# Patient Record
Sex: FEMALE | Race: BLACK OR AFRICAN AMERICAN | NOT HISPANIC OR LATINO | Employment: FULL TIME | URBAN - METROPOLITAN AREA
[De-identification: names, ages, dates, MRNs, and addresses within clinical notes are randomized per-mention and may not be internally consistent; named-entity substitution may affect disease eponyms.]

---

## 2017-03-14 ENCOUNTER — TELEPHONE (OUTPATIENT)
Dept: GASTROENTEROLOGY | Facility: CLINIC | Age: 42
End: 2017-03-14

## 2017-03-14 DIAGNOSIS — K90.89 BILE SALT-INDUCED DIARRHEA: Chronic | ICD-10-CM

## 2017-03-14 RX ORDER — CHOLESTYRAMINE 4 G/9G
4 POWDER, FOR SUSPENSION ORAL DAILY
Qty: 30 PACKET | Refills: 1 | Status: SHIPPED | OUTPATIENT
Start: 2017-03-14 | End: 2017-05-03 | Stop reason: SDUPTHER

## 2017-03-14 NOTE — TELEPHONE ENCOUNTER
----- Message from Amanda Phipps MA sent at 3/14/2017  1:56 PM CDT -----  Contact: [phone] self      ----- Message -----     From: Keith Michaels MA     Sent: 3/14/2017   1:31 PM       To: Amanda Phipps MA        ----- Message -----     From: Beverly Reza MA     Sent: 3/14/2017   1:24 PM       To: [facility] Staff ([facility])    REFILLS:     Patient is requesting a medication refill.     RX name: cholestyramine (QUESTRAN) 4 gram packet    Pharmacy name/location: [address] [place] [address]    Pharmacy phone: [phone]

## 2017-04-24 ENCOUNTER — TELEPHONE (OUTPATIENT)
Dept: OBSTETRICS AND GYNECOLOGY | Facility: CLINIC | Age: 42
End: 2017-04-24

## 2017-04-24 NOTE — TELEPHONE ENCOUNTER
----- Message from Annabel Faye sent at 4/21/2017 12:33 PM CDT -----  Contact: self, [phone]  Patient requests to schedule her mammogram exam on the day of her annual appointment on 5/3. Please advise.

## 2017-05-03 ENCOUNTER — OFFICE VISIT (OUTPATIENT)
Dept: GASTROENTEROLOGY | Facility: CLINIC | Age: 42
End: 2017-05-03
Payer: COMMERCIAL

## 2017-05-03 VITALS
SYSTOLIC BLOOD PRESSURE: 111 MMHG | WEIGHT: 236.19 LBS | BODY MASS INDEX: 43.46 KG/M2 | DIASTOLIC BLOOD PRESSURE: 68 MMHG | HEIGHT: 62 IN | HEART RATE: 86 BPM

## 2017-05-03 DIAGNOSIS — R11.0 NAUSEA ALONE: ICD-10-CM

## 2017-05-03 DIAGNOSIS — K90.89 BILE SALT-INDUCED DIARRHEA: Chronic | ICD-10-CM

## 2017-05-03 DIAGNOSIS — Z51.81 ENCOUNTER FOR MONITORING CHRONIC NSAID THERAPY: Chronic | ICD-10-CM

## 2017-05-03 DIAGNOSIS — Z79.1 ENCOUNTER FOR MONITORING CHRONIC NSAID THERAPY: Chronic | ICD-10-CM

## 2017-05-03 DIAGNOSIS — M94.0 TIETZE'S SYNDROME: ICD-10-CM

## 2017-05-03 DIAGNOSIS — K21.9 GASTROESOPHAGEAL REFLUX DISEASE WITHOUT ESOPHAGITIS: Primary | Chronic | ICD-10-CM

## 2017-05-03 DIAGNOSIS — K90.89 BILE SALT-INDUCED DIARRHEA: ICD-10-CM

## 2017-05-03 DIAGNOSIS — R12 HEARTBURN: ICD-10-CM

## 2017-05-03 DIAGNOSIS — K21.9 GASTROESOPHAGEAL REFLUX DISEASE WITHOUT ESOPHAGITIS: Chronic | ICD-10-CM

## 2017-05-03 PROCEDURE — 1160F RVW MEDS BY RX/DR IN RCRD: CPT | Mod: S$GLB,,, | Performed by: INTERNAL MEDICINE

## 2017-05-03 PROCEDURE — 99999 PR PBB SHADOW E&M-EST. PATIENT-LVL III: CPT | Mod: PBBFAC,,, | Performed by: INTERNAL MEDICINE

## 2017-05-03 PROCEDURE — 99214 OFFICE O/P EST MOD 30 MIN: CPT | Mod: S$GLB,,, | Performed by: INTERNAL MEDICINE

## 2017-05-03 RX ORDER — KETOROLAC TROMETHAMINE 10 MG/1
10 TABLET, FILM COATED ORAL 2 TIMES DAILY
COMMUNITY
End: 2018-05-10

## 2017-05-03 RX ORDER — OMEPRAZOLE 20 MG/1
20 CAPSULE, DELAYED RELEASE ORAL DAILY
Qty: 30 CAPSULE | Refills: 10 | Status: SHIPPED | OUTPATIENT
Start: 2017-05-03 | End: 2018-05-10

## 2017-05-03 RX ORDER — TRAMADOL HYDROCHLORIDE 50 MG/1
50 TABLET ORAL EVERY 12 HOURS PRN
COMMUNITY
End: 2018-05-10

## 2017-05-03 RX ORDER — CHOLESTYRAMINE 4 G/9G
4 POWDER, FOR SUSPENSION ORAL DAILY
Qty: 30 PACKET | Refills: 10 | Status: SHIPPED | OUTPATIENT
Start: 2017-05-03 | End: 2018-10-26

## 2017-05-03 NOTE — PROGRESS NOTES
[facility]     Chief complaint: Follow-up and Gastroesophageal Reflux       PCP: Liana Grider MD    HPI: 42 y.o. 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  On presentation today, the patient looks well, but reports that she has had significant increase in heartburn symptoms, and persistent nausea for the past 2-3 months.  Upon reviewing her medications, it appears that she has been on daily ketorolac/Toradol for the past year as a method of preventing or treating her headaches.  There is been no weight loss, she denies any dysphagia or odynophagia.  Normal bowel pattern with Questran use is 1-2 bowel movements daily.  She denies any bright red blood per rectum or melena.    I discussed some length the problem with chronic NSAID use, especially for Toradol.  She has most of her heartburn symptoms occur at night, we discussed shifting the dosing of the Prilosec to before the evening meal.  Also trial off Toradol was recommended and the patient agrees to this.    Patient is accompanied by no one.    Past Medical History:   Diagnosis Date    Fibroid     GERD (gastroesophageal reflux disease)     Migraine headache        Review of Systems  Review of Systems   Constitutional: Negative for appetite change, chills, diaphoresis, fatigue, fever and unexpected weight change.   HENT: Negative for postnasal drip, trouble swallowing and voice change.    Eyes: Negative for visual disturbance.   Respiratory: Negative for cough, chest tightness, shortness of breath and wheezing.    Cardiovascular: Positive for chest pain (retrosternal burn). Negative for palpitations and leg swelling.   Gastrointestinal: Positive for nausea. Negative for abdominal distention, abdominal pain, anal bleeding, blood in stool, constipation, diarrhea, rectal pain and vomiting.   Endocrine: Negative for cold  "intolerance and polyuria.   Genitourinary: Negative for difficulty urinating, frequency, urgency and vaginal bleeding.   Musculoskeletal: Negative for arthralgias, back pain and gait problem.   Skin: Negative.    Allergic/Immunologic: Negative for environmental allergies and food allergies.   Neurological: Negative for seizures, speech difficulty and headaches.   Hematological: Does not bruise/bleed easily.   Psychiatric/Behavioral: Negative.        Physical Examination  /68 (BP Location: Right arm, Patient Position: Sitting)  Pulse 86  Ht 5' 2" (1.575 m)  Wt 107.1 kg (236 lb 3.2 oz)  BMI 43.2 kg/m2  Wt Readings from Last 1 Encounters:   05/03/17 1302 107.1 kg (236 lb 3.2 oz)       Physical Exam   Constitutional: She is oriented to person, place, and time. Vital signs are normal. She appears well-developed and well-nourished.   HENT:   Head: Normocephalic and atraumatic.   Right Ear: External ear normal.   Left Ear: External ear normal.   Nose: Nose normal.   Mouth/Throat: Uvula is midline and oropharynx is clear and moist. No oropharyngeal exudate.   Eyes: Conjunctivae, EOM and lids are normal. Pupils are equal, round, and reactive to light. No scleral icterus.   Neck: Trachea normal. Neck supple. No JVD present. No tracheal tenderness and no muscular tenderness present. No tracheal deviation and no edema present. No thyromegaly present.   Cardiovascular: Normal rate, regular rhythm, normal heart sounds and normal pulses.  Exam reveals no S3 and no S4.    No murmur heard.  Pulmonary/Chest: Effort normal and breath sounds normal. No stridor. She exhibits tenderness (focally tender in the mid axillary line along both costal margins).   Abdominal: Soft. Normal appearance and bowel sounds are normal. She exhibits no distension, no pulsatile liver, no fluid wave, no abdominal bruit, no pulsatile midline mass and no mass. There is no hepatosplenomegaly. There is no tenderness. There is no rebound and no " guarding. No hernia.   Morbidly obese, nontender throughout examined area   Musculoskeletal: Normal range of motion. She exhibits no edema.   Neurological: She is alert and oriented to person, place, and time. She exhibits normal muscle tone. Coordination normal.   Skin: Skin is warm and dry. No petechiae and no rash noted. No cyanosis. Nails show no clubbing.   Psychiatric: She has a normal mood and affect. Her speech is normal and behavior is normal. Judgment normal. Cognition and memory are normal.   Nursing note and vitals reviewed.    Labs:   Complete Blood Count  No results found for: CBC    Comprehensive Metabolic Panel  No results found for: CMP    TSH  No results found for: TSH      Assessment:   Gastroesophageal reflux disease without esophagitis  Comments:  Symptoms have recurred in the past 2-3 months despite daily Prilosec treatment;  Mainly nocturnal  Orders:  -     omeprazole (PRILOSEC) 20 MG capsule; Take 1 capsule (20 mg total) by mouth once daily.  Dispense: 30 capsule; Refill: 10    Bile salt-induced diarrhea  -     cholestyramine (QUESTRAN) 4 gram packet; Take 1 packet (4 g total) by mouth once daily.  Dispense: 30 packet; Refill: 10    Nausea alone  Comments:  Present for the past 2-3 months, persistent,    Heartburn    Encounter for monitoring chronic NSAID therapy  Comments:  Daily Toradol for 1 year for headache treatment    Tietze's syndrome  Comments:  Bilateral costal margins mainly in the chart      Bile salt-induced diarrhea  Comments:  doing well w cholestyramine  Orders:  -     cholestyramine (QUESTRAN) 4 gram packet; Take 1 packet (4 g total) by mouth once daily.  Dispense: 30 packet; Refill: 10    Gastroesophageal reflux disease without esophagitis  -     omeprazole (PRILOSEC) 20 MG capsule; Take 1 capsule (20 mg total) by mouth once daily.  Dispense: 30 capsule; Refill: 10      Plan:    Return in about 3 months (around 8/3/2017), or if symptoms worsen or fail to improve.    No  orders of the defined types were placed in this encounter.    - Prilosec dose should be moved to have a dose given a half-hour before the evening meal  -Recommend holding Toradol use for at least 1 week, and, assuming that symptoms respond, should discontinue and seek another treatment plan for headaches  -Will attempt to refer patient for headache specialist at [facility]  -Continue Prilosec use as well as Questran use as previously recommended  -Follow-up office visit in 3 months, or as needed      Rafael Huffman MD, FACP, FACG, AGAF [facility]  [address], [address], [place] [address]  Phone: [phone] Fax: [phone]    [address], [address], [place] [address]  Phone: [phone] Fax: [phone]    - Portions of this note were dictated using voice recognition software and may contain dictation related errors in spelling / grammar / syntax not discovered on text review.

## 2017-05-03 NOTE — PATIENT INSTRUCTIONS
- Prilosec dose should be moved to have a dose given a half-hour before the evening meal  -Recommend holding Toradol use for at least 1 week, and, assuming that symptoms respond, should discontinue and seek another treatment plan for headaches  -Will attempt to refer patient for headache specialist at [facility]  -Continue Prilosec use as well as Questran use as previously recommended  -Follow-up office visit in 3 months, or as needed

## 2017-05-03 NOTE — MR AVS SNAPSHOT
[facility]  [address] [place] [address]  Phone: [phone]  Fax: [phone]                  Luzmaria Real   5/3/2017 1:20 PM   Office Visit    Description:  Female : 1975   Provider:  Rafael Huffman MD   Department:  [facility]           Reason for Visit     Follow-up     Gastroesophageal Reflux           Diagnoses this Visit        Comments    Gastroesophageal reflux disease without esophagitis    -  Primary Symptoms have recurred in the past 2-3 months despite daily Prilosec treatment;  Mainly nocturnal    Bile salt-induced diarrhea         Nausea alone     Present for the past 2-3 months, persistent,    Heartburn         Encounter for monitoring chronic NSAID therapy     Daily Toradol for 1 year for headache treatment    Tietze's syndrome     Bilateral costal margins mainly in the chart      Bile salt-induced diarrhea     doing well w cholestyramine    Gastroesophageal reflux disease without esophagitis                To Do List           Future Appointments        Provider Department Dept Phone    5/15/2017 9:00 AM MD [facility] - [phone]    2017 9:20 AM Jarett Guajardo MD [place] - Neurology [phone]      Goals (5 Years of Data)     None      Follow-Up and Disposition     Return in about 3 months (around 8/3/2017), or if symptoms worsen or fail to improve.       These Medications        Disp Refills Start End    cholestyramine (QUESTRAN) 4 gram packet 30 packet 10 5/3/2017 2017    Take 1 packet (4 g total) by mouth once daily. - Oral    Pharmacy: [facility]/pharmacy #[id number] - [address] AT [facility] Ph #: [phone]       omeprazole (PRILOSEC) 20 MG capsule 30 capsule 10 5/3/2017     Take 1 capsule (20 mg total) by mouth once daily. - Oral    Pharmacy: [facility]/pharmacy #[id number] - [facility] [address] [facility] Ph  #: [phone]         [facility]     [facility] Nurse Care [facility] - 24/7 Assistance  Unless otherwise directed by your provider, please contact [facility], our nurse care line that is available for 24/7 assistance.     Registered nurses in the [facility] provide: appointment scheduling, clinical advisement, health education, and other advisory services.  Call: [phone] (toll free)               Medications           Message regarding Medications     Verify the changes and/or additions to your medication regime listed below are the same as discussed with your clinician today.  If any of these changes or additions are incorrect, please notify your healthcare provider.        STOP taking these medications     ALBUTEROL SULFATE (PROAIR HFA INHL) Inhale into the lungs.    amoxicillin (AMOXIL) 500 MG capsule Take 500 mg by mouth 3 (three) times daily.    oxycodone-acetaminophen 5-325 mg (PERCOCET) 5-325 mg per tablet Take by mouth as needed.    promethazine-codeine 6.25-10 mg/5 ml (PHENERGAN WITH CODEINE) 6.25-10 mg/5 mL syrup Take 5 mLs by mouth every 4 (four) hours as needed for Cough.           Verify that the below list of medications is an accurate representation of the medications you are currently taking.  If none reported, the list may be blank. If incorrect, please contact your healthcare provider. Carry this list with you in case of emergency.           Current Medications     ketorolac (TORADOL) 10 mg tablet Take 10 mg by mouth 2 (two) times daily.    omeprazole (PRILOSEC) 20 MG capsule Take 1 capsule (20 mg total) by mouth once daily.    promethazine (PHENERGAN) 12.5 MG Tab Take 12.5 mg by mouth every 6 (six) hours as needed.    tramadol (ULTRAM) 50 mg tablet Take 50 mg by mouth every 12 (twelve) hours as needed for Pain.    cholestyramine (QUESTRAN) 4 gram packet Take 1 packet (4 g total) by mouth once daily.           Clinical Reference Information           Your Vitals Were   "   BP Pulse Height Weight BMI    111/68 (BP Location: Right arm, Patient Position: Sitting) 86 5' 2" (1.575 m) 107.1 kg (236 lb 3.2 oz) 43.2 kg/m2      Blood Pressure          Most Recent Value    BP  111/68      Allergies as of 5/3/2017     Caffeine      Immunizations Administered on Date of Encounter - 5/3/2017     None      [facility] Sign-Up     Activating your [facility] account is as easy as 1-2-3!     1) Visit [email or web address], select Sign Up Now, enter this activation code and your date of birth, then select Next.  [email or web address]  Expires: 6/17/2017  2:05 PM      2) Create a username and password to use when you visit [facility] in the future and select a security question in case you lose your password and select Next.    3) Enter your e-mail address and click Sign Up!    Additional Information  If you have questions, please e-mail [email or web address] or call [phone] to talk to our [facility] staff. Remember, [facility] is NOT to be used for urgent needs. For medical emergencies, dial 911.         Instructions    - Prilosec dose should be moved to have a dose given a half-hour before the evening meal  -Recommend holding Toradol use for at least 1 week, and, assuming that symptoms respond, should discontinue and seek another treatment plan for headaches  -[facility]  -Continue Prilosec use as well as Questran use as previously recommended  -Follow-up office visit in 3 months, or as needed       Language Assistance Services     ATTENTION: Language assistance services are available, free of charge. Please call [phone].      ATENCIÓN: Si habla jason, tiene a emmanuel disposición servicios gratuitos de asistencia lingüística. Llame al [phone].     SHANA Ý: N?u b?n nói Ti?ng Vi?t, có các d?ch v? h? tr? ngôn ng? mi?n phí dành cho b?n. G?i s? [phone] complies with applicable Federal civil rights laws and " does not discriminate on the basis of race, color, national origin, age, disability, or sex.

## 2017-05-09 DIAGNOSIS — K21.9 GASTROESOPHAGEAL REFLUX DISEASE WITHOUT ESOPHAGITIS: Chronic | ICD-10-CM

## 2017-05-09 RX ORDER — OMEPRAZOLE 20 MG/1
20 CAPSULE, DELAYED RELEASE ORAL DAILY
Qty: 30 CAPSULE | Refills: 4 | OUTPATIENT
Start: 2017-05-09

## 2017-05-15 ENCOUNTER — OFFICE VISIT (OUTPATIENT)
Dept: OBSTETRICS AND GYNECOLOGY | Facility: CLINIC | Age: 42
End: 2017-05-15
Payer: COMMERCIAL

## 2017-05-15 ENCOUNTER — HOSPITAL ENCOUNTER (OUTPATIENT)
Dept: RADIOLOGY | Facility: HOSPITAL | Age: 42
Discharge: HOME | End: 2017-05-15
Attending: OBSTETRICS & GYNECOLOGY
Payer: COMMERCIAL

## 2017-05-15 VITALS
SYSTOLIC BLOOD PRESSURE: 110 MMHG | DIASTOLIC BLOOD PRESSURE: 80 MMHG | WEIGHT: 235.88 LBS | BODY MASS INDEX: 43.41 KG/M2 | HEIGHT: 62 IN

## 2017-05-15 DIAGNOSIS — Z01.419 WELL WOMAN EXAM WITH ROUTINE GYNECOLOGICAL EXAM: ICD-10-CM

## 2017-05-15 DIAGNOSIS — Z01.419 WELL WOMAN EXAM WITH ROUTINE GYNECOLOGICAL EXAM: Primary | ICD-10-CM

## 2017-05-15 PROCEDURE — 77067 SCR MAMMO BI INCL CAD: CPT | Mod: TC

## 2017-05-15 PROCEDURE — 88175 CYTOPATH C/V AUTO FLUID REDO: CPT

## 2017-05-15 PROCEDURE — 77067 SCR MAMMO BI INCL CAD: CPT | Mod: 26,,, | Performed by: RADIOLOGY

## 2017-05-15 PROCEDURE — 99396 PREV VISIT EST AGE 40-64: CPT | Mod: S$GLB,,, | Performed by: OBSTETRICS & GYNECOLOGY

## 2017-05-15 PROCEDURE — 99999 PR PBB SHADOW E&M-EST. PATIENT-LVL III: CPT | Mod: PBBFAC,,, | Performed by: OBSTETRICS & GYNECOLOGY

## 2017-05-15 NOTE — MR AVS SNAPSHOT
[place] [facility]  [address], [address]  [address] [place] [address]  Phone: [phone]                  Luzmaria Real   5/15/2017 9:00 AM   Office Visit    Description:  Female : 1975   Provider:  Boy Davila MD   Department:  [facility]           Reason for Visit     Well Woman                To Do List           Future Appointments        Provider Department Dept Phone    2017 9:20 AM Jarett Guajardo MD [facility] Neurology [phone]      Goals (5 Years of Data)     None      [facility]     [facility] Nurse Care Line -  Assistance  Unless otherwise directed by your provider, please contact [facility], our nurse care line that is available for  assistance.     Registered nurses in the [facility] provide: appointment scheduling, clinical advisement, health education, and other advisory services.  Call: [phone] (toll free)               Medications           Message regarding Medications     Verify the changes and/or additions to your medication regime listed below are the same as discussed with your clinician today.  If any of these changes or additions are incorrect, please notify your healthcare provider.             Verify that the below list of medications is an accurate representation of the medications you are currently taking.  If none reported, the list may be blank. If incorrect, please contact your healthcare provider. Carry this list with you in case of emergency.           Current Medications     cholestyramine (QUESTRAN) 4 gram packet Take 1 packet (4 g total) by mouth once daily.    ketorolac (TORADOL) 10 mg tablet Take 10 mg by mouth 2 (two) times daily.    omeprazole (PRILOSEC) 20 MG capsule Take 1 capsule (20 mg total) by mouth once daily.    promethazine (PHENERGAN) 12.5 MG Tab Take 12.5 mg by mouth every 6 (six) hours as needed.    tramadol (ULTRAM) 50 mg tablet Take 50 mg by mouth every 12  "(twelve) hours as needed for Pain.           Clinical Reference Information           Your Vitals Were     BP Height Weight BMI       110/80 5' 2" (1.575 m) 107 kg (235 lb 14.3 oz) 43.15 kg/m2       Blood Pressure          Most Recent Value    BP  110/80      Allergies as of 5/15/2017     Caffeine      Immunizations Administered on Date of Encounter - 5/15/2017     None      [facility] Sign-Up     Activating your [facility] account is as easy as 1-2-3!     1) Visit [email or web address], select Sign Up Now, enter this activation code and your date of birth, then select Next.  [email or web address]  Expires: 6/17/2017  2:05 PM      2) Create a username and password to use when you visit [facility] in the future and select a security question in case you lose your password and select Next.    3) Enter your e-mail address and click Sign Up!    Additional Information  If you have questions, please e-mail [email or web address] or call [phone] to talk to our [facility] staff. Remember, [facility] is NOT to be used for urgent needs. For medical emergencies, dial 911.         Language Assistance Services     ATTENTION: Language assistance services are available, free of charge. Please call [phone].      ATENCIÓN: Si habla jason, tiene a emmanuel disposición servicios gratuitos de asistencia lingüística. Llame al [phone].     CHÚ Ý: N?u b?n nói Ti?ng Vi?t, có các d?ch v? h? tr? ngôn ng? mi?n phí dành cho b?n. G?i s? [phone].         [facility] complies with applicable Federal civil rights laws and does not discriminate on the basis of race, color, national origin, age, disability, or sex.        "

## 2017-05-15 NOTE — PROGRESS NOTES
Subjective:       Patient ID: Luzmaria Real is a 42 y.o. female.    Chief Complaint: Well Woman (no complaints)    No problems; some breast tenderness; rare VMS (?)    HPI  Review of Systems   Gastrointestinal: Negative for abdominal distention, abdominal pain, constipation and nausea.   Genitourinary: Negative for dyspareunia, dysuria, genital sores, pelvic pain, vaginal bleeding and vaginal discharge.       Objective:      Physical Exam  PE:   APPEARANCE: Well nourished, well developed, in no acute distress.  SKIN: Normal skin turgor, no lesions.  NECK: Neck symmetric without thyromegaly.  NODES: No inguinal or cervical lymph node enlargement.  CHEST: Lungs clear to auscultation.  HEART: Regular rate and rhythm, no murmurs, rubs or gallops.  ABDOMEN: Soft. No tenderness or masses. No hernias.  BREASTS: Symmetrical, no skin changes or visible lesions. No palpable masses, nipple discharge or adenopathy bilaterally.  PELVIC: External female genitalia without lesions. Normal hair distribution. Adequate perineal body, normal urethral meatus. Vagina atrophic and not well rugated without lesions or discharge. Cervix and Uterus surgically absent. No significant cystocele or rectocele. Adnexa without masses or tenderness. Urethra and bladder normal.  EXTREMITIES: No edema.        Assessment:       1. Well woman exam with routine gynecological exam        Plan:     annual gyn visit; pap and mammogram; no labs or ERT unless gets worse symptoms or age 50

## 2017-05-22 ENCOUNTER — TELEPHONE (OUTPATIENT)
Dept: OBSTETRICS AND GYNECOLOGY | Facility: CLINIC | Age: 42
End: 2017-05-22

## 2017-06-02 ENCOUNTER — TELEPHONE (OUTPATIENT)
Dept: OBSTETRICS AND GYNECOLOGY | Facility: CLINIC | Age: 42
End: 2017-06-02

## 2017-06-02 NOTE — TELEPHONE ENCOUNTER
----- Message from Annabel Faye sent at 6/1/2017  4:29 PM CDT -----  Contact: self, [phone]  Patient called in requesting to speak with you regarding her 5/15 mammogram results.  Please advise.

## 2017-12-28 ENCOUNTER — HOSPITAL ENCOUNTER (EMERGENCY)
Facility: HOSPITAL | Age: 42
Discharge: HOME | End: 2017-12-29
Payer: COMMERCIAL

## 2017-12-28 DIAGNOSIS — K52.9 ENTERITIS: Primary | ICD-10-CM

## 2017-12-28 LAB
ALBUMIN SERPL BCP-MCNC: 4.4 G/DL
ALP SERPL-CCNC: 70 U/L
ALT SERPL W/O P-5'-P-CCNC: 40 U/L
ANION GAP SERPL CALC-SCNC: 12 MMOL/L
AST SERPL-CCNC: 42 U/L
BACTERIA #/AREA URNS AUTO: ABNORMAL /HPF
BASOPHILS # BLD AUTO: 0.01 K/UL
BASOPHILS NFR BLD: 0.1 %
BILIRUB SERPL-MCNC: 0.5 MG/DL
BILIRUB UR QL STRIP: NEGATIVE
BUN SERPL-MCNC: 13 MG/DL
CALCIUM SERPL-MCNC: 8.9 MG/DL
CHLORIDE SERPL-SCNC: 101 MMOL/L
CLARITY UR REFRACT.AUTO: CLEAR
CO2 SERPL-SCNC: 26 MMOL/L
COLOR UR AUTO: ABNORMAL
CREAT SERPL-MCNC: 0.74 MG/DL
DIFFERENTIAL METHOD: ABNORMAL
EOSINOPHIL # BLD AUTO: 0 K/UL
EOSINOPHIL NFR BLD: 0.4 %
ERYTHROCYTE [DISTWIDTH] IN BLOOD BY AUTOMATED COUNT: 13.3 %
EST. GFR  (AFRICAN AMERICAN): >60 ML/MIN/1.73 M^2
EST. GFR  (NON AFRICAN AMERICAN): >60 ML/MIN/1.73 M^2
GLUCOSE SERPL-MCNC: 115 MG/DL
GLUCOSE UR QL STRIP: NEGATIVE
HCT VFR BLD AUTO: 41.5 %
HGB BLD-MCNC: 13.8 G/DL
HGB UR QL STRIP: ABNORMAL
KETONES UR QL STRIP: NEGATIVE
LEUKOCYTE ESTERASE UR QL STRIP: NEGATIVE
LIPASE SERPL-CCNC: 33 U/L
LYMPHOCYTES # BLD AUTO: 0.9 K/UL
LYMPHOCYTES NFR BLD: 11.5 %
MCH RBC QN AUTO: 30 PG
MCHC RBC AUTO-ENTMCNC: 33.3 G/DL
MCV RBC AUTO: 90 FL
MICROSCOPIC COMMENT: ABNORMAL
MONOCYTES # BLD AUTO: 0.4 K/UL
MONOCYTES NFR BLD: 4.9 %
NEUTROPHILS # BLD AUTO: 6.2 K/UL
NEUTROPHILS NFR BLD: 83 %
NITRITE UR QL STRIP: NEGATIVE
PH UR STRIP: 5 [PH] (ref 5–8)
PLATELET # BLD AUTO: 263 K/UL
PMV BLD AUTO: 9.5 FL
POTASSIUM SERPL-SCNC: 4 MMOL/L
PROT SERPL-MCNC: 8 G/DL
PROT UR QL STRIP: NEGATIVE
RBC # BLD AUTO: 4.6 M/UL
RBC #/AREA URNS AUTO: 25 /HPF (ref 0–4)
SODIUM SERPL-SCNC: 139 MMOL/L
SP GR UR STRIP: 1.01 (ref 1–1.03)
TROPONIN I SERPL DL<=0.01 NG/ML-MCNC: <0.012 NG/ML
URN SPEC COLLECT METH UR: ABNORMAL
UROBILINOGEN UR STRIP-ACNC: NEGATIVE EU/DL
WBC # BLD AUTO: 7.5 K/UL
WBC #/AREA URNS AUTO: 1 /HPF (ref 0–5)

## 2017-12-28 PROCEDURE — 85025 COMPLETE CBC W/AUTO DIFF WBC: CPT

## 2017-12-28 PROCEDURE — 80053 COMPREHEN METABOLIC PANEL: CPT

## 2017-12-28 PROCEDURE — 93010 ELECTROCARDIOGRAM REPORT: CPT | Mod: S$GLB,,, | Performed by: INTERNAL MEDICINE

## 2017-12-28 PROCEDURE — 81000 URINALYSIS NONAUTO W/SCOPE: CPT

## 2017-12-28 PROCEDURE — 25000003 PHARM REV CODE 250

## 2017-12-28 PROCEDURE — 96361 HYDRATE IV INFUSION ADD-ON: CPT

## 2017-12-28 PROCEDURE — 99284 EMERGENCY DEPT VISIT MOD MDM: CPT | Mod: 25

## 2017-12-28 PROCEDURE — 96374 THER/PROPH/DIAG INJ IV PUSH: CPT

## 2017-12-28 PROCEDURE — 84484 ASSAY OF TROPONIN QUANT: CPT

## 2017-12-28 PROCEDURE — 83690 ASSAY OF LIPASE: CPT

## 2017-12-28 PROCEDURE — 93005 ELECTROCARDIOGRAM TRACING: CPT

## 2017-12-28 PROCEDURE — 63600175 PHARM REV CODE 636 W HCPCS

## 2017-12-28 PROCEDURE — 96375 TX/PRO/DX INJ NEW DRUG ADDON: CPT

## 2017-12-28 PROCEDURE — 96372 THER/PROPH/DIAG INJ SC/IM: CPT

## 2017-12-28 RX ORDER — DICYCLOMINE HYDROCHLORIDE 10 MG/ML
20 INJECTION INTRAMUSCULAR
Status: COMPLETED | OUTPATIENT
Start: 2017-12-28 | End: 2017-12-28

## 2017-12-28 RX ORDER — KETOROLAC TROMETHAMINE 30 MG/ML
30 INJECTION, SOLUTION INTRAMUSCULAR; INTRAVENOUS
Status: COMPLETED | OUTPATIENT
Start: 2017-12-28 | End: 2017-12-28

## 2017-12-28 RX ORDER — ONDANSETRON 2 MG/ML
4 INJECTION INTRAMUSCULAR; INTRAVENOUS
Status: COMPLETED | OUTPATIENT
Start: 2017-12-28 | End: 2017-12-28

## 2017-12-28 RX ADMIN — ONDANSETRON 4 MG: 2 INJECTION INTRAMUSCULAR; INTRAVENOUS at 09:12

## 2017-12-28 RX ADMIN — DICYCLOMINE HYDROCHLORIDE 20 MG: 20 INJECTION, SOLUTION INTRAMUSCULAR at 09:12

## 2017-12-28 RX ADMIN — SODIUM CHLORIDE 1000 ML: 0.9 INJECTION, SOLUTION INTRAVENOUS at 09:12

## 2017-12-28 RX ADMIN — KETOROLAC TROMETHAMINE 30 MG: 30 INJECTION, SOLUTION INTRAMUSCULAR at 11:12

## 2017-12-28 RX ADMIN — SODIUM CHLORIDE 1000 ML: 0.9 INJECTION, SOLUTION INTRAVENOUS at 10:12

## 2017-12-29 VITALS
DIASTOLIC BLOOD PRESSURE: 76 MMHG | HEART RATE: 92 BPM | OXYGEN SATURATION: 99 % | BODY MASS INDEX: 45.73 KG/M2 | TEMPERATURE: 98 F | SYSTOLIC BLOOD PRESSURE: 130 MMHG | RESPIRATION RATE: 18 BRPM | WEIGHT: 250 LBS

## 2017-12-29 RX ORDER — ONDANSETRON 4 MG/1
4 TABLET, FILM COATED ORAL EVERY 8 HOURS PRN
Qty: 12 TABLET | Refills: 0 | Status: SHIPPED | OUTPATIENT
Start: 2017-12-29 | End: 2018-05-10

## 2017-12-29 RX ORDER — DICYCLOMINE HYDROCHLORIDE 20 MG/1
20 TABLET ORAL 2 TIMES DAILY
Qty: 20 TABLET | Refills: 0 | Status: SHIPPED | OUTPATIENT
Start: 2017-12-29 | End: 2018-01-28

## 2017-12-29 NOTE — ED NOTES
Resting quietly in bed with respirations even and unlabored.  No distress noted.  Will continue to monitor.

## 2017-12-29 NOTE — ED TRIAGE NOTES
Pt presents to ed with upper abdominal pain with nausea and approx 9 episodes of diarrhea. Pt took phenergen with no relief. Mild headache. Pt c/o dry mouth-minimal water.

## 2017-12-29 NOTE — ED PROVIDER NOTES
Encounter Date: 12/28/2017       History     Chief Complaint   Patient presents with    Abdominal Pain     abd pain and nausea with diarrhea that started today    Nausea    Diarrhea     42-year-old female presents to clinic with complaints of diffuse abdominal cramping, 9 episodes of nonbloody diarrhea, nausea without vomiting and left sided chest pain.  Symptoms started this morning.  Patient suspects possible food intake as cause of symptoms.  Patient denies urinary symptoms.  Patient denies fever or chills. patient has past medical history of tubal ligation, hysterectomy and cholecystectomy.  Patient has tried leftover Phenergan prescription without relief. Pt reports PMH of hematuria and her PCP stating she most likely has kidney stones. When asked, pt states she did have CT scan 6 years ago that confirmed stones but to her knowledge she has never passed one.           Review of patient's allergies indicates:   Allergen Reactions    Caffeine      Past Medical History:   Diagnosis Date    Fibroid     GERD (gastroesophageal reflux disease)     Migraine headache      Past Surgical History:   Procedure Laterality Date    CHOLECYSTECTOMY      HYSTERECTOMY      MOUTH SURGERY      TONSILLECTOMY      TUBAL LIGATION       Family History   Problem Relation Age of Onset    Prostate cancer Paternal Grandfather     Cancer Paternal Grandmother     Throat cancer Maternal Grandmother     Aneurysm Father      stomach aneurysm    Cancer Maternal Aunt      gastric cancer    Prostate cancer Maternal Uncle      Social History   Substance Use Topics    Smoking status: Never Smoker    Smokeless tobacco: Never Used    Alcohol use Yes      Comment: socailly     Review of Systems   Constitutional: Negative for fever.   HENT: Negative for sore throat.    Respiratory: Negative for shortness of breath.    Cardiovascular: Negative for chest pain.   Gastrointestinal: Negative for nausea.   Genitourinary: Negative for  dysuria.   Musculoskeletal: Negative for back pain.   Skin: Negative for rash.   Neurological: Negative for weakness.   Hematological: Does not bruise/bleed easily.   All other systems reviewed and are negative.      Physical Exam     Initial Vitals [12/28/17 2126]   BP Pulse Resp Temp SpO2   (!) 136/90 (!) 115 20 98.9 °F (37.2 °C) 100 %      MAP       105.33         Physical Exam    Nursing note and vitals reviewed.  Constitutional: Vital signs are normal. She appears well-developed and well-nourished. She is active. No distress.   HENT:   Head: Normocephalic and atraumatic.   Eyes: Conjunctivae, EOM and lids are normal.   Neck: Normal range of motion. Neck supple. No thyroid mass and no thyromegaly present.   Cardiovascular: Normal rate, regular rhythm, normal heart sounds and normal pulses.   No murmur heard.  Pulmonary/Chest: Effort normal and breath sounds normal. No respiratory distress. She has no decreased breath sounds. She has no wheezes. She has no rales.   Musculoskeletal: Normal range of motion.   Neurological: She is alert.   Skin: Skin is warm, dry and intact. Capillary refill takes less than 2 seconds.         ED Course   Procedures  Labs Reviewed   URINALYSIS   CBC W/ AUTO DIFFERENTIAL   COMPREHENSIVE METABOLIC PANEL   LIPASE   TROPONIN I     EKG Readings: (Independently Interpreted)   Initial Reading: No STEMI. Rhythm: Normal Sinus Rhythm. Heart Rate: 97. Ectopy: No Ectopy. Conduction: Normal. ST Segments: Normal ST Segments. T Waves: Normal. Clinical Impression: Normal Sinus Rhythm       X-Rays:   Independently Interpreted Readings:   Abdomen:   Renal Stone Study - Consistent with nonspecific enteritis       Medical Decision Making:   Initial Assessment:   42-year-old female presents to clinic with complaints of diffuse abdominal cramping, 9 episodes of nonbloody diarrhea, nausea without vomiting and left sided chest pain.  Symptoms started this morning.  Patient suspects possible food intake as  cause of symptoms.  Patient denies urinary symptoms.  Patient denies fever or chills. patient has past medical history of tubal ligation, hysterectomy and cholecystectomy.  Patient has tried leftover Phenergan prescription without relief. Pt reports PMH of hematuria and her PCP stating she most likely has kidney stones. When asked, pt states she did have CT scan 6 years ago that confirmed stones but to her knowledge she has never passed one.   Physical exam reveals diffuse, mild,, mild, nonlocalizing abdominal tenderness without, guarding or rigidity.  No CVA tenderness.  Lung sounds are clear to auscultation bilaterally.  Oral mucosa is moist.    Differential Diagnosis:   Gastroenteritis, urinary tract infection, MI.  Clinical Tests:   Lab Tests: Ordered and Reviewed  Radiological Study: Ordered and Reviewed                   ED Course      Clinical Impression:   The encounter diagnosis was Enteritis.    Disposition:   Disposition: Discharged  Condition: Stable                        Mariana Gill MD  12/29/17 0144

## 2018-03-02 ENCOUNTER — TELEPHONE (OUTPATIENT)
Dept: OBSTETRICS AND GYNECOLOGY | Facility: CLINIC | Age: 43
End: 2018-03-02

## 2018-05-10 ENCOUNTER — HOSPITAL ENCOUNTER (EMERGENCY)
Facility: HOSPITAL | Age: 43
Discharge: HOME | End: 2018-05-10
Attending: EMERGENCY MEDICINE
Payer: COMMERCIAL

## 2018-05-10 VITALS
HEIGHT: 62 IN | RESPIRATION RATE: 18 BRPM | HEART RATE: 87 BPM | TEMPERATURE: 98 F | OXYGEN SATURATION: 99 % | SYSTOLIC BLOOD PRESSURE: 133 MMHG | BODY MASS INDEX: 45.08 KG/M2 | WEIGHT: 245 LBS | DIASTOLIC BLOOD PRESSURE: 81 MMHG

## 2018-05-10 DIAGNOSIS — R52 PAIN: ICD-10-CM

## 2018-05-10 DIAGNOSIS — K52.9 GASTROENTERITIS: Primary | ICD-10-CM

## 2018-05-10 LAB
BILIRUB UR QL STRIP: NEGATIVE
CLARITY UR REFRACT.AUTO: ABNORMAL
COLOR UR AUTO: YELLOW
GLUCOSE UR QL STRIP: NEGATIVE
HGB UR QL STRIP: ABNORMAL
KETONES UR QL STRIP: ABNORMAL
LEUKOCYTE ESTERASE UR QL STRIP: NEGATIVE
MICROSCOPIC COMMENT: ABNORMAL
NITRITE UR QL STRIP: NEGATIVE
PH UR STRIP: 5 [PH] (ref 5–8)
PROT UR QL STRIP: NEGATIVE
RBC #/AREA URNS AUTO: 25 /HPF (ref 0–4)
SP GR UR STRIP: 1.02 (ref 1–1.03)
URN SPEC COLLECT METH UR: ABNORMAL
UROBILINOGEN UR STRIP-ACNC: NEGATIVE EU/DL
WBC #/AREA URNS AUTO: 1 /HPF (ref 0–5)

## 2018-05-10 PROCEDURE — 96372 THER/PROPH/DIAG INJ SC/IM: CPT

## 2018-05-10 PROCEDURE — 25000003 PHARM REV CODE 250: Performed by: EMERGENCY MEDICINE

## 2018-05-10 PROCEDURE — 63600175 PHARM REV CODE 636 W HCPCS: Performed by: EMERGENCY MEDICINE

## 2018-05-10 PROCEDURE — 81000 URINALYSIS NONAUTO W/SCOPE: CPT

## 2018-05-10 PROCEDURE — 99284 EMERGENCY DEPT VISIT MOD MDM: CPT | Mod: 25

## 2018-05-10 RX ORDER — KETOROLAC TROMETHAMINE 30 MG/ML
60 INJECTION, SOLUTION INTRAMUSCULAR; INTRAVENOUS
Status: COMPLETED | OUTPATIENT
Start: 2018-05-10 | End: 2018-05-10

## 2018-05-10 RX ORDER — ONDANSETRON 4 MG/1
4 TABLET, ORALLY DISINTEGRATING ORAL
Status: COMPLETED | OUTPATIENT
Start: 2018-05-10 | End: 2018-05-10

## 2018-05-10 RX ORDER — ONDANSETRON 4 MG/1
4 TABLET, ORALLY DISINTEGRATING ORAL EVERY 6 HOURS PRN
Qty: 12 TABLET | Refills: 0 | Status: SHIPPED | OUTPATIENT
Start: 2018-05-10 | End: 2018-10-26

## 2018-05-10 RX ADMIN — KETOROLAC TROMETHAMINE 60 MG: 30 INJECTION, SOLUTION INTRAMUSCULAR at 08:05

## 2018-05-10 RX ADMIN — ONDANSETRON 4 MG: 4 TABLET, ORALLY DISINTEGRATING ORAL at 07:05

## 2018-05-10 NOTE — ED PROVIDER NOTES
Encounter Date: 5/10/2018       History     Chief Complaint   Patient presents with    Abdominal Pain     abd pain , nausea, no vomiting, no diarrhea. took zofran at home. denies urinary symptoms      The history is provided by the patient.   Abdominal Pain   The current episode started several hours ago. The onset of the illness was gradual. The problem has not changed since onset.The abdominal pain is generalized. The abdominal pain does not radiate. The severity of the abdominal pain is 5/10. The abdominal pain is relieved by nothing. The other symptoms of the illness include nausea. The other symptoms of the illness do not include fever, jaundice, melena, vomiting or diarrhea.     Review of patient's allergies indicates:   Allergen Reactions    Caffeine      Past Medical History:   Diagnosis Date    Fibroid     GERD (gastroesophageal reflux disease)     Migraine headache      Past Surgical History:   Procedure Laterality Date    CHOLECYSTECTOMY      HYSTERECTOMY      MOUTH SURGERY      TONSILLECTOMY      TUBAL LIGATION       Family History   Problem Relation Age of Onset    Prostate cancer Paternal Grandfather     Cancer Paternal Grandmother     Throat cancer Maternal Grandmother     Aneurysm Father         stomach aneurysm    Cancer Maternal Aunt         gastric cancer    Prostate cancer Maternal Uncle      Social History   Substance Use Topics    Smoking status: Never Smoker    Smokeless tobacco: Never Used    Alcohol use Yes      Comment: socailly     Review of Systems   Constitutional: Negative for fever.   Gastrointestinal: Positive for abdominal pain and nausea. Negative for diarrhea, jaundice, melena and vomiting.   All other systems reviewed and are negative.      Physical Exam     Initial Vitals [05/10/18 0713]   BP Pulse Resp Temp SpO2   137/89 94 18 98.2 °F (36.8 °C) 99 %      MAP       105         Physical Exam    Nursing note and vitals reviewed.  Constitutional: She appears  well-developed and well-nourished.   HENT:   Head: Normocephalic and atraumatic.   Eyes: Conjunctivae and EOM are normal.   Neck: Normal range of motion. Neck supple.   Cardiovascular: Normal rate, regular rhythm, normal heart sounds and intact distal pulses.   Pulmonary/Chest: Breath sounds normal. She has no wheezes. She has no rhonchi. She has no rales.   Abdominal: Soft. She exhibits no distension. There is no tenderness. There is no rigidity, no rebound, no guarding, no CVA tenderness, no tenderness at McBurney's point and negative Centeno's sign.   Musculoskeletal: Normal range of motion.   Neurological: She is alert and oriented to person, place, and time.   Skin: Skin is warm and dry. Capillary refill takes less than 2 seconds.   Psychiatric: She has a normal mood and affect. Her behavior is normal. Judgment and thought content normal.         ED Course   Procedures  Labs Reviewed   URINALYSIS          X-Rays:   Independently Interpreted Readings:   Abdomen:   Erect only of Abdomen - Unremarkable     Medical Decision Making:   Clinical Tests:   Lab Tests: Ordered and Reviewed  Radiological Study: Ordered and Reviewed  ED Management:  Patient has not had any vomiting while in the emergency department.  Her pain is at a minimum as well.                      Clinical Impression:   The primary encounter diagnosis was Gastroenteritis. A diagnosis of Pain was also pertinent to this visit.    Disposition:   Disposition: Discharged  Condition: Stable                        Mariana Gill MD  05/10/18 9083

## 2018-10-26 ENCOUNTER — OFFICE VISIT (OUTPATIENT)
Dept: INTERNAL MEDICINE | Facility: CLINIC | Age: 43
End: 2018-10-26
Payer: COMMERCIAL

## 2018-10-26 ENCOUNTER — LAB VISIT (OUTPATIENT)
Dept: LAB | Facility: HOSPITAL | Age: 43
End: 2018-10-26
Attending: INTERNAL MEDICINE
Payer: COMMERCIAL

## 2018-10-26 VITALS
DIASTOLIC BLOOD PRESSURE: 77 MMHG | TEMPERATURE: 98 F | BODY MASS INDEX: 44.3 KG/M2 | RESPIRATION RATE: 16 BRPM | HEART RATE: 63 BPM | HEIGHT: 62 IN | WEIGHT: 240.75 LBS | SYSTOLIC BLOOD PRESSURE: 124 MMHG

## 2018-10-26 DIAGNOSIS — G43.909 MIGRAINE WITHOUT STATUS MIGRAINOSUS, NOT INTRACTABLE, UNSPECIFIED MIGRAINE TYPE: ICD-10-CM

## 2018-10-26 DIAGNOSIS — Z12.31 VISIT FOR SCREENING MAMMOGRAM: ICD-10-CM

## 2018-10-26 DIAGNOSIS — Z00.00 ANNUAL PHYSICAL EXAM: ICD-10-CM

## 2018-10-26 DIAGNOSIS — K21.9 GASTROESOPHAGEAL REFLUX DISEASE WITHOUT ESOPHAGITIS: Chronic | ICD-10-CM

## 2018-10-26 DIAGNOSIS — Z00.00 ANNUAL PHYSICAL EXAM: Primary | ICD-10-CM

## 2018-10-26 LAB
ALBUMIN SERPL BCP-MCNC: 3.6 G/DL
ALP SERPL-CCNC: 53 U/L
ALT SERPL W/O P-5'-P-CCNC: 17 U/L
ANION GAP SERPL CALC-SCNC: 9 MMOL/L
AST SERPL-CCNC: 17 U/L
BASOPHILS # BLD AUTO: 0.01 K/UL
BASOPHILS NFR BLD: 0.1 %
BILIRUB SERPL-MCNC: 0.7 MG/DL
BUN SERPL-MCNC: 9 MG/DL
CALCIUM SERPL-MCNC: 10.1 MG/DL
CHLORIDE SERPL-SCNC: 102 MMOL/L
CHOLEST SERPL-MCNC: 218 MG/DL
CHOLEST/HDLC SERPL: 5.5 {RATIO}
CO2 SERPL-SCNC: 26 MMOL/L
CREAT SERPL-MCNC: 0.7 MG/DL
DIFFERENTIAL METHOD: ABNORMAL
EOSINOPHIL # BLD AUTO: 0.1 K/UL
EOSINOPHIL NFR BLD: 0.7 %
ERYTHROCYTE [DISTWIDTH] IN BLOOD BY AUTOMATED COUNT: 13 %
EST. GFR  (AFRICAN AMERICAN): >60 ML/MIN/1.73 M^2
EST. GFR  (NON AFRICAN AMERICAN): >60 ML/MIN/1.73 M^2
ESTIMATED AVG GLUCOSE: 100 MG/DL
GLUCOSE SERPL-MCNC: 80 MG/DL
HBA1C MFR BLD HPLC: 5.1 %
HCT VFR BLD AUTO: 39.8 %
HDLC SERPL-MCNC: 40 MG/DL
HDLC SERPL: 18.3 %
HGB BLD-MCNC: 13 G/DL
IMM GRANULOCYTES # BLD AUTO: 0.01 K/UL
IMM GRANULOCYTES NFR BLD AUTO: 0.1 %
LDLC SERPL CALC-MCNC: 145.6 MG/DL
LYMPHOCYTES # BLD AUTO: 3.8 K/UL
LYMPHOCYTES NFR BLD: 52.8 %
MCH RBC QN AUTO: 29.7 PG
MCHC RBC AUTO-ENTMCNC: 32.7 G/DL
MCV RBC AUTO: 91 FL
MONOCYTES # BLD AUTO: 0.4 K/UL
MONOCYTES NFR BLD: 5.3 %
NEUTROPHILS # BLD AUTO: 2.9 K/UL
NEUTROPHILS NFR BLD: 41 %
NONHDLC SERPL-MCNC: 178 MG/DL
NRBC BLD-RTO: 0 /100 WBC
PLATELET # BLD AUTO: 301 K/UL
PMV BLD AUTO: 10.1 FL
POTASSIUM SERPL-SCNC: 3.8 MMOL/L
PROT SERPL-MCNC: 7.3 G/DL
RBC # BLD AUTO: 4.37 M/UL
SODIUM SERPL-SCNC: 137 MMOL/L
TRIGL SERPL-MCNC: 162 MG/DL
TSH SERPL DL<=0.005 MIU/L-ACNC: 2.63 UIU/ML
WBC # BLD AUTO: 7.16 K/UL

## 2018-10-26 PROCEDURE — 85025 COMPLETE CBC W/AUTO DIFF WBC: CPT

## 2018-10-26 PROCEDURE — 80061 LIPID PANEL: CPT

## 2018-10-26 PROCEDURE — 99396 PREV VISIT EST AGE 40-64: CPT | Mod: 25,S$GLB,, | Performed by: INTERNAL MEDICINE

## 2018-10-26 PROCEDURE — 84443 ASSAY THYROID STIM HORMONE: CPT

## 2018-10-26 PROCEDURE — 90714 TD VACC NO PRESV 7 YRS+ IM: CPT | Mod: S$GLB,,, | Performed by: INTERNAL MEDICINE

## 2018-10-26 PROCEDURE — 99999 PR PBB SHADOW E&M-EST. PATIENT-LVL IV: CPT | Mod: PBBFAC,,, | Performed by: INTERNAL MEDICINE

## 2018-10-26 PROCEDURE — 83036 HEMOGLOBIN GLYCOSYLATED A1C: CPT

## 2018-10-26 PROCEDURE — 90471 IMMUNIZATION ADMIN: CPT | Mod: S$GLB,,, | Performed by: INTERNAL MEDICINE

## 2018-10-26 PROCEDURE — 80053 COMPREHEN METABOLIC PANEL: CPT

## 2018-10-26 PROCEDURE — 36415 COLL VENOUS BLD VENIPUNCTURE: CPT | Mod: PO

## 2018-10-26 RX ORDER — CHOLESTYRAMINE 4 G/9G
4 POWDER, FOR SUSPENSION ORAL
COMMUNITY
End: 2022-02-11 | Stop reason: SDUPTHER

## 2018-10-26 RX ORDER — PHENTERMINE HYDROCHLORIDE 37.5 MG/1
37.5 CAPSULE ORAL EVERY MORNING
COMMUNITY
End: 2018-10-26

## 2018-10-26 NOTE — PROGRESS NOTES
Subjective:       Patient ID: Luzmaria Real is a 43 y.o. female.    Chief Complaint: Annual Exam    HPI     43 y.o. female here for annual exam.     Cholesterol: needs  Vaccines: Influenza - needs; Tetanus - needs  Sexual Screening:   STD screening: no concern  Eye exam: done last about 1 yr ago  Mammogram: due  Gyn exam: done a year ago.  Dr. Davila.  Colonoscopy: no family history of cancer (she has polyps and has seen a GI doctor for years).  She sees Dr. Cleary.    Exercise:  No regular exercise. She started school again online and works Monday through Friday 8am - 5 pm.   Diet:  Fast food, home cooked, eating out.  Brings food from home.  When she gets home, she takes her bath and sits in bed to do her work.  She eats in her bed.  She does not eat late to avoid GERD.    She has topamax for migraine prophylaxis.  She has tightness in her head like before she is going to get a headache.  She sees neurology who has tried multiple medications to help her headaches.    Past Medical History:   Diagnosis Date    Fibroid     GERD (gastroesophageal reflux disease)     Migraine headache      Past Surgical History:   Procedure Laterality Date    CHOLECYSTECTOMY      HYSTERECTOMY      MOUTH SURGERY      TONSILLECTOMY      TUBAL LIGATION       Social History     Socioeconomic History    Marital status:      Spouse name: Not on file    Number of children: Not on file    Years of education: Not on file    Highest education level: Not on file   Social Needs    Financial resource strain: Not on file    Food insecurity - worry: Not on file    Food insecurity - inability: Not on file    Transportation needs - medical: Not on file    Transportation needs - non-medical: Not on file   Occupational History    Not on file   Tobacco Use    Smoking status: Never Smoker    Smokeless tobacco: Never Used   Substance and Sexual Activity    Alcohol use: Yes     Comment: once a week maybe     Drug use: No    Sexual activity: Yes     Partners: Male     Comment: hysterectomy   Other Topics Concern    Not on file   Social History Narrative    Not on file     Review of patient's allergies indicates:   Allergen Reactions    Caffeine      Luzmaria Real had no medications administered during this visit.    Review of Systems   Constitutional: Positive for unexpected weight change. Negative for activity change.   HENT: Negative for hearing loss, rhinorrhea and trouble swallowing.    Eyes: Negative for discharge and visual disturbance.   Respiratory: Negative for chest tightness and wheezing.    Cardiovascular: Positive for palpitations. Negative for chest pain.   Gastrointestinal: Negative for blood in stool, constipation, diarrhea and vomiting.   Endocrine: Negative for polydipsia and polyuria.   Genitourinary: Negative for difficulty urinating, dysuria, hematuria and menstrual problem.   Musculoskeletal: Negative for arthralgias, joint swelling and neck pain.   Neurological: Positive for headaches. Negative for weakness.   Psychiatric/Behavioral: Negative for confusion and dysphoric mood.       Objective:      Physical Exam   Constitutional: She is oriented to person, place, and time. She appears well-developed and well-nourished.   HENT:   Head: Normocephalic and atraumatic.   Mouth/Throat: No oropharyngeal exudate.   Eyes: EOM are normal. Pupils are equal, round, and reactive to light. Right eye exhibits no discharge. Left eye exhibits no discharge. No scleral icterus.   Neck: Normal range of motion. Neck supple. No tracheal deviation present. No thyromegaly present.   Cardiovascular: Normal rate, regular rhythm and normal heart sounds. Exam reveals no gallop and no friction rub.   No murmur heard.  Pulmonary/Chest: Effort normal and breath sounds normal. No respiratory distress. She has no wheezes. She has no rales. She exhibits no tenderness.   Abdominal: Soft. Bowel sounds are normal. She  exhibits no distension and no mass. There is no tenderness. There is no rebound and no guarding.   Musculoskeletal: Normal range of motion. She exhibits no edema or tenderness.   Neurological: She is alert and oriented to person, place, and time.   Skin: Skin is warm and dry. No rash noted. No erythema. No pallor.   Psychiatric: She has a normal mood and affect. Her behavior is normal.   Vitals reviewed.      Assessment:       1. Annual physical exam    2. Gastroesophageal reflux disease without esophagitis    3. Adult BMI 40.0-44.9 kg/sq m    4. Migraine without status migrainosus, not intractable, unspecified migraine type    5. Visit for screening mammogram        Plan:       1.  Check CBC, CMP, lipids, TSH, A1c.  Discussed exercise with patient.  Encouraged 5 days a week, 30 min a day.  Mammogram ordered.  No need for colonoscopy yet.  Flu and tetanus vaccines given today.  2.  Acid reflux medication as needed.  3.  Discussed exercise with patient.  Encouraged patient to start Topamax.  This will help with weight loss.  4.  Start Topamax as prescribed by Neurology.  5.  Mammogram.

## 2018-12-10 ENCOUNTER — TELEPHONE (OUTPATIENT)
Dept: INTERNAL MEDICINE | Facility: CLINIC | Age: 43
End: 2018-12-10

## 2018-12-10 NOTE — TELEPHONE ENCOUNTER
----- Message from Chel Butts sent at 12/10/2018  2:54 PM CST -----  Contact: Call [phone]  Patient was in the Urgent Care (Non [facility] in [place])  because it seem lump in throat and started to get painful. They  recommended her to have her  Tested. She will be seeing you on Friday 12/14. Please call to advise.

## 2018-12-13 ENCOUNTER — TELEPHONE (OUTPATIENT)
Dept: NEUROLOGY | Facility: CLINIC | Age: 43
End: 2018-12-13

## 2018-12-13 NOTE — TELEPHONE ENCOUNTER
I called patient from staff pool messages. The patient says she no longer needs a neurologist. She has decided to deal with the migraines and not see a neurologist. I just encouraged her to give us a call if she ever is in need again.

## 2018-12-14 ENCOUNTER — OFFICE VISIT (OUTPATIENT)
Dept: INTERNAL MEDICINE | Facility: CLINIC | Age: 43
End: 2018-12-14
Payer: COMMERCIAL

## 2018-12-14 ENCOUNTER — HOSPITAL ENCOUNTER (OUTPATIENT)
Dept: RADIOLOGY | Facility: HOSPITAL | Age: 43
Discharge: HOME | End: 2018-12-14
Attending: INTERNAL MEDICINE
Payer: COMMERCIAL

## 2018-12-14 ENCOUNTER — TELEPHONE (OUTPATIENT)
Dept: INTERNAL MEDICINE | Facility: CLINIC | Age: 43
End: 2018-12-14

## 2018-12-14 VITALS
BODY MASS INDEX: 44.63 KG/M2 | WEIGHT: 242.5 LBS | DIASTOLIC BLOOD PRESSURE: 70 MMHG | RESPIRATION RATE: 10 BRPM | HEIGHT: 62 IN | HEART RATE: 70 BPM | SYSTOLIC BLOOD PRESSURE: 110 MMHG

## 2018-12-14 DIAGNOSIS — E04.9 THYROID ENLARGED: ICD-10-CM

## 2018-12-14 DIAGNOSIS — E04.1 THYROID NODULE: Primary | ICD-10-CM

## 2018-12-14 DIAGNOSIS — E04.9 THYROID ENLARGED: Primary | ICD-10-CM

## 2018-12-14 DIAGNOSIS — R53.83 FATIGUE, UNSPECIFIED TYPE: ICD-10-CM

## 2018-12-14 PROCEDURE — 3008F BODY MASS INDEX DOCD: CPT | Mod: CPTII,S$GLB,, | Performed by: INTERNAL MEDICINE

## 2018-12-14 PROCEDURE — 99214 OFFICE O/P EST MOD 30 MIN: CPT | Mod: S$GLB,,, | Performed by: INTERNAL MEDICINE

## 2018-12-14 PROCEDURE — 76536 US EXAM OF HEAD AND NECK: CPT | Mod: TC,PO

## 2018-12-14 PROCEDURE — 99999 PR PBB SHADOW E&M-EST. PATIENT-LVL III: CPT | Mod: PBBFAC,,, | Performed by: INTERNAL MEDICINE

## 2018-12-14 NOTE — PROGRESS NOTES
Subjective:       Patient ID: Luzmaria Real is a 43 y.o. female.    Chief Complaint: Follow-up    HPI     43-year-old female here for follow-up after urgent care visit.  She was told she needs to have her thyroid functions checked.  She went to , because she felt like she has a lump in her throat.  She had acid reflux and made lifestyle changes.  It felt a lot bigger before.  She feels like something is sitting in her throat and is stuck.  Usually, the sensation resolves with acid reflux medications.  It did not resolve with her acid suppression medications.     She is sleepy and has been doing online classes.  This has been going on since April.  Her  says she snores.  She wakes up feeling tired.  She does not wake up feeling like she cannot breath.  Her  has not noticed that she stops breathing at night.    Review of Systems   Constitutional: Positive for unexpected weight change. Negative for activity change.   HENT: Positive for trouble swallowing. Negative for hearing loss and rhinorrhea.    Eyes: Positive for visual disturbance. Negative for discharge.   Respiratory: Negative for chest tightness and wheezing.    Cardiovascular: Negative for chest pain and palpitations.   Gastrointestinal: Negative for blood in stool, constipation, diarrhea and vomiting.   Endocrine: Negative for polydipsia and polyuria.   Genitourinary: Negative for difficulty urinating, dysuria, hematuria and menstrual problem.   Musculoskeletal: Negative for arthralgias, joint swelling and neck pain.   Neurological: Negative for weakness and headaches.   Psychiatric/Behavioral: Negative for confusion and dysphoric mood.       Objective:      Physical Exam   Constitutional: She is oriented to person, place, and time. She appears well-developed and well-nourished.   HENT:   Head: Normocephalic and atraumatic.   Mouth/Throat: No oropharyngeal exudate.   Eyes: EOM are normal. Pupils are equal, round, and reactive to  light. Right eye exhibits no discharge. Left eye exhibits no discharge. No scleral icterus.   Neck: Normal range of motion. Neck supple. No tracheal deviation present. No thyromegaly present.   Cardiovascular: Normal rate, regular rhythm and normal heart sounds. Exam reveals no gallop and no friction rub.   No murmur heard.  Pulmonary/Chest: Effort normal and breath sounds normal. No respiratory distress. She has no wheezes. She has no rales. She exhibits no tenderness.   Abdominal: Soft. Bowel sounds are normal. She exhibits no distension and no mass. There is no tenderness. There is no rebound and no guarding.   Musculoskeletal: Normal range of motion. She exhibits no edema or tenderness.   Neurological: She is alert and oriented to person, place, and time.   Skin: Skin is warm and dry. No rash noted. No erythema. No pallor.   Psychiatric: She has a normal mood and affect. Her behavior is normal.   Vitals reviewed.      Assessment:       1. Thyroid enlarged    2. Fatigue, unspecified type        Plan:       1.  Check ultrasound of thyroid.  If no enlargement of thyroid noted, would plan on EGD.  2.  Refer for Sleep Medicine.  Think patient has sleep apnea.

## 2018-12-14 NOTE — TELEPHONE ENCOUNTER
You do have enlargement of the thyroid.  This is responsible for this sensation you have when swallowing.  We need to get a nuclear medicine thyroid scan to further evaluate these nodules.  I am also referring you to endocrine.  Released to patient portal.

## 2018-12-18 ENCOUNTER — HOSPITAL ENCOUNTER (OUTPATIENT)
Dept: RADIOLOGY | Facility: HOSPITAL | Age: 43
Discharge: HOME | End: 2018-12-18
Attending: INTERNAL MEDICINE
Payer: COMMERCIAL

## 2018-12-18 ENCOUNTER — TELEPHONE (OUTPATIENT)
Dept: INTERNAL MEDICINE | Facility: CLINIC | Age: 43
End: 2018-12-18

## 2018-12-18 DIAGNOSIS — Z12.31 VISIT FOR SCREENING MAMMOGRAM: ICD-10-CM

## 2018-12-18 DIAGNOSIS — E04.1 THYROID NODULE: Primary | ICD-10-CM

## 2018-12-18 PROCEDURE — 77063 BREAST TOMOSYNTHESIS BI: CPT | Mod: TC,PO

## 2018-12-18 PROCEDURE — 77067 SCR MAMMO BI INCL CAD: CPT | Mod: TC,PO

## 2018-12-18 NOTE — TELEPHONE ENCOUNTER
----- Message from Ne Monroy LPN sent at 12/18/2018  7:36 AM CST -----  Regarding: FW: Referral correction  Do you want Nuclear Med uptake only or also the scan. Please advise and add scan if needed.    ThanksNe    ----- Message -----  From: Katelin Otero  Sent: 12/17/2018   8:50 AM  To: Ne Monroy LPN  Subject: Referral correction                              Dr Reza entered a referral for patient to have a NM Uptake only.  Chart notes show he wants a scan as well.  Please check with Dr Reza to verify what he needs for this patient and message me back to schedule.  Patient is wanting to do this before end of year.    Thyroid nodule [E04.1]    ThanksJolly

## 2018-12-21 ENCOUNTER — OFFICE VISIT (OUTPATIENT)
Dept: SLEEP MEDICINE | Facility: CLINIC | Age: 43
End: 2018-12-21
Payer: COMMERCIAL

## 2018-12-21 VITALS
SYSTOLIC BLOOD PRESSURE: 110 MMHG | HEIGHT: 62 IN | BODY MASS INDEX: 45.29 KG/M2 | DIASTOLIC BLOOD PRESSURE: 74 MMHG | WEIGHT: 246.13 LBS

## 2018-12-21 DIAGNOSIS — G47.30 SLEEP APNEA, UNSPECIFIED TYPE: Primary | ICD-10-CM

## 2018-12-21 PROCEDURE — 3008F BODY MASS INDEX DOCD: CPT | Mod: CPTII,S$GLB,, | Performed by: NURSE PRACTITIONER

## 2018-12-21 PROCEDURE — 99204 OFFICE O/P NEW MOD 45 MIN: CPT | Mod: S$GLB,,, | Performed by: NURSE PRACTITIONER

## 2018-12-21 PROCEDURE — 99999 PR PBB SHADOW E&M-EST. PATIENT-LVL III: CPT | Mod: PBBFAC,,, | Performed by: NURSE PRACTITIONER

## 2018-12-21 NOTE — PATIENT INSTRUCTIONS
Obstructive Sleep Apnea  Obstructive sleep apnea is a condition that causes your air passages to become narrowed or blocked during sleep. As a result, breathing stops for short periods. Your body wakes up enough for breathing to begin again, though you don't remember it. The cycle of stopped breathing and brief awakenings can repeat dozens of times a night. This prevents the body from getting to the deeper stages of sleep that are needed for good rest and may cause your body's oxygen level to fall.  Signs of sleep apnea include loud snoring, noisy breathing, and gasping sounds during sleep. Daytime symptoms include waking up tired after a full night's sleep, waking up with headaches, feeling very sleepy or falling asleep during the day, and having problems with memory or concentration.  Risk factors for sleep apnea include:  · Being overweight  · Being a man, or a woman in menopause  · Smoking  · Using alcohol or sedating medicines  · Having enlarged structures in the nose or throat  Home care  Lifestyle changes that can help treat snoring and sleep apnea include the following:  · If you are overweight, lose weight. Talk to your healthcare provider about a weight-loss plan for you.  · Avoid alcohol for 3 to 4 hours before bedtime. Avoid sedating medications. Ask your healthcare provider about the medicines you take.  · If you smoke, talk to your healthcare provider about ways to quit.  · Sleep on your side. This can help prevent gravity from pulling relaxed throat tissues into your breathing passages.  · If you have allergies or sinus problems that block your nose, ask your healthcare provider for help.  Follow-up care  Follow up with your healthcare provider, or as advised. A diagnosis of sleep apnea is made with a sleep study. Your healthcare provider can tell you more about this test.  When to seek medical advice  Sleep apnea can make you more likely to have certain health problems. These include high blood  pressure, heart attack, stroke, and sexual dysfunction. If you have sleep apnea, talk to your healthcare provider about the best treatments for you.  Date Last Reviewed: 4/1/2017  © 7136-4826 [employer] [address] [place] [address], [place] [address]. All rights reserved. This information is not intended as a substitute for professional medical care. Always follow your healthcare professional's instructions.      Mundo from sleep lab- [phone]

## 2018-12-21 NOTE — LETTER
December 21, 2018      Bharat Reza MD  [address] [place] [address]           [facility]  [address] [address]  Phone: [phone]  Fax: [phone]          Patient: Luzmaria Real   MR Number: [id number]   YOB: 1975   Date of Visit: 12/21/2018       Dear Dr. Bharat Reza:    Thank you for referring Luzmaria Real to me for evaluation. Attached you will find relevant portions of my assessment and plan of care.    If you have questions, please do not hesitate to call me. I look forward to following Luzmaria Real along with you.    Sincerely,    Saumya Lu, NP    Enclosure  CC:  No Recipients    If you would like to receive this communication electronically, please contact [email or web address] or [phone] to request more information on [employer] access.    For providers and/or their staff who would like to refer a patient to [facility], please contact us through our one-stop-shop provider referral line, [facility], at [phone].    If you feel you have received this communication in error or would no longer like to receive these types of communications, please e-mail [email or web address]

## 2018-12-21 NOTE — PROGRESS NOTES
Luzmaria Real  was seen as a new patient, referred by Dr. Bharat Reza , for the evaluation of obstructive sleep apnea.     CHIEF COMPLAINT: Snoring, excessive daytime sleepiness    HISTORY OF PRESENT ILLNESS: Luzmaria Real a 43 y.o.  female presents for the evaluation of obstructive sleep apnea.She has never had a sleep study. Hard to lose weight even with taking Adipex 3mos. Had no energy. Avoids caffeine due to allergy. Denies witnessed apneic pauses. Snoring. Denies witnessed apneic pauses. Disrupted sleep 3x and then can take 30min to return to sleep. TV on all night! Daily am headaches, but resolves once out of bed. SIDE sleeper.     During daytime may take deep breath and often coughing     Denies symptoms of restless legs or kicking during sleep.   Legs ache and bottom of feet burn like neuropathic pain    10/2018 HgBA1c 5.1, TSH normal but has nodules/having workup next week    EPWORTH SLEEPINESS SCALE 12/21/2018   Sitting and reading 1   Watching TV 1   Sitting, inactive in a public place (e.g. a theatre or a meeting) 0   As a passenger in a car for an hour without a break 1   Lying down to rest in the afternoon when circumstances permit 1   Sitting and talking to someone 0   Sitting quietly after a lunch without alcohol 1   In a car, while stopped for a few minutes in traffic 0   Total score 5     Sleep Clinic New Patient 12/21/2018   What time do you go to bed on a week day? (Give a range) 11   What time do you go to bed on a day off? (Give a range) 1   How long does it take you to fall asleep? (Give a range) 30 minutes   On average, how many times per night do you wake up? 3   How long does it take you to fall back into sleep? (Give a range) 30   What time do you wake up to start your day on a week day? (Give a range) 6:40   What time do you wake up to start your day on a day off? (Give a range) 7   What time do you get out of bed? (Give a range) 6:55   On average, how many hours do you  "sleep? 6   On average, how many naps do you take per day? None   Rate your sleep quality from 0 to 5 (0-poor, 5-great). 3   Have you experienced:  N/a   How much weight have you lost or gained (in lbs.) in the last year? Lost 20... gained 10   On average, how many times per night do you go to the bathroom?  1   Have you ever had a sleep study/CPAP machine/surgery for sleep apnea? No   Have you ever had a CPAP machine for sleep apnea? No   Have you ever had surgery for sleep apnea? No     FAMILY HISTORY: No known sleep disorders     SOCIAL HISTORY: . Social ETOH, no tobacco, airport /desk work    REVIEW OF SYSTEMS:  Sleep related symptoms as per [facility]  Sleep Clinic ROS  12/21/2018   Difficulty breathing through the nose?  No   Sore throat or dry mouth in the morning? Sometimes   Irregular or very fast heart beat?  Sometimes   Shortness of breath?  Sometimes   Acid reflux? Sometimes   Body aches and pains?  Sometimes   Morning headaches? Yes   Dizziness? Sometimes   Mood changes?  Yes   Do you exercise?  No   Do you feel like moving your legs a lot?  Yes     PHYSICAL EXAM:   /74   Ht 5' 2" (1.575 m)   Wt 111.6 kg (246 lb 1.6 oz)   BMI 45.01 kg/m²   GENERAL: morbid obese body habitus, well groomed   HEENT: Conjunctivae are non-erythematous; Pupils equal, round, and reactive to light; Nose is symmetrical; Nasal mucosa is normal; Septum is midline; Inferior turbinates are normal; Nasal airflow is normal; Posterior pharynx is pink; Modified Mallampati: II; Posterior palate is low; Tonsils absent  Uvula is normal. Tongue is high, small oral cavity; Dentition is fair; No TMJ tenderness; Jaw opening and protrusion without click but +bilateral discomfort.   NECK: Supple. Neck circumference is 15.25 inches. No thyromegaly. No palpable nodes.   SKIN: On face and neck: No abrasions, no rashes, no lesions. No subcutaneous nodules are palpable.   RESPIRATORY: Chest is clear to auscultation. Normal " chest expansion and non-labored breathing at rest.   CARDIOVASCULAR: Normal S1, S2. No murmurs, gallops or rubs. No carotid bruits bilaterally.   EXTREMITIES: No edema. No clubbing. No cyanosis. Station normal. Gait normal.   NEURO/PSYCH: Oriented to time, place and person. Normal attention span and concentration. Affect is full. Mood is normal.       ASSESSMENT:     Unspecified Sleep Apnea, with symptoms of snoring, un-refreshing disrupted sleep and excessive daytime sleepiness, with exam findings of a crowded oral airway, with medical comorbidities of morbid obesity. Warrants further investigation for untreated sleep apnea.     PLAN:   1.Home Sleep Study, discussed plan of care ([facility])   2. Discussed etiology of FLORIN and potential ramifications of untreated FLORIN, including stroke, heart disease, HTN.  We discussed potential treatment options, which could include weight loss (10-15%), body positioning, continuous positive airway pressure (CPAP-definitive), mandibular advancement splint by dentist, or referral for surgical consideration.   3. See nutritionist again. Encouraged weight loss efforts for potential improvement of FLORIN and overall health benefits        Thank you for allowing me the opportunity to participate in the care of your patient

## 2018-12-26 ENCOUNTER — HOSPITAL ENCOUNTER (EMERGENCY)
Facility: HOSPITAL | Age: 43
Discharge: HOME | End: 2018-12-26
Attending: EMERGENCY MEDICINE
Payer: COMMERCIAL

## 2018-12-26 VITALS
SYSTOLIC BLOOD PRESSURE: 128 MMHG | TEMPERATURE: 99 F | WEIGHT: 240 LBS | HEART RATE: 88 BPM | DIASTOLIC BLOOD PRESSURE: 74 MMHG | RESPIRATION RATE: 16 BRPM | HEIGHT: 62 IN | OXYGEN SATURATION: 100 % | BODY MASS INDEX: 44.16 KG/M2

## 2018-12-26 DIAGNOSIS — R05.9 COUGH: ICD-10-CM

## 2018-12-26 DIAGNOSIS — J06.9 VIRAL URI WITH COUGH: Primary | ICD-10-CM

## 2018-12-26 PROCEDURE — 99284 EMERGENCY DEPT VISIT MOD MDM: CPT

## 2018-12-26 PROCEDURE — 25000003 PHARM REV CODE 250: Performed by: PHYSICIAN ASSISTANT

## 2018-12-26 RX ORDER — BENZONATATE 100 MG/1
100 CAPSULE ORAL ONCE
Status: COMPLETED | OUTPATIENT
Start: 2018-12-26 | End: 2018-12-26

## 2018-12-26 RX ORDER — KETOROLAC TROMETHAMINE 10 MG/1
10 TABLET, FILM COATED ORAL
Status: COMPLETED | OUTPATIENT
Start: 2018-12-26 | End: 2018-12-26

## 2018-12-26 RX ORDER — BENZONATATE 100 MG/1
100 CAPSULE ORAL 3 TIMES DAILY PRN
Qty: 20 CAPSULE | Refills: 0 | Status: SHIPPED | OUTPATIENT
Start: 2018-12-26 | End: 2019-01-05

## 2018-12-26 RX ORDER — FLUTICASONE PROPIONATE 50 MCG
1 SPRAY, SUSPENSION (ML) NASAL 2 TIMES DAILY PRN
Qty: 15 G | Refills: 0 | Status: SHIPPED | OUTPATIENT
Start: 2018-12-26 | End: 2019-04-30

## 2018-12-26 RX ORDER — METHYLPREDNISOLONE 4 MG/1
TABLET ORAL
Qty: 1 PACKAGE | Refills: 0 | Status: SHIPPED | OUTPATIENT
Start: 2018-12-26 | End: 2019-02-18 | Stop reason: ALTCHOICE

## 2018-12-26 RX ORDER — KETOROLAC TROMETHAMINE 10 MG/1
10 TABLET, FILM COATED ORAL EVERY 6 HOURS
Qty: 12 TABLET | Refills: 0 | Status: SHIPPED | OUTPATIENT
Start: 2018-12-26 | End: 2018-12-26 | Stop reason: ALTCHOICE

## 2018-12-26 RX ADMIN — KETOROLAC TROMETHAMINE 10 MG: 10 TABLET, FILM COATED ORAL at 12:12

## 2018-12-26 RX ADMIN — BENZONATATE 100 MG: 100 CAPSULE ORAL at 12:12

## 2018-12-26 NOTE — ED NOTES
Pt states she started coughing last week. Pt is also complaining of SOB and hoarse voice. Pt started taking allergy medicine 3 days ago and felt better but the cough did not resolve

## 2018-12-28 ENCOUNTER — TELEPHONE (OUTPATIENT)
Dept: INTERNAL MEDICINE | Facility: CLINIC | Age: 43
End: 2018-12-28

## 2018-12-28 NOTE — TELEPHONE ENCOUNTER
Spoke with patient, she will try mucinex over weekend and if not improved or worsens over weekend will go to urgent care or make appt to be seen in clinic

## 2018-12-28 NOTE — TELEPHONE ENCOUNTER
"----- Message from Heidy Angeles sent at 12/28/2018 10:21 AM CST -----  Contact: self/ [phone]  Patient would like to get medical advice.    Symptoms (please be specific):  Coughing all night, was seen in ER 12/26/18 and was given cough medicine and it is not working.     How long has patient had these symptoms:  5 days    Pharmacy name and phone # (DON'T enter "on file" or "in chart"):  [facility]/pharmacy #[id number] - [address] AT [facility] [phone] (Phone)  [phone] (Fax)        Any drug allergies:      Would you prefer a response via [employer]?:      Comments:  Please call and advise.Patient have been taking benzonatate (TESSALON) 100 MG capsule and it is not working and also promethazine 6.25 mg/5ml syrup.  "

## 2018-12-30 NOTE — ED PROVIDER NOTES
Encounter Date: 12/26/2018       History     Chief Complaint   Patient presents with    Cough     patient states she has  been battling with a cough x3 days; has been taking over the counter medication with no relief.      Gabriellamartir Real 43 y.o. afebrile female with PMH of fibroids, GERD and migraine headaches presented to the ED with c/o URI symptoms for the past 3 days.  She reports that she initially started with some dry cough and increased nasal congestion last week that has continued.  She does report that cough is predominantly dry.  She states that during coughing episode she has some associated shortness of breath however denies any at rest.  She also reports some hoarse voice, generalized body aches and sore throat. Patient has been taking over-the-counter allergy relief medicines with only modest improvement.      The history is provided by the patient.     Review of patient's allergies indicates:   Allergen Reactions    Caffeine      Past Medical History:   Diagnosis Date    Fibroid     GERD (gastroesophageal reflux disease)     Migraine headache      Past Surgical History:   Procedure Laterality Date    CHOLECYSTECTOMY      HYSTERECTOMY      MOUTH SURGERY      TONSILLECTOMY      TUBAL LIGATION       Family History   Problem Relation Age of Onset    Prostate cancer Paternal Grandfather     Cancer Paternal Grandmother     Throat cancer Maternal Grandmother     Cancer Maternal Grandmother         throat cancer - smoker    Diabetes Maternal Grandmother     Stroke Maternal Grandmother     Hypertension Maternal Grandmother     Aneurysm Father         stomach aneurysm    Cancer Maternal Aunt         gastric cancer    Prostate cancer Maternal Uncle     Hyperlipidemia Mother     Prostate cancer Brother     Heart disease Neg Hx      Social History     Tobacco Use    Smoking status: Never Smoker    Smokeless tobacco: Never Used   Substance Use Topics    Alcohol use: Yes      Comment: once a week maybe    Drug use: No     Review of Systems   Constitutional: Negative for chills and fever.   HENT: Positive for congestion, postnasal drip, sinus pressure, sore throat and voice change.    Eyes: Negative for visual disturbance.   Respiratory: Positive for cough and shortness of breath (Associated exclusively with coughing episodes).    Cardiovascular: Negative for chest pain.   Gastrointestinal: Negative for nausea and vomiting.   Genitourinary: Negative for dysuria.   Musculoskeletal: Positive for arthralgias. Negative for back pain, neck pain and neck stiffness.   Skin: Negative for rash.   Allergic/Immunologic: Negative for immunocompromised state.   Neurological: Negative for dizziness, weakness, light-headedness and headaches.   Hematological: Does not bruise/bleed easily.       Physical Exam     Initial Vitals [12/26/18 1233]   BP Pulse Resp Temp SpO2   128/74 100 16 98.8 °F (37.1 °C) 100 %      MAP       --         Vitals:    12/26/18 1419   BP:    Pulse:    Resp:    Temp: 98.6 °F (37 °C)       Physical Exam    Nursing note and vitals reviewed.  Constitutional: Vital signs are normal. She appears well-developed and well-nourished. She is cooperative.  Non-toxic appearance. She appears ill. No distress.   HENT:   Head: Normocephalic and atraumatic.   Right Ear: A middle ear effusion is present.   Left Ear: A middle ear effusion is present.   Nose: Mucosal edema present.   Mouth/Throat: Mucous membranes are not dry. No posterior oropharyngeal edema or posterior oropharyngeal erythema.   Eyes: Conjunctivae and lids are normal.   Neck: Neck supple. No neck rigidity.   Cardiovascular: Normal rate and regular rhythm.   Pulmonary/Chest: Breath sounds normal. No respiratory distress. She has no wheezes. She has no rhonchi.   Abdominal: Soft. Normal appearance. There is no tenderness. There is no rigidity.   Musculoskeletal: Normal range of motion.   Neurological: She is alert and oriented to  person, place, and time. GCS eye subscore is 4. GCS verbal subscore is 5. GCS motor subscore is 6.   Skin: Skin is warm, dry and intact. No rash noted.   Psychiatric: She has a normal mood and affect. Her speech is normal and behavior is normal. Thought content normal.         ED Course   Procedures  Labs Reviewed - No data to display       Imaging Results          X-Ray Chest PA And Lateral (Final result)  Result time 12/26/18 13:19:04    Final result by Soni Yuen MD (12/26/18 13:19:04)                 Impression:      No acute abnormality.      Electronically signed by: Soni Yuen MD  Date:    12/26/2018  Time:    13:19             Narrative:    EXAMINATION:  XR CHEST PA AND LATERAL    CLINICAL HISTORY:  Cough    TECHNIQUE:  PA and lateral views of the chest were performed.    COMPARISON:  10/08/2016    FINDINGS:  The lungs are clear, with normal appearance of pulmonary vasculature and no pleural effusion or pneumothorax.    The cardiac silhouette is normal in size. The hilar and mediastinal contours are unremarkable.    Bones are intact. Surgical clips right upper abdominal quadrant.                            Luzmaria Real 43 y.o. afebrile female with PMH of fibroids, GERD and migraine headaches presented to the ED with c/o URI symptoms for the past 3 days.  She reports that she initially started with some dry cough and increased nasal congestion last week that has continued.  She does report that cough is predominantly dry.  She states that during coughing episode she has some associated shortness of breath however denies any at rest.  She also reports some hoarse voice, generalized body aches and sore throat. Patient has been taking over-the-counter allergy relief medicines with only modest improvement. ROS positive for URI symptoms.  Physical exam reveals patient that appears ill but nontoxic. TM's with bilateral serous otitis media; nose with congestion and rhinorrhea.  Oropharynx with mild erythema; no edema or exudate. Lungs clear and free of wheeze. Heart regular rate and rhythm. Abdomen is soft and nontender with normal bowel sounds. FROM of neck, no lymphadenopathy and FROM of all extremities with strength 5/5 bilaterally. Skin free of rash, pallor and diaphoresis.    DDX: viral URI with cough, bronchitis, pneumonia    ED management:  No flu swab was patient is out of Tamiflu treatment window.  Chest x-ray is unremarkable. No further labs or imaging warranted at this time as patient remains well appearing, afebrile and in no distress at this time.  We will send home with supportive medications for probable viral URI in this otherwise well patient and informed patient that this process is typically self limiting.. Instructed patient on fever and symptom control.      Impression/Plan: The primary encounter diagnosis was Viral URI with cough. A diagnosis of Cough was also pertinent to this visit. Discharged with Tessalon Perles, Flonase, Toradol and Medrol Dosepak. Patient will follow up with Primary.  Patient cautioned on when to return to ED.  Pt. Understands and agrees with current treatment plan                                                 Clinical Impression:   The primary encounter diagnosis was Viral URI with cough. A diagnosis of Cough was also pertinent to this visit.                             ASHKAN Mora  12/29/18 7807

## 2019-01-14 ENCOUNTER — TELEPHONE (OUTPATIENT)
Dept: SLEEP MEDICINE | Facility: OTHER | Age: 44
End: 2019-01-14

## 2019-01-21 ENCOUNTER — HOSPITAL ENCOUNTER (OUTPATIENT)
Dept: RADIOLOGY | Facility: HOSPITAL | Age: 44
Discharge: HOME | End: 2019-01-21
Attending: INTERNAL MEDICINE
Payer: COMMERCIAL

## 2019-01-21 DIAGNOSIS — E04.1 THYROID NODULE: ICD-10-CM

## 2019-01-21 PROCEDURE — 78014 THYROID IMAGING W/BLOOD FLOW: CPT | Mod: 26,,, | Performed by: RADIOLOGY

## 2019-01-21 PROCEDURE — A9516 IODINE I-123 SOD IODIDE MIC: HCPCS

## 2019-01-21 PROCEDURE — 78014 NM THYROID UPTAKE AND SCAN: ICD-10-PCS | Mod: 26,,, | Performed by: RADIOLOGY

## 2019-01-22 ENCOUNTER — HOSPITAL ENCOUNTER (OUTPATIENT)
Dept: RADIOLOGY | Facility: HOSPITAL | Age: 44
Discharge: HOME | End: 2019-01-22
Attending: INTERNAL MEDICINE
Payer: COMMERCIAL

## 2019-01-22 ENCOUNTER — TELEPHONE (OUTPATIENT)
Dept: INTERNAL MEDICINE | Facility: CLINIC | Age: 44
End: 2019-01-22

## 2019-01-22 DIAGNOSIS — E04.9 THYROID GOITER: Primary | ICD-10-CM

## 2019-01-22 NOTE — TELEPHONE ENCOUNTER
You have a multinodular thyroid goiter.  I am going to refer you to endocrine further evaluation.  Released to patient portal.

## 2019-01-30 ENCOUNTER — TELEPHONE (OUTPATIENT)
Dept: SLEEP MEDICINE | Facility: OTHER | Age: 44
End: 2019-01-30

## 2019-02-01 ENCOUNTER — TELEPHONE (OUTPATIENT)
Dept: SLEEP MEDICINE | Facility: OTHER | Age: 44
End: 2019-02-01

## 2019-02-01 ENCOUNTER — PATIENT MESSAGE (OUTPATIENT)
Dept: SLEEP MEDICINE | Facility: CLINIC | Age: 44
End: 2019-02-01

## 2019-02-01 DIAGNOSIS — G47.30 SLEEP APNEA, UNSPECIFIED TYPE: Primary | ICD-10-CM

## 2019-02-17 NOTE — PROGRESS NOTES
ENDOCRINOLOGY INITIAL VISIT    Gabriellamartir Real is a 44 y.o. female referred by Dr. Bharat Reza for evaluation of thyroid nodule    Nodule found 12/2018 when she reported globus sensation which had previously been helped by reflux medications but was not helped so seen at  and by PCP who ordered thyroid US which showed two right sided-nodules  She now notes enlargement in right side of neck  Continues to have sticking sensation and also notes change in her voice which affects her singing  +hoarseness and dry cough  Denies rapid neck enlargement, SOB    Risk Factors for Thyroid Cancer:  Hx of External Beam Radiation:denies  Family Hx of Thyroid Cancer:denies    There is no known disorder of thyroid function.  Recent TSH:   Lab Results   Component Value Date    TSH 2.631 10/26/2018     +cold intolerance  +palpitations  No tremor  +weight gain. Has been off and on adipex with varied results  Also on topamax for migraines    REVIEW OF SYSTEMS  Constitutional: Weight gain. + fatigue  Temperature: +cold intolerance  Eyes: No blurry vision, loss of vision, diplopia.  ENT: per HPI  Thyroid: per HPI  CV: No chest pain, palpitations, normal blood pressure, no swelling of the lower extremities.  Pulm: No cough, no shortness of breath, no wheezing.  GI: irregular bowel movements, on cholestyramine  MSK: No arthralgias, myalgias.  Neuro: Denies tremors.  Skin: No skin lesions.  Endo: No polyuria/polydipsia.  Menses: hysterectomy years ago; +hot flashes    MEDICATION    Current Outpatient Medications:     acetaminophen-codeine 300-30mg (TYLENOL #3) 300-30 mg Tab, TAKE 1 TABLET BY MOUTH EVERY 4 TO 6 HOURS AS NEEDED FOR HEADACHE OR PAIN, Disp: , Rfl: 1    cholestyramine (QUESTRAN) 4 gram packet, Take 4 g by mouth 3 (three) times daily with meals., Disp: , Rfl:     phentermine (ADIPEX-P) 37.5 mg tablet, TAKE 1 TABLET BY MOUTH EVERY DAY IN THE MORNING TO CURB APPETITE, Disp: , Rfl: 2    promethazine (PHENERGAN) 12.5 MG Tab,  "TAKE 1 TO 2 TABLETS BY MOUTH EVERY 6 HOURS AS NEEDED FOR NAUSEA, Disp: , Rfl: 1    fluticasone (FLONASE) 50 mcg/actuation nasal spray, 1 spray (50 mcg total) by Each Nare route 2 (two) times daily as needed for Rhinitis., Disp: 15 g, Rfl: 0    ALLERGIES  Review of patient's allergies indicates:   Allergen Reactions    Caffeine          PHYSICAL EXAM  /88   Pulse 76   Resp 18   Ht 5' 2" (1.575 m)   Wt 112.6 kg (248 lb 3.8 oz)   BMI 45.40 kg/m²   Body mass index is 45.4 kg/m².  General Appearance:  well appearing, pleasant, NAD  Skin:  no rashes or lesions  HEENT:  EOMI, MMM  Neck:  Thyromegaly R>L, palpable right-sided thyroid nodule  Extremities:  no lower extremity edema  Neurologic:  A&O x3  Psychiatric:  normal mood and affect    IMAGING STUDIES  Thyroid US 12/2018:  1. There is a 23 mm subtle nodule in the superior pole of the right lobe of the thyroid.  It has a mild amount of vascularity associated with it. There is a 34 mm complex nodule in the inferior pole of the right lobe of the thyroid.  It has a mild amount of vascularity associated with it. The cystic portion of this nodule measures 25 mm in size.    Uptake and scan 12/2018: The 24 hour uptake is elevated at 33% (normal 10-30%)..    ASSESSMENT/PLAN  1. Thyroid nodule    2. Globus sensation    3. Adult BMI 40.0-44.9 kg/sq m    4. Hyperthyroidism      Thyroid nodule  Globus sensation  Plan for FNA of right inferior and superior pole thyroid nodules  Inferior pole nodule with large cystic component and will also request drainage of this as she is bothered by size although unclear if nodules are cause of her current symptoms  Discussed possible outcomes of FNA including malignancy, benign, unsatisfactory, indeterminate  Discussed indications for surgery  Evaluation of thyroid function as below to ensure euthyroid prior to FNA    Hyperthyroidism  Recent TSH normal although uptake and scan with mildly elevated uptake which could be due to " underlying AI disease  Check TSH, TPO, TSI    Obesity  Referral to bariatric medicine to discuss weight loss options  On topamax and has had varied results with Adipex    RTC 1 year    Kathy Florez MD

## 2019-02-18 ENCOUNTER — OFFICE VISIT (OUTPATIENT)
Dept: ENDOCRINOLOGY | Facility: CLINIC | Age: 44
End: 2019-02-18
Payer: COMMERCIAL

## 2019-02-18 VITALS
HEART RATE: 76 BPM | DIASTOLIC BLOOD PRESSURE: 88 MMHG | SYSTOLIC BLOOD PRESSURE: 112 MMHG | RESPIRATION RATE: 18 BRPM | HEIGHT: 62 IN | WEIGHT: 248.25 LBS | BODY MASS INDEX: 45.68 KG/M2

## 2019-02-18 DIAGNOSIS — E05.90 HYPERTHYROIDISM: ICD-10-CM

## 2019-02-18 DIAGNOSIS — R09.A2 GLOBUS SENSATION: ICD-10-CM

## 2019-02-18 DIAGNOSIS — E04.1 THYROID NODULE: Primary | ICD-10-CM

## 2019-02-18 PROCEDURE — 99244 OFF/OP CNSLTJ NEW/EST MOD 40: CPT | Mod: S$GLB,,, | Performed by: INTERNAL MEDICINE

## 2019-02-18 PROCEDURE — 99999 PR PBB SHADOW E&M-EST. PATIENT-LVL V: ICD-10-PCS | Mod: PBBFAC,,, | Performed by: INTERNAL MEDICINE

## 2019-02-18 PROCEDURE — 99244 PR OFFICE CONSULTATION,LEVEL IV: ICD-10-PCS | Mod: S$GLB,,, | Performed by: INTERNAL MEDICINE

## 2019-02-18 PROCEDURE — 99999 PR PBB SHADOW E&M-EST. PATIENT-LVL V: CPT | Mod: PBBFAC,,, | Performed by: INTERNAL MEDICINE

## 2019-02-18 RX ORDER — PHENTERMINE HYDROCHLORIDE 37.5 MG/1
TABLET ORAL
Refills: 2 | COMMUNITY
Start: 2018-12-04 | End: 2019-05-07

## 2019-02-18 RX ORDER — ACETAMINOPHEN AND CODEINE PHOSPHATE 300; 30 MG/1; MG/1
TABLET ORAL
Refills: 1 | COMMUNITY
Start: 2019-01-06 | End: 2019-09-20

## 2019-02-18 RX ORDER — PROMETHAZINE HYDROCHLORIDE 12.5 MG/1
TABLET ORAL
Refills: 1 | COMMUNITY
Start: 2019-01-06 | End: 2019-05-07

## 2019-02-18 NOTE — LETTER
February 18, 2019      Bharat Reza MD  [address]  [place] [address]           [facility] - Endocrinology  [address]  [place] [address]  Phone: [phone]          Patient: Luzmaria Real   MR Number: [id number]   YOB: 1975   Date of Visit: 2/18/2019       Dear Dr. Bharat Reza:    Thank you for referring Luzmaria Real to me for evaluation. Attached you will find relevant portions of my assessment and plan of care.    If you have questions, please do not hesitate to call me. I look forward to following Luzmaria Real along with you.    Sincerely,    Kathy Florez MD    Enclosure  CC:  No Recipients    If you would like to receive this communication electronically, please contact [email or web address] or [phone] to request more information on [employer] access.    For providers and/or their staff who would like to refer a patient to [facility], please contact us through our one-stop-shop provider referral line, [facility] , at [phone].    If you feel you have received this communication in error or would no longer like to receive these types of communications, please e-mail [email or web address]

## 2019-02-19 ENCOUNTER — TELEPHONE (OUTPATIENT)
Dept: SLEEP MEDICINE | Facility: OTHER | Age: 44
End: 2019-02-19

## 2019-02-20 ENCOUNTER — HOSPITAL ENCOUNTER (OUTPATIENT)
Dept: SLEEP MEDICINE | Facility: OTHER | Age: 44
Discharge: HOME | End: 2019-02-20
Attending: NURSE PRACTITIONER
Payer: COMMERCIAL

## 2019-02-20 DIAGNOSIS — G47.20 SLEEP STAGE DYSFUNCTION: ICD-10-CM

## 2019-02-20 PROCEDURE — 95806 SLEEP STUDY UNATT&RESP EFFT: CPT | Mod: 26,,, | Performed by: PSYCHIATRY & NEUROLOGY

## 2019-02-20 PROCEDURE — 95806 PR SLEEP STUDY, UNATTENDED, SIMUL RECORD HR/O2 SAT/RESP FLOW/RESP EFFT: ICD-10-PCS | Mod: 26,,, | Performed by: PSYCHIATRY & NEUROLOGY

## 2019-02-20 PROCEDURE — 95800 SLP STDY UNATTENDED: CPT

## 2019-02-22 ENCOUNTER — HOSPITAL ENCOUNTER (OUTPATIENT)
Dept: ENDOCRINOLOGY | Facility: CLINIC | Age: 44
Discharge: HOME | End: 2019-02-22
Attending: INTERNAL MEDICINE
Payer: COMMERCIAL

## 2019-02-22 DIAGNOSIS — E04.1 THYROID NODULE: ICD-10-CM

## 2019-02-22 PROCEDURE — 10006 FNA BX W/US GDN EA ADDL: CPT | Mod: RT,S$GLB,, | Performed by: INTERNAL MEDICINE

## 2019-02-22 PROCEDURE — 88173 CYTOLOGY SPECIMEN-FNA NON-RADIOLOGY CLINICIAN PERFORMED W/O ON SITE: ICD-10-PCS | Mod: 26,,, | Performed by: PATHOLOGY

## 2019-02-22 PROCEDURE — 10005 FNA BX W/US GDN 1ST LES: CPT | Mod: RT,S$GLB,, | Performed by: INTERNAL MEDICINE

## 2019-02-22 PROCEDURE — 10005 PR FINE NEEDLE ASP BIOPSY, W/US GUIDANCE, 1ST LESION: ICD-10-PCS | Mod: RT,S$GLB,, | Performed by: INTERNAL MEDICINE

## 2019-02-22 PROCEDURE — 88173 CYTOPATH EVAL FNA REPORT: CPT | Performed by: PATHOLOGY

## 2019-02-22 PROCEDURE — 88173 CYTOPATH EVAL FNA REPORT: CPT | Mod: 26,,, | Performed by: PATHOLOGY

## 2019-02-22 PROCEDURE — 10006 US FINE NEEDLE ASPIRATION BIOPSY , ADDL LESION: ICD-10-PCS | Mod: RT,S$GLB,, | Performed by: INTERNAL MEDICINE

## 2019-02-26 ENCOUNTER — PATIENT MESSAGE (OUTPATIENT)
Dept: ENDOCRINOLOGY | Facility: CLINIC | Age: 44
End: 2019-02-26

## 2019-02-26 DIAGNOSIS — E04.1 THYROID NODULE: Primary | ICD-10-CM

## 2019-03-12 ENCOUNTER — PATIENT MESSAGE (OUTPATIENT)
Dept: SLEEP MEDICINE | Facility: CLINIC | Age: 44
End: 2019-03-12

## 2019-03-15 ENCOUNTER — PATIENT MESSAGE (OUTPATIENT)
Dept: SLEEP MEDICINE | Facility: CLINIC | Age: 44
End: 2019-03-15

## 2019-03-15 DIAGNOSIS — G47.30 SLEEP APNEA, UNSPECIFIED TYPE: Primary | ICD-10-CM

## 2019-04-02 ENCOUNTER — TELEPHONE (OUTPATIENT)
Dept: SLEEP MEDICINE | Facility: OTHER | Age: 44
End: 2019-04-02

## 2019-04-16 ENCOUNTER — HOSPITAL ENCOUNTER (OUTPATIENT)
Dept: ENDOCRINOLOGY | Facility: CLINIC | Age: 44
Discharge: HOME | End: 2019-04-16
Attending: INTERNAL MEDICINE
Payer: COMMERCIAL

## 2019-04-16 DIAGNOSIS — E04.1 THYROID NODULE: ICD-10-CM

## 2019-04-16 PROCEDURE — 88173 CYTOLOGY SPECIMEN-FNA NON-RADIOLOGY CLINICIAN PERFORMED W/O ON SITE: ICD-10-PCS | Mod: 26,,, | Performed by: PATHOLOGY

## 2019-04-16 PROCEDURE — 88173 CYTOPATH EVAL FNA REPORT: CPT | Performed by: PATHOLOGY

## 2019-04-16 PROCEDURE — 10005 FNA BX W/US GDN 1ST LES: CPT | Mod: S$GLB,,, | Performed by: INTERNAL MEDICINE

## 2019-04-16 PROCEDURE — 88173 CYTOPATH EVAL FNA REPORT: CPT | Mod: 26,,, | Performed by: PATHOLOGY

## 2019-04-16 PROCEDURE — 10005 FNA BX W/US GDN 1ST LES: CPT

## 2019-04-16 PROCEDURE — 10005 US FINE NEEDLE ASPIRATION BIOPSY, FIRST LESION: ICD-10-PCS | Mod: S$GLB,,, | Performed by: INTERNAL MEDICINE

## 2019-04-17 ENCOUNTER — TELEPHONE (OUTPATIENT)
Dept: ENDOCRINOLOGY | Facility: CLINIC | Age: 44
End: 2019-04-17

## 2019-04-17 NOTE — TELEPHONE ENCOUNTER
Spoke with patient informed her of providers recommendations patient stated she will come by to have provider take a look to be sure everything is ok.

## 2019-04-17 NOTE — TELEPHONE ENCOUNTER
----- Message from Mariella Hanley sent at 4/17/2019 10:28 AM CDT -----  Contact: Self [phone]  Site is swollen where PT had FNA yesterday.

## 2019-04-17 NOTE — TELEPHONE ENCOUNTER
Spoke with patient she stated the site of FNA is swollen to the point it is protruding from her neck. Patient is experiencing mild pain says pain was worse last night she has taken aleve and applied ice pack to location patient is concerned please address and advise.

## 2019-04-22 ENCOUNTER — TELEPHONE (OUTPATIENT)
Dept: ENDOCRINOLOGY | Facility: CLINIC | Age: 44
End: 2019-04-22

## 2019-04-22 NOTE — TELEPHONE ENCOUNTER
----- Message from Mariella Hanley sent at 4/22/2019 11:40 AM CDT -----  Contact: Self [phone]  PT returned call. She wants to know when she can come in.

## 2019-04-23 ENCOUNTER — PATIENT MESSAGE (OUTPATIENT)
Dept: ENDOCRINOLOGY | Facility: CLINIC | Age: 44
End: 2019-04-23

## 2019-04-23 NOTE — TELEPHONE ENCOUNTER
Left a message stated that at this time do not have appointment but May . Sept will open she can call  to scheduled at that time

## 2019-04-23 NOTE — TELEPHONE ENCOUNTER
----- Message from Gricel East sent at 4/18/2019  3:22 PM CDT -----  Contact:   Pt  [phone]  Needs Advice    Reason for call:    Pt returning the nurse phone call in regards to an appt         Communication Preference:   Phone     Additional Information:

## 2019-04-30 ENCOUNTER — CLINICAL SUPPORT (OUTPATIENT)
Dept: ENDOCRINOLOGY | Facility: CLINIC | Age: 44
End: 2019-04-30
Payer: COMMERCIAL

## 2019-04-30 ENCOUNTER — OFFICE VISIT (OUTPATIENT)
Dept: INTERNAL MEDICINE | Facility: CLINIC | Age: 44
End: 2019-04-30
Payer: COMMERCIAL

## 2019-04-30 ENCOUNTER — TELEPHONE (OUTPATIENT)
Dept: SLEEP MEDICINE | Facility: OTHER | Age: 44
End: 2019-04-30

## 2019-04-30 VITALS
BODY MASS INDEX: 46.38 KG/M2 | DIASTOLIC BLOOD PRESSURE: 80 MMHG | WEIGHT: 252 LBS | HEIGHT: 62 IN | SYSTOLIC BLOOD PRESSURE: 114 MMHG | TEMPERATURE: 98 F | HEART RATE: 62 BPM | RESPIRATION RATE: 19 BRPM

## 2019-04-30 DIAGNOSIS — J30.2 SEASONAL ALLERGIES: Primary | ICD-10-CM

## 2019-04-30 DIAGNOSIS — E04.2 NONTOXIC MULTINODULAR GOITER: Primary | ICD-10-CM

## 2019-04-30 PROCEDURE — 99214 OFFICE O/P EST MOD 30 MIN: CPT | Mod: S$GLB,,, | Performed by: INTERNAL MEDICINE

## 2019-04-30 PROCEDURE — 99214 PR OFFICE/OUTPT VISIT, EST, LEVL IV, 30-39 MIN: ICD-10-PCS | Mod: S$GLB,,, | Performed by: INTERNAL MEDICINE

## 2019-04-30 PROCEDURE — 3008F PR BODY MASS INDEX (BMI) DOCUMENTED: ICD-10-PCS | Mod: CPTII,S$GLB,, | Performed by: INTERNAL MEDICINE

## 2019-04-30 PROCEDURE — 99999 PR PBB SHADOW E&M-EST. PATIENT-LVL III: ICD-10-PCS | Mod: PBBFAC,,, | Performed by: INTERNAL MEDICINE

## 2019-04-30 PROCEDURE — 99999 PR PBB SHADOW E&M-EST. PATIENT-LVL III: CPT | Mod: PBBFAC,,, | Performed by: INTERNAL MEDICINE

## 2019-04-30 PROCEDURE — 3008F BODY MASS INDEX DOCD: CPT | Mod: CPTII,S$GLB,, | Performed by: INTERNAL MEDICINE

## 2019-04-30 RX ORDER — PREDNISONE 20 MG/1
20 TABLET ORAL DAILY
Qty: 2 TABLET | Refills: 0 | Status: SHIPPED | OUTPATIENT
Start: 2019-04-30 | End: 2019-05-02

## 2019-04-30 RX ORDER — TRIAMCINOLONE ACETONIDE 40 MG/ML
40 INJECTION, SUSPENSION INTRA-ARTICULAR; INTRAMUSCULAR ONCE
Status: DISCONTINUED | OUTPATIENT
Start: 2019-04-30 | End: 2019-04-30

## 2019-04-30 RX ORDER — LEVOCETIRIZINE DIHYDROCHLORIDE 5 MG/1
5 TABLET, FILM COATED ORAL NIGHTLY
Qty: 30 TABLET | Refills: 11 | Status: SHIPPED | OUTPATIENT
Start: 2019-04-30 | End: 2019-06-25 | Stop reason: CLARIF

## 2019-04-30 NOTE — TELEPHONE ENCOUNTER
Patient wants to reschedule in June on Sat in [place].  We will call her back to schedule,once the schedule opens up.

## 2019-04-30 NOTE — PROGRESS NOTES
Subjective:       Patient ID: Luzmaria Real is a 44 y.o. female.    Chief Complaint: Ear Fullness (Left ear); Otalgia (Left ear); and Dizziness (Pt state feeling imbalanced for couple weeks)    HPI     44-year-old female here for evaluation of otalgia, ear fullness, and dizziness.  This has been going on the last 2.5-3 weeks.  She reports that her left ear has been clogged a lot.  She feels like she cannot pop her ear.  It feels like it is not popping.  She has been dizzy - room spinning around her and imbalanced.  No sinus pressure of congestion.  No fevers.  She has had chills.    She has been thinking about seeing a bariatric surgeon.    Review of Systems   HENT: Positive for ear discharge and ear pain. Negative for hearing loss, rhinorrhea and sore throat.    Respiratory: Negative for cough.    Gastrointestinal: Negative for abdominal pain, diarrhea and vomiting.   Musculoskeletal: Negative for neck pain.   Skin: Negative for rash.   Neurological: Positive for headaches.       Objective:      Physical Exam   Constitutional: She is oriented to person, place, and time. She appears well-developed and well-nourished.   HENT:   Head: Normocephalic and atraumatic.   Right Ear: Tympanic membrane and ear canal normal.   Left Ear: Ear canal normal. A middle ear effusion is present.   Mouth/Throat: No oropharyngeal exudate.   Eyes: Pupils are equal, round, and reactive to light. EOM are normal. Right eye exhibits no discharge. Left eye exhibits no discharge. No scleral icterus.   Neck: Normal range of motion. Neck supple. No tracheal deviation present. No thyromegaly present.   Cardiovascular: Normal rate, regular rhythm and normal heart sounds. Exam reveals no gallop and no friction rub.   No murmur heard.  Pulmonary/Chest: Effort normal and breath sounds normal. No respiratory distress. She has no wheezes. She has no rales. She exhibits no tenderness.   Abdominal: Soft. Bowel sounds are normal. She exhibits no  distension and no mass. There is no tenderness. There is no rebound and no guarding.   Musculoskeletal: Normal range of motion. She exhibits no edema or tenderness.   Neurological: She is alert and oriented to person, place, and time.   Skin: Skin is warm and dry. No rash noted. No erythema. No pallor.   Psychiatric: She has a normal mood and affect. Her behavior is normal.   Vitals reviewed.      Assessment:       1. Seasonal allergies    2. BMI 45.0-49.9, adult        Plan:       1.  Xyzal and prednisone prescribed.  Prednisone 20 mg daily x2 days.    2.  Referred to Bariatric surgery

## 2019-05-06 NOTE — PROGRESS NOTES
Subjective:       Patient ID: Luzmaria Real is a 44 y.o. female.    Chief Complaint: Consult    CC: weight    Current attempts at weight loss: New pt to me, referred by Kathy Florez MD  [address] [place] [address] , with Patient Active Problem List:     GERD (gastroesophageal reflux disease)     Bile salt-induced diarrhea     BMI 45.0-49.9, adult     Migraine without status migrainosus, not intractable     Nausea alone     Heartburn     Encounter for monitoring chronic NSAID therapy     Tietze's syndrome     Sleep stage dysfunction     No effort currently      Previous diet attempts: Less fast food.     History of medication for loss: On topiramate for migraines. Took phentermine throughout 2018 and did not lose weight.   checked today     Heaviest weight: 255 lbs    Lightest weight: 135#    Goal weight: 200# or less.       Last eye exam:      1 year ago. No glaucoma per pt    Provider:    Typical eating patterns: Works at [email or web address]. Lives with  and daughter. SHe and  share in cooking.   Breakfast: eggs with spinach. PB and J. Weekends: eggs, sausage, grits (from [employer])    Lunch: leftovers.     dinner:; Spaghetti. Chicken with with mac and cheese or red beans and rice and spinach. If out- fried seafood.     Snacks:  Almonds, pork skins, pecans.     Beverages: water. Coffee sometimes. ETOH- glass of wine on fridays and 2 vodka cocktails on some satudays    Willingness to change: 5/10    EKG:Normal sinus rhythm  Slightly low T waves  Within normal limits  When compared with ECG of 01-JUN-2013 08:55,  Vent. rate has increased BY  37 BPM    BMR: 1731    Review of Systems   Constitutional: Negative for chills and fever.   Respiratory: Negative for shortness of breath.         + Snores   Cardiovascular: Negative for chest pain and leg swelling.   Gastrointestinal: Negative for constipation and diarrhea.        + GERD   Genitourinary: Negative for difficulty urinating and  dysuria.   Musculoskeletal: Positive for arthralgias and myalgias. Negative for back pain.   Neurological: Positive for dizziness. Negative for light-headedness.   Psychiatric/Behavioral: Negative for dysphoric mood. The patient is not nervous/anxious.        Objective:     /62   Pulse 69   Ht 5' (1.524 m) Comment: measured  Wt 115.9 kg (255 lb 8.2 oz)   BMI 49.90 kg/m²     Physical Exam   Constitutional: She is oriented to person, place, and time. She appears well-developed. No distress.   Morbidly obese     HENT:   Head: Normocephalic and atraumatic.   Mouth/Throat: No oropharyngeal exudate.   Eyes: Pupils are equal, round, and reactive to light. EOM are normal. No scleral icterus.   Neck: Normal range of motion. Neck supple. Thyromegaly present.   Cardiovascular: Normal rate and normal heart sounds. Exam reveals no gallop and no friction rub.   No murmur heard.  Pulmonary/Chest: Effort normal and breath sounds normal. No respiratory distress. She has no wheezes.   Abdominal: Soft. Bowel sounds are normal. She exhibits no distension. There is no tenderness.   Musculoskeletal: Normal range of motion. She exhibits no edema.   Lymphadenopathy:     She has no cervical adenopathy.   Neurological: She is alert and oriented to person, place, and time. No cranial nerve deficit.   Skin: Skin is warm and dry. No erythema.   Psychiatric: She has a normal mood and affect. Her behavior is normal. Judgment normal.   Vitals reviewed.      Assessment:       1. Class 3 severe obesity due to excess calories with serious comorbidity and body mass index (BMI) of 45.0 to 49.9 in adult    2. Gastroesophageal reflux disease without esophagitis        Plan:         1. Class 3 severe obesity due to excess calories with serious comorbidity and body mass index (BMI) of 45.0 to 49.9 in adult    - diethylpropion 75 mg TbSR; Take 75 mg by mouth once daily.  Dispense: 30 tablet; Refill: 2    Patient warned of common side effects of  diethylpropion including anxiety, insomnia, palpitations and increased blood pressure. It was also explained that it is for short-term usage along with diet and exercise, and that stopping the medication without making lifestyle changes will result in regain of weight. Patient states understanding.    Weight loss medications are controlled substances.  They require routine follow up. Prescription or pills that are lost or destroyed will not be replaced.     Exercise 30 min 3 days week. Gradually increase.     Patient counseled in strategies for long term weight loss and maintenance: Keeping a food diary, exercise for 1 hour a day and eating more protein than carbohydrates.         3 meals a day made up of the following:  Unlimited green vegetables, tomatoes, mushrooms, spaghetti squash, cauliflower, meat, poultry, seafood, eggs and hard cheeses.   Milk and plain yogurt  Dressings, seasonings, condiments, etc should have less than 2 g sugars.   Beans (1-1.5 cups) or nuts (1/4 cup) can have 1 x a day.   1-2 servings of citrus fruits, berries, pineapple or melon a day (1/2 cup)  Avoid fried foods    No grains, rice, pasta, potatoes, bread, corn, peas, oatmeal, grits, tortillas, crackers, chips    No soda, sweet tea, juices or lemonade    [email or web address] for recipes. Moderate carb intake.    Discussed medical and surgical options with patient.      2. Gastroesophageal reflux disease without esophagitis  Expect improvement with weight loss.

## 2019-05-07 ENCOUNTER — INITIAL CONSULT (OUTPATIENT)
Dept: BARIATRICS | Facility: CLINIC | Age: 44
End: 2019-05-07
Payer: COMMERCIAL

## 2019-05-07 VITALS
SYSTOLIC BLOOD PRESSURE: 133 MMHG | BODY MASS INDEX: 50.16 KG/M2 | WEIGHT: 255.5 LBS | HEIGHT: 60 IN | HEART RATE: 69 BPM | DIASTOLIC BLOOD PRESSURE: 62 MMHG

## 2019-05-07 DIAGNOSIS — K21.9 GASTROESOPHAGEAL REFLUX DISEASE WITHOUT ESOPHAGITIS: Chronic | ICD-10-CM

## 2019-05-07 DIAGNOSIS — E66.01 CLASS 3 SEVERE OBESITY DUE TO EXCESS CALORIES WITH SERIOUS COMORBIDITY AND BODY MASS INDEX (BMI) OF 45.0 TO 49.9 IN ADULT: Primary | ICD-10-CM

## 2019-05-07 PROCEDURE — 99999 PR PBB SHADOW E&M-EST. PATIENT-LVL III: ICD-10-PCS | Mod: PBBFAC,,, | Performed by: INTERNAL MEDICINE

## 2019-05-07 PROCEDURE — 99244 PR OFFICE CONSULTATION,LEVEL IV: ICD-10-PCS | Mod: S$GLB,,, | Performed by: INTERNAL MEDICINE

## 2019-05-07 PROCEDURE — 99244 OFF/OP CNSLTJ NEW/EST MOD 40: CPT | Mod: S$GLB,,, | Performed by: INTERNAL MEDICINE

## 2019-05-07 PROCEDURE — 99999 PR PBB SHADOW E&M-EST. PATIENT-LVL III: CPT | Mod: PBBFAC,,, | Performed by: INTERNAL MEDICINE

## 2019-05-07 RX ORDER — DIETHYLPROPION HYDROCHLORIDE 75 MG/1
75 TABLET, EXTENDED RELEASE ORAL DAILY
Qty: 30 TABLET | Refills: 2 | Status: SHIPPED | OUTPATIENT
Start: 2019-05-07 | End: 2019-08-14 | Stop reason: SDUPTHER

## 2019-05-07 NOTE — LETTER
[facility]  [address]  [facility] [address]  Phone: [phone]  Fax: [phone] May 10, 2019      Kathy Florez MD  [address]  [facility] [address]    Patient: Luzmaria Real   MR Number: [id number]   YOB: 1975   Date of Visit: 5/7/2019     Dear Dr. Florez:    Thank you for referring Luzmaria Real to me for evaluation. Below are the relevant portions of my assessment and plan of care.    Ms. Real's assessment is as follows:    1. Class 3 severe obesity due to excess calories with serious comorbidity and body mass index (BMI) of 45.0 to 49.9 in adult    2. Gastroesophageal reflux disease without esophagitis        PLAN:  1. Class 3 severe obesity due to excess calories with serious comorbidity and body mass index (BMI) of 45.0 to 49.9 in adult  - diethylpropion 75 mg TbSR; Take 75 mg by mouth once daily.  Dispense: 30 tablet; Refill: 2     The patient was warned of common side effects of diethylpropion including anxiety, insomnia, palpitations and increased blood pressure. It was also explained that it is for short-term usage along with diet and exercise, and that stopping the medication without making lifestyle changes will result in regain of weight. The patient states understanding.     Weight loss medications are controlled substances.  They require routine follow up. Prescription or pills that are lost or destroyed will not be replaced.      Exercise 30 min 3 days week. Gradually increase.      The patient counseled in strategies for long term weight loss and maintenance: Keeping a food diary, exercise for 1 hour a day and eating more protein than carbohydrates.      3 meals a day made up of the following:  · Unlimited green vegetables, tomatoes, mushrooms, spaghetti squash, cauliflower, meat, poultry, seafood, eggs and hard cheeses.   · Milk and plain yogurt  · Dressings, seasonings, condiments, etc should have less than 2 g sugars.    · Beans (1-1.5 cups) or nuts (1/4 cup) can have 1 x a day.   · 1-2 servings of citrus fruits, berries, pineapple or melon a day (1/2 cup)  · Avoid fried foods     No grains, rice, pasta, potatoes, bread, corn, peas, oatmeal, grits, tortillas, crackers, chips     No soda, sweet tea, juices or lemonade     [email or web address] for recipes. Moderate carb intake.     Discussed medical and surgical options with patient.       2. Gastroesophageal reflux disease without esophagitis  Expect improvement with weight loss.     If you have questions, please do not hesitate to call me. I look forward to following Alosia along with you.    Sincerely,      Fabiana Rueda MD   Section of Bariatric Surgery  Department of Surgery  [facility]    PHONG/sherif

## 2019-05-07 NOTE — PATIENT INSTRUCTIONS
Patient warned of common side effects of diethylpropion including anxiety, insomnia, palpitations and increased blood pressure. It was also explained that it is for short-term usage along with diet and exercise, and that stopping the medication without making lifestyle changes will result in regain of weight. Patient states understanding.    Weight loss medications are controlled substances.  They require routine follow up. Prescription or pills that are lost or destroyed will not be replaced.     Exercise 30 min 3 days week. Gradually increase.     Patient counseled in strategies for long term weight loss and maintenance: Keeping a food diary, exercise for 1 hour a day and eating more protein than carbohydrates.         3 meals a day made up of the following:  Unlimited green vegetables, tomatoes, mushrooms, spaghetti squash, cauliflower, meat, poultry, seafood, eggs and hard cheeses.   Milk and plain yogurt  Dressings, seasonings, condiments, etc should have less than 2 g sugars.   Beans (1-1.5 cups) or nuts (1/4 cup) can have 1 x a day.   1-2 servings of citrus fruits, berries, pineapple or melon a day (1/2 cup)  Avoid fried foods    No grains, rice, pasta, potatoes, bread, corn, peas, oatmeal, grits, tortillas, crackers, chips    No soda, sweet tea, juices or lemonade    [email or web address] for recipes. Moderate carb intake.        Meal Ideas for Regular Bariatric Diet  *Recipes and products available at [email or web address].[email or web address]      Breakfast: (15-20g protein)    - Egg white omelet: 2 egg whites or ½ cup Egg Beaters. (Optional proteins: cheese, shrimp, black beans, chicken, sliced turkey) (Optional veggies: tomatoes, salsa, spinach, mushrooms, onions, green peppers, or small slice avocado)     - Egg and sausage: 1 egg or ¼ cup Egg Beaters (any variety), with 1 brady or 2 links of Turkey sausage or Veggie breakfast sausage (Starbak or Mindmancer)    - Crust-less breakfast quiche: To make a glass pie dish, mix  4oz part skim Ricotta, 1 cup skim milk, and 2 eggs as your base. Add protein: shredded cheese, sliced lean ham or turkey, turkey norman/sausage. Add veggies: tomato, onion, green onion, mushroom, green pepper, spinach, etc.    - Yogurt parfait: Mix 1 - 6oz container Dannon Light N Fit vanilla yogurt, with ¼ cup Kashi Go Lean cereal    - Cottage cheese and fruit: ½ cup part-skim cottage cheese or ricotta cheese topped with fresh fruit or sugar free preserves     - Kathy Harrison's Vanilla Egg custard* (add 2 Tbsp instant coffee granules to make Cappuccino Custard*)    - Hi-Protein café latte (skim milk, decaf coffee, 1 scoop protein powder). Optional to add Sugar free syrup or extract flavoring.    Lunch: (20-30g protein)    - ½ cup Black bean soup (Homemade or Progresso), with ¼ cup shredded low-fat cheese. Top with chopped tomato or fresh salsa.     - Lean deli turkey breast and low-fat sliced cheese, mustard or light harding to moisten, rolled up together, or wrapped in a Wilian lettuce leaf    - Chicken salad made from dinner leftovers, moisten with low-fat salad dressing or light harding. Also try leftover salmon, shrimp, tuna or boiled eggs. Serve ½ cup over dark green salad    - Fat-free canned refried beans, topped with ¼ cup shredded low-fat cheese. Top with chopped tomato or fresh salsa.     - Greek salad: Top mixed greens with 1-2oz grilled chicken, tomatoes, red onions, 2-3 kalamata olives, and sprinkle lightly with feta cheese. Spritz with Balsamic vinegar to taste.     - Crust-less lunch quiche: To make a glass pie dish, mix 4oz part skim Ricotta, 1 cup skim milk, and 2 eggs as your base. Add protein: shredded cheese, sliced lean ham or turkey, shrimp, chicken. Add veggies: tomato, onion, green onion, mushroom, green pepper, spinach, artichoke, broccoli, etc.    - Pizza bake: tomato sauce, low-fat shredded mozzarella and turkey pepperoni or Chappell norman. Add any veggies.    - Cucumber crab bites: Spread ¼ cup  crab dip (lump crabmeat + light cream cheese and green onions) over sliced cucumber.     - Chicken with light spinach and artichoke dip*: Puree in : 6oz cooked and drained spinach, 2 cloves garlic, 1 can cannelloni beans, ½ cup chopped green onions, 1 can drained artichoke hearts (not marinated in oil), lemon juice and basil. Mix in 2oz chopped up chicken.    Supper: (20-30g protein)    - Serve grilled fish over dark green salad tossed with low-fat dressing, served with grilled asparagus perez     - Rotisserie chicken salad: served with sliced strawberries, walnuts, fat-free feta cheese crumbles and 1 tbsp Brantleys Own Light Raspberry [place] Vinaigrette    - Shrimp cocktail: Dip cold boiled shrimp in homemade low-sugar cocktail sauce (1/2 cup Harjeet One Carb ketchup, 2 tbsp horseradish, 1/4 tsp hot sauce, 1 tsp Worcestershire sauce, 1 tbsp freshly-squeezed lemon juice). Serve with dark green salad, walnuts, and crumbled blue cheese drizzled with olive oil and Balsamic vinegar    - Tuna Melt: Spread tuna salad onto 2 thick slices of tomato. Top with low-fat cheese and broil until cheese is melted. May also be made with chicken salad of shrimp salad. Luis Llorens Torres with different types of cheeses.    - Homemade low-fat Chili using extra lean ground beef or ground turkey. Top with shredded cheese and salsa as desired. May add dollop fat-free sour cream if desired    - Dinner Omelet with shrimp or chicken and onion, green peppers and chives.    - No noodle lasagna: Use sliced zucchini or eggplant in place of noodles.  Layer with part skim ricotta cheese and low sugar meat sauce (use very lean ground beef or ground turkey).    - Mexican chicken bake: Bake chunks of chicken breast or thigh with taco seasoning, Pace brand enchilada sauce, green onions and low-fat cheese. Serve with ¼ cup black beans or fat free refried beans topped with chopped tomatoes or salsa.    - Carl frozen meatballs, simmered in  Classico Marinara sauce. Different flavors of salsa or spaghetti sauce create different dishes! Sprinkle with parmesan cheese. Serve with grilled or steamed veggies, or a dark green salad.    - Simmer boneless skinless chicken thigh chunks in Classico Marinara sauce or roasted salsa until tender with chopped onion, bell pepper, garlic, mushrooms, spinach, etc.     - Hamburger, without the bun, dressed the way you like. Served with grilled or steamed veggies.    - Eggplant parmesan: Bake slices of eggplant at 350 degrees for 15 minutes. Layer tomato sauce, sliced eggplant and low-fat mozzarella cheese in a baking dish and cover with foil. Bake 30-40 more minutes or until bubbly. Uncover and bake at 400 degrees for about 15 more minutes, or until top is slightly crisp.    - Fish tacos: grilled/baked white fish, wrapped in Wilian lettuce leaf, topped with salsa, shredded low-fat cheese, and light coleslaw.    Snacks: (100-200 calories; >5g protein)    - 1 low-fat cheese stick with 8 cherry tomatoes or 1 serving fresh fruit  - 4 thin slices fat-free turkey breast and 1 slice low-fat cheese  - 4 thin slices fat-free honey ham with wedge of melon  - 1/4 cup unsalted nuts with ½ cup fruit  - 6-oz container Dannon Light n Fit vanilla yogurt, topped with 1oz unsalted nuts         - apple, celery or baby carrots spread with 2 Tbsp natural peanut butter or almond butter   - apple slices with 1 oz slice low-fat cheese  - celery, cucumber, bell pepper or baby carrots dipped in ¼ cup hummus bean spread or light spinach and artichoke dip (*recipe in lunch section)  - 100 calorie bag microwave light popcorn with 3 tbsp grated parmesan cheese  - Sanju Links Beef Steak - 14g protein! (similar to beef jerky)  - 2 wedges Laughing Cow - Light Herb & Garlic Cheese with sliced cucumber or green bell pepper  - 1/2 cup low-fat cottage cheese with ¼ cup fruit or ¼ cup salsa  - RTD Protein drinks: Atkins, Low Carb Slim Fast, EAS light,  Muscle Milk Light, etc.  - Homemade Protein drinks: [facility] Soy95, Isopure, Nectar, UNJURY, Whey Gourmet, etc. Mix 1 scoop powder with 8oz skim/1% milk or light soymilk.  - Protein bars: Atkins, EAS, Pure Protein, Think Thin, Detour, etc. Must have 0-4 grams sugar - Read the label.    Takeout Options: No more than twice/week  Deli - Salads (no pasta or rice), meats, cheeses. Roasted chicken. Lox (salmon)    Mexican - Platters which don't include tortillas, chips, or rice. Go easy on the beans. Example: Fajitas without the tortillas. Ask the  not to bring chips to the table if they are too tempting.    Greek - Meat or fish and vegetable, but no bread or rice. Including hummus, baba ganoush, etc, is OK. Most sit-down Greek restaurants can provide you with cucumber slices for dipping instead of wali bread.    Fast Food (Avoid as much as possible) - Salads (no croutons and limit salad dressing to 2 tbsp), grilled chicken sandwich without the bun and ask for no harding. Reemas low fat chili or Taco Bell pintos and cheese.    BBQ - The meats are fine if you ask for sauces on the side, but most of the traditional side dishes are loaded with carbs. Chato slaw, baked beans and BBQ sauce are typically made with sugar.    Chinese - Nothing deep-fried, no rice or noodles. Many Chinese sauces have starch and sugar in them, so you'll have to use your judgement. If you find that these sauces trigger cravings, or cause Dumping, you can ask for the sauce to be made without sugar or just use soy sauce.        If you are interested in bariatric surgery we will need you to either attend an in-person seminar or online seminar. If you choose to attend an in-person seminar this is give once a month and you may call [phone] to arrange your appointment. If you choose to view the online seminar please go to: [email or web address] . The seminar is best viewed on a desktop or laptop computer. Upon completion of the seminar on the last  slide you will see the code word comprised of letters and numbers in red and you should jot them down as youll need them to enter into the required form. On that same page youll see the link which will take you to the form. Please enter all the information required, including the code word. You will receive an email confirmation and so will we. Upon receiving this letter you will be called so that your appointments can be arranged. There is also two links for you to print out two packets of information. One is the patient information packet and the other is the nutrition worksheet. Please complete them and bring to your next appointment in our department.

## 2019-05-07 NOTE — LETTER
May 10, 2019      Kathy Florez MD  [address]  [facility] [address]           [facility]  [address]  [facility] [address]  Phone: [phone]  Fax: [phone]          Patient: Luzmaria Real   MR Number: [id number]   YOB: 1975   Date of Visit: 5/7/2019       Dear Dr. Kathy Florez:    Thank you for referring Luzmaria Real to me for evaluation. Attached you will find relevant portions of my assessment and plan of care.    If you have questions, please do not hesitate to call me. I look forward to following Luzmaria Real along with you.    Sincerely,    Fabiana Rueda MD    Enclosure  CC:  No Recipients    If you would like to receive this communication electronically, please contact [email or web address] or [phone] to request more information on [employer] Link access.    For providers and/or their staff who would like to refer a patient to [facility], please contact us through our one-stop-shop provider referral line, [facility], at [phone].    If you feel you have received this communication in error or would no longer like to receive these types of communications, please e-mail [email or web address]

## 2019-05-14 LAB
MISCELLANEOUS TEST NAME: NORMAL
REFERENCE LAB: NORMAL
SPECIMEN TYPE: NORMAL
TEST RESULT: NORMAL

## 2019-05-15 ENCOUNTER — TELEPHONE (OUTPATIENT)
Dept: SLEEP MEDICINE | Facility: OTHER | Age: 44
End: 2019-05-15

## 2019-05-15 ENCOUNTER — TELEPHONE (OUTPATIENT)
Dept: ENDOCRINOLOGY | Facility: CLINIC | Age: 44
End: 2019-05-15

## 2019-05-15 DIAGNOSIS — E04.1 THYROID NODULE: ICD-10-CM

## 2019-05-15 DIAGNOSIS — E04.2 NONTOXIC MULTINODULAR GOITER: Primary | ICD-10-CM

## 2019-05-15 NOTE — TELEPHONE ENCOUNTER
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 Santillan)

## 2019-05-22 ENCOUNTER — TELEPHONE (OUTPATIENT)
Dept: SURGERY | Facility: CLINIC | Age: 44
End: 2019-05-22

## 2019-05-30 ENCOUNTER — INITIAL CONSULT (OUTPATIENT)
Dept: SURGERY | Facility: CLINIC | Age: 44
End: 2019-05-30
Payer: COMMERCIAL

## 2019-05-30 VITALS
HEART RATE: 69 BPM | DIASTOLIC BLOOD PRESSURE: 61 MMHG | WEIGHT: 251.56 LBS | SYSTOLIC BLOOD PRESSURE: 135 MMHG | TEMPERATURE: 98 F | RESPIRATION RATE: 18 BRPM | BODY MASS INDEX: 46.29 KG/M2 | HEIGHT: 62 IN

## 2019-05-30 DIAGNOSIS — E04.1 THYROID NODULE: Primary | ICD-10-CM

## 2019-05-30 PROCEDURE — 99214 PR OFFICE/OUTPT VISIT, EST, LEVL IV, 30-39 MIN: ICD-10-PCS | Mod: S$GLB,,, | Performed by: SURGERY

## 2019-05-30 PROCEDURE — 99999 PR PBB SHADOW E&M-EST. PATIENT-LVL III: ICD-10-PCS | Mod: PBBFAC,,, | Performed by: SURGERY

## 2019-05-30 PROCEDURE — 3008F PR BODY MASS INDEX (BMI) DOCUMENTED: ICD-10-PCS | Mod: CPTII,S$GLB,, | Performed by: SURGERY

## 2019-05-30 PROCEDURE — 3008F BODY MASS INDEX DOCD: CPT | Mod: CPTII,S$GLB,, | Performed by: SURGERY

## 2019-05-30 PROCEDURE — 99999 PR PBB SHADOW E&M-EST. PATIENT-LVL III: CPT | Mod: PBBFAC,,, | Performed by: SURGERY

## 2019-05-30 PROCEDURE — 99214 OFFICE O/P EST MOD 30 MIN: CPT | Mod: S$GLB,,, | Performed by: SURGERY

## 2019-05-30 NOTE — LETTER
Endocrine/General Surgery  [address] [place] [address]  Phone: [phone]  Fax: [phone] June 2, 2019         Kathy Florez MD  [address] [facility] [address]    Patient: Luzmaria Real   YOB: 1975   Date of Visit: 5/30/2019     Dear Dr. Florez:    I saw Ms. Daryl Real in clinic. Attached you will find a copy of my note.    As you know, she is a 44 y.o. female who presented with a thyroid nodule with atypical cells noted on FNA. I was able to discuss the need for definitive diagnosis based on FNA and molecular marker findings in addition to the expected perioperative course. The current plan includes thyroid lobectomy possible total in the near future.    If you have any questions or concerns, please don't hesitate to contact my office. Again, thank you kindly for this referral.    Regards,      Carlee Huff MD      CC  Bharat Reza MD  [address] [facility] [address]  VIA In Basket

## 2019-05-30 NOTE — Clinical Note
June 2, 2019      Kathy Florez MD  [address] [facility] [address]           [facility] [place]  [address]  [facility] [address]  Phone: [phone]          Patient: Luzmaria Real   MR Number: [id number]   YOB: 1975   Date of Visit: 5/30/2019       Dear Dr. Kathy Florez:    Thank you for referring Luzmaria Real to me for evaluation. Attached you will find relevant portions of my assessment and plan of care.    If you have questions, please do not hesitate to call me. I look forward to following Luzmaria Real along with you.    Sincerely,    Carlee Huff MD    Enclosure  CC:  No Recipients    If you would like to receive this communication electronically, please contact [email or web address] or [phone] to request more information on [employer] Link access.    For providers and/or their staff who would like to refer a patient to [facility], please contact us through our one-stop-shop provider referral line, [facility], at [phone].    If you feel you have received this communication in error or would no longer like to receive these types of communications, please e-mail [email or web address]

## 2019-05-30 NOTE — PROGRESS NOTES
Consult Note  Endocrine Surgery    Visit Diagnosis: Thyroid nodule [E04.1]    SUBJECTIVE:     Luzmaria Real is a 44 y.o. female seen at the request of Dr. Kathy Florez today in the Endocrine Surgery [facility] for evaluation of thyroid nodule(s). Her history dates back to December 2018 when patient sought evaluation for symptoms(she was noted to have a globus sensation). Subsequent evaluation included referral to an endocrinologist, neck ultrasound and fine needle aspiration. Luzmaria Real complains of change in voice quality and dry cough.  In addition, the patient notes that she has an intermittent sensation of a lump in her throat.  The patient denies hot/cold intolerance, fatigue, unexplained weight changes and difficulty with shortness of breath especially with postural changes. She does not have a history of head and neck radiation therapy. She does not have a family history of thyroid disease. She does have a family history of endocrinopathies(Pancreatic CA mat aunt). She is not taking thyroid hormone supplementation. She has not undergone radioactive iodine treatment.    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  I do not have copy of these results for personal review during clinic visit today.      Review of patient's allergies indicates:   Allergen Reactions    Caffeine        Current Outpatient Medications   Medication Sig Dispense Refill    acetaminophen-codeine 300-30mg (TYLENOL #3) 300-30 mg Tab TAKE 1 TABLET BY MOUTH EVERY 4 TO 6 HOURS AS NEEDED FOR HEADACHE OR PAIN  1    cholestyramine (QUESTRAN) 4 gram packet Take 4 g by mouth 3 (three) times daily with meals.      diethylpropion 75 mg TbSR Take 75 mg by mouth once daily. 30 tablet 2    levocetirizine (XYZAL) 5 MG tablet Take 1 tablet (5 mg total) by mouth every evening. 30 tablet 11  "    No current facility-administered medications for this visit.        Past Medical History:   Diagnosis Date    Fibroid     GERD (gastroesophageal reflux disease)     Migraine headache      Past Surgical History:   Procedure Laterality Date    CHOLECYSTECTOMY      HYSTERECTOMY      MOUTH SURGERY      TONSILLECTOMY      TUBAL LIGATION       Social History     Tobacco Use    Smoking status: Never Smoker    Smokeless tobacco: Never Used   Substance Use Topics    Alcohol use: Yes     Frequency: 2-4 times a month     Drinks per session: 1 or 2     Binge frequency: Never     Comment: once a week maybe    Drug use: No          Review of Systems:    Review of Systems  Constitutional: negative for chills, fatigue, fevers, malaise and night sweats  Eyes: negative for icterus and irritation  Respiratory: positive for cough and negative for dyspnea on exertion  Cardiovascular: negative for chest pain and palpitations  Gastrointestinal: negative for constipation, diarrhea and dysphagia  Genitourinary:negative for dysuria, hematuria and nocturia  Integument/breast: negative for rash and skin color change  Hematologic/lymphatic: negative for bleeding and easy bruising  Neurological: negative for gait problems, headaches and seizures  Behavioral/Psych: negative for anxiety and depression  Endocrine: negative    ENT ROS: negative      OBJECTIVE:     Vital Signs:  /61   Pulse 69   Temp 98.1 °F (36.7 °C)   Resp 18   Ht 5' 2" (1.575 m)   Wt 114.1 kg (251 lb 8.7 oz)   BMI 46.01 kg/m²    Body mass index is 46.01 kg/m².      Physical Exam:    General:  no distress, see vitals for BMI    Eyes:  conjunctivae/corneas clear   Neck: trachea midline and symmetric, no adenopathy    Thyroid:  thyroid enlarged and nodular   Lung: clear to auscultation bilaterally   Heart:  regular rate and rhythm   Abdomen: soft, non-tender; bowel sounds normal; no masses,  no organomegaly   Skin/Extremities: warm and well-perfused "   Pulses: 2+ and symmetric   Neuro: normal without focal findings and mental status, speech normal, alert and oriented x3       Laboratory/Radiologic Studies    Studies:  Date:       Results:    Ultrasound:  12/14/2018 FINDINGS:    There is a 23 mm subtle nodule in the superior pole of the right lobe of the thyroid.  It has a mild amount of vascularity associated with it.    There is a 34 mm complex nodule in the inferior pole of the right lobe of the thyroid.  It has a mild amount of vascularity associated with it.  The cystic portion of this nodule measures 25 mm in size.    The right lobe of the thyroid measures 5.8 cm in craniocaudal dimension by 2.4 cm in AP dimension by 2.3 cm in mediolateral dimension.    The isthmus measures 3 mm in AP dimension.    The left lobe of the thyroid measures 3.3 cm in craniocaudal dimension by 0.9 cm in AP dimension by 1.5 cm in mediolateral dimension.      Impression       1. There is a 23 mm subtle nodule in the superior pole of the right lobe of the thyroid.  It has a mild amount of vascularity associated with it. There is a 34 mm complex nodule in the inferior pole of the right lobe of the thyroid.  It has a mild amount of vascularity associated with it. The cystic portion of this nodule measures 25 mm in size.  2. If additional imaging evaluation is clinically indicated, I recommend consideration of a nuclear medicine thyroid scan.  3. The right lobe of the thyroid is enlarged.  It measures 5.8 cm in craniocaudal dimension.        Pathology::  2/22/2019 FINAL PATHOLOGIC DIAGNOSIS  1. Right superior thyroid, nodule, fine needle aspiration:  [facility] Thyroid Cytology Category: Follicular lesion of undetermined significance (FLUS)  Follicular epithelial cells with some changes suggestive of the microfollicles and 1 area suggestive of a possible  intranuclear inclusion.  2. Right isthmus/inferior nodule, fine-needle aspiration:  [facility] Thyroid Cytology Category:  Benign.  Few benign follicular epithelial cell groups, macrophages and colloid are present.   Pathology: 4/16/2019 FINAL PATHOLOGIC DIAGNOSIS  [facility] Thyroid Cytology Category: Follicular Lesion of Undetermined Significance (FLUS)               Component Value Date    TSH 1.422 02/18/2019    Sodium 137 10/26/2018    Potassium 3.8 10/26/2018    Chloride 102 10/26/2018    CO2 26 10/26/2018    Glucose 80 10/26/2018    BUN, Bld 9 10/26/2018    Creatinine 0.7 10/26/2018    Calcium 10.1 10/26/2018    Anion Gap 9 10/26/2018    eGFR if African American >60.0 10/26/2018    eGFR if non African American >60.0 10/26/2018       ASSESSMENT/PLAN:       Assessment    Ms. Daryl Real is a 44 y.o. female who presents with evidence of Thyroid nodule [E04.1].    Plan    During this visit, lab and imaging results were reviewed with the patient and her mother and sibling(sister). Thyroid anatomy and physiology were reviewed and she was given a copy of our patient education booklet, Understanding Thyroid Disease. The above testing and examination support the diagnosis of nodular thyroid with atypical findings on FNA.     Thyroid surgery is recommended for definitive diagnosis. Options include thyroid lobectomy (with the possibility of a completion thyroidectomy pending final pathology) versus up-front total thyroidectomy as there is a 75-85% risk of malignancy.  Potential complications of these operations were reviewed with the patient.  The patient has elected to proceed with a thyroid lobectomy with the understanding that she may require completion thyroidectomy down the line.    The risks and benefits of my recommendations, as well as other treatment options were discussed with the patient today. In addition, I discussed the nature of the disease process and the risk of surgery including, temporary or permanent hypoparathyroidism resulting in low blood calcium levels that require extensive medication to avoid serious  degenerative conditions such as cataracts, brittle bones, muscle weakness, and muscle irritability. Other risks include bleeding, infection, injury to the recurrent laryngeal nerves resulting in hoarseness or impairment of speech and the risks of general anesthetic including MI, CVA, sudden death, or even reaction to anesthetic medications. The patient understands the risks, benefits, and treatment alternatives. Any and all questions were answered to the patient's satisfaction. Written consent was obtained.     Thyroid surgery is scheduled for the near future. Prior to this we will obtain bilateral lymph node mapping if not already completed(will review existing thyroid US with radiology). We will also ensure that the patient is medically optimized prior to procedure.

## 2019-06-03 DIAGNOSIS — E04.1 THYROID NODULE: Primary | ICD-10-CM

## 2019-06-04 ENCOUNTER — TELEPHONE (OUTPATIENT)
Dept: SURGERY | Facility: CLINIC | Age: 44
End: 2019-06-04

## 2019-06-05 ENCOUNTER — HOSPITAL ENCOUNTER (OUTPATIENT)
Dept: RADIOLOGY | Facility: HOSPITAL | Age: 44
Discharge: HOME | End: 2019-06-05
Attending: SURGERY
Payer: COMMERCIAL

## 2019-06-05 ENCOUNTER — TELEPHONE (OUTPATIENT)
Dept: PREADMISSION TESTING | Facility: HOSPITAL | Age: 44
End: 2019-06-05

## 2019-06-05 DIAGNOSIS — E04.1 THYROID NODULE: Primary | ICD-10-CM

## 2019-06-05 DIAGNOSIS — E04.1 THYROID NODULE: ICD-10-CM

## 2019-06-05 PROCEDURE — 76536 US SOFT TISSUE HEAD NECK THYROID: ICD-10-PCS | Mod: 26,,, | Performed by: RADIOLOGY

## 2019-06-05 PROCEDURE — 76536 US EXAM OF HEAD AND NECK: CPT | Mod: 26,,, | Performed by: RADIOLOGY

## 2019-06-05 PROCEDURE — 76536 US EXAM OF HEAD AND NECK: CPT | Mod: TC

## 2019-06-05 RX ORDER — SODIUM CHLORIDE 0.9 % (FLUSH) 0.9 %
10 SYRINGE (ML) INJECTION
Status: CANCELLED | OUTPATIENT
Start: 2019-06-05

## 2019-06-05 RX ORDER — LIDOCAINE HYDROCHLORIDE 10 MG/ML
1 INJECTION, SOLUTION EPIDURAL; INFILTRATION; INTRACAUDAL; PERINEURAL ONCE
Status: CANCELLED | OUTPATIENT
Start: 2019-06-05 | End: 2019-06-05

## 2019-06-05 NOTE — TELEPHONE ENCOUNTER
----- Message from Rahel Smith RN sent at 6/5/2019 12:18 PM CDT -----  Regarding: Pre-Admit Appt Request   This pt is scheduled for surgery July 3. She will need a pre-admit appt. Please feel free to call to schedule.     Thank you

## 2019-06-07 ENCOUNTER — OFFICE VISIT (OUTPATIENT)
Dept: INTERNAL MEDICINE | Facility: CLINIC | Age: 44
End: 2019-06-07
Payer: COMMERCIAL

## 2019-06-07 VITALS
WEIGHT: 248.25 LBS | HEART RATE: 62 BPM | BODY MASS INDEX: 45.68 KG/M2 | SYSTOLIC BLOOD PRESSURE: 113 MMHG | HEIGHT: 62 IN | TEMPERATURE: 99 F | DIASTOLIC BLOOD PRESSURE: 78 MMHG

## 2019-06-07 DIAGNOSIS — M62.838 MASSETER MUSCLE SPASM: Primary | ICD-10-CM

## 2019-06-07 PROCEDURE — 99999 PR PBB SHADOW E&M-EST. PATIENT-LVL III: CPT | Mod: PBBFAC,,, | Performed by: INTERNAL MEDICINE

## 2019-06-07 PROCEDURE — 99213 OFFICE O/P EST LOW 20 MIN: CPT | Mod: S$GLB,,, | Performed by: INTERNAL MEDICINE

## 2019-06-07 PROCEDURE — 3008F PR BODY MASS INDEX (BMI) DOCUMENTED: ICD-10-PCS | Mod: CPTII,S$GLB,, | Performed by: INTERNAL MEDICINE

## 2019-06-07 PROCEDURE — 3008F BODY MASS INDEX DOCD: CPT | Mod: CPTII,S$GLB,, | Performed by: INTERNAL MEDICINE

## 2019-06-07 PROCEDURE — 99999 PR PBB SHADOW E&M-EST. PATIENT-LVL III: ICD-10-PCS | Mod: PBBFAC,,, | Performed by: INTERNAL MEDICINE

## 2019-06-07 PROCEDURE — 99213 PR OFFICE/OUTPT VISIT, EST, LEVL III, 20-29 MIN: ICD-10-PCS | Mod: S$GLB,,, | Performed by: INTERNAL MEDICINE

## 2019-06-07 RX ORDER — METHOCARBAMOL 500 MG/1
500 TABLET, FILM COATED ORAL 3 TIMES DAILY PRN
Qty: 30 TABLET | Refills: 0 | Status: SHIPPED | OUTPATIENT
Start: 2019-06-07 | End: 2019-06-17

## 2019-06-07 RX ORDER — NAPROXEN 500 MG/1
500 TABLET ORAL 2 TIMES DAILY
Qty: 60 TABLET | Refills: 2 | Status: SHIPPED | OUTPATIENT
Start: 2019-06-07 | End: 2019-10-11

## 2019-06-07 NOTE — PROGRESS NOTES
Subjective:       Patient ID: Luzmaria Real is a 44 y.o. female.    Chief Complaint: Jaw Pain and Headache    HPI     44-year-old female here for evaluation of her jaw locking up as well as a headache.  It felt really stiff yesterday.  Her right upper jaw felt really stiff.  She had homework and yawned.  Her mouth barely opened and hurt to open.  She massaged it.  It is not as bad as yesterday was.  She can talk more and open her mouth to a certain extent.  She has a bit of stress.  She has never had anything like this happen before.  The left side is ok.  She does not clench or grind her teeth that she is aware of.    Review of Systems    Objective:      Physical Exam   Constitutional: She is oriented to person, place, and time. She appears well-developed and well-nourished.   HENT:   Head: Normocephalic and atraumatic.   Mouth/Throat: No oropharyngeal exudate.   Eyes: Pupils are equal, round, and reactive to light. EOM are normal. Right eye exhibits no discharge. Left eye exhibits no discharge. No scleral icterus.   Neck: Normal range of motion. Neck supple. No tracheal deviation present. No thyromegaly present.   Cardiovascular: Normal rate, regular rhythm and normal heart sounds. Exam reveals no gallop and no friction rub.   No murmur heard.  Pulmonary/Chest: Effort normal and breath sounds normal. No respiratory distress. She has no wheezes. She has no rales. She exhibits no tenderness.   Abdominal: Soft. Bowel sounds are normal. She exhibits no distension and no mass. There is no tenderness. There is no rebound and no guarding.   Musculoskeletal: Normal range of motion. She exhibits no edema or tenderness.   Neurological: She is alert and oriented to person, place, and time.   Skin: Skin is warm and dry. No rash noted. No erythema. No pallor.   Psychiatric: She has a normal mood and affect. Her behavior is normal.   Vitals reviewed.      Assessment:       1. Masseter muscle spasm        Plan:        1.  Naproxen 500 mg b.i.d. prescribed.  Robaxin 500 mg 3 times daily as needed prescribed.  Advised patient this can make her drowsy.  Apply heat for 15-20 minutes at a time.

## 2019-06-18 ENCOUNTER — TELEPHONE (OUTPATIENT)
Dept: SURGERY | Facility: CLINIC | Age: 44
End: 2019-06-18

## 2019-06-18 NOTE — TELEPHONE ENCOUNTER
Spoke with pt. Surgery changed to [facility]. And she will see the Pre-Admit team at [facility].

## 2019-06-25 ENCOUNTER — ANESTHESIA EVENT (OUTPATIENT)
Dept: SURGERY | Facility: HOSPITAL | Age: 44
End: 2019-06-25
Payer: COMMERCIAL

## 2019-06-25 ENCOUNTER — HOSPITAL ENCOUNTER (OUTPATIENT)
Dept: PREADMISSION TESTING | Facility: HOSPITAL | Age: 44
Discharge: HOME | End: 2019-06-25
Attending: ANESTHESIOLOGY
Payer: COMMERCIAL

## 2019-06-25 VITALS
HEART RATE: 68 BPM | SYSTOLIC BLOOD PRESSURE: 128 MMHG | BODY MASS INDEX: 45.3 KG/M2 | WEIGHT: 246.19 LBS | DIASTOLIC BLOOD PRESSURE: 73 MMHG | OXYGEN SATURATION: 100 % | HEIGHT: 62 IN | TEMPERATURE: 98 F

## 2019-06-25 RX ORDER — MULTIVITAMIN
1 TABLET ORAL DAILY
COMMUNITY
End: 2020-02-10

## 2019-06-25 NOTE — ANESTHESIA PREPROCEDURE EVALUATION
06/25/2019  Luzmaria Real is a 44 y.o., female.  [facility]  Anesthesia Pre-Operative Evaluation         Patient Name: Luzmaria Real  YOB: 1975  MRN: [id number]    SUBJECTIVE:     Pre-operative evaluation for Procedure(s) (LRB):  LOBECTOMY, THYROID, Possible Total (Bilateral)     07/02/2019    Luzmaria Real is a 44 y.o. female w/ a significant PMHx of GERD, Uterine fibroids and known thyroid nodule.    Patient now presents for the above procedure(s).      LDA: None documented.    Prev airway: None documented.    Drips: None documented.    Patient Active Problem List   Diagnosis    GERD (gastroesophageal reflux disease)    Bile salt-induced diarrhea    BMI 45.0-49.9, adult    Migraine without status migrainosus, not intractable    Nausea alone    Heartburn    Encounter for monitoring chronic NSAID therapy    Tietze's syndrome    Sleep stage dysfunction       Review of patient's allergies indicates:   Allergen Reactions    Caffeine        Current Outpatient Medications:  No current facility-administered medications for this encounter.     Current Outpatient Medications:     acetaminophen-codeine 300-30mg (TYLENOL #3) 300-30 mg Tab, TAKE 1 TABLET BY MOUTH EVERY 4 TO 6 HOURS AS NEEDED FOR HEADACHE OR PAIN, Disp: , Rfl: 1    cholestyramine (QUESTRAN) 4 gram packet, Take 4 g by mouth 3 (three) times daily with meals., Disp: , Rfl:     diethylpropion 75 mg TbSR, Take 75 mg by mouth once daily., Disp: 30 tablet, Rfl: 2    multivitamin (ONE DAILY MULTIVITAMIN) per tablet, Take 1 tablet by mouth once daily., Disp: , Rfl:     naproxen (NAPROSYN) 500 MG tablet, Take 1 tablet (500 mg total) by mouth 2 (two) times daily., Disp: 60 tablet, Rfl: 2    Past Surgical History:   Procedure Laterality Date    CHOLECYSTECTOMY      HYSTERECTOMY      MOUTH  SURGERY      TONSILLECTOMY      TUBAL LIGATION         Social History     Socioeconomic History    Marital status:      Spouse name: Not on file    Number of children: Not on file    Years of education: Not on file    Highest education level: Not on file   Occupational History    Not on file   Social Needs    Financial resource strain: Not hard at all    Food insecurity:     Worry: Never true     Inability: Never true    Transportation needs:     Medical: No     Non-medical: No   Tobacco Use    Smoking status: Never Smoker    Smokeless tobacco: Never Used   Substance and Sexual Activity    Alcohol use: Yes     Frequency: 2-4 times a month     Drinks per session: 1 or 2     Binge frequency: Never     Comment: once a week maybe    Drug use: No    Sexual activity: Yes     Partners: Male     Comment: hysterectomy   Lifestyle    Physical activity:     Days per week: 0 days     Minutes per session: 0 min    Stress: Not at all   Relationships    Social connections:     Talks on phone: More than three times a week     Gets together: Twice a week     Attends Taoism service: Not on file     Active member of club or organization: No     Attends meetings of clubs or organizations: Never     Relationship status:    Other Topics Concern    Not on file   Social History Narrative    Not on file       OBJECTIVE:     Vital Signs Range (Last 24H):         Significant Labs:  Lab Results   Component Value Date    WBC 7.37 06/28/2019    WBC 7.37 06/28/2019    HGB 12.5 06/28/2019    HGB 12.5 06/28/2019    HCT 39.4 06/28/2019    HCT 39.4 06/28/2019     06/28/2019     06/28/2019    CHOL 218 (H) 10/26/2018    TRIG 162 (H) 10/26/2018    HDL 40 10/26/2018    ALT 27 06/28/2019    AST 21 06/28/2019     06/28/2019     06/28/2019    K 3.7 06/28/2019    K 3.7 06/28/2019     06/28/2019     06/28/2019    CREATININE 0.7 06/28/2019    CREATININE 0.7 06/28/2019    BUN 9  06/28/2019    BUN 9 06/28/2019    CO2 26 06/28/2019    CO2 26 06/28/2019    TSH 1.422 02/18/2019    INR 1.1 06/28/2019    HGBA1C 5.1 10/26/2018       Diagnostic Studies: No relevant studies.    EKG: No recent studies available.    2D ECHO:  No results found for this or any previous visit.      ASSESSMENT/PLAN:       Anesthesia Evaluation         Review of Systems  Anesthesia Hx:  No problems with previous Anesthesia Denies Family Hx of Anesthesia complications.   Denies Personal Hx of Anesthesia complications.   Social:  Non-Smoker, Social Alcohol Use    Hematology/Oncology:  Hematology Normal      Oncology Comments: Possible thyroid cancer-nodule to be removed.    EENT/Dental:   chronic allergic rhinitis Throat Symptoms (D/T thyroid nodule)  include Hoarseness   Jaw Problems:  Pain Saw PCP last week for jaw muscle pain-no TMJ diagnosis-no limited mouth opening.  Cardiovascular:    Denies Angina. HX bradycardia Functional Capacity good / => 4 METS    Pulmonary:   Denies Shortness of breath.  Denies Recent URI.    Renal/:   renal calculi    Hepatic/GI:   GERD (on no meds-adjusted diet)    Musculoskeletal:   Arthritis  Left arm pain-no dx   Neurological:   Headaches  Dx of Headaches, Migraine Headache   Endocrine:   Thyroid nodule Metabolic Disorders, Morbid Obesity / BMI > 40  Psych:  Psychiatric Normal           Physical Exam  General:  Well nourished    Airway/Jaw/Neck:  Airway Findings: Mouth Opening: Small, but > 3cm Tongue: Normal  Jaw/Neck Findings:  Neck ROM: Normal ROM  Neck Findings:  Girth Increased      Dental:  Dental Findings: In tact   Chest/Lungs:  Chest/Lungs Findings: Clear to auscultation     Heart/Vascular:  Heart Findings: Rate: Normal        Mental Status:  Mental Status Findings:  Cooperative, Alert and Oriented         Anesthesia Plan  Type of Anesthesia, risks & benefits discussed:  Anesthesia Type:  general  Patient's Preference: General  Intra-op Monitoring Plan: standard ASA  monitors  Intra-op Monitoring Plan Comments:   Post Op Pain Control Plan: multimodal analgesia and IV/PO Opioids PRN  Post Op Pain Control Plan Comments:   Induction:   IV  Beta Blocker:  Patient is not currently on a Beta-Blocker (No further documentation required).       Informed Consent: Patient understands risks and agrees with Anesthesia plan.  Questions answered. Anesthesia consent signed with patient.  ASA Score: 3     Day of Surgery Review of History & Physical: I have interviewed and examined the patient. I have reviewed the patient's H&P dated:  There are no significant changes.          Ready For Surgery From Anesthesia Perspective.     7/1/19-Pt evaluated in Preop Center on 6/25/19. Had preop clearance appt with PCP Dr Bharat Reza on 6/28/19. Pt C/O chest and left shoulder pain:  She complains of chest pain the last month.  She has a sensation that travels down her left and and into her left chest.  Once she takes her bra off, she feels better.  She has some SOB when she is standing up talking and has trouble singing as well.  She has had trouble moving her left arm the last three months.  When it is really cold, she has aching in her shoulder.     EKG & stress test ordered.    See below per Dr Reza:  Stress test is negative for damage to the heart.  You are able to proceed with planned surgery.  I will copy your surgeon on this.

## 2019-06-25 NOTE — DISCHARGE INSTRUCTIONS
Your surgery has been scheduled for:__________________________________________    You should report to:  ____[facility], located on the [place] side of the first floor of the           [facility] ([phone])  ____[facility], [facility] the            Second floor of the [facility] ([phone])  ____[facility]rd Floor [facility] located on the [facility] side of the [facility] [phone]  Please Note   - Tell your doctor if you take Aspirin, products containing Aspirin, herbal medications  or blood thinners, such as Coumadin, Ticlid, or Plavix.  (Consult your provider regarding holding or stopping before surgery).  - Arrange for someone to drive you home following surgery.  You will not be allowed to leave the surgical facility alone or drive yourself home following sedation and anesthesia.  Before Surgery  - Stop taking all herbal medications 14days prior to surgery  - No Motrin/Advil (Ibuprofen) 7 days before surgery  - No Aleve (Naproxen) 7 days before surgery  - No Goody's/BC powder 7 days before surgery  - Stop Taking Asprin, products containing Asprin _____days before surgery  - Stop taking blood thinners_______days before surgery  - Refrain from drinking alcoholic beverages for 24hours before and after surgery  - Stop or limit smoking _________days before surgery  - You may take Tylenol for pain  Night before Surgery  - Take a shower or bath (shower is recommended).  Bathe with Hibiclens soap or an antibacterial soap from the neck down.  If not supplied by your surgeon, hibiclens soap will need to be purchased over the counter in pharmacy.  Rinse soap off thoroughly.  - Shampoo your hair with your regular shampoo                           Food and Beverage Instructions  1. Stop ALL solid food, gum, candy (including vitamins) 8 hours before surgery/procedure time.  2. The patient should be  ENCOURAGED to drink carbohydrate-rich clear liquids (sports drinks, clear juices) until 2 hours prior to surgery/procedure time.  3. CLEAR liquids include only water, black coffee NO creamer, clear oral rehydration drinks, clear sports drinks or clear fruit juices (no orange juice, no pulpy juices, no apple cider). Advise patients if they can read newsprint through the liquid, it qualifies as clear liquid.   4. IF IN DOUBT, drink water instead.   5. NOTHING  TO DRINK 2 hours before to arrival for surgery/procedure time. If you are told to take medication on the morning of surgery, it may be taken with a sip of water.     FOLLOW YOUR SURGEON'S INSTRUCTIONS REGARDING FOOD AND BEVERAGES IF DIFFERENT THAN ABOVE INSTRUCTIONS.    The Day of Surgery  - Take another bath or shower with hibiclens or any antibacterial soap, to reduce the chance of infection.  - Take heart and blood pressure medications with a small sip of water, as advised by the perioperative team.  - Do not take fluid pills  - You may brush your teeth and rinse your mouth, but do not swall any additional water.   - Do not apply perfumes, powder, body lotions or deodorant on the day of surgery.  - Nail polish should be removed.  - Do not wear makeup or moisturizer  - Wear comfortable clothes, such as a button front shirt and loose fitting pants.  - Leave all jewelry, including body piercings, and valuables at home.    - Bring any devices you will neeed after surgery such as crutches or canes.  - If you have sleep apnea, please bring your CPAP machine  In the event that your physical condition changes including the onset of a cold or respiratory illness, or if you have to delay or cancel your surgery, please notify your surgeon.    Anesthesia: General Anesthesia     You are watched continuously during your procedure by your anesthesia provider.     Youre due to have surgery. During surgery, youll be given medicine called anesthesia or anesthetic. This will  keep you comfortable and pain-free. Your anesthesia provider will use general anesthesia.  What is general anesthesia?  General anesthesia puts you into a state like deep sleep. It goes into the bloodstream (IV anesthetics), into the lungs (gas anesthetics), or both. You feel nothing during the procedure. You will not remember it. During the procedure, the anesthesia provider monitors you continuously. He or she checks your heart rate and rhythm, blood pressure, breathing, and blood oxygen.  · IV anesthetics. IV anesthetics are given through an IV line in your arm. Theyre often given first. This is so you are asleep before a gas anesthetic is started. Some kinds of IV anesthetics relieve pain. Others relax you. Your doctor will decide which kind is best in your case.  · Gas anesthetics. Gas anesthetics are breathed into the lungs. They are often used to keep you asleep. They can be given through a facemask or a tube placed in your larynx or trachea (breathing tube).  ? If you have a facemask, your anesthesia provider will most likely place it over your nose and mouth while youre still awake. Youll breathe oxygen through the mask as your IV anesthetic is started. Gas anesthetic may be added through the mask.  ? If you have a tube in the larynx or trachea, it will be inserted into your throat after youre asleep.  Anesthesia tools and medicines  You will likely have:  · IV anesthetics. These are put into an IV line into your bloodstream.  · Gas anesthetics. You breathe these anesthetics into your lungs, where they pass into your bloodstream.  · Pulse oximeter. This is a small clip that is attached to the end of your finger. This measures your blood oxygen level.  · Electrocardiography leads (electrodes). These are small sticky pads that are placed on your chest. They record your heart rate and rhythm.  · Blood pressure cuff. This reads your blood pressure.  Risks and possible complications  General anesthesia has  some risks. These include:  · Breathing problems  · Nausea and vomiting  · Sore throat or hoarseness (usually temporary)  · Allergic reaction to the anesthetic  · Irregular heartbeat (rare)  · Cardiac arrest (rare)   Anesthesia safety  · Follow all instructions you are given for how long not to eat or drink before your procedure.  · Be sure your doctor knows what medicines and drugs you take. This includes over-the-counter medicines, herbs, supplements, alcohol or other drugs. You will be asked when those were last taken.  · Have an adult family member or friend drive you home after the procedure.  · For the first 24 hours after your surgery:  ? Do not drive or use heavy equipment.  ? Do not make important decisions or sign legal documents. If important decisions or signing legal documents is necessary during the first 24 hours after surgery, have a trusted family member or spouse act on your behalf.  ? Avoid alcohol.  ? Have a responsible adult stay with you. He or she can watch for problems and help keep you safe.  Date Last Reviewed: 12/1/2016 © 2000-2017 [employer]. [address] [place] [address], [place] [address]. All rights reserved. This information is not intended as a substitute for professional medical care. Always follow your healthcare professional's instructions

## 2019-06-28 ENCOUNTER — HOSPITAL ENCOUNTER (OUTPATIENT)
Dept: CARDIOLOGY | Facility: CLINIC | Age: 44
Discharge: HOME | End: 2019-06-28
Attending: INTERNAL MEDICINE
Payer: COMMERCIAL

## 2019-06-28 ENCOUNTER — DOCUMENTATION ONLY (OUTPATIENT)
Dept: SURGERY | Facility: CLINIC | Age: 44
End: 2019-06-28

## 2019-06-28 ENCOUNTER — HOSPITAL ENCOUNTER (OUTPATIENT)
Dept: RADIOLOGY | Facility: HOSPITAL | Age: 44
Discharge: HOME | End: 2019-06-28
Attending: INTERNAL MEDICINE
Payer: COMMERCIAL

## 2019-06-28 ENCOUNTER — OFFICE VISIT (OUTPATIENT)
Dept: INTERNAL MEDICINE | Facility: CLINIC | Age: 44
End: 2019-06-28
Payer: COMMERCIAL

## 2019-06-28 VITALS
DIASTOLIC BLOOD PRESSURE: 80 MMHG | SYSTOLIC BLOOD PRESSURE: 122 MMHG | BODY MASS INDEX: 45.6 KG/M2 | WEIGHT: 247.81 LBS | RESPIRATION RATE: 18 BRPM | TEMPERATURE: 98 F | HEART RATE: 62 BPM | HEIGHT: 62 IN

## 2019-06-28 VITALS — HEIGHT: 62 IN | WEIGHT: 247 LBS | BODY MASS INDEX: 45.45 KG/M2

## 2019-06-28 DIAGNOSIS — M25.512 CHRONIC LEFT SHOULDER PAIN: ICD-10-CM

## 2019-06-28 DIAGNOSIS — R07.9 CHEST PAIN, UNSPECIFIED TYPE: ICD-10-CM

## 2019-06-28 DIAGNOSIS — G89.29 CHRONIC LEFT SHOULDER PAIN: ICD-10-CM

## 2019-06-28 DIAGNOSIS — E04.1 THYROID NODULE: ICD-10-CM

## 2019-06-28 DIAGNOSIS — Z01.818 PRE-OP EVALUATION: Primary | ICD-10-CM

## 2019-06-28 DIAGNOSIS — Z01.818 PRE-OP EVALUATION: ICD-10-CM

## 2019-06-28 LAB
BSA FOR ECHO PROCEDURE: 2.21 M2
CV STRESS BASE HR: 81 BPM
DIASTOLIC BLOOD PRESSURE: 69 MMHG
OHS CV CPX 1 MINUTE RECOVERY HEART RATE: 133 BPM
OHS CV CPX 85 PERCENT MAX PREDICTED HEART RATE MALE: 142
OHS CV CPX ESTIMATED METS: 8
OHS CV CPX MAX PREDICTED HEART RATE: 167
OHS CV CPX PATIENT IS FEMALE: 1
OHS CV CPX PATIENT IS MALE: 0
OHS CV CPX PEAK DIASTOLIC BLOOD PRESSURE: 48 MMHG
OHS CV CPX PEAK HEAR RATE: 169 BPM
OHS CV CPX PEAK RATE PRESSURE PRODUCT: NORMAL
OHS CV CPX PEAK SYSTOLIC BLOOD PRESSURE: 161 MMHG
OHS CV CPX PERCENT MAX PREDICTED HEART RATE ACHIEVED: 101
OHS CV CPX RATE PRESSURE PRODUCT PRESENTING: 9234
RA PRESSURE: 3 MMHG
STRESS ECHO POST EXERCISE DUR MIN: 5 MINUTES
STRESS ECHO POST EXERCISE DUR SEC: 12 SECONDS
SYSTOLIC BLOOD PRESSURE: 114 MMHG

## 2019-06-28 PROCEDURE — 3008F PR BODY MASS INDEX (BMI) DOCUMENTED: ICD-10-PCS | Mod: CPTII,S$GLB,, | Performed by: INTERNAL MEDICINE

## 2019-06-28 PROCEDURE — 73030 X-RAY EXAM OF SHOULDER: CPT | Mod: TC,PO,LT

## 2019-06-28 PROCEDURE — 73030 X-RAY EXAM OF SHOULDER: CPT | Mod: 26,LT,, | Performed by: RADIOLOGY

## 2019-06-28 PROCEDURE — 93010 EKG 12-LEAD: ICD-10-PCS | Mod: S$GLB,,, | Performed by: INTERNAL MEDICINE

## 2019-06-28 PROCEDURE — 99999 PR PBB SHADOW E&M-EST. PATIENT-LVL IV: CPT | Mod: PBBFAC,,, | Performed by: INTERNAL MEDICINE

## 2019-06-28 PROCEDURE — 93005 ELECTROCARDIOGRAM TRACING: CPT | Mod: S$GLB,,, | Performed by: INTERNAL MEDICINE

## 2019-06-28 PROCEDURE — 99214 PR OFFICE/OUTPT VISIT, EST, LEVL IV, 30-39 MIN: ICD-10-PCS | Mod: S$GLB,,, | Performed by: INTERNAL MEDICINE

## 2019-06-28 PROCEDURE — 93351 STRESS TTE COMPLETE: CPT

## 2019-06-28 PROCEDURE — 3008F BODY MASS INDEX DOCD: CPT | Mod: CPTII,S$GLB,, | Performed by: INTERNAL MEDICINE

## 2019-06-28 PROCEDURE — 99214 OFFICE O/P EST MOD 30 MIN: CPT | Mod: S$GLB,,, | Performed by: INTERNAL MEDICINE

## 2019-06-28 PROCEDURE — 93351 ECHOCARDIOGRAM STRESS TEST (CUPID ONLY): ICD-10-PCS | Mod: 26,,, | Performed by: INTERNAL MEDICINE

## 2019-06-28 PROCEDURE — 93005 EKG 12-LEAD: ICD-10-PCS | Mod: S$GLB,,, | Performed by: INTERNAL MEDICINE

## 2019-06-28 PROCEDURE — 93351 STRESS TTE COMPLETE: CPT | Mod: 26,,, | Performed by: INTERNAL MEDICINE

## 2019-06-28 PROCEDURE — 93010 ELECTROCARDIOGRAM REPORT: CPT | Mod: S$GLB,,, | Performed by: INTERNAL MEDICINE

## 2019-06-28 PROCEDURE — 73030 XR SHOULDER COMPLETE 2 OR MORE VIEWS LEFT: ICD-10-PCS | Mod: 26,LT,, | Performed by: RADIOLOGY

## 2019-06-28 PROCEDURE — 99999 PR PBB SHADOW E&M-EST. PATIENT-LVL IV: ICD-10-PCS | Mod: PBBFAC,,, | Performed by: INTERNAL MEDICINE

## 2019-06-28 NOTE — PROGRESS NOTES
MD Bharat Damon MD   Cc: GONSALO Bejarano Staff             No problem.  Thanks for the follow-up.  Have a great weekend.     aob    Previous Messages      ----- Message -----   From: Bharat Reza MD   Sent: 6/28/2019  11:33 AM   To: Carlee Huff MD     Patient needs a stress test prior to surgery.  I have ordered this.  I will let you know when we get this resulted.     Bharat Reza

## 2019-06-28 NOTE — PROGRESS NOTES
"Subjective:       Patient ID: Luzmaria Real is a 44 y.o. female.    Chief Complaint: Pre-op Exam (Lobectomy); Chest Pain (Travels from left shoulder to mid chest, pt state "ache and burning" sensation); and Hand Pain (top Left hand)    HPI     43 yo female here for pre-op evaluation of lobectomy of the thyroid.    + CP, denies SOB/nausea/MI last 3 months, never diagnosed with heart failure, never diagnosed with arrhythmias, never diagnosed with valvular heart disease.    RCRI criteria: - IDDM, - CAD, - CVA, - chronic renal insufficiency, - heart failure, - high risk surgery    Functional status: not too active.  She sits behind a desk.    Bleeding risk - history of bleeding with prior surgeries - none    History of prior anesthetic complications - none    - tobacco, - EtOH, - Illicit substances    Chronic conditions/medication usage - cholestyramine 4 g (take), naproxen 500 mg (hold week prior to surgery), Tylenol No.  3 (take if needed)    Chronic Steroid usage - twice    She complains of chest pain the last month.  She has a sensation that travels down her left and and into her left chest.  Once she takes her bra off, she feels better.  She has some SOB when she is standing up talking and has trouble singing as well.  She has had trouble moving her left arm the last three months.  When it is really cold, she has aching in her shoulder.      Review of Systems    Objective:      Physical Exam   Constitutional: She is oriented to person, place, and time. She appears well-developed and well-nourished.   HENT:   Head: Normocephalic and atraumatic.   Mouth/Throat: No oropharyngeal exudate.   Eyes: Pupils are equal, round, and reactive to light. EOM are normal. Right eye exhibits no discharge. Left eye exhibits no discharge. No scleral icterus.   Neck: Normal range of motion. Neck supple. No tracheal deviation present. No thyromegaly present.   Cardiovascular: Normal rate, regular rhythm and normal heart " sounds. Exam reveals no gallop and no friction rub.   No murmur heard.  Pulmonary/Chest: Effort normal and breath sounds normal. No respiratory distress. She has no wheezes. She has no rales. She exhibits no tenderness.   Abdominal: Soft. Bowel sounds are normal. She exhibits no distension and no mass. There is no tenderness. There is no rebound and no guarding.   Musculoskeletal: Normal range of motion. She exhibits no edema or tenderness.   Neurological: She is alert and oriented to person, place, and time.   Skin: Skin is warm and dry. No rash noted. No erythema. No pallor.   Psychiatric: She has a normal mood and affect. Her behavior is normal.   Vitals reviewed.      Assessment:       1. Pre-op evaluation    2. Thyroid nodule    3. Chest pain, unspecified type    4. Chronic left shoulder pain        Plan:       1/2.  Surgery is not emergent.  Patient has an active cardiac condition and chest pain.  Patient needs a stress test prior to surgery.  3.  Check EKG, exercise stress echo.  4.  Check x-ray shoulder.  Refer to Orthopedics.  Naproxen once patient is finished with surgery.  5.  EKG - done in clinic shows normal sinus rhythm.  6.  DVT prevention - Recommend use of TEDs, and early ambulation if able.  Consider short term use of anti-coagulation if appropriate post-op.  7.  Chronic medications - cholestyramine 4 g (take), naproxen 500 mg (hold week prior to surgery), Tylenol No.  3 (take if needed)  8.  Minimize urinary catheter use.

## 2019-06-28 NOTE — Clinical Note
Patient needs a stress test prior to surgery.  I have ordered this.  I will let you know when we get this resulted.Bharat Reza

## 2019-06-28 NOTE — PROGRESS NOTES
----- Message -----   From: Bharat Reza MD   Sent: 6/28/2019   4:17 PM   To: Carlee Huff MD     Stress test is negative for damage to the heart.  You are able to proceed with planned surgery.  I will copy your surgeon on this.  Released to patient portal.

## 2019-07-02 ENCOUNTER — PATIENT MESSAGE (OUTPATIENT)
Dept: SURGERY | Facility: CLINIC | Age: 44
End: 2019-07-02

## 2019-07-02 ENCOUNTER — TELEPHONE (OUTPATIENT)
Dept: SURGERY | Facility: CLINIC | Age: 44
End: 2019-07-02

## 2019-07-02 NOTE — TELEPHONE ENCOUNTER
Pre-op Call:   Called pt to inform of surgery start time, arrival time and location. Reminded of NPO status, washing with Hibiclens, making arrangements for transportation home upon discharge. Post-op appt made. Requested a return call to confirm receipt of message.

## 2019-07-03 ENCOUNTER — HOSPITAL ENCOUNTER (OUTPATIENT)
Facility: HOSPITAL | Age: 44
Discharge: HOME | End: 2019-07-04
Attending: SURGERY | Admitting: SURGERY
Payer: COMMERCIAL

## 2019-07-03 ENCOUNTER — ANESTHESIA (OUTPATIENT)
Dept: SURGERY | Facility: HOSPITAL | Age: 44
End: 2019-07-03
Payer: COMMERCIAL

## 2019-07-03 DIAGNOSIS — E04.1 THYROID NODULE: Primary | ICD-10-CM

## 2019-07-03 PROCEDURE — 27000221 HC OXYGEN, UP TO 24 HOURS

## 2019-07-03 PROCEDURE — 37000009 HC ANESTHESIA EA ADD 15 MINS: Performed by: SURGERY

## 2019-07-03 PROCEDURE — 27201423 OPTIME MED/SURG SUP & DEVICES STERILE SUPPLY: Performed by: SURGERY

## 2019-07-03 PROCEDURE — 63600175 PHARM REV CODE 636 W HCPCS: Performed by: SURGERY

## 2019-07-03 PROCEDURE — 88331 PATH CONSLTJ SURG 1 BLK 1SPC: CPT | Mod: 59 | Performed by: PATHOLOGY

## 2019-07-03 PROCEDURE — 25000003 PHARM REV CODE 250: Performed by: STUDENT IN AN ORGANIZED HEALTH CARE EDUCATION/TRAINING PROGRAM

## 2019-07-03 PROCEDURE — 88307 TISSUE SPECIMEN TO PATHOLOGY - SURGERY: ICD-10-PCS | Mod: 26,,, | Performed by: PATHOLOGY

## 2019-07-03 PROCEDURE — 88307 TISSUE EXAM BY PATHOLOGIST: CPT | Mod: 26,,, | Performed by: PATHOLOGY

## 2019-07-03 PROCEDURE — 60220 PARTIAL REMOVAL OF THYROID: CPT | Mod: RT,,, | Performed by: SURGERY

## 2019-07-03 PROCEDURE — 36000707: Performed by: SURGERY

## 2019-07-03 PROCEDURE — 36000706: Performed by: SURGERY

## 2019-07-03 PROCEDURE — D9220A PRA ANESTHESIA: ICD-10-PCS | Mod: ,,, | Performed by: ANESTHESIOLOGY

## 2019-07-03 PROCEDURE — C9290 INJ, BUPIVACAINE LIPOSOME: HCPCS | Performed by: SURGERY

## 2019-07-03 PROCEDURE — 63600175 PHARM REV CODE 636 W HCPCS: Performed by: STUDENT IN AN ORGANIZED HEALTH CARE EDUCATION/TRAINING PROGRAM

## 2019-07-03 PROCEDURE — 71000033 HC RECOVERY, INTIAL HOUR: Performed by: SURGERY

## 2019-07-03 PROCEDURE — 94761 N-INVAS EAR/PLS OXIMETRY MLT: CPT

## 2019-07-03 PROCEDURE — 37000008 HC ANESTHESIA 1ST 15 MINUTES: Performed by: SURGERY

## 2019-07-03 PROCEDURE — 63600175 PHARM REV CODE 636 W HCPCS: Performed by: ANESTHESIOLOGY

## 2019-07-03 PROCEDURE — 60220 PR THYROID LOBECTOMY,UNILAT: ICD-10-PCS | Mod: RT,,, | Performed by: SURGERY

## 2019-07-03 PROCEDURE — 88331 PATH CONSLTJ SURG 1 BLK 1SPC: CPT | Mod: 26,,, | Performed by: PATHOLOGY

## 2019-07-03 PROCEDURE — 88331 TISSUE SPECIMEN TO PATHOLOGY - SURGERY: ICD-10-PCS | Mod: 26,,, | Performed by: PATHOLOGY

## 2019-07-03 PROCEDURE — 71000039 HC RECOVERY, EACH ADD'L HOUR: Performed by: SURGERY

## 2019-07-03 PROCEDURE — D9220A PRA ANESTHESIA: Mod: ,,, | Performed by: ANESTHESIOLOGY

## 2019-07-03 RX ORDER — OXYCODONE HYDROCHLORIDE 5 MG/1
5 TABLET ORAL EVERY 4 HOURS PRN
Status: DISCONTINUED | OUTPATIENT
Start: 2019-07-03 | End: 2019-07-04 | Stop reason: HOSPADM

## 2019-07-03 RX ORDER — LIDOCAINE HYDROCHLORIDE 10 MG/ML
1 INJECTION, SOLUTION EPIDURAL; INFILTRATION; INTRACAUDAL; PERINEURAL ONCE
Status: DISCONTINUED | OUTPATIENT
Start: 2019-07-03 | End: 2019-07-03

## 2019-07-03 RX ORDER — ONDANSETRON 2 MG/ML
4 INJECTION INTRAMUSCULAR; INTRAVENOUS DAILY PRN
Status: DISCONTINUED | OUTPATIENT
Start: 2019-07-03 | End: 2019-07-03

## 2019-07-03 RX ORDER — PROPOFOL 10 MG/ML
VIAL (ML) INTRAVENOUS
Status: DISCONTINUED | OUTPATIENT
Start: 2019-07-03 | End: 2019-07-03

## 2019-07-03 RX ORDER — CEFAZOLIN SODIUM 1 G/3ML
2 INJECTION, POWDER, FOR SOLUTION INTRAMUSCULAR; INTRAVENOUS
Status: COMPLETED | OUTPATIENT
Start: 2019-07-03 | End: 2019-07-03

## 2019-07-03 RX ORDER — ONDANSETRON 2 MG/ML
8 INJECTION INTRAMUSCULAR; INTRAVENOUS EVERY 6 HOURS PRN
Status: DISCONTINUED | OUTPATIENT
Start: 2019-07-03 | End: 2019-07-04 | Stop reason: HOSPADM

## 2019-07-03 RX ORDER — SUCCINYLCHOLINE CHLORIDE 20 MG/ML
INJECTION INTRAMUSCULAR; INTRAVENOUS
Status: DISCONTINUED | OUTPATIENT
Start: 2019-07-03 | End: 2019-07-03

## 2019-07-03 RX ORDER — ROCURONIUM BROMIDE 10 MG/ML
INJECTION, SOLUTION INTRAVENOUS
Status: DISCONTINUED | OUTPATIENT
Start: 2019-07-03 | End: 2019-07-03

## 2019-07-03 RX ORDER — ACETAMINOPHEN 325 MG/1
650 TABLET ORAL EVERY 4 HOURS PRN
Status: DISCONTINUED | OUTPATIENT
Start: 2019-07-03 | End: 2019-07-04 | Stop reason: HOSPADM

## 2019-07-03 RX ORDER — OXYCODONE AND ACETAMINOPHEN 5; 325 MG/1; MG/1
1 TABLET ORAL EVERY 4 HOURS PRN
Qty: 20 TABLET | Refills: 0 | Status: SHIPPED | OUTPATIENT
Start: 2019-07-03 | End: 2019-07-03 | Stop reason: SDUPTHER

## 2019-07-03 RX ORDER — KETAMINE HYDROCHLORIDE 10 MG/ML
INJECTION, SOLUTION INTRAMUSCULAR; INTRAVENOUS
Status: DISCONTINUED | OUTPATIENT
Start: 2019-07-03 | End: 2019-07-03

## 2019-07-03 RX ORDER — LIDOCAINE HCL/PF 100 MG/5ML
SYRINGE (ML) INTRAVENOUS
Status: DISCONTINUED | OUTPATIENT
Start: 2019-07-03 | End: 2019-07-03

## 2019-07-03 RX ORDER — ACETAMINOPHEN 10 MG/ML
INJECTION, SOLUTION INTRAVENOUS
Status: DISCONTINUED | OUTPATIENT
Start: 2019-07-03 | End: 2019-07-03

## 2019-07-03 RX ORDER — MIDAZOLAM HYDROCHLORIDE 1 MG/ML
INJECTION, SOLUTION INTRAMUSCULAR; INTRAVENOUS
Status: DISCONTINUED | OUTPATIENT
Start: 2019-07-03 | End: 2019-07-03

## 2019-07-03 RX ORDER — SODIUM CHLORIDE 0.9 % (FLUSH) 0.9 %
10 SYRINGE (ML) INJECTION
Status: DISCONTINUED | OUTPATIENT
Start: 2019-07-03 | End: 2019-07-04 | Stop reason: HOSPADM

## 2019-07-03 RX ORDER — SODIUM CHLORIDE 9 MG/ML
INJECTION, SOLUTION INTRAVENOUS CONTINUOUS PRN
Status: DISCONTINUED | OUTPATIENT
Start: 2019-07-03 | End: 2019-07-03

## 2019-07-03 RX ORDER — HYDROMORPHONE HYDROCHLORIDE 1 MG/ML
0.2 INJECTION, SOLUTION INTRAMUSCULAR; INTRAVENOUS; SUBCUTANEOUS EVERY 5 MIN PRN
Status: DISCONTINUED | OUTPATIENT
Start: 2019-07-03 | End: 2019-07-03 | Stop reason: HOSPADM

## 2019-07-03 RX ORDER — OXYCODONE AND ACETAMINOPHEN 5; 325 MG/1; MG/1
1 TABLET ORAL EVERY 4 HOURS PRN
Qty: 10 TABLET | Refills: 0 | Status: SHIPPED | OUTPATIENT
Start: 2019-07-03 | End: 2019-09-20

## 2019-07-03 RX ORDER — OXYCODONE HYDROCHLORIDE 10 MG/1
10 TABLET ORAL EVERY 4 HOURS PRN
Status: DISCONTINUED | OUTPATIENT
Start: 2019-07-03 | End: 2019-07-04 | Stop reason: HOSPADM

## 2019-07-03 RX ORDER — DEXAMETHASONE SODIUM PHOSPHATE 4 MG/ML
INJECTION, SOLUTION INTRA-ARTICULAR; INTRALESIONAL; INTRAMUSCULAR; INTRAVENOUS; SOFT TISSUE
Status: DISCONTINUED | OUTPATIENT
Start: 2019-07-03 | End: 2019-07-03

## 2019-07-03 RX ORDER — SODIUM CHLORIDE 0.9 % (FLUSH) 0.9 %
3 SYRINGE (ML) INJECTION
Status: DISCONTINUED | OUTPATIENT
Start: 2019-07-03 | End: 2019-07-03

## 2019-07-03 RX ORDER — FENTANYL CITRATE 50 UG/ML
25 INJECTION, SOLUTION INTRAMUSCULAR; INTRAVENOUS EVERY 5 MIN PRN
Status: DISCONTINUED | OUTPATIENT
Start: 2019-07-03 | End: 2019-07-03 | Stop reason: HOSPADM

## 2019-07-03 RX ORDER — CHOLESTYRAMINE 4 G/9G
1 POWDER, FOR SUSPENSION ORAL
Status: DISCONTINUED | OUTPATIENT
Start: 2019-07-03 | End: 2019-07-04 | Stop reason: HOSPADM

## 2019-07-03 RX ORDER — FENTANYL CITRATE 50 UG/ML
INJECTION, SOLUTION INTRAMUSCULAR; INTRAVENOUS
Status: DISCONTINUED | OUTPATIENT
Start: 2019-07-03 | End: 2019-07-03

## 2019-07-03 RX ADMIN — KETAMINE HYDROCHLORIDE 10 MG: 10 INJECTION, SOLUTION INTRAMUSCULAR; INTRAVENOUS at 08:07

## 2019-07-03 RX ADMIN — OXYCODONE HYDROCHLORIDE 10 MG: 10 TABLET ORAL at 04:07

## 2019-07-03 RX ADMIN — HYDROMORPHONE HYDROCHLORIDE 0.2 MG: 1 INJECTION, SOLUTION INTRAMUSCULAR; INTRAVENOUS; SUBCUTANEOUS at 11:07

## 2019-07-03 RX ADMIN — MIDAZOLAM HYDROCHLORIDE 2 MG: 1 INJECTION, SOLUTION INTRAMUSCULAR; INTRAVENOUS at 07:07

## 2019-07-03 RX ADMIN — CEFAZOLIN 2 G: 330 INJECTION, POWDER, FOR SOLUTION INTRAMUSCULAR; INTRAVENOUS at 08:07

## 2019-07-03 RX ADMIN — PROPOFOL 100 MG: 10 INJECTION, EMULSION INTRAVENOUS at 08:07

## 2019-07-03 RX ADMIN — CHOLESTYRAMINE 4 G: 4 POWDER, FOR SUSPENSION ORAL at 04:07

## 2019-07-03 RX ADMIN — PROPOFOL 200 MG: 10 INJECTION, EMULSION INTRAVENOUS at 08:07

## 2019-07-03 RX ADMIN — SODIUM CHLORIDE, SODIUM GLUCONATE, SODIUM ACETATE, POTASSIUM CHLORIDE, MAGNESIUM CHLORIDE, SODIUM PHOSPHATE, DIBASIC, AND POTASSIUM PHOSPHATE: .53; .5; .37; .037; .03; .012; .00082 INJECTION, SOLUTION INTRAVENOUS at 10:07

## 2019-07-03 RX ADMIN — SODIUM CHLORIDE, SODIUM GLUCONATE, SODIUM ACETATE, POTASSIUM CHLORIDE, MAGNESIUM CHLORIDE, SODIUM PHOSPHATE, DIBASIC, AND POTASSIUM PHOSPHATE: .53; .5; .37; .037; .03; .012; .00082 INJECTION, SOLUTION INTRAVENOUS at 08:07

## 2019-07-03 RX ADMIN — PROPOFOL 50 MG: 10 INJECTION, EMULSION INTRAVENOUS at 10:07

## 2019-07-03 RX ADMIN — PROPOFOL 50 MG: 10 INJECTION, EMULSION INTRAVENOUS at 09:07

## 2019-07-03 RX ADMIN — KETAMINE HYDROCHLORIDE 10 MG: 10 INJECTION, SOLUTION INTRAMUSCULAR; INTRAVENOUS at 09:07

## 2019-07-03 RX ADMIN — OXYCODONE HYDROCHLORIDE 10 MG: 10 TABLET ORAL at 08:07

## 2019-07-03 RX ADMIN — FENTANYL CITRATE 100 MCG: 50 INJECTION, SOLUTION INTRAMUSCULAR; INTRAVENOUS at 08:07

## 2019-07-03 RX ADMIN — LIDOCAINE HYDROCHLORIDE 60 MG: 20 INJECTION, SOLUTION INTRAVENOUS at 08:07

## 2019-07-03 RX ADMIN — DEXAMETHASONE SODIUM PHOSPHATE 8 MG: 4 INJECTION, SOLUTION INTRAMUSCULAR; INTRAVENOUS at 08:07

## 2019-07-03 RX ADMIN — SODIUM CHLORIDE: 0.9 INJECTION, SOLUTION INTRAVENOUS at 06:07

## 2019-07-03 RX ADMIN — ACETAMINOPHEN 1000 MG: 10 INJECTION, SOLUTION INTRAVENOUS at 08:07

## 2019-07-03 RX ADMIN — ROCURONIUM BROMIDE 20 MG: 10 INJECTION, SOLUTION INTRAVENOUS at 08:07

## 2019-07-03 RX ADMIN — SUCCINYLCHOLINE CHLORIDE 160 MG: 20 INJECTION, SOLUTION INTRAMUSCULAR; INTRAVENOUS at 08:07

## 2019-07-03 RX ADMIN — OXYCODONE HYDROCHLORIDE 10 MG: 10 TABLET ORAL at 12:07

## 2019-07-03 NOTE — PLAN OF CARE
Problem: Fall Injury Risk  Goal: Absence of Fall and Fall-Related Injury    Intervention: Identify and Manage Contributors to Fall Injury Risk  Pt sitting up in chair, ambulating well.

## 2019-07-03 NOTE — TRANSFER OF CARE
Anesthesia Transfer of Care Note    Patient: Luzmaria Real    Procedure(s) Performed: Procedure(s) (LRB):  LOBECTOMY, THYROID, Possible Total (Right)    Patient location: PACU    Anesthesia Type: general    Transport from OR: Transported from OR on 6-10 L/min O2 by face mask with adequate spontaneous ventilation    Post pain: adequate analgesia    Post assessment: no apparent anesthetic complications and tolerated procedure well    Post vital signs: stable    Level of consciousness: sedated and responds to stimulation    Nausea/Vomiting: no nausea/vomiting    Complications: none    Transfer of care protocol was followed      Last vitals:   Visit Vitals  /68 (BP Location: Left arm, Patient Position: Lying)   Pulse 74   Temp 36.7 °C (98.1 °F) (Oral)   Resp 18   Ht 5' (1.524 m)   Wt 107.5 kg (237 lb)   SpO2 100%   Breastfeeding? No   BMI 46.29 kg/m²

## 2019-07-03 NOTE — BRIEF OP NOTE
[facility]  Brief Operative Note    SUMMARY     Surgery Date: 7/3/2019     Surgeon(s) and Role:     * Carlee Huff MD - Primary     * Fredy Holliday MD - Resident - Assisting    Pre-op Diagnosis:  Thyroid nodule [E04.1]    Post-op Diagnosis:  Post-Op Diagnosis Codes:     * Thyroid nodule [E04.1]    Procedure(s) (LRB):  LOBECTOMY, THYROID, Possible Total (Right)    Anesthesia: General    Description of Procedure: right thyroid lobectomy with isthmus     Description of the findings of the procedure: 2 large nodules; frozen negative; superior and inferior parathyroid glands identified; recurrent laryngeal nerve identified and verified with nerve monitoring    Estimated Blood Loss: 20 mL         Specimens:   Specimen (12h ago, onward)    Start     Ordered    07/03/19 0951  Specimen to Pathology - Surgery  Once     Comments:  1. Right thyroid and isthmus, short stitch superior pole, long stitch isthmus - FROZEN     Start Status     07/03/19 0951 Collected (07/03/19 1009) Order ID: [id number]       07/03/19 1046

## 2019-07-03 NOTE — NURSING TRANSFER
Nursing Transfer Note      7/3/2019     Transfer To: 504 From PACU    Transfer via stretcher    Transfer with chart    Transported by PCT    Medicines sent: yes    Chart send with patient: Yes    Notified: spouse    Patient reassessed at: 7/3/19 @ 1415     Upon arrival to floor:

## 2019-07-04 VITALS
TEMPERATURE: 98 F | HEIGHT: 60 IN | SYSTOLIC BLOOD PRESSURE: 160 MMHG | DIASTOLIC BLOOD PRESSURE: 91 MMHG | OXYGEN SATURATION: 97 % | BODY MASS INDEX: 46.53 KG/M2 | WEIGHT: 237 LBS | HEART RATE: 77 BPM | RESPIRATION RATE: 18 BRPM

## 2019-07-04 PROCEDURE — 25000003 PHARM REV CODE 250: Performed by: STUDENT IN AN ORGANIZED HEALTH CARE EDUCATION/TRAINING PROGRAM

## 2019-07-04 RX ADMIN — OXYCODONE HYDROCHLORIDE 10 MG: 10 TABLET ORAL at 03:07

## 2019-07-04 RX ADMIN — OXYCODONE HYDROCHLORIDE 10 MG: 10 TABLET ORAL at 06:07

## 2019-07-04 NOTE — ANESTHESIA POSTPROCEDURE EVALUATION
Anesthesia Post Evaluation    Patient: Luzmaria Real    Procedure(s) Performed: Procedure(s) (LRB):  LOBECTOMY, THYROID, Possible Total (Right)    Final Anesthesia Type: general  Patient location during evaluation: PACU  Patient participation: Yes- Able to Participate  Level of consciousness: awake and alert  Post-procedure vital signs: reviewed and stable  Pain management: adequate  Airway patency: patent  PONV status at discharge: No PONV  Anesthetic complications: no      Cardiovascular status: blood pressure returned to baseline and stable  Respiratory status: unassisted  Hydration status: euvolemic  Follow-up not needed.          Vitals Value Taken Time   /80 7/3/2019 11:11 PM   Temp 36.6 °C (97.8 °F) 7/3/2019 11:11 PM   Pulse 69 7/3/2019 11:11 PM   Resp 18 7/3/2019 11:11 PM   SpO2 95 % 7/3/2019 11:11 PM         Event Time     Out of Recovery 14:34:20          Pain/Reynaldo Score: Pain Rating Prior to Med Admin: 6 (7/3/2019  8:25 PM)  Pain Rating Post Med Admin: 3 (7/3/2019  2:51 PM)  Reynaldo Score: 10 (7/3/2019  2:15 PM)

## 2019-07-04 NOTE — DISCHARGE SUMMARY
[facility]  General Surgery  Discharge Summary      Patient Name: Luzmaria Real  MRN: [id number]  Admission Date: 7/3/2019  Hospital Length of Stay: 0 days  Discharge Date and Time:  07/04/2019 7:51 AM  Attending Physician: Carlee Huff MD   Discharging Provider: Fredy Holliday MD  Primary Care Provider: Bharat Reza MD     HPI: Luzmaria Real is a 44 y.o. female seen at the request of Dr. Kathy Florez today in the Endocrine Surgery Clinic for evaluation of thyroid nodule(s). Her history dates back to December 2018 when patient sought evaluation for symptoms(she was noted to have a globus sensation). Subsequent evaluation included referral to an endocrinologist, neck ultrasound and fine needle aspiration. Luzmaria Real complains of change in voice quality and dry cough.  In addition, the patient notes that she has an intermittent sensation of a lump in her throat.  The patient denies hot/cold intolerance, fatigue, unexplained weight changes and difficulty with shortness of breath especially with postural changes. She does not have a history of head and neck radiation therapy. She does not have a family history of thyroid disease. She does have a family history of endocrinopathies(Pancreatic CA mat aunt). She is not taking thyroid hormone supplementation. She has not undergone radioactive iodine treatment.     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  I do not have copy of these results for personal review during clinic visit today.    Procedure(s) (LRB):  LOBECTOMY, THYROID, Possible Total (Right)     Hospital Course: Underwent a hemithyroidectomy and had an expected post op course. Upon discharge, pain was well controlled, tolerating a regular diet and ambulating/voiding without assistance.     PE: No acute  distress  Incision c/d/i with dermabond in place; minimal edema around incision  Denies change in voice quality  No respiratory distress    Consults: None    Significant Diagnostic Studies: None    Pending Diagnostic Studies:     None        Final Active Diagnoses:    Diagnosis Date Noted POA    PRINCIPAL PROBLEM:  Thyroid nodule [E04.1] 07/03/2019 Yes      Problems Resolved During this Admission:      Discharged Condition: good    Disposition: home    Follow Up:  Follow-up Information     Carlee Huff MD In 2 weeks.    Specialty:  General Surgery  Why:  s/p daren-thyroidectomy  Contact information:  [address]  [address] [place] [id number]  [phone]                 Patient Instructions:      Diet Adult Regular     Other restrictions (specify):   Order Comments: Recovering after Endocrine Surgery    Type of Procedure: Daren-thyroidectomy    Postoperative clinic appointment date: 1-2 weeks after surgery    Activity:  You may resume normal daily activities upon discharge.  This includes walking and climbing stairs.  Avoid heavy lifting and vigorous exercise for approximately 2 weeks.    Showering:  You may resume normal showering.  Your incision does not require special care and it may get wet after 24 hours.    Incision:  Your neck incision is closed with absorbable sutures under the skin.  You will not need to have any stitches removed. A surgical glue is over the wound to protect it. This will peal off in 10-14 days.    Pain:  After surgery you may experience pain at the incision, generalized neck pain, or a sore throat.  You will be given a prescription for pain relief.  Most patients will still have discomfort 2-3 days after surgery.  Many times, over the counter pain medications, such as Extra Strength Tylenol, are effective.    Driving:  Use your judgment in resuming to drive.  Avoid driving if you are still experiencing neck pain and are using prescription pain medications.      Return to  Work:  Recovery after surgery depends on the individual.  Most patients can expect to return to work approximately 1-2 weeks after surgery.    Diet:  You may resume your normal diet after surgery without any restrictions.    Constipation:  Anesthesia and pain medication can cause a decrease in bowel motility.  If you experience constipation postoperatively, you may take over the counter laxatives such as Milk of Magnesia.    Medications:  You may resume taking medication as instructed by your MD at discharge from the hospital.     Questions/Concerns:  If you have any questions or concerns please call your doctor's office  Dr. Carlee Huff             [phone]     Medications:  Reconciled Home Medications:      Medication List      START taking these medications    oxyCODONE-acetaminophen 5-325 mg per tablet  Commonly known as:  PERCOCET  Take 1 tablet by mouth every 4 (four) hours as needed.        CONTINUE taking these medications    acetaminophen-codeine 300-30mg 300-30 mg Tab  Commonly known as:  TYLENOL #3  TAKE 1 TABLET BY MOUTH EVERY 4 TO 6 HOURS AS NEEDED FOR HEADACHE OR PAIN     cholestyramine 4 gram packet  Commonly known as:  QUESTRAN  Take 4 g by mouth 3 (three) times daily with meals.     diethylpropion 75 mg Tbsr  Take 75 mg by mouth once daily.     naproxen 500 MG tablet  Commonly known as:  NAPROSYN  Take 1 tablet (500 mg total) by mouth 2 (two) times daily.     ONE DAILY MULTIVITAMIN per tablet  Generic drug:  multivitamin  Take 1 tablet by mouth once daily.            Fredy Holliday MD  General Surgery  [facility]

## 2019-07-05 NOTE — OP NOTE
[facility]  Endocrine Surgery  Operative Report    SUMMARY     Date of Procedure: 7/3/2019     Procedure: Procedure(s) (LRB):  LOBECTOMY, THYROID, Possible Total (Right)     Indications: This patient presents with a palpable nodule on the right side of the neck. Fine needle aspiration cytology revealed findings consistent with a atypica of undetermined significance and molecular markers increased the risk of malignancy. The patient now presents for a thyroid lobectomy and possible total thyroidectomy.     Surgeon(s) and Role:     * Carlee Huff MD - Primary     * Fredy Holliday MD - Resident - Assisting    Pre-Operative Diagnosis: Thyroid nodule [E04.1]    Post-Operative Diagnosis: Thyroid nodule [E04.1]    Anesthesia: General    Intraoperative Findings:     1.  A nodule was found within the thyroid lobe, located in the lower pole.  This measured approximately 1.5 cm in diameter and mobile from surrounding structures.   2. The right recurrent laryngeal nerve was identified.  Function was verified using the NIMS system.  3. Parathyroid tissue was identified and preserved with a viable blood supply.     Description of the Findings of the Procedure:    The patient was seen in the Holding Room. The risks, benefits, complications, treatment options, and expected outcomes were discussed with the patient. The possibilities of reaction to medication, pulmonary aspiration, perforation of viscus, bleeding, recurrent infection, finding a normal thyroid, recurrently laryngeal nerve damage, the need for additional procedures, failure to diagnose a condition, and creating a complication requiring transfusion or operation were discussed with the patient. The patient concurred with the proposed plan, giving informed consent.  The site of surgery properly noted/marked. The patient was taken to Operating Room, identified as Luzmaria Real and the procedure verified as Thyroidectomy. A Time Out  was held and the above information confirmed.    The patient was placed supine after induction of a general anesthetic. The neck was extended and prepped and draped in standard fashion. An 5 cm transverse cervical incision was created above the sternal notch within a natural skin fold. Dissection was carried down through the platysma layer. Once this was completed, sub-platysmal flaps were raised superiorly to the thyroid cartilage and inferiorly to the sternal notch. The strap muscles were identified and divided at the midline. Sharp and blunt dissection were used to mobilize the right thyroid lobe in a medial direction. A nodule was found within the thyroid lobe, located in the upper pole.  This measured approximately 1.5 cm in diameter and mobile from surrounding structures. Dissection continued posteriorly to expose the tracheoesophageal groove and right carotid artery. The right thyroid lobe was mobilized further and the superior and inferior pole vessels were divided with silk ties, clips, and the bipolar cautery. The middle thyroid vein was similarly divided. The right recurrent laryngeal nerve was identified and preserved. Function was verified using the NIMS.The right inferior parathyroid gland was identified, and the right superior parathyroid was noted dorsal to the nerve and left in situ. Small vessels were likewise divided. The gland was rotated in a medial direction and taken off the trachea using the bipolar cautery. The isthmus was removed off the thyroid cartilage also using the bipolar cautery. The specimen was submitted to pathology for permanent evaluation. A suture was placed for orientation purposes (stitch marks the right superior pole).     The wound was irrigated and inspected carefully. Multiple Valsalva maneuvers were performed at 30 cm of water and additional hemostasis was achieved as necessary with focal application of bipolar cautery. This was augmented with Fibrillar which was placed  in the thyroid fossa. The parathyroid tissue was found to be viable and the right recurrent laryngeal nerve were left intact in their anatomic locations. Function was again verified using the NIMS prior to closure. 20 mL of Exparel mixed with Marcaine was placed into the strap muscles and subcutaneous tissues for post-op analgesia. The strap muscles were then closed with interrupted 3-0 Vicryl suture. The strap muscles were then closed with interrupted 3-0 Vicryl suture. The platysma was closed with interrupted 3-0 Vicryl suture, and the skin incision was closed with a 5-0 Monocryl subcuticular knot-less closure. Sterile dressings were was applied across the incision.    Instrument, sponge, and needle counts were correct prior to closure and at the conclusion of the case.     Significant Surgical Tasks Conducted by the Assistant(s), if Applicable: none    Complications: No    Total IV Fluids: see anesthesia record    Estimated Blood Loss (EBL): 20mL           Drains: none    Implants: none    Specimens: Right thyroid lobe and isthmus.  Short stitch marks the superior pole and long stitch marks the isthmus            Condition: stable    Disposition: PACU - hemodynamically stable.    Attestation: I performed the procedure.

## 2019-07-11 ENCOUNTER — OFFICE VISIT (OUTPATIENT)
Dept: SURGERY | Facility: CLINIC | Age: 44
End: 2019-07-11
Payer: COMMERCIAL

## 2019-07-11 VITALS
WEIGHT: 244 LBS | BODY MASS INDEX: 44.9 KG/M2 | DIASTOLIC BLOOD PRESSURE: 63 MMHG | HEIGHT: 62 IN | SYSTOLIC BLOOD PRESSURE: 133 MMHG | HEART RATE: 64 BPM

## 2019-07-11 DIAGNOSIS — Z09 POSTOP CHECK: Primary | ICD-10-CM

## 2019-07-11 DIAGNOSIS — E04.1 THYROID NODULE: ICD-10-CM

## 2019-07-11 PROCEDURE — 99024 POSTOP FOLLOW-UP VISIT: CPT | Mod: S$GLB,,, | Performed by: SURGERY

## 2019-07-11 PROCEDURE — 99999 PR PBB SHADOW E&M-EST. PATIENT-LVL III: CPT | Mod: PBBFAC,,, | Performed by: SURGERY

## 2019-07-11 PROCEDURE — 99999 PR PBB SHADOW E&M-EST. PATIENT-LVL III: ICD-10-PCS | Mod: PBBFAC,,, | Performed by: SURGERY

## 2019-07-11 PROCEDURE — 99024 PR POST-OP FOLLOW-UP VISIT: ICD-10-PCS | Mod: S$GLB,,, | Performed by: SURGERY

## 2019-07-11 NOTE — LETTER
Endocrine/General Surgery  [address] [place] [address]  Phone: [phone]  Fax: [phone] July 11, 2019     Patient: Luzmaria Real   YOB: 1975   Date of Visit: 7/11/2019       To whom it may concern,    I saw Ms. Rinaldi today in clinic. Luzmaria Ramirez has been under my care since 7/3/2019 when she had a procedure.    She is clear to return to school/work on 7/22/2019.  She can perform all duties without restriction.     If you have any questions or concerns, please don't hesitate to contact my office. Thank you for accomodating Luzmaria during this time of her treatment.        Regards,        Carlee Huff MD

## 2019-07-11 NOTE — PROGRESS NOTES
"I have reviewed the Resident's progress note. I agree with the findings. Any changes were made directly in the note below.    Carlee Huff MD  Endocrine Surgery & General Surgery        Subjective:       Luzmaria Real presents to the clinic 1 weeks following right lobectomy with isthmusectomy. Eating a regular diet without difficulty. Bowel movements are Normal.  Pain is controlled with current analgesics. Medications being used: ibuprofen (OTC).. Patient admits positional SOB relieved by sitting upright. Patient denies trouble swallowing, trouble speaking, voice changes and   numbness or tingling.  She is not taking Rocaltrol. Patient is not taking Levothyroxine.       Objective:      /63   Pulse 64   Ht 5' 2" (1.575 m)   Wt 110.7 kg (244 lb)   BMI 44.63 kg/m²     General:   alert, appears stated age and cooperative.    Incision:   well approximated, healing well, no signs of drainage, no erythema, no dehiscence and minimal edema   Voice:  normal     Pathology:  Pending    Labs:  Lab Results   Component Value Date    CALCIUM 9.0 06/28/2019    CALCIUM 9.0 06/28/2019    CALCIUM 10.1 10/26/2018    TSH 1.422 02/18/2019    TSH 2.631 10/26/2018    [id number] 19 (L) 06/28/2019    PTH 55.0 06/28/2019    PHOS 2.6 (L) 06/28/2019            Assessment:          Luzmaria Real is doing well post-operatively after right lobectomy with isthmusectomy.         Plan:        1. Continue any current medications.  2. Wound care and scar massage discussed.  3. Pt is to increase activities as tolerated.  4. Follow up with endocrinology for the possibility for further treatment.  5. RTC prn    Fredy Holliday M.D.  PGY 4       "

## 2019-07-17 ENCOUNTER — DOCUMENTATION ONLY (OUTPATIENT)
Dept: SURGERY | Facility: CLINIC | Age: 44
End: 2019-07-17

## 2019-07-17 ENCOUNTER — TELEPHONE (OUTPATIENT)
Dept: SURGERY | Facility: CLINIC | Age: 44
End: 2019-07-17

## 2019-07-17 NOTE — TELEPHONE ENCOUNTER
"Spoke with pt. Advised the report still states "in process". Informed her that I called the Pathology Dept to advise she called regarding the report. Nuha in Pathology stated she will inquire of the Pathologist. Advised pt I would let Dr. Santillan know she called as well. We will f/u as soon as we get a report, preliminary and/or final. Pt states she was aware that the Pathologist couldn't determine if it was cancerous or not and needed to review further.    "

## 2019-07-17 NOTE — PROGRESS NOTES
Called pathology to follow-up on report. It's still in process. Nuha in Pathology states she will speak with the Pathologist.

## 2019-07-23 ENCOUNTER — PATIENT MESSAGE (OUTPATIENT)
Dept: ENDOCRINOLOGY | Facility: CLINIC | Age: 44
End: 2019-07-23

## 2019-07-23 ENCOUNTER — TELEPHONE (OUTPATIENT)
Dept: SURGERY | Facility: CLINIC | Age: 44
End: 2019-07-23

## 2019-07-23 DIAGNOSIS — E04.1 THYROID NODULE: Primary | ICD-10-CM

## 2019-08-07 ENCOUNTER — PATIENT MESSAGE (OUTPATIENT)
Dept: SURGERY | Facility: CLINIC | Age: 44
End: 2019-08-07

## 2019-08-14 ENCOUNTER — OFFICE VISIT (OUTPATIENT)
Dept: BARIATRICS | Facility: CLINIC | Age: 44
End: 2019-08-14
Payer: COMMERCIAL

## 2019-08-14 VITALS
DIASTOLIC BLOOD PRESSURE: 84 MMHG | WEIGHT: 245.13 LBS | BODY MASS INDEX: 45.11 KG/M2 | HEART RATE: 72 BPM | HEIGHT: 62 IN | SYSTOLIC BLOOD PRESSURE: 100 MMHG

## 2019-08-14 DIAGNOSIS — E66.01 CLASS 3 SEVERE OBESITY DUE TO EXCESS CALORIES WITH SERIOUS COMORBIDITY AND BODY MASS INDEX (BMI) OF 45.0 TO 49.9 IN ADULT: ICD-10-CM

## 2019-08-14 PROCEDURE — 3008F BODY MASS INDEX DOCD: CPT | Mod: CPTII,S$GLB,, | Performed by: INTERNAL MEDICINE

## 2019-08-14 PROCEDURE — 99213 OFFICE O/P EST LOW 20 MIN: CPT | Mod: S$GLB,,, | Performed by: INTERNAL MEDICINE

## 2019-08-14 PROCEDURE — 99213 PR OFFICE/OUTPT VISIT, EST, LEVL III, 20-29 MIN: ICD-10-PCS | Mod: S$GLB,,, | Performed by: INTERNAL MEDICINE

## 2019-08-14 PROCEDURE — 99999 PR PBB SHADOW E&M-EST. PATIENT-LVL III: CPT | Mod: PBBFAC,,, | Performed by: INTERNAL MEDICINE

## 2019-08-14 PROCEDURE — 3008F PR BODY MASS INDEX (BMI) DOCUMENTED: ICD-10-PCS | Mod: CPTII,S$GLB,, | Performed by: INTERNAL MEDICINE

## 2019-08-14 PROCEDURE — 99999 PR PBB SHADOW E&M-EST. PATIENT-LVL III: ICD-10-PCS | Mod: PBBFAC,,, | Performed by: INTERNAL MEDICINE

## 2019-08-14 RX ORDER — DIETHYLPROPION HYDROCHLORIDE 75 MG/1
75 TABLET, EXTENDED RELEASE ORAL DAILY
Qty: 30 TABLET | Refills: 2 | Status: SHIPPED | OUTPATIENT
Start: 2019-08-30 | End: 2020-02-13 | Stop reason: SDUPTHER

## 2019-08-14 NOTE — PROGRESS NOTES
"Subjective:       Patient ID: Luzmaria Real is a 44 y.o. female.    Chief Complaint: Follow-up    Pt here today for follow-up. Has lost 10 lbs. Started LCHF diet and diethylpropion. Last filled 7/30/19. Denies SE. She does feel it has her appetite down. She did not start right away. Had thyroid surgery in July. She has been cleared for regular activity. Nodule was benign. C/o of fatigue. No exercise.       Review of Systems   Constitutional: Negative for chills and fever.   Respiratory: Negative for shortness of breath.         + Snores   Cardiovascular: Negative for chest pain and leg swelling.   Gastrointestinal: Negative for constipation and diarrhea.        + GERD   Genitourinary: Negative for difficulty urinating and dysuria.   Musculoskeletal: Positive for arthralgias and myalgias. Negative for back pain.   Neurological: Positive for dizziness. Negative for light-headedness.   Psychiatric/Behavioral: Negative for dysphoric mood. The patient is not nervous/anxious.        Objective:     /84   Pulse 72   Ht 5' 2" (1.575 m)   Wt 111.2 kg (245 lb 2.4 oz)   BMI 44.84 kg/m²     Physical Exam   Constitutional: She is oriented to person, place, and time. She appears well-developed. No distress.   Morbidly obese     HENT:   Head: Normocephalic and atraumatic.   Mouth/Throat: No oropharyngeal exudate.   Eyes: Pupils are equal, round, and reactive to light. EOM are normal. No scleral icterus.   Neck: Normal range of motion. Neck supple.   Cardiovascular: Normal rate.   Pulmonary/Chest: Effort normal.   Musculoskeletal: Normal range of motion. She exhibits no edema.   Neurological: She is alert and oriented to person, place, and time. No cranial nerve deficit.   Skin: Skin is warm and dry. No erythema.   Psychiatric: She has a normal mood and affect. Her behavior is normal. Judgment normal.   Vitals reviewed.      Assessment:       1. Class 3 severe obesity due to excess calories with serious " comorbidity and body mass index (BMI) of 45.0 to 49.9 in adult        Plan:         Luzmaria was seen today for follow-up.    Diagnoses and all orders for this visit:    Class 3 severe obesity due to excess calories with serious comorbidity and body mass index (BMI) of 45.0 to 49.9 in adult  -     diethylpropion 75 mg TbSR; Take 75 mg by mouth once daily.         Patient warned of common side effects of diethylpropion including anxiety, insomnia, palpitations and increased blood pressure. It was also explained that it is for short-term usage along with diet and exercise, and that stopping the medication without making lifestyle changes will result in regain of weight. Patient states understanding.    Weight loss medications are controlled substances.  They require routine follow up. Prescription or pills that are lost or destroyed will not be replaced.     Exercise 30 min 3 days week. Gradually increase.     Exercise tips given. Healthy all day tips given.     3 meals a day made up of the following:  Unlimited green vegetables, tomatoes, mushrooms, spaghetti squash, cauliflower, meat, poultry, seafood, eggs and hard cheeses.   Milk and plain yogurt  Dressings, seasonings, condiments, etc should have less than 2 g sugars.   Beans (1-1.5 cups) or nuts (1/4 cup) can have 1 x a day.   1-2 servings of citrus fruits, berries, pineapple or melon a day (1/2 cup)  Avoid fried foods    No grains, rice, pasta, potatoes, bread, corn, peas, oatmeal, grits, tortillas, crackers, chips    No soda, sweet tea, juices or lemonade    Www.dietdoctor.com for recipes. Moderate carb intake.

## 2019-08-14 NOTE — PATIENT INSTRUCTIONS
"Patient warned of common side effects of diethylpropion including anxiety, insomnia, palpitations and increased blood pressure. It was also explained that it is for short-term usage along with diet and exercise, and that stopping the medication without making lifestyle changes will result in regain of weight. Patient states understanding.    Weight loss medications are controlled substances.  They require routine follow up. Prescription or pills that are lost or destroyed will not be replaced.       Exercise is one of those "bite the bullet" propositions. Sometimes you just have to get started. What I suggest is setting a timer for 10 minutes. Do whatever activity you plan to start for the 10 minutes. If the timer goes off and you want to stop, fine. You can stop. If you feel like you want to go on a little longer, you can go on. Do this a few times a week. Eventually you will likely decide to keep going. Every 2 weeks, add 5 more minutes before you give yourself permission to stop.       Exercise 30 min 3 days week. Gradually increase.     Patient counseled in strategies for long term weight loss and maintenance: Keeping a food diary, exercise for 1 hour a day and eating more protein than carbohydrates.         3 meals a day made up of the following:  Unlimited green vegetables, tomatoes, mushrooms, spaghetti squash, cauliflower, meat, poultry, seafood, eggs and hard cheeses.   Milk and plain yogurt  Dressings, seasonings, condiments, etc should have less than 2 g sugars.   Beans (1-1.5 cups) or nuts (1/4 cup) can have 1 x a day.   1-2 servings of citrus fruits, berries, pineapple or melon a day (1/2 cup)  Avoid fried foods    No grains, rice, pasta, potatoes, bread, corn, peas, oatmeal, grits, tortillas, crackers, chips    No soda, sweet tea, juices or lemonade    Www.dietdoctor.com for recipes. Moderate carb intake.        Eating well to be healthy and lose weight does not have to be hard. It also does not have " to be time consuming or expensive. There a lots of ways you can work in healthy choices into your day. Many of these are easy, quick and even family friendly!    Homemade hazelnut au lait  Brew your favorite brand of hazelnut flavored coffee ([employer] makes a good one). Microwave 1/2 cup of milk that fits your eating plan (whole, skim or sugar-free almond milk can all work). Add half to 1 oz sugar free hazelnut syrup.     Quick and easy breakfast  1-2 boiled eggs or mini-frittatas with a tangerine. The boiled eggs and mini-frittatas can both be made ahead and last for up to 4 days in the refrigerator. Bonus if you portion them out in ready to go containers or zipper bags.     Breakfast Egg Muffins with Norman and Spinach  Makes 12 muffins  Ingredients    6 eggs  ¼ cup milk  ¼ teaspoon salt  2 cups grated cheddar cheese  3/4 cup spinach, cooked and drained (about 8 oz fresh spinach)  6 norman slices, cooked, drained of fat, and chopped  1/2 cup grated Parmesan cheese (optional)    Instructions      Preheat oven to 350 degrees. Use a regular 12-cup muffin pan. Spray the muffin pan with non-stick cooking spray.  In a large bowl, beat eggs until smooth. Add milk, salt, Cheddar cheese and mix. Stir spinach, cooked norman into the egg mixture. Ladle the egg mixture into greased muffin cups ¾ full.  Top each muffin cup with grated Parmesan cheese.  Bake for 25 minutes. Remove from the oven, let the muffins cool for 30 minutes before removing them from the pan.      Be a brown bagger! When you make dinner, plan for an extra helping. When you serve your plate for dinner, serve an additional helping into a container that you can take with you the next day. If you don't have a refrigerator available during the day, an insulated lunch bag and ice packs will help you safely store you lunch.     Cold Brewed Iced tea. Fill a pitcher with 64 oz filtered water. Add either 4 regular tea bags of your choice or a large iced tea bag.  Refrigerate over night then remove the tea bags. The tea will not be bitter and is super flavorful. Get creative! Try combinations like green tea and hibiscus tea or black tea with lemon zinger. Add orange or lemon slices for even more flavor.     Snack wisely. Protein filled snacks will fill you up, allowing you to get by with fewer calories. String cheese, pork skins (chicharrones), turkey pepperoni, or celery with cream cheese will all fit the bill.       Ditch the take out. Turkey tacos (with or without a low carb tortilla), burgers (without the bun), or fun stir fries are all quick and easy. The whole family will be happy, and you can save calories and money.      Orange Chicken Stir cervantes with asparagus   Makes 6 servings  Ingredients:    1.5 lbs boneless skinless chicken breast/tenders, diced into 1-inch pieces  1 Tbsp extra virgin olive or avocado oil, divided  2 lb asparagus, end portions trimmed and remainder diced into 1 1/2-inch pieces  1 small yellow onion, sliced into thin strips  8 oz button mushrooms, sliced  1 Tbsp peeled and finely grated fresh hilary  4 cloves garlic, minced  1/2 cup low-sodium chicken broth  Juice of 2 fresh oranges  2 Tbsp low sodium soy sauce  2 Tbsp cornstarch  Sea salt and freshly ground black pepper    Directions:    In a 12-inch non-stick wok, heat 1/2 oil over moderately high heat. Once oil is hot, add diced chicken and season lightly with salt and pepper. Sauté until cooked through, tossing occasionally, about 5-6 minutes.  Place chicken on a large plate and set aside. Return wok, reduce to medium-high heat, add remaining oil.  Once oil is hot, add asparagus, yellow onion and mushrooms, and sauté until tender-crisp, about 4 - 5 minutes, adding in garlic and hilary during the last 1 minute of sautéing.  Meanwhile, in a mixing bowl whisk together chicken broth, orange juice, soy sauce and cornstarch until well blended.  Pour chicken broth mixture into skillet with veggies,  season with salt and pepper to taste, and bring mixture to a light boil, stirring constantly. Allow mixture to gently boil, stirring constantly, until thickened, about 1 minute.  Toss chicken into mixture and serve immediately over cauliflower rice or Shirataki noodles.      Skinny Chicken Tortilla Soup  Makes 7 servings    2 teaspoons olive oil  1 cup onion, chopped (about 1 small)  2 cups celery, sliced (about 4 medium stalks)  4 garlic cloves, minced  4 medium tomatoes, chopped  2 cups water  4 cups low-sodium organic chicken broth  3 cups chopped and/or shredded rotisserie chicken, skinless  2 cups sliced carrots (about 3 medium)  1 teaspoon dried oregano leaves  2 teaspoons chili powder  1 teaspoon garlic powder  2 teaspoons cumin  ½ teaspoon cayenne pepper (add less or omit, if you don't want a spicy soup)  ½ teaspoon sea salt + more to taste  ½ teaspoon pepper + more to taste    Directions:   Put all ingredients into a large crock pot. Cook on low for 5-6 hours.     Optional garnish with chopped avocado, chopped fresh cilantro, crumbled Cotija cheese, sour cream, Greek yogurt, your favorite hot sauce.           Vegan Avocado Banana Chocolate Pudding  Makes 4 servings  Ingredients    1 1/2 ripe avocados  2 ripe bananas  6 tbsp raw cacao powder or unsweetened cocoa powder  2-3 tbsp maple syrup (or calorie free sweetener)  1/4 cup almond milk  Instructions    Blend everything together in a  until the consistency is smooth and velvety. Taste and see if more sweetener is needed and stir to make sure everything is evenly mixed. Blend a second time if needed.  Top with banana slices, raw cacao nibs, almond butter, or any other toppings and enjoy!

## 2019-08-16 ENCOUNTER — PATIENT MESSAGE (OUTPATIENT)
Dept: ENDOCRINOLOGY | Facility: CLINIC | Age: 44
End: 2019-08-16

## 2019-08-16 ENCOUNTER — PATIENT MESSAGE (OUTPATIENT)
Dept: SURGERY | Facility: CLINIC | Age: 44
End: 2019-08-16

## 2019-08-19 ENCOUNTER — LAB VISIT (OUTPATIENT)
Dept: LAB | Facility: HOSPITAL | Age: 44
End: 2019-08-19
Attending: INTERNAL MEDICINE
Payer: COMMERCIAL

## 2019-08-19 DIAGNOSIS — E04.1 THYROID NODULE: ICD-10-CM

## 2019-08-19 LAB — TSH SERPL DL<=0.005 MIU/L-ACNC: 2.23 UIU/ML (ref 0.4–4)

## 2019-08-19 PROCEDURE — 36415 COLL VENOUS BLD VENIPUNCTURE: CPT

## 2019-08-19 PROCEDURE — 84443 ASSAY THYROID STIM HORMONE: CPT

## 2019-09-19 NOTE — PROGRESS NOTES
Subjective:       Patient ID: Luzmaria Real is a 44 y.o. female.    Chief Complaint: Migraine and Medication Refill    HPI     This 44-year-old female here for evaluation of migraines.  She has migraines.  She was with another doctor and changed doctors.  She had a bad migraine for the last week.  The headache is located across the front of her head.  There is nothing she can do to stop it.  She gets nauseous.  She cannot use caffeine.  She was taking tylenol #3.  She has not tried other medications.  She used to take topamax, but stopped taking it, because she was trying to get off the medication she was on.  Her head feels tight.      Her left shoulder is still bothering her.  Her arm is very painful.  She has trouble lifting her left arm.  It is the worst at night.  She has taken muscle relaxers to see if this would help, which is did.    Review of Systems   Constitutional: Negative for activity change and unexpected weight change.   HENT: Negative for hearing loss, rhinorrhea and trouble swallowing.    Eyes: Negative for discharge and visual disturbance.   Respiratory: Negative for chest tightness and wheezing.    Cardiovascular: Negative for chest pain and palpitations.   Gastrointestinal: Negative for blood in stool, constipation, diarrhea and vomiting.   Endocrine: Negative for polydipsia and polyuria.   Genitourinary: Negative for difficulty urinating, dysuria, hematuria and menstrual problem.   Musculoskeletal: Negative for arthralgias, joint swelling and neck pain.   Neurological: Positive for headaches. Negative for weakness.   Psychiatric/Behavioral: Negative for confusion and dysphoric mood.       Objective:      Physical Exam   Constitutional: She is oriented to person, place, and time. She appears well-developed and well-nourished.   HENT:   Head: Normocephalic and atraumatic.   Mouth/Throat: No oropharyngeal exudate.   Eyes: Pupils are equal, round, and reactive to light. EOM are normal.  Right eye exhibits no discharge. Left eye exhibits no discharge. No scleral icterus.   Neck: Normal range of motion. Neck supple. No tracheal deviation present. No thyromegaly present.   Cardiovascular: Normal rate, regular rhythm and normal heart sounds. Exam reveals no gallop and no friction rub.   No murmur heard.  Pulmonary/Chest: Effort normal and breath sounds normal. No respiratory distress. She has no wheezes. She has no rales. She exhibits no tenderness.   Abdominal: Soft. Bowel sounds are normal. She exhibits no distension and no mass. There is no tenderness. There is no rebound and no guarding.   Musculoskeletal: Normal range of motion. She exhibits no edema or tenderness.   Neurological: She is alert and oriented to person, place, and time.   Skin: Skin is warm and dry. No rash noted. No erythema. No pallor.   Psychiatric: She has a normal mood and affect. Her behavior is normal.   Vitals reviewed.      Assessment:       1. Other migraine without status migrainosus, not intractable    2. Chronic left shoulder pain        Plan:       1.  Refer to Neurology.  Topamax 50 mg daily for 1-2 weeks then increase to twice daily.  Imitrex prescribed as well.  2.  Refill of her vaccine given.  Referral to Orthopedics already in computer.

## 2019-09-20 ENCOUNTER — PATIENT MESSAGE (OUTPATIENT)
Dept: OBSTETRICS AND GYNECOLOGY | Facility: CLINIC | Age: 44
End: 2019-09-20

## 2019-09-20 ENCOUNTER — OFFICE VISIT (OUTPATIENT)
Dept: INTERNAL MEDICINE | Facility: CLINIC | Age: 44
End: 2019-09-20
Payer: COMMERCIAL

## 2019-09-20 VITALS
DIASTOLIC BLOOD PRESSURE: 80 MMHG | RESPIRATION RATE: 18 BRPM | WEIGHT: 242.5 LBS | HEIGHT: 62 IN | HEART RATE: 74 BPM | TEMPERATURE: 98 F | SYSTOLIC BLOOD PRESSURE: 118 MMHG | BODY MASS INDEX: 44.63 KG/M2

## 2019-09-20 DIAGNOSIS — G89.29 CHRONIC LEFT SHOULDER PAIN: ICD-10-CM

## 2019-09-20 DIAGNOSIS — G43.809 OTHER MIGRAINE WITHOUT STATUS MIGRAINOSUS, NOT INTRACTABLE: Primary | Chronic | ICD-10-CM

## 2019-09-20 DIAGNOSIS — M25.512 CHRONIC LEFT SHOULDER PAIN: ICD-10-CM

## 2019-09-20 PROCEDURE — 99214 OFFICE O/P EST MOD 30 MIN: CPT | Mod: S$GLB,,, | Performed by: INTERNAL MEDICINE

## 2019-09-20 PROCEDURE — 99999 PR PBB SHADOW E&M-EST. PATIENT-LVL III: ICD-10-PCS | Mod: PBBFAC,,, | Performed by: INTERNAL MEDICINE

## 2019-09-20 PROCEDURE — 3008F PR BODY MASS INDEX (BMI) DOCUMENTED: ICD-10-PCS | Mod: CPTII,S$GLB,, | Performed by: INTERNAL MEDICINE

## 2019-09-20 PROCEDURE — 99214 PR OFFICE/OUTPT VISIT, EST, LEVL IV, 30-39 MIN: ICD-10-PCS | Mod: S$GLB,,, | Performed by: INTERNAL MEDICINE

## 2019-09-20 PROCEDURE — 3008F BODY MASS INDEX DOCD: CPT | Mod: CPTII,S$GLB,, | Performed by: INTERNAL MEDICINE

## 2019-09-20 PROCEDURE — 99999 PR PBB SHADOW E&M-EST. PATIENT-LVL III: CPT | Mod: PBBFAC,,, | Performed by: INTERNAL MEDICINE

## 2019-09-20 RX ORDER — SUMATRIPTAN 50 MG/1
50 TABLET, FILM COATED ORAL
Qty: 18 TABLET | Refills: 6 | Status: SHIPPED | OUTPATIENT
Start: 2019-09-20 | End: 2020-05-11

## 2019-09-20 RX ORDER — METHOCARBAMOL 500 MG/1
500 TABLET, FILM COATED ORAL 3 TIMES DAILY PRN
Qty: 30 TABLET | Refills: 11 | Status: SHIPPED | OUTPATIENT
Start: 2019-09-20 | End: 2019-09-30

## 2019-09-20 RX ORDER — TOPIRAMATE 50 MG/1
50 TABLET, FILM COATED ORAL 2 TIMES DAILY
Qty: 60 TABLET | Refills: 11 | Status: SHIPPED | OUTPATIENT
Start: 2019-09-20 | End: 2020-02-13

## 2019-09-25 ENCOUNTER — PATIENT OUTREACH (OUTPATIENT)
Dept: ADMINISTRATIVE | Facility: OTHER | Age: 44
End: 2019-09-25

## 2019-09-27 ENCOUNTER — OFFICE VISIT (OUTPATIENT)
Dept: SPORTS MEDICINE | Facility: CLINIC | Age: 44
End: 2019-09-27
Payer: COMMERCIAL

## 2019-09-27 VITALS
SYSTOLIC BLOOD PRESSURE: 122 MMHG | HEIGHT: 62 IN | BODY MASS INDEX: 44.53 KG/M2 | HEART RATE: 59 BPM | DIASTOLIC BLOOD PRESSURE: 78 MMHG | WEIGHT: 242 LBS

## 2019-09-27 DIAGNOSIS — M25.512 CHRONIC LEFT SHOULDER PAIN: Primary | ICD-10-CM

## 2019-09-27 DIAGNOSIS — G89.29 CHRONIC LEFT SHOULDER PAIN: Primary | ICD-10-CM

## 2019-09-27 PROCEDURE — 99999 PR PBB SHADOW E&M-EST. PATIENT-LVL III: ICD-10-PCS | Mod: PBBFAC,,, | Performed by: ORTHOPAEDIC SURGERY

## 2019-09-27 PROCEDURE — 3008F PR BODY MASS INDEX (BMI) DOCUMENTED: ICD-10-PCS | Mod: CPTII,S$GLB,, | Performed by: ORTHOPAEDIC SURGERY

## 2019-09-27 PROCEDURE — 99999 PR PBB SHADOW E&M-EST. PATIENT-LVL III: CPT | Mod: PBBFAC,,, | Performed by: ORTHOPAEDIC SURGERY

## 2019-09-27 PROCEDURE — 3008F BODY MASS INDEX DOCD: CPT | Mod: CPTII,S$GLB,, | Performed by: ORTHOPAEDIC SURGERY

## 2019-09-27 PROCEDURE — 99203 PR OFFICE/OUTPT VISIT, NEW, LEVL III, 30-44 MIN: ICD-10-PCS | Mod: S$GLB,,, | Performed by: ORTHOPAEDIC SURGERY

## 2019-09-27 PROCEDURE — 99203 OFFICE O/P NEW LOW 30 MIN: CPT | Mod: S$GLB,,, | Performed by: ORTHOPAEDIC SURGERY

## 2019-09-27 NOTE — LETTER
September 27, 2019      Bharat Reza MD  [address]  [place] [address]           [facility]  [address] [address]  Phone: [phone]          Patient: Luzmaria Louis   MR Number: [id number]   YOB: 1975   Date of Visit: 9/27/2019       Dear Dr. Bharat Reza:    Thank you for referring Luzmaria Louis to me for evaluation. Attached you will find relevant portions of my assessment and plan of care.    If you have questions, please do not hesitate to call me. I look forward to following Luzmaria Louis along with you.    Sincerely,    Tejas Prince MD    Enclosure  CC:  No Recipients    If you would like to receive this communication electronically, please contact [email or web address] or [phone] to request more information on [employer] Link access.    For providers and/or their staff who would like to refer a patient to [facility], please contact us through our one-stop-shop provider referral line, [facility] , at [phone].    If you feel you have received this communication in error or would no longer like to receive these types of communications, please e-mail [email or web address]

## 2019-09-27 NOTE — PROGRESS NOTES
CC: LEFT shoulder pain     44 y.o. Female with a history of left shoulder pain for over 1 year without TINY.  In the last couple months shoulder pain and range of motion has worsened.  She has tried muscle relaxers and ibuprofen without help.  Reports pain is worst at night.     She reports that the pain and weakness is worse with overhead activity.     Is affecting ADLs.  Pain is 7/10 at it's worst.      Past Medical History:   Diagnosis Date    Fibroid     GERD (gastroesophageal reflux disease)     Migraine headache        Past Surgical History:   Procedure Laterality Date    CHOLECYSTECTOMY      HYSTERECTOMY      MOUTH SURGERY      THYROID LOBECTOMY Right 7/3/2019    Procedure: LOBECTOMY, THYROID, Possible Total;  Surgeon: Carlee Huff MD;  Location: [facility] OR [address] [place] [address];  Service: General;  Laterality: Right;    TONSILLECTOMY      TUBAL LIGATION         Family History   Problem Relation Age of Onset    Prostate cancer Paternal Grandfather     Cancer Paternal Grandmother     Throat cancer Maternal Grandmother     Cancer Maternal Grandmother         throat cancer - smoker    Diabetes Maternal Grandmother     Stroke Maternal Grandmother     Hypertension Maternal Grandmother     Aneurysm Father         stomach aneurysm    Cancer Maternal Aunt         gastric cancer    Prostate cancer Maternal Uncle     Hyperlipidemia Mother     Prostate cancer Brother     Heart disease Neg Hx          Current Outpatient Medications:     cholestyramine (QUESTRAN) 4 gram packet, Take 4 g by mouth 3 (three) times daily with meals., Disp: , Rfl:     diethylpropion 75 mg TbSR, Take 75 mg by mouth once daily., Disp: 30 tablet, Rfl: 2    methocarbamol (ROBAXIN) 500 MG Tab, Take 1 tablet (500 mg total) by mouth 3 (three) times daily as needed. Can make drowsy, Disp: 30 tablet, Rfl: 11    multivitamin (ONE DAILY MULTIVITAMIN) per tablet, Take 1 tablet by mouth once daily., Disp: , Rfl:     naproxen (NAPROSYN) 500  "MG tablet, Take 1 tablet (500 mg total) by mouth 2 (two) times daily., Disp: 60 tablet, Rfl: 2    sumatriptan (IMITREX) 50 MG tablet, Take 1 tablet (50 mg total) by mouth every 2 (two) hours as needed for Migraine (no more than 2 in 24 hours)., Disp: 18 tablet, Rfl: 6    topiramate (TOPAMAX) 50 MG tablet, Take 1 tablet (50 mg total) by mouth 2 (two) times daily., Disp: 60 tablet, Rfl: 11    Review of patient's allergies indicates:   Allergen Reactions    Caffeine      She feels like it is hard to breath, dizzy, nauseous.          REVIEW OF SYSTEMS:  Constitution: Negative. Negative for chills, fever and night sweats.   HENT: Negative for congestion and headaches.    Eyes: Negative for blurred vision, left vision loss and right vision loss.   Cardiovascular: Negative for chest pain and syncope.   Respiratory: Negative for cough and shortness of breath.    Endocrine: Negative for polydipsia, polyphagia and polyuria.   Hematologic/Lymphatic: Negative for bleeding problem. Does not bruise/bleed easily.   Skin: Negative for dry skin, itching and rash.   Musculoskeletal: Negative for falls.  Positive for left shoulder pain and muscle weakness.   Gastrointestinal: Negative for abdominal pain and bowel incontinence.   Genitourinary: Negative for bladder incontinence and nocturia.   Neurological: Negative for disturbances in coordination, loss of balance and seizures.   Psychiatric/Behavioral: Negative for depression. The patient does not have insomnia.    Allergic/Immunologic: Negative for hives and persistent infections.      PHYSICAL EXAMINATION:  Vitals:  /78   Pulse (!) 59   Ht 5' 2" (1.575 m)   Wt 109.8 kg (242 lb)   BMI 44.26 kg/m²    General: The patient is alert and oriented x 3.  Mood is pleasant.  Observation of ears, eyes and nose reveal no gross abnormalities.  No labored breathing observed.  Gait is coordinated. Patient can toe walk and heel walk without difficulty.      LEFT Shoulder / Upper " Extremity Exam    OBSERVATION:     Swelling  none  Deformity  none   Discoloration  none   Scapular winging none   Scars   none  Atrophy  none    TENDERNESS / CREPITUS (T/C):          T/C      T/C   Clavicle   -/-  SUPRAspinatus    -/-     AC Jt.    -/-  INFRAspinatus  -/-    SC Jt.    -/-  Deltoid    -/-      G. Tuberosity  -/-  LH BICEP groove  -/-   Acromion:  -/-  Midline Neck   -/-     Scapular Spine -/-  Trapezium   -/-   SMA Scapula  -/-  GH jt. line - post  -/-     Scapulothoracic  -/-         ROM: (* = with pain)  Right shoulder   Left shoulder        AROM (PROM)   AROM (PROM)   FE    170° (175°)     30° (65°) *     ER at 90° ABD  90°  (90°)    30°  (45°) *   IR (spine level)   T10     0 *    STRENGTH: (* = with pain) Right shoulder   Left shoulder    SCAPTION   5/5    4/5 *    IR    5/5    4/5 *   ER    5/5    4/5 *   BICEPS   5/5    4/5 *   Deltoid    5/5    4+/5 *     SIGNS:  Painful side       NEER   +    OASHLEES  +    SPENCER   +    SPEEDS  neg     DROP ARM   -   BELLY PRESS neg   Superior escape none    LIFT-OFF  neg   X-Body ADD    +   MOVING VALGUS neg        STABILITY TESTING    Right shoulder   Left shoulder    Translation     Anterior  up face     up face    Posterior  up face    up face    Sulcus   < 10mm    < 10 mm     Signs   Apprehension   neg      neg       Relocation   no change     no change      Jerk test  neg     neg    EXTREMITY NEURO-VASCULAR EXAM:    Sensation grossly intact to light touch all dermatomal regions.    DTR 2+ Biceps, Triceps, BR and Negative Rubens sign   Grossly intact motor function at Elbow, Wrist and Hand   Distal pulses radial and ulnar 2+, brisk cap refill, symmetric.      NECK:  Painless FROM and spinous processes non-tender. Negative Spurlings sign.      OTHER FINDINGS:  + scapular dyskinesia    XRAYS (6/28/19): No significant abnormality identified.      ASSESSMENT:   Left shoulder pain    PLAN:    1. MRI without contrast left shoulder  2. Follow up  in clinic for MRI results    All questions were answered, patient will contact us for questions or concerns in the interim.

## 2019-10-02 ENCOUNTER — PATIENT OUTREACH (OUTPATIENT)
Dept: ADMINISTRATIVE | Facility: OTHER | Age: 44
End: 2019-10-02

## 2019-10-03 NOTE — PROGRESS NOTES
New Patient     SUBJECTIVE:  Patient ID: Luzmaria Louis   MRN: [id number]  Referred By: Dr. Bharat Reza  Chief Complaint: Consult and Migraine      History of Present Illness:   44 y.o. female with a PMHx of migraine, GERD, Tietze syndrome, hysterectomy, kidney stone last year, s/p thyroid resection who presents to clinic alone for evaluation of headaches.   PMHx negative for TBI, Meningitis, Aneurysms, Smoking, asthma, GI bleed, osteoporosis, CAD/MI, CVA/TIA, DM, infection, fever, cancer, pregnancy   Family Hx negative for brain tumors. Positive for sister with migraines, maternal aunt with aneurysm    Headache History:  Onset - since high school  Location/Radiation - unilateral or bilateral occipital, frontal  Quality - throbbing, pulsating  Duration - 1 day - 1 week  Intensity (range) - 1-10/10  Frequency - 12/30 ha days per month since high school, 0/30 are debilitating  Triggers - stress  Aggravating Factors -  Stress, light, smell, sound  Alleviating Factors - meds, time  Recent Changes - no  Prodrome/Aura - floaters prior to HA  HA today? - 1/10  Status Migrainosus history - yes   Time of day of most headaches- upon awakening  Sleep - wakes with HA that resolves once she gets up and moves around, snores, doesn't wakes feeling refreshed  Occupation - computer based    Associated symptoms with the headache: dizziness, photophobia, phonophobia, nausea, hyperosmia    Treatments Tried   Methocarbamol 500mg TID prn - for shoulder pain, didn't help HA's  Sumatriptan 50mg - hasn't tried yet  topirimate 50mg BID - started 2 weeks ago  Naproxen 500mg BID prn - didn't help  Phenergan - helps nausea    Social History  Alcohol - 1 glass of wine per weekend  Smoke - denies  Recreational Drug Use- denies    Note from Internal Med 9/20/19:  This 44-year-old female here for evaluation of migraines.  She has migraines.  She was with another doctor and changed doctors.  She had a bad migraine for the last week.  The headache  "is located across the front of her head.  There is nothing she can do to stop it.  She gets nauseous.  She cannot use caffeine.  She was taking tylenol #3.  She has not tried other medications.  She used to take topamax, but stopped taking it, because she was trying to get off the medication she was on.  Her head feels tight.      Current Medications:    cholestyramine (QUESTRAN) 4 gram packet, Take 4 g by mouth 3 (three) times daily with meals., Disp: , Rfl:     diethylpropion 75 mg TbSR, Take 75 mg by mouth once daily., Disp: 30 tablet, Rfl: 2    multivitamin (ONE DAILY MULTIVITAMIN) per tablet, Take 1 tablet by mouth once daily., Disp: , Rfl:     topiramate (TOPAMAX) 50 MG tablet, Take 1 tablet (50 mg total) by mouth 2 (two) times daily., Disp: 60 tablet, Rfl: 11    naproxen (NAPROSYN) 500 MG tablet, Take 1 tablet (500 mg total) by mouth 2 (two) times daily. (Patient not taking: Reported on 10/4/2019), Disp: 60 tablet, Rfl: 2    sumatriptan (IMITREX) 50 MG tablet, Take 1 tablet (50 mg total) by mouth every 2 (two) hours as needed for Migraine (no more than 2 in 24 hours). (Patient not taking: Reported on 10/4/2019), Disp: 18 tablet, Rfl: 6    Review of Systems - as per HPI, otherwise a balanced 10 systems review is negative.    OBJECTIVE:  Vitals:  /84   Pulse 65   Ht 5' 2" (1.575 m)   Wt 109.7 kg (241 lb 13.5 oz)   BMI 44.23 kg/m²     Physical Exam   Constitutional: she appears well-developed and well-nourished. she is well groomed. NAD  HENT:    Head: Normocephalic and atraumatic, oral and nasal mucosa intact.  Frontalis was TTP, temporalis was TTP   Eyes: Conjunctivae and EOM are normal. Pupils are equal, round, and reactive to light   Neck: Neck supple. Occiput and trapezius TTP. + Tinnel bilaterally   Musculoskeletal: Normal range of motion. No joint stiffness. No vertebral point tenderness.  Skin: Skin is warm and dry.  Psychiatric: Normal mood and affect.     Neuro exam:    Mental " status:  The patient is alert and oriented to person, place and time.  Language is intact and fluent  Remote and recent memory are intact  Normal attention and concentration  Mood is stable    Cranial Nerves:  Pupils are equal and reactive to light.    Extraocular movements are intact and without nystagmus.    Visual fields are full to confrontation testing.   Facial movement is symmetric.  Facial sensation is intact.    Hearing is intact to finger rub   Uvula in midline. Tongue in midline without fasciculation.   FROM of neck in all (6) directions without pain  Shoulder shrug symmetrical.    Coordination:     Finger to nose - normal and symmetric bilaterally   Heel to shin test - normal and symmetric bilaterally     Motor:  Normal muscle bulk and symmetry. No fasciculations were noted.   Tremor not apparent   Pronator drift not apparent.    strength was strong and symmetric  Finger extension strength was strong and symmetric  RUE:appropriate against gravity and medium force as tested 5/5  LUE: appropriate against gravity and medium force as tested 5/5  RLE:appropriate against gravity and medium force as tested 5/5              LLE: appropriate against gravity and medium force as tested 5/5    Reflexes:  Right Brachioradialis 2+  Left Brachioradialis 2+  Right Biceps 2+  Left Biceps 2+  Right Patellar2+  Left Patellar 2+                          Monroy was negative    Sensory:  RUE  intact light touch  LUE intact light touch  RLE intact light touch  LLE intact light touch    Gait:   Romberg - negative  Normal gait  Tandem, Heel, and Toe Walk - able to perform without difficulty    Review of Data:   Notes from internal med reviewed   Labs:  Lab Visit on 08/19/2019   Component Date Value Ref Range Status    TSH 08/19/2019 2.227  0.400 - 4.000 uIU/mL Final     Imaging:  No results found for this or any previous visit.  Note: I have independently reviewed any/all imaging/labs/tests and agree with the report (s) as  documented.  Any discrepancies will be as noted/demarcated by free text.  ESPINOZA MAYORGA 10/4/2019    ASSESSMENT:  1. Migraine with aura and without status migrainosus, not intractable    2. Suspected sleep apnea    3. Hematuria, unspecified type          PLAN:  - Discussed symptoms appear to be consistent with migraines and untreated sleep apnea, discussed treatment options and patient agreed with the following plan:  - preventative - topamax, patient will hydrate well to avoid kidney stones  - abortive - sumatriptan  - nausea - phenergan  - hematuria - caution with topamax and zonisamide  - suspected sleep apnea - patient is in the process of setting up an inpatient sleep study  - risks, benefits, and potential side effects of phenergan, sumatriptan, topamax discussed   - alternative treatment options offered   - importance of healthy diet, regular exercise and sleep hygiene in the treatment of headaches    - Start tracking headaches via Migraine Buddy brett on phone   - RTC in 3 mo          I have discussed realistic goals of care with patient at length as well as medication options, and need for lifestyle adjustment. I have explained that treatment will take time. We have agreed that the goal will be to reduce frequency/intensity/quantity of HA, not to be completely HA free. I have explained my non narcotic policy regarding headache treatment.    Patient agreeable to work on lifestyle adjustments.    Discussed potential for teratogenicity with treatment, patient understands if her family planning status should change she will contact office immediately and we will safely adjust medications as needed.     Questions and concerns were sought and answered to the patient's stated verbal satisfaction.  The patient verbalizes understanding and agreement with the above stated treatment plan.     CC: MD Gracia Mccray PA-C  [facility]  [phone]    Dr. Smith was available during today's  encounter.

## 2019-10-04 ENCOUNTER — TELEPHONE (OUTPATIENT)
Dept: INTERNAL MEDICINE | Facility: CLINIC | Age: 44
End: 2019-10-04

## 2019-10-04 ENCOUNTER — HOSPITAL ENCOUNTER (OUTPATIENT)
Dept: RADIOLOGY | Facility: HOSPITAL | Age: 44
Discharge: HOME | End: 2019-10-04
Attending: ORTHOPAEDIC SURGERY
Payer: COMMERCIAL

## 2019-10-04 ENCOUNTER — OFFICE VISIT (OUTPATIENT)
Dept: NEUROLOGY | Facility: CLINIC | Age: 44
End: 2019-10-04
Payer: COMMERCIAL

## 2019-10-04 VITALS
HEIGHT: 62 IN | SYSTOLIC BLOOD PRESSURE: 134 MMHG | WEIGHT: 241.88 LBS | BODY MASS INDEX: 44.51 KG/M2 | HEART RATE: 65 BPM | DIASTOLIC BLOOD PRESSURE: 84 MMHG

## 2019-10-04 DIAGNOSIS — R29.818 SUSPECTED SLEEP APNEA: ICD-10-CM

## 2019-10-04 DIAGNOSIS — G43.109 MIGRAINE WITH AURA AND WITHOUT STATUS MIGRAINOSUS, NOT INTRACTABLE: Primary | ICD-10-CM

## 2019-10-04 DIAGNOSIS — R31.9 HEMATURIA, UNSPECIFIED TYPE: ICD-10-CM

## 2019-10-04 DIAGNOSIS — G89.29 CHRONIC LEFT SHOULDER PAIN: ICD-10-CM

## 2019-10-04 DIAGNOSIS — M25.512 CHRONIC LEFT SHOULDER PAIN: ICD-10-CM

## 2019-10-04 PROCEDURE — 3008F PR BODY MASS INDEX (BMI) DOCUMENTED: ICD-10-PCS | Mod: CPTII,S$GLB,, | Performed by: PHYSICIAN ASSISTANT

## 2019-10-04 PROCEDURE — 73221 MRI JOINT UPR EXTREM W/O DYE: CPT | Mod: TC,LT

## 2019-10-04 PROCEDURE — 99204 PR OFFICE/OUTPT VISIT, NEW, LEVL IV, 45-59 MIN: ICD-10-PCS | Mod: S$GLB,,, | Performed by: PHYSICIAN ASSISTANT

## 2019-10-04 PROCEDURE — 99999 PR PBB SHADOW E&M-EST. PATIENT-LVL III: CPT | Mod: PBBFAC,,, | Performed by: PHYSICIAN ASSISTANT

## 2019-10-04 PROCEDURE — 73221 MRI SHOULDER WITHOUT CONTRAST LEFT: ICD-10-PCS | Mod: 26,LT,, | Performed by: RADIOLOGY

## 2019-10-04 PROCEDURE — 99204 OFFICE O/P NEW MOD 45 MIN: CPT | Mod: S$GLB,,, | Performed by: PHYSICIAN ASSISTANT

## 2019-10-04 PROCEDURE — 99999 PR PBB SHADOW E&M-EST. PATIENT-LVL III: ICD-10-PCS | Mod: PBBFAC,,, | Performed by: PHYSICIAN ASSISTANT

## 2019-10-04 PROCEDURE — 3008F BODY MASS INDEX DOCD: CPT | Mod: CPTII,S$GLB,, | Performed by: PHYSICIAN ASSISTANT

## 2019-10-04 PROCEDURE — 73221 MRI JOINT UPR EXTREM W/O DYE: CPT | Mod: 26,LT,, | Performed by: RADIOLOGY

## 2019-10-04 RX ORDER — METHOCARBAMOL 500 MG/1
500 TABLET, FILM COATED ORAL 3 TIMES DAILY PRN
Qty: 30 TABLET | Refills: 0 | Status: SHIPPED | OUTPATIENT
Start: 2019-10-04 | End: 2019-10-11

## 2019-10-04 RX ORDER — PROMETHAZINE HYDROCHLORIDE 12.5 MG/1
12.5 TABLET ORAL 4 TIMES DAILY
Qty: 20 TABLET | Refills: 6 | Status: SHIPPED | OUTPATIENT
Start: 2019-10-04 | End: 2021-02-24

## 2019-10-04 NOTE — LETTER
October 4, 2019      Bharat Reza MD  [address] [facility] [place] [address]           [facility]  [address]  [place] [address]  Phone: [phone]  Fax: [phone]          Patient: Luzmaria Louis   MR Number: [id number]   YOB: 1975   Date of Visit: 10/4/2019       Dear Dr. Bharat Reza:    Thank you for referring Luzmaria Louis to me for evaluation. Attached you will find relevant portions of my assessment and plan of care.    If you have questions, please do not hesitate to call me. I look forward to following Luzmaria Louis along with you.    Sincerely,    Gracia Westfall PA-C    Enclosure  CC:  No Recipients    If you would like to receive this communication electronically, please contact [email or web address] or [phone] to request more information on [employer] access.    For providers and/or their staff who would like to refer a patient to [facility], please contact us through our one-stop-shop provider referral line, [facility] , at [phone].    If you feel you have received this communication in error or would no longer like to receive these types of communications, please e-mail [email or web address]

## 2019-10-04 NOTE — PATIENT INSTRUCTIONS
Supplements for Migraine:  1. Magnesium Oxide - 400mg by mouth daily  2. Riboflavin (Vitamin B2) - 400mg by mouth daily  3. Coenzyme Q10 - 200mg tablet by mouth daily    - Please download Migraine Panda brett on phone and begin tracking your headaches     - risks associated with untreated FLORIN including increased risk of cardiac event, stroke, HTN, T2DM, worsening headaches, daytime drowsiness, and motor vehicle accident, advised patient against driving if she feels drowsy

## 2019-10-04 NOTE — TELEPHONE ENCOUNTER
----- Message from Can Carpenter sent at 10/4/2019 10:39 AM CDT -----  Contact: Patient [phone]  RX request - refill or new RX.  Is this a refill or new RX: New    RX name and strength:    Pain Rx      Pharmacy name and phone #[id number] - [address] AT [facility] [phone] (Phone) [phone] (Fax)    Comments:  Patient is requesting pain Rx for the left shoulder, stating muscle relaxers are not working, would like a call to inform has been sent.    Please call an advise  Thank you

## 2019-10-04 NOTE — TELEPHONE ENCOUNTER
I have sent a Robaxin to the pharmacy for the patient.  This is a muscle relaxer.  It can make her drowsy.

## 2019-10-10 ENCOUNTER — TELEPHONE (OUTPATIENT)
Dept: SPORTS MEDICINE | Facility: CLINIC | Age: 44
End: 2019-10-10

## 2019-10-10 NOTE — TELEPHONE ENCOUNTER
Returning call to patient.  I offered her a sooner appointment with ASHKAN Andrew; she declined.  She is going to see her PCP, Dr. Reza, tomorrow and discuss her pain with him.     She reports having Naproxen 500mg BID prescribed; started taking it this morning.  Previously was taking robaxin 500mg TID; reports not helping her pain.  We discussed conservative treatment options such as switching NSAIDS, steroid injection, and physical therapy versus surgical options - explained this discussion can occur with PA or Dr. Prince in clinic.  She is going to hold off coming in sooner to Sports Medicine and will discuss with Dr. Reza tomorrow instead.

## 2019-10-10 NOTE — TELEPHONE ENCOUNTER
----- Message from Delphine Higgins sent at 10/10/2019 10:58 AM CDT -----  Contact: self  Pt ask for a call in regards to getting her MRI results. Pt ask if another provider can give the results to her      Contact info:   [phone]

## 2019-10-11 ENCOUNTER — OFFICE VISIT (OUTPATIENT)
Dept: INTERNAL MEDICINE | Facility: CLINIC | Age: 44
End: 2019-10-11
Payer: COMMERCIAL

## 2019-10-11 ENCOUNTER — PATIENT OUTREACH (OUTPATIENT)
Dept: ADMINISTRATIVE | Facility: HOSPITAL | Age: 44
End: 2019-10-11

## 2019-10-11 ENCOUNTER — LAB VISIT (OUTPATIENT)
Dept: LAB | Facility: HOSPITAL | Age: 44
End: 2019-10-11
Attending: INTERNAL MEDICINE
Payer: COMMERCIAL

## 2019-10-11 ENCOUNTER — PATIENT OUTREACH (OUTPATIENT)
Dept: ADMINISTRATIVE | Facility: OTHER | Age: 44
End: 2019-10-11

## 2019-10-11 VITALS
SYSTOLIC BLOOD PRESSURE: 118 MMHG | DIASTOLIC BLOOD PRESSURE: 80 MMHG | HEIGHT: 62 IN | WEIGHT: 240.31 LBS | BODY MASS INDEX: 44.22 KG/M2 | RESPIRATION RATE: 20 BRPM | HEART RATE: 84 BPM

## 2019-10-11 DIAGNOSIS — M75.112 INCOMPLETE TEAR OF LEFT ROTATOR CUFF, UNSPECIFIED WHETHER TRAUMATIC: ICD-10-CM

## 2019-10-11 DIAGNOSIS — G62.9 NEUROPATHY: ICD-10-CM

## 2019-10-11 DIAGNOSIS — Z00.00 ANNUAL PHYSICAL EXAM: Primary | ICD-10-CM

## 2019-10-11 DIAGNOSIS — Z00.00 ANNUAL PHYSICAL EXAM: ICD-10-CM

## 2019-10-11 LAB
CHOLEST SERPL-MCNC: 209 MG/DL (ref 120–199)
CHOLEST/HDLC SERPL: 6.1 {RATIO} (ref 2–5)
ESTIMATED AVG GLUCOSE: 103 MG/DL (ref 68–131)
HBA1C MFR BLD HPLC: 5.2 % (ref 4–5.6)
HDLC SERPL-MCNC: 34 MG/DL (ref 40–75)
HDLC SERPL: 16.3 % (ref 20–50)
LDLC SERPL CALC-MCNC: 132.6 MG/DL (ref 63–159)
NONHDLC SERPL-MCNC: 175 MG/DL
TRIGL SERPL-MCNC: 212 MG/DL (ref 30–150)

## 2019-10-11 PROCEDURE — 99999 PR PBB SHADOW E&M-EST. PATIENT-LVL III: CPT | Mod: PBBFAC,,, | Performed by: INTERNAL MEDICINE

## 2019-10-11 PROCEDURE — 90471 FLU VACCINE (QUAD) GREATER THAN OR EQUAL TO 3YO PRESERVATIVE FREE IM: ICD-10-PCS | Mod: S$GLB,,, | Performed by: INTERNAL MEDICINE

## 2019-10-11 PROCEDURE — 90471 IMMUNIZATION ADMIN: CPT | Mod: S$GLB,,, | Performed by: INTERNAL MEDICINE

## 2019-10-11 PROCEDURE — 99396 PREV VISIT EST AGE 40-64: CPT | Mod: 25,S$GLB,, | Performed by: INTERNAL MEDICINE

## 2019-10-11 PROCEDURE — 99999 PR PBB SHADOW E&M-EST. PATIENT-LVL III: ICD-10-PCS | Mod: PBBFAC,,, | Performed by: INTERNAL MEDICINE

## 2019-10-11 PROCEDURE — 99396 PR PREVENTIVE VISIT,EST,40-64: ICD-10-PCS | Mod: 25,S$GLB,, | Performed by: INTERNAL MEDICINE

## 2019-10-11 PROCEDURE — 80061 LIPID PANEL: CPT

## 2019-10-11 PROCEDURE — 90686 FLU VACCINE (QUAD) GREATER THAN OR EQUAL TO 3YO PRESERVATIVE FREE IM: ICD-10-PCS | Mod: S$GLB,,, | Performed by: INTERNAL MEDICINE

## 2019-10-11 PROCEDURE — 83036 HEMOGLOBIN GLYCOSYLATED A1C: CPT

## 2019-10-11 PROCEDURE — 36415 COLL VENOUS BLD VENIPUNCTURE: CPT | Mod: PO

## 2019-10-11 PROCEDURE — 90686 IIV4 VACC NO PRSV 0.5 ML IM: CPT | Mod: S$GLB,,, | Performed by: INTERNAL MEDICINE

## 2019-10-11 RX ORDER — GABAPENTIN 100 MG/1
200 CAPSULE ORAL NIGHTLY PRN
Qty: 60 CAPSULE | Refills: 11 | Status: SHIPPED | OUTPATIENT
Start: 2019-10-11 | End: 2021-02-22

## 2019-10-11 RX ORDER — CYCLOBENZAPRINE HCL 10 MG
10 TABLET ORAL 3 TIMES DAILY PRN
Qty: 45 TABLET | Refills: 0 | Status: SHIPPED | OUTPATIENT
Start: 2019-10-11 | End: 2020-07-23

## 2019-10-11 RX ORDER — DICLOFENAC SODIUM 75 MG/1
75 TABLET, DELAYED RELEASE ORAL 2 TIMES DAILY PRN
Qty: 60 TABLET | Refills: 3 | Status: SHIPPED | OUTPATIENT
Start: 2019-10-11 | End: 2021-02-22

## 2019-10-11 NOTE — LETTER
AUTHORIZATION FOR RELEASE OF   CONFIDENTIAL INFORMATION    Dear Dr. Huffman,    We are seeing Luzmaria Louis, date of birth 1975, in the clinic at [facility] INTERNAL MEDICINE. Bharat Reza MD is the patient's PCP. Luzmaria Louis has an outstanding lab/procedure at the time we reviewed her chart. In order to help keep her health information updated, she has authorized us to request the following medical record(s):        (  )  MAMMOGRAM                                      (x )  COLONOSCOPY      (  )  PAP SMEAR                                          (  )  OUTSIDE LAB RESULTS     (  )  DEXA SCAN                                          (  )  EYE EXAM            (  )  FOOT EXAM                                          (  )  ENTIRE RECORD     (  )  OUTSIDE IMMUNIZATIONS                 (  )  _______________         Please fax records to [facility], Ryan Lee, MD, [phone]     If you have any questions, please contact ALETHA Weinstein at [phone]          Patient Name: Luzmaria Louis  : 1975  Patient Phone #: [phone]

## 2019-10-11 NOTE — PROGRESS NOTES
Subjective:       Patient ID: Luzmaria Louis is a 44 y.o. female.    Chief Complaint: Annual Exam    HPI     44 y.o. female here for annual exam.     Cholesterol: needs  Vaccines: Influenza - needs; Tetanus - 2018  Sexual Screening: active  STD screening: no concern  Eye exam: due  Mammogram: due Dec 19th  Gyn exam: due Monday  Colonoscopy: done by Dr. Huffman  A1c: needs    Exercise:  Not too much, because of working a lot and being in school.  Diet:  Tries to cook    Her left arm pain is horrible.  She was doing muscle relaxers and naproxen to get some relief.  It hurts so badly that it travels into her hand.  She cannot sleep on it any more.  She is seeing Dr. Prince on the 24th.    Past Medical History:   Diagnosis Date    Fibroid     GERD (gastroesophageal reflux disease)     Migraine headache      Past Surgical History:   Procedure Laterality Date    CHOLECYSTECTOMY      HYSTERECTOMY      MOUTH SURGERY      THYROID LOBECTOMY Right 7/3/2019    Procedure: LOBECTOMY, THYROID, Possible Total;  Surgeon: Carlee Huff MD;  Location: [address];  Service: General;  Laterality: Right;    TONSILLECTOMY      TUBAL LIGATION       Social History     Socioeconomic History    Marital status:      Spouse name: Not on file    Number of children: Not on file    Years of education: Not on file    Highest education level: Not on file   Occupational History    Not on file   Social Needs    Financial resource strain: Not hard at all    Food insecurity:     Worry: Never true     Inability: Never true    Transportation needs:     Medical: No     Non-medical: No   Tobacco Use    Smoking status: Never Smoker    Smokeless tobacco: Never Used   Substance and Sexual Activity    Alcohol use: Yes     Frequency: 2-4 times a month     Drinks per session: 1 or 2     Binge frequency: Never     Comment: once a week maybe    Drug use: No    Sexual activity: Yes     Partners: Male      Comment: hysterectomy   Lifestyle    Physical activity:     Days per week: 0 days     Minutes per session: 0 min    Stress: Not at all   Relationships    Social connections:     Talks on phone: More than three times a week     Gets together: Twice a week     Attends Congregation service: Not on file     Active member of club or organization: No     Attends meetings of clubs or organizations: Never     Relationship status:    Other Topics Concern    Not on file   Social History Narrative    Not on file     Review of patient's allergies indicates:   Allergen Reactions    Caffeine      She feels like it is hard to breath, dizzy, nauseous.     Luzmaria Lousi had no medications administered during this visit.    Review of Systems    Objective:      Physical Exam   Constitutional: She is oriented to person, place, and time. She appears well-developed and well-nourished.   HENT:   Head: Normocephalic and atraumatic.   Mouth/Throat: No oropharyngeal exudate.   Eyes: Pupils are equal, round, and reactive to light. EOM are normal. Right eye exhibits no discharge. Left eye exhibits no discharge. No scleral icterus.   Neck: Normal range of motion. Neck supple. No tracheal deviation present. No thyromegaly present.   Cardiovascular: Normal rate, regular rhythm and normal heart sounds. Exam reveals no gallop and no friction rub.   No murmur heard.  Pulmonary/Chest: Effort normal and breath sounds normal. No respiratory distress. She has no wheezes. She has no rales. She exhibits no tenderness.   Abdominal: Soft. Bowel sounds are normal. She exhibits no distension and no mass. There is no tenderness. There is no rebound and no guarding.   Musculoskeletal: Normal range of motion. She exhibits no edema or tenderness.   Neurological: She is alert and oriented to person, place, and time.   Skin: Skin is warm and dry. No rash noted. No erythema. No pallor.   Psychiatric: She has a normal mood and affect. Her  behavior is normal.   Vitals reviewed.      Assessment:       1. Annual physical exam    2. Incomplete tear of left rotator cuff, unspecified whether traumatic    3. Neuropathy        Plan:       1.  Check lipids, A1c.  Reviewed CBC, CMP, TSH.  Within normal limits.  Discussed diet and exercise with patient.  Flu vaccine given today.  Up-to-date on tetanus vaccine.  2.  Refer to physical therapy.  Patient to schedule this appointment after seeing Sports Medicine on the 24th.  Change from naproxen to diclofenac.  Change from Robaxin to Flexeril.  3.  Patient having burning pain postoperatively at the site of the surgery.  Gabapentin 200 mg nightly prescribed.  Advised her to try this an hour before sleep, and see how she reacts.  She may not need to take the Flexeril if this makes her drowsy and helps the shoulder.

## 2019-10-14 ENCOUNTER — OFFICE VISIT (OUTPATIENT)
Dept: OBSTETRICS AND GYNECOLOGY | Facility: CLINIC | Age: 44
End: 2019-10-14
Payer: COMMERCIAL

## 2019-10-14 VITALS
SYSTOLIC BLOOD PRESSURE: 108 MMHG | WEIGHT: 245.13 LBS | DIASTOLIC BLOOD PRESSURE: 72 MMHG | BODY MASS INDEX: 44.84 KG/M2

## 2019-10-14 DIAGNOSIS — Z87.42 HISTORY OF ABNORMAL CERVICAL PAP SMEAR: ICD-10-CM

## 2019-10-14 DIAGNOSIS — Z12.4 PAP SMEAR FOR CERVICAL CANCER SCREENING: ICD-10-CM

## 2019-10-14 DIAGNOSIS — Z01.419 ENCOUNTER FOR ANNUAL ROUTINE GYNECOLOGICAL EXAMINATION: Primary | ICD-10-CM

## 2019-10-14 PROCEDURE — 99999 PR PBB SHADOW E&M-EST. PATIENT-LVL III: ICD-10-PCS | Mod: PBBFAC,,, | Performed by: OBSTETRICS & GYNECOLOGY

## 2019-10-14 PROCEDURE — 99999 PR PBB SHADOW E&M-EST. PATIENT-LVL III: CPT | Mod: PBBFAC,,, | Performed by: OBSTETRICS & GYNECOLOGY

## 2019-10-14 PROCEDURE — 99396 PR PREVENTIVE VISIT,EST,40-64: ICD-10-PCS | Mod: S$GLB,,, | Performed by: OBSTETRICS & GYNECOLOGY

## 2019-10-14 PROCEDURE — 99396 PREV VISIT EST AGE 40-64: CPT | Mod: S$GLB,,, | Performed by: OBSTETRICS & GYNECOLOGY

## 2019-10-14 PROCEDURE — 87624 HPV HI-RISK TYP POOLED RSLT: CPT

## 2019-10-14 PROCEDURE — 88175 CYTOPATH C/V AUTO FLUID REDO: CPT

## 2019-10-14 NOTE — PROGRESS NOTES
No chief complaint on file.      HISTORY OF PRESENT ILLNESS:   Luzmaria Louis is a 44 y.o. female s[facility] who presents for well woman exam.  No LMP recorded. Patient has had a hysterectomy..  She has no complaints.  Declines STD testing.      Past Medical History:   Diagnosis Date    Fibroid     GERD (gastroesophageal reflux disease)     Migraine headache         Past Surgical History:   Procedure Laterality Date    CHOLECYSTECTOMY      HYSTERECTOMY      MOUTH SURGERY      THYROID LOBECTOMY Right 7/3/2019    Procedure: LOBECTOMY, THYROID, Possible Total;  Surgeon: Carlee Huff MD;  Location: [address];  Service: General;  Laterality: Right;    TONSILLECTOMY      TUBAL LIGATION         Social History     Socioeconomic History    Marital status:      Spouse name: Not on file    Number of children: Not on file    Years of education: Not on file    Highest education level: Not on file   Occupational History    Not on file   Social Needs    Financial resource strain: Not hard at all    Food insecurity:     Worry: Never true     Inability: Never true    Transportation needs:     Medical: No     Non-medical: No   Tobacco Use    Smoking status: Never Smoker    Smokeless tobacco: Never Used   Substance and Sexual Activity    Alcohol use: Yes     Frequency: 2-4 times a month     Drinks per session: 1 or 2     Binge frequency: Never     Comment: once a week maybe    Drug use: No    Sexual activity: Yes     Partners: Male     Comment: hysterectomy   Lifestyle    Physical activity:     Days per week: 0 days     Minutes per session: 0 min    Stress: Not at all   Relationships    Social connections:     Talks on phone: More than three times a week     Gets together: Twice a week     Attends Sikh service: Not on file     Active member of club or organization: No     Attends meetings of clubs or organizations: Never     Relationship status:    Other Topics Concern     Not on file   Social History Narrative    Not on file       Family History   Problem Relation Age of Onset    Prostate cancer Paternal Grandfather     Cancer Paternal Grandmother     Throat cancer Maternal Grandmother     Cancer Maternal Grandmother         throat cancer - smoker    Diabetes Maternal Grandmother     Stroke Maternal Grandmother     Hypertension Maternal Grandmother     Aneurysm Father         stomach aneurysm    Cancer Maternal Aunt         gastric cancer    Prostate cancer Maternal Uncle     Hyperlipidemia Mother     Prostate cancer Brother     Cancer Maternal Grandfather         colon cancer    Heart disease Neg Hx        OB History    Para Term  AB Living   3 2 2   1     SAB TAB Ectopic Multiple Live Births   1              # Outcome Date GA Lbr Anuj/2nd Weight Sex Delivery Anes PTL Lv   3 SAB            2 Term            1 Term                GYN HISTORY:  PAP History: h/o abnormal Paps lasti n  with HPV + and  ASCUS with HPV negative, has had colpo in the past  Infection History:Denies STDs. Denies PID.  Benign History: Denies uterine fibroids. Denies ovarian cysts. Denies endometriosis Denies other conditions.  Cancer History: Denies cervical cancer. Denies uterine cancer or hyperplasia. Denies ovarian cancer. Denies vulvar cancer or pre-cancer. Denies vaginal cancer or pre-cancer. Denies breast cancer. Denies colon cancer.  Cycle: 12/irregular  Had LAVH around  2/2 fibroids, left ovaries     ROS:  GENERAL: Denies weight gain or weight loss. Feeling well overall.   SKIN: Denies rash or lesions.   HEAD: Denies headache.   NODES: Denies enlarged lymph nodes.   CHEST: Denies shortness of breath.   ABDOMEN: No abdominal pain, constipation, diarrhea, nausea, vomiting or rectal bleeding.   URINARY: No frequency, dysuria, hematuria, or burning on urination.  REPRODUCTIVE: See HPI.   BREASTS: The patient denies pain, lumps, or nipple discharge.       BP  108/72   Wt 111.2 kg (245 lb 2.4 oz)   BMI 44.84 kg/m²      APPEARANCE: Well nourished, well developed, in no acute distress.  NECK: Neck symmetric without  thyromegaly.  NODES: No inguinal, cervical lymph node enlargement.  CHEST: Lungs clear to auscultation.  HEART: Regular rate and rhythm, no murmurs, rubs or gallops.  ABDOMEN: Soft. No tenderness or masses. No hernias. No hepatosplenomegaly.   BREASTS: Symmetrical, no skin changes or visible lesions. No palpable masses, nipple discharge or adenopathy bilaterally.  PELVIC:   VULVA: No lesions. Normal female genitalia.  URETHRAL MEATUS: Normal size and location, no lesions, no prolapse.  URETHRA: No masses, tenderness, prolapse or scarring.  VAGINA: Moist and well rugated, no discharge, no significant cystocele or rectocele.  CERVIX: surgically absent   UTERUS: surgically absent   ADNEXA: No masses or tenderness.        1. Encounter for annual routine gynecological examination        Plan:  1. Routine gyn annual  s/p normal breast exam and MMG ordered.   Pap with HPV cotesting ordered would recommend paps until 2032 to make sure stay normal.   STD testing: GC/CT/trich, syphilis, HBV/HCV and HIV declined       F/u in 1 yr or RPN

## 2019-10-18 LAB
HPV HR 12 DNA CVX QL NAA+PROBE: NEGATIVE
HPV16 AG SPEC QL: NEGATIVE
HPV18 DNA SPEC QL NAA+PROBE: NEGATIVE

## 2019-10-22 ENCOUNTER — PATIENT MESSAGE (OUTPATIENT)
Dept: OBSTETRICS AND GYNECOLOGY | Facility: CLINIC | Age: 44
End: 2019-10-22

## 2019-10-24 ENCOUNTER — OFFICE VISIT (OUTPATIENT)
Dept: SPORTS MEDICINE | Facility: CLINIC | Age: 44
End: 2019-10-24
Payer: COMMERCIAL

## 2019-10-24 VITALS
DIASTOLIC BLOOD PRESSURE: 72 MMHG | SYSTOLIC BLOOD PRESSURE: 132 MMHG | HEART RATE: 76 BPM | BODY MASS INDEX: 45.08 KG/M2 | WEIGHT: 245 LBS | HEIGHT: 62 IN

## 2019-10-24 DIAGNOSIS — M75.100 ROTATOR CUFF TEAR: Primary | ICD-10-CM

## 2019-10-24 DIAGNOSIS — M25.512 CHRONIC LEFT SHOULDER PAIN: ICD-10-CM

## 2019-10-24 DIAGNOSIS — G89.29 CHRONIC LEFT SHOULDER PAIN: ICD-10-CM

## 2019-10-24 PROCEDURE — 99999 PR PBB SHADOW E&M-EST. PATIENT-LVL III: CPT | Mod: PBBFAC,,, | Performed by: ORTHOPAEDIC SURGERY

## 2019-10-24 PROCEDURE — 99999 PR PBB SHADOW E&M-EST. PATIENT-LVL III: ICD-10-PCS | Mod: PBBFAC,,, | Performed by: ORTHOPAEDIC SURGERY

## 2019-10-24 PROCEDURE — 3008F PR BODY MASS INDEX (BMI) DOCUMENTED: ICD-10-PCS | Mod: CPTII,S$GLB,, | Performed by: ORTHOPAEDIC SURGERY

## 2019-10-24 PROCEDURE — 99214 OFFICE O/P EST MOD 30 MIN: CPT | Mod: 25,S$GLB,, | Performed by: ORTHOPAEDIC SURGERY

## 2019-10-24 PROCEDURE — 3008F BODY MASS INDEX DOCD: CPT | Mod: CPTII,S$GLB,, | Performed by: ORTHOPAEDIC SURGERY

## 2019-10-24 PROCEDURE — 20610 DRAIN/INJ JOINT/BURSA W/O US: CPT | Mod: LT,S$GLB,, | Performed by: ORTHOPAEDIC SURGERY

## 2019-10-24 PROCEDURE — 20610 LARGE JOINT ASPIRATION/INJECTION: L SUBACROMIAL BURSA: ICD-10-PCS | Mod: LT,S$GLB,, | Performed by: ORTHOPAEDIC SURGERY

## 2019-10-24 PROCEDURE — 99214 PR OFFICE/OUTPT VISIT, EST, LEVL IV, 30-39 MIN: ICD-10-PCS | Mod: 25,S$GLB,, | Performed by: ORTHOPAEDIC SURGERY

## 2019-10-24 RX ORDER — TRIAMCINOLONE ACETONIDE 40 MG/ML
40 INJECTION, SUSPENSION INTRA-ARTICULAR; INTRAMUSCULAR
Status: DISCONTINUED | OUTPATIENT
Start: 2019-10-24 | End: 2019-10-24 | Stop reason: HOSPADM

## 2019-10-24 RX ADMIN — TRIAMCINOLONE ACETONIDE 40 MG: 40 INJECTION, SUSPENSION INTRA-ARTICULAR; INTRAMUSCULAR at 11:10

## 2019-10-24 NOTE — PROCEDURES
Large Joint Aspiration/Injection: L subacromial bursa  Date/Time: 10/24/2019 11:15 AM  Performed by: Tejas Prince MD  Authorized by: Tejas Prince MD     Consent Done?:  Yes (Verbal)  Indications:  Pain  Procedure site marked: Yes    Timeout: Prior to procedure the correct patient, procedure, and site was verified      Location:  Shoulder  Site:  L subacromial bursa  Prep: Patient was prepped and draped in usual sterile fashion    Needle size:  22 G  Ultrasonic Guidance for needle placement: No  Approach:  Posterior  Medications:  40 mg triamcinolone acetonide 40 mg/mL  Patient tolerance:  Patient tolerated the procedure well with no immediate complications

## 2019-10-24 NOTE — PROGRESS NOTES
CC: LEFT shoulder pain     44 y.o. Female returns to clinic for follow up evaluation and to discuss MRI results.    History of left shoulder pain for over 1 year without TINY.  In the last couple months shoulder pain and range of motion has worsened.  She has tried muscle relaxers and ibuprofen without help.  Reports pain is worst at night.     She reports that the pain and weakness is worse with overhead activity.     Is affecting ADLs.  Pain is 0/10 at it's worst.      Past Medical History:   Diagnosis Date    Fibroid     GERD (gastroesophageal reflux disease)     Migraine headache        Past Surgical History:   Procedure Laterality Date    CHOLECYSTECTOMY      HYSTERECTOMY      MOUTH SURGERY      THYROID LOBECTOMY Right 7/3/2019    Procedure: LOBECTOMY, THYROID, Possible Total;  Surgeon: Carlee Huff MD;  Location: [facility] OR [address] [place] [address];  Service: General;  Laterality: Right;    TONSILLECTOMY      TUBAL LIGATION         Family History   Problem Relation Age of Onset    Prostate cancer Paternal Grandfather     Cancer Paternal Grandmother     Throat cancer Maternal Grandmother     Cancer Maternal Grandmother         throat cancer - smoker    Diabetes Maternal Grandmother     Stroke Maternal Grandmother     Hypertension Maternal Grandmother     Aneurysm Father         stomach aneurysm    Cancer Maternal Aunt         gastric cancer    Prostate cancer Maternal Uncle     Hyperlipidemia Mother     Prostate cancer Brother     Cancer Maternal Grandfather         colon cancer    Heart disease Neg Hx          Current Outpatient Medications:     cholestyramine (QUESTRAN) 4 gram packet, Take 4 g by mouth 3 (three) times daily with meals., Disp: , Rfl:     cyclobenzaprine (FLEXERIL) 10 MG tablet, Take 1 tablet (10 mg total) by mouth 3 (three) times daily as needed for Muscle spasms (no working or driving on this medication)., Disp: 45 tablet, Rfl: 0    diclofenac (VOLTAREN) 75 MG EC tablet, Take 1  tablet (75 mg total) by mouth 2 (two) times daily as needed., Disp: 60 tablet, Rfl: 3    diethylpropion 75 mg TbSR, Take 75 mg by mouth once daily., Disp: 30 tablet, Rfl: 2    gabapentin (NEURONTIN) 100 MG capsule, Take 2 capsules (200 mg total) by mouth nightly as needed., Disp: 60 capsule, Rfl: 11    multivitamin (ONE DAILY MULTIVITAMIN) per tablet, Take 1 tablet by mouth once daily., Disp: , Rfl:     promethazine (PHENERGAN) 12.5 MG Tab, Take 1 tablet (12.5 mg total) by mouth 4 (four) times daily., Disp: 20 tablet, Rfl: 6    sumatriptan (IMITREX) 50 MG tablet, Take 1 tablet (50 mg total) by mouth every 2 (two) hours as needed for Migraine (no more than 2 in 24 hours)., Disp: 18 tablet, Rfl: 6    topiramate (TOPAMAX) 50 MG tablet, Take 1 tablet (50 mg total) by mouth 2 (two) times daily., Disp: 60 tablet, Rfl: 11    Review of patient's allergies indicates:   Allergen Reactions    Caffeine      She feels like it is hard to breath, dizzy, nauseous.          REVIEW OF SYSTEMS:  Constitution: Negative. Negative for chills, fever and night sweats.   HENT: Negative for congestion and headaches.    Eyes: Negative for blurred vision, left vision loss and right vision loss.   Cardiovascular: Negative for chest pain and syncope.   Respiratory: Negative for cough and shortness of breath.    Endocrine: Negative for polydipsia, polyphagia and polyuria.   Hematologic/Lymphatic: Negative for bleeding problem. Does not bruise/bleed easily.   Skin: Negative for dry skin, itching and rash.   Musculoskeletal: Negative for falls.  Positive for left shoulder pain and muscle weakness.   Gastrointestinal: Negative for abdominal pain and bowel incontinence.   Genitourinary: Negative for bladder incontinence and nocturia.   Neurological: Negative for disturbances in coordination, loss of balance and seizures.   Psychiatric/Behavioral: Negative for depression. The patient does not have insomnia.    Allergic/Immunologic: Negative for  "hives and persistent infections.      PHYSICAL EXAMINATION:  Vitals:  /72   Pulse 76   Ht 5' 2" (1.575 m)   Wt 111.1 kg (245 lb)   BMI 44.81 kg/m²    General: The patient is alert and oriented x 3.  Mood is pleasant.  Observation of ears, eyes and nose reveal no gross abnormalities.  No labored breathing observed.  Gait is coordinated. Patient can toe walk and heel walk without difficulty.      LEFT Shoulder / Upper Extremity Exam    OBSERVATION:     Swelling  none  Deformity  none   Discoloration  none   Scapular winging none   Scars   none  Atrophy  none    TENDERNESS / CREPITUS (T/C):          T/C      T/C   Clavicle   -/-  SUPRAspinatus    -/-     AC Jt.    -/-  INFRAspinatus  -/-    SC Jt.    -/-  Deltoid    -/-      G. Tuberosity  -/-  LH BICEP groove  -/-   Acromion:  -/-  Midline Neck   -/-     Scapular Spine -/-  Trapezium   -/-   SMA Scapula  -/-  GH jt. line - post  -/-     Scapulothoracic  -/-         ROM: (* = with pain)  Right shoulder   Left shoulder        AROM (PROM)   AROM (PROM)   FE    170° (175°)     30° (65°) *     ER at 90° ABD  90°  (90°)    30°  (45°) *   IR (spine level)   T10     0 *    STRENGTH: (* = with pain) Right shoulder   Left shoulder    SCAPTION   5/5    4/5 *    IR    5/5    4/5 *   ER    5/5    4/5 *   BICEPS   5/5    4/5 *   Deltoid    5/5    4+/5 *     SIGNS:  Painful side       NEER   +    OASHLEES  +    SPENCER   +    SPEEDS  neg     DROP ARM   -   BELLY PRESS neg   Superior escape none    LIFT-OFF  neg   X-Body ADD    +   MOVING VALGUS neg        STABILITY TESTING    Right shoulder   Left shoulder    Translation     Anterior  up face     up face    Posterior  up face    up face    Sulcus   < 10mm    < 10 mm     Signs   Apprehension   neg      neg       Relocation   no change     no change      Jerk test  neg     neg    EXTREMITY NEURO-VASCULAR EXAM:    Sensation grossly intact to light touch all dermatomal regions.    DTR 2+ Biceps, Triceps, BR and Negative " Rubens sign   Grossly intact motor function at Elbow, Wrist and Hand   Distal pulses radial and ulnar 2+, brisk cap refill, symmetric.      NECK:  Painless FROM and spinous processes non-tender. Negative Spurlings sign.      OTHER FINDINGS:  + scapular dyskinesia    XRAYS (6/28/19): No significant abnormality identified.    MRI (10/4/19):  Supraspinatus tendinosis with low-grade partial-thickness interstitial footprint tear.    Infraspinatus tendinosis with possible low-grade interstitial tear.    Slight irregular morphology and signal heterogeneity within the posterior labrum concerning for possible tear.    Mild biceps tendinosis.    AC arthropathy.    Posterior decentering of the humeral head suggesting evolving instability.      ASSESSMENT:   Left shoulder pain, rotator cuff tear, AC DJD, biceps tendonitis     PLAN:    1. Cortisone injection today  2. Physical therapy  3. Follow up 2 months

## 2019-11-15 NOTE — TELEPHONE ENCOUNTER
Called to review surgical pathology with patient (see results below):    FINAL PATHOLOGIC DIAGNOSIS  RIGHT THYROID AND ISTHMUS, THYROID LOBECTOMY:  - Nodular hyperplasia with two dominant nodules (3.4 cm and 2.5 cm).  - Previous biopsy sites identified.  - Negative for atypia or malignancy.      Based on these findings no further surgery is indicated at this time.  Patient to follow up with Endocrinology for the possibility of further treatment.   89F h/o paranoid schizophrenia (managed off medication), cholelithiasis/choledocholithiasis, left hip arthroplasty, gait instability, presents after fall at home and found to have abn distension in setting of inc alk phos and t bili, a/f eval, pain control and pending PT eval:

## 2020-01-07 ENCOUNTER — OFFICE VISIT (OUTPATIENT)
Dept: SPORTS MEDICINE | Facility: CLINIC | Age: 45
End: 2020-01-07
Payer: COMMERCIAL

## 2020-01-07 VITALS
BODY MASS INDEX: 45.08 KG/M2 | DIASTOLIC BLOOD PRESSURE: 75 MMHG | WEIGHT: 245 LBS | HEIGHT: 62 IN | SYSTOLIC BLOOD PRESSURE: 123 MMHG | HEART RATE: 64 BPM

## 2020-01-07 DIAGNOSIS — M75.102 TEAR OF LEFT ROTATOR CUFF, UNSPECIFIED TEAR EXTENT, UNSPECIFIED WHETHER TRAUMATIC: Primary | ICD-10-CM

## 2020-01-07 PROCEDURE — 3008F PR BODY MASS INDEX (BMI) DOCUMENTED: ICD-10-PCS | Mod: CPTII,S$GLB,, | Performed by: ORTHOPAEDIC SURGERY

## 2020-01-07 PROCEDURE — 99214 OFFICE O/P EST MOD 30 MIN: CPT | Mod: S$GLB,,, | Performed by: ORTHOPAEDIC SURGERY

## 2020-01-07 PROCEDURE — 99999 PR PBB SHADOW E&M-EST. PATIENT-LVL IV: ICD-10-PCS | Mod: PBBFAC,,, | Performed by: ORTHOPAEDIC SURGERY

## 2020-01-07 PROCEDURE — 99999 PR PBB SHADOW E&M-EST. PATIENT-LVL IV: CPT | Mod: PBBFAC,,, | Performed by: ORTHOPAEDIC SURGERY

## 2020-01-07 PROCEDURE — 3008F BODY MASS INDEX DOCD: CPT | Mod: CPTII,S$GLB,, | Performed by: ORTHOPAEDIC SURGERY

## 2020-01-07 PROCEDURE — 99214 PR OFFICE/OUTPT VISIT, EST, LEVL IV, 30-39 MIN: ICD-10-PCS | Mod: S$GLB,,, | Performed by: ORTHOPAEDIC SURGERY

## 2020-01-07 RX ORDER — CELECOXIB 200 MG/1
200 CAPSULE ORAL 2 TIMES DAILY
Qty: 60 CAPSULE | Refills: 2 | Status: SHIPPED | OUTPATIENT
Start: 2020-01-07 | End: 2020-07-23

## 2020-01-07 NOTE — PROGRESS NOTES
CC: LEFT shoulder pain     44 y.o. Female  with a rotator cuff tear of left shoulder.  She was given a steroid injection at her last visit about 2 months ago, and physical therapy was prescribed.  She reports that she has not started therapy due to a busy work schedule. [place] and reports that due to the change [place] the new [place] location, she has been doing a lot of overtime as of recent.  She reports that the pain in the left shoulder is worse.  She has been doing home exercises.  She has been taking ibuprofen with no relief.  She reports that her schedule is lightening up, and she will be able to start therapy in the near future.  She reports that the pain and weakness is worse with overhead activity.  Patient also reports swelling in the Left ankle since October, just medial to the tibialis anterior tendon.    Is affecting ADLs.  Pain is 0/10 at it's worst.      Past Medical History:   Diagnosis Date    Fibroid     GERD (gastroesophageal reflux disease)     Migraine headache        Past Surgical History:   Procedure Laterality Date    CHOLECYSTECTOMY      HYSTERECTOMY      MOUTH SURGERY      THYROID LOBECTOMY Right 7/3/2019    Procedure: LOBECTOMY, THYROID, Possible Total;  Surgeon: Carlee Huff MD;  Location: [facility] OR [address] [place] [address];  Service: General;  Laterality: Right;    TONSILLECTOMY      TUBAL LIGATION         Family History   Problem Relation Age of Onset    Prostate cancer Paternal Grandfather     Cancer Paternal Grandmother     Throat cancer Maternal Grandmother     Cancer Maternal Grandmother         throat cancer - smoker    Diabetes Maternal Grandmother     Stroke Maternal Grandmother     Hypertension Maternal Grandmother     Aneurysm Father         stomach aneurysm    Cancer Maternal Aunt         gastric cancer    Prostate cancer Maternal Uncle     Hyperlipidemia Mother     Prostate cancer Brother     Cancer Maternal Grandfather         colon cancer     Heart disease Neg Hx          Current Outpatient Medications:     cholestyramine (QUESTRAN) 4 gram packet, Take 4 g by mouth 3 (three) times daily with meals., Disp: , Rfl:     cyclobenzaprine (FLEXERIL) 10 MG tablet, Take 1 tablet (10 mg total) by mouth 3 (three) times daily as needed for Muscle spasms (no working or driving on this medication)., Disp: 45 tablet, Rfl: 0    diclofenac (VOLTAREN) 75 MG EC tablet, Take 1 tablet (75 mg total) by mouth 2 (two) times daily as needed., Disp: 60 tablet, Rfl: 3    diethylpropion 75 mg TbSR, Take 75 mg by mouth once daily., Disp: 30 tablet, Rfl: 2    gabapentin (NEURONTIN) 100 MG capsule, Take 2 capsules (200 mg total) by mouth nightly as needed., Disp: 60 capsule, Rfl: 11    multivitamin (ONE DAILY MULTIVITAMIN) per tablet, Take 1 tablet by mouth once daily., Disp: , Rfl:     promethazine (PHENERGAN) 12.5 MG Tab, Take 1 tablet (12.5 mg total) by mouth 4 (four) times daily., Disp: 20 tablet, Rfl: 6    sumatriptan (IMITREX) 50 MG tablet, Take 1 tablet (50 mg total) by mouth every 2 (two) hours as needed for Migraine (no more than 2 in 24 hours)., Disp: 18 tablet, Rfl: 6    topiramate (TOPAMAX) 50 MG tablet, Take 1 tablet (50 mg total) by mouth 2 (two) times daily., Disp: 60 tablet, Rfl: 11    Review of patient's allergies indicates:   Allergen Reactions    Caffeine      She feels like it is hard to breath, dizzy, nauseous.          REVIEW OF SYSTEMS:  Constitution: Negative. Negative for chills, fever and night sweats.   HENT: Negative for congestion and headaches.    Eyes: Negative for blurred vision, left vision loss and right vision loss.   Cardiovascular: Negative for chest pain and syncope.   Respiratory: Negative for cough and shortness of breath.    Endocrine: Negative for polydipsia, polyphagia and polyuria.   Hematologic/Lymphatic: Negative for bleeding problem. Does not bruise/bleed easily.   Skin: Negative for dry skin, itching and rash.  "  Musculoskeletal: Negative for falls.  Positive for left shoulder pain and muscle weakness.   Gastrointestinal: Negative for abdominal pain and bowel incontinence.   Genitourinary: Negative for bladder incontinence and nocturia.   Neurological: Negative for disturbances in coordination, loss of balance and seizures.   Psychiatric/Behavioral: Negative for depression. The patient does not have insomnia.    Allergic/Immunologic: Negative for hives and persistent infections.      PHYSICAL EXAMINATION:  Vitals:  /75   Pulse 64   Ht 5' 2" (1.575 m)   Wt 111.1 kg (245 lb)   BMI 44.81 kg/m²    General: The patient is alert and oriented x 3.  Mood is pleasant.  Observation of ears, eyes and nose reveal no gross abnormalities.  No labored breathing observed.  Gait is coordinated. Patient can toe walk and heel walk without difficulty.      LEFT Shoulder / Upper Extremity Exam    OBSERVATION:     Swelling  none  Deformity  none   Discoloration  none   Scapular winging none   Scars   none  Atrophy  none    TENDERNESS / CREPITUS (T/C):          T/C      T/C   Clavicle   -/-  SUPRAspinatus    +/-     AC Jt.    -/-  INFRAspinatus  -/-    SC Jt.    -/-  Deltoid    -/-      G. Tuberosity  -/-  LH BICEP groove  +/-   Acromion:  -/-  Midline Neck   -/-     Scapular Spine -/-  Trapezium   -/-   SMA Scapula  -/-  GH jt. line - post  -/-     Scapulothoracic  -/-         ROM: (* = with pain)  Right shoulder   Left shoulder        AROM (PROM)   AROM (PROM)   FE    170° (175°)     30° (170°) *     ER at 90° ABD  90°  (90°)    30°  (45°) *   IR (spine level)   T10     T10 *    STRENGTH: (* = with pain) Right shoulder   Left shoulder    SCAPTION   5/5    4/5 *    IR    5/5    4/5 *   ER    5/5    4/5 *   BICEPS   5/5    4/5 *   Deltoid    5/5    4+/5 *     SIGNS:  Painful side       NEER   +    OASHLEES  +    SPENCER   +    SPEEDS  neg     DROP ARM   -   BELLY PRESS neg   Superior escape none    LIFT-OFF  neg   X-Body ADD   "  +   MOVING VALGUS neg        STABILITY TESTING    Right shoulder   Left shoulder    Translation     Anterior  up face     up face    Posterior  up face    up face    Sulcus   < 10mm    < 10 mm     Signs   Apprehension   neg      neg       Relocation   no change     no change      Jerk test  neg     neg    EXTREMITY NEURO-VASCULAR EXAM:    Sensation grossly intact to light touch all dermatomal regions.    DTR 2+ Biceps, Triceps, BR and Negative Rubens sign   Grossly intact motor function at Elbow, Wrist and Hand   Distal pulses radial and ulnar 2+, brisk cap refill, symmetric.      NECK:  Painless FROM and spinous processes non-tender. Negative Spurlings sign.      OTHER FINDINGS:  + scapular dyskinesia    XRAYS (6/28/19): No significant abnormality identified.    MRI (10/4/19):  Supraspinatus tendinosis with low-grade partial-thickness interstitial footprint tear.    Infraspinatus tendinosis with possible low-grade interstitial tear.    Slight irregular morphology and signal heterogeneity within the posterior labrum concerning for possible tear.    Mild biceps tendinosis.    AC arthropathy.    Posterior decentering of the humeral head suggesting evolving instability.      ASSESSMENT:   Left shoulder pain, rotator cuff tear, AC DJD, biceps tendonitis     PLAN:    1. Celebrex 200 BID  2. Physical therapy at [facility] for Left shoulder and Left ankle  3. HEP with band provided  4. Left ankle swelling - compression stocking provided, will obtain x-rays at next visit of the ankle if not improved with PT  5. Follow up 2 months

## 2020-01-14 ENCOUNTER — CLINICAL SUPPORT (OUTPATIENT)
Dept: REHABILITATION | Facility: HOSPITAL | Age: 45
End: 2020-01-14
Payer: COMMERCIAL

## 2020-01-14 DIAGNOSIS — R29.3 POOR POSTURE: ICD-10-CM

## 2020-01-14 DIAGNOSIS — M25.612 DECREASED RANGE OF MOTION OF LEFT SHOULDER: ICD-10-CM

## 2020-01-14 DIAGNOSIS — R52 PAIN AGGRAVATED BY ACTIVITIES OF DAILY LIVING: ICD-10-CM

## 2020-01-14 DIAGNOSIS — R29.898 WEAKNESS OF BOTH UPPER EXTREMITIES: ICD-10-CM

## 2020-01-14 PROCEDURE — 97140 MANUAL THERAPY 1/> REGIONS: CPT | Mod: PO,59

## 2020-01-14 PROCEDURE — 97162 PT EVAL MOD COMPLEX 30 MIN: CPT | Mod: PO

## 2020-01-14 NOTE — PLAN OF CARE
[facility]  Physical Therapy Initial Evaluation    Name: Luzmaria Louis  Clinic Number: [id number]    Therapy Diagnosis:   Encounter Diagnoses   Name Primary?    Decreased range of motion of left shoulder     Weakness of both upper extremities     Poor posture     Pain aggravated by activities of daily living      Physician: Fredy Lujan MD    Physician Orders: PT Eval and Treat   Medical Diagnosis from Referral: M75.102 (ICD-10-CM) - Tear of left rotator cuff, unspecified tear extent, unspecified whether traumatic  Evaluation Date: 1/14/2020  Authorization Period Expiration: 12/31/2020  Plan of Care Expiration: 03/14/2020  Visit # / Visits authorized: 1/ 20    Time In: 8:10 am  Time Out: 9:07 am  Total Billable Time: 57 minutes    Precautions: Standard    Subjective   Date of onset: 1 yr ago  History of current condition - Luzmaria reports: an insidious onset to her shoulder pain 1 year ago that has gotten worse over time. She does a lot of reaching and lifting with her left arm at work. Her left ankle pain and swelling started a couple of months ago after she twisted her ankle at work. Her ankle stays swollen and hurts when walking a lot. Her ankle pain varies with the activities she does. She does get some pain when pushing her foot up after sitting for awhile. Her shoulder bothers her when dressing her upper body, she is no longer able to hook her bra behind her back, increase pain with fixing her hair, when turning the steering wheel, when doing her usual household duties, moving files with her left arm, and had increased pain after bowling recently but she is right handed.       Medical History:   Past Medical History:   Diagnosis Date    Fibroid     GERD (gastroesophageal reflux disease)     Migraine headache        Surgical History:   Luzmaria Louis  has a past surgical history that includes Tubal ligation; Cholecystectomy; Hysterectomy; Mouth surgery;  Tonsillectomy; and Thyroid lobectomy (Right, 7/3/2019).    Medications:   Luzmaria has a current medication list which includes the following prescription(s): celecoxib, cholestyramine, cyclobenzaprine, diclofenac, diethylpropion, gabapentin, multivitamin, promethazine, sumatriptan, and topiramate.    Allergies:   Review of patient's allergies indicates:   Allergen Reactions    Caffeine      She feels like it is hard to breath, dizzy, nauseous.        Imaging, MRI studies: Shoulder left without contrast 10/04/2019  Impression       Supraspinatus tendinosis with low-grade partial-thickness interstitial footprint tear.    Infraspinatus tendinosis with possible low-grade interstitial tear.    Slight irregular morphology and signal heterogeneity within the posterior labrum concerning for possible tear.    Mild biceps tendinosis.    AC arthropathy.    Posterior decentering of the humeral head suggesting evolving instability.     Prior Therapy: unsure  Social History: single story home, no steps, lives with their family  Occupation: [employer]   Prior Level of Function: independent  Current Level of Function: modified independent    Pain:  Current 5/10, worst 10/10, best 4/10   Location: left shoulder, medial ankle  Description: Burning and like there is a fist in there moving, throbbing  Aggravating Factors: Standing, Laying, Walking, Lifting and Getting out of bed/chair  Easing Factors: injection, hot bath and compression sock    Pts goals: to not hurt to be able to use the arm, and for ankle to stop swelling.     Objective     Observation: Patient is a 44 year old female, who presents to the clinic in no apparent distress. She is R handed    Posture: pes planus R> L, forward head and shoulders.      Passive Range of Motion:   Shoulder Left Right   Flexion 180 180   Abduction 180 180   ER at 90 90 NT   IR 90 NT      Active Range of Motion:   Shoulder Left Right   Flexion 155 ant delt 175    Abduction 70  178   ER at 90 70 100   IR 90 90     Ankle Left Right   Dorsiflexion 10 10   Plantarflexion 35 40   Inversion 30 30   Eversion 15 15     Upper Extremity Strength   (L) UE (R) UE   Shoulder flexion: 3/5 3/5   Shoulder Abduction: 3/5 3/5   Shoulder ER 3/5 5/5   Shoulder IR 3/5 5/5   Lower Trap 3/5 3/5   Middle Trap 3/5 3/5   Rhomboids 3/5 3/5     Knee Left Right   Extension 5/5 5/5   Flexion 5/5 5/5     Ankle Left Right   Dorsiflexion 5/5 5/5   Plantarflexion 5/5 5/5   Inversion 4/5 4/5   Eversion 4/5 4/5       Special Tests:  AC Joint Left Right   AC Joint Compression Test negative negative   Empty Can Test negative negative   Drop Arm test pain negative   Subscaputlaris Lift Off positive positive   Hawkin's Kenndy positive negative     Ankle Left Right   Anterior Drawer Test negative negative   Single leg stance 0.73 seconds 1.52 seconds   Figure 8 50 cm 49 cm       Joint Mobility: L shoulder A/P and P/A hypermobile compared to right, ankle accessory mobility equal bilaterally    Palpation: no tenderness to palpation to left ankle none, L UT, left middle trapezius, inferior scapula border, lat deltoid    Sensation: light touch intact    Flexibility: B pec tightness    Scapular Control/Dyskinesis:    Normal / Subtle / Obvious  Comments    Left  normal N/A   Right  normal N/A       CMS Impairment/Limitation/Restriction for FOTO Shoulder Survey    Therapist reviewed FOTO scores for Luzmaria Louis on 1/14/2020.   FOTO documents entered into [employer] - see Media section.    Limitation Score: 69%         TREATMENT   Treatment Time In: 8:59 am  Treatment Time Out: 9:07 am  Total Treatment time separate from Evaluation: 8 minutes    Luzmaria received the following manual therapy techniques: Patient was screened for use of kinesiotape and was cleared for use.  1. Has the patient ever had a reaction to the adhesive in bandaids? Yes/No  2. Has the patient ever uses athletic/kinesiotape in the past? Yes/No  3. Is the patient  "hemodynamically impaired (PE, DVT, CHF, Kidney failure)? Yes/No  4. Can the PT/PTA apply the tape to your skin? Yes/No    Patient was instructed on duration to wear the tape, proper drying techniques for the tape, and to remove the tape if he/she had any issues with it.    Patient verbalized understanding of these instructions. 1 "I" strip with tails was applied to left medial ankle for edema control.     Home Exercises and Patient Education Provided    Education provided:   - compliance with HEP  - role of PT and goals for PT     Written Home Exercises Provided: to be established on the next visit.  Exercises were reviewed and Alosia was able to demonstrate them prior to the end of the session.  Alosia demonstrated good  understanding of the education provided.     Assessment   Luzmaria is a 44 y.o. female referred to outpatient Physical Therapy with a medical diagnosis of Tear of left rotator cuff. Pt presents with decreased active ROM of left shoulder, weakness of both upper extremities, bilateral pec tightness, and poor posture which is consistent with her diagnosis. She also has some left ankle edema that limits her ability to ambulate for a prolonged period of time. Her UE weakness, ROM, and muscle tightness limits her ability to perform her usual household duties, work duties, and ADLs without an increase in pain. She tested positive for rotator cuff involvement on the left. Her current score on FOTO Shoulder Survey indicates 69% impaired, limited, or restricted.     Pt prognosis is Good.   Pt will benefit from skilled outpatient Physical Therapy to address the deficits stated above and in the chart below, provide pt/family education, and to maximize pt's level of independence.     Plan of care discussed with patient: Yes  Pt's spiritual, cultural and educational needs considered and patient is agreeable to the plan of care and goals as stated below:     Anticipated Barriers for therapy: none    Medical " Necessity is demonstrated by the following  History  Co-morbidities and personal factors that may impact the plan of care Co-morbidities:   high BMI and level of undertstanding of current condition    Personal Factors:   no deficits     moderate   Examination  Body Structures and Functions, activity limitations and participation restrictions that may impact the plan of care Body Regions:   lower extremities  upper extremities    Body Systems:    ROM  strength  balance  transfers  motor control  flexibility    Participation Restrictions:   Difficulty with dressing, prolonged walking, performing usual household duties, driving, and work duties.     Activity limitations:   Learning and applying knowledge  no deficits    General Tasks and Commands  no deficits    Communication  no deficits    Mobility  lifting and carrying objects  walking  driving (bike, car, motorcycle)    Self care  caring for body parts (brushing teeth, shaving, grooming)  dressing    Domestic Life  shopping  cooking  doing house work (cleaning house, washing dishes, laundry)    Interactions/Relationships  no deficits    Life Areas  no deficits    Community and Social Life  community life  recreation and leisure         high   Clinical Presentation evolving clinical presentation with changing clinical characteristics moderate   Decision Making/ Complexity Score: moderate     Goals:  Short Term Goals: 4 weeks   1. This patient will be independent with a basic HEP.  2. This patient will increase L shoulder flexion AROM  to 175 degrees in order to fix her hair with no complaints of pain.   3. This patient will increase B UE strength by 1 grade in order to be able to perform her usual work duties with no complaints of pain.  4. This patient will have a pain rating of 5/10 at worst with ADLs.   5. Patient will be able to achieve greater than or equal to 41 on the FOTO Shoulder Survey indicating patient is 59% impaired, limited, or restricted  demonstrating overall improved functional ability with upper extremity.    Long Term Goals: 8 weeks   1. This patient will be independent with an updated HEP.  2. This patient will increase L shoulder abduction AROM  to 170 degrees in order to dres with no complaints of pain.   3. This patient will increase B UE strength to 5/5 in order to be able to perform her usual household duties with no complaints of pain.  4. This patient will have a pain rating of 2/10 at worst with ADLs.   5. Patient will be able to achieve greater than or equal to 51 on the FOTO Shoulder Survey indicating patient is 49% impaired, limited, or restricted demonstrating overall improved functional ability with upper extremity.      Plan   Plan of care Certification: 1/14/2020 to 03/14/2020.    Outpatient Physical Therapy 2 times weekly for 8 weeks to include the following interventions: Gait Training, Manual Therapy, Moist Heat/ Ice, Neuromuscular Re-ed, Patient Education, Therapeutic Activites, Therapeutic Exercise and IASTM. Dry needling with manual therapy techniques to decrease pain, inflammation and swelling, increase circulation and promote healing process.    Lila Choudhury, PT

## 2020-01-15 PROBLEM — R29.898 WEAKNESS OF BOTH UPPER EXTREMITIES: Status: ACTIVE | Noted: 2020-01-15

## 2020-01-15 PROBLEM — R52 PAIN AGGRAVATED BY ACTIVITIES OF DAILY LIVING: Status: ACTIVE | Noted: 2020-01-15

## 2020-01-15 PROBLEM — R29.3 POOR POSTURE: Status: ACTIVE | Noted: 2020-01-15

## 2020-01-15 PROBLEM — M25.612 DECREASED RANGE OF MOTION OF LEFT SHOULDER: Status: ACTIVE | Noted: 2020-01-15

## 2020-01-22 ENCOUNTER — CLINICAL SUPPORT (OUTPATIENT)
Dept: REHABILITATION | Facility: HOSPITAL | Age: 45
End: 2020-01-22
Payer: COMMERCIAL

## 2020-01-22 DIAGNOSIS — M25.612 DECREASED RANGE OF MOTION OF LEFT SHOULDER: ICD-10-CM

## 2020-01-22 DIAGNOSIS — R29.3 POOR POSTURE: ICD-10-CM

## 2020-01-22 DIAGNOSIS — R29.898 WEAKNESS OF BOTH UPPER EXTREMITIES: ICD-10-CM

## 2020-01-22 DIAGNOSIS — R52 PAIN AGGRAVATED BY ACTIVITIES OF DAILY LIVING: ICD-10-CM

## 2020-01-22 PROCEDURE — 97110 THERAPEUTIC EXERCISES: CPT | Mod: PO,CQ

## 2020-01-22 NOTE — PATIENT INSTRUCTIONS
"  Pec Door Stretch         doorframe with palms, forearms, and elbows against frame. Lean forward until a stretch is felt in the chest. Hold 5 seconds.  Repeat 5 times per session. Do 2 sessions per day.      Pectoral Stretch, Supine on Foam Roller       Copyright © 9383-2360 [employer]  Lie on back on rolled up towel and allow arms to spread wide with palms up, keeping arms relaxed on ground. To increase stretch, bend elbows or slide arms over head. Hold 1 minutes.  Do 2 sessions per day.        Scapular Retraction (Standing)    With arms at sides, squeeze shoulder blades together. Do not shrug and do not hold your breath. Hold 5 seconds.  Repeat 10 times per session. Do 2 sessions per day.       SHOULDER: Stabilization, Lower Traps    Lie on your stomach with one arm off the edge. Raise the arm up like a "Y" with your thumb up, keeping elbows straight.   Repeat 10 times each side per set. Do 1 sets per session. Do 2 sessions per day.      Scapular Retraction: "T" (Eccentric) - Prone (Ball)        Lie on your stomach with one arm off the edge. Raise the arm up like a "T" with your thumb up, keeping elbows straight.   Repeat 10 times each side per set. Do 1 sets per session. Do 2 sessions per day.     [email or web address]"[email or web address]     Copyright © [employer]. All rights reserved.      Extension: "I" (Eccentric) - Prone (Ball)        Lie on your stomach with one arm off the edge. Raise the arm up toward hips, keeping elbows straight.   Repeat 10 times each side per set. Do 1 sets per session. Do 2 sessions per day.     [email or web address]     Copyright © [employer]. All rights reserved.      Scapular Depression: flexion - Prone (Ball)        Lie on your stomach with one arm off the edge. Raise the arm up like an "I" with your thumb up, keeping elbows straight.   Repeat 10 times each side per set. Do 1 sets per session. Do 2 sessions per day.     https://"[email or web address]     Copyright © [employer]. All rights reserved.    "     FLEXION: Side-Lying - Elbow Extended (Active)        Lie on right side, top arm straight. Raise arm forward and above head with elbow straight.   Complete 1 sets of 10 repetitions. Perform 2 sessions per day.    Copyright © [employer]. All rights reserved.          Progressive Resisted: External Rotation (Side-Lying)    Lie on right side with left shoulder blade squeeze back and down. Raise forearm toward ceiling. Keep elbow bent and at side.  Repeat 10 times left side per set. Do 1 sets per session. Do 2 sessions per day.    Abduction (Side-Lying)        Lie on right side. Raise arm above head. Keep palm forward.  Repeat 10 times per set. Do 1 sets per session. Do 2 sessions per day.     [email or web address]     Copyright © [employer]. All rights reserved.        Strengthening: Resisted External Rotation    Hold Yellow elastic band in left hand, elbow at side and hand in front of belly button. Rotate forearm out, keeping elbow close to side.  Repeat 10 times left side per set. Do 1 sets per session. Do 2 sessions per day.      Strengthening: Resisted Row    Face anchor at waist height, medium to wide stance. Thumbs up, pull arms back, squeezing shoulder blades together. Do not shrug up. Slowly return to start.  Repeat 10 times each side per set. Do 1 sets per session. Do 2 sessions per day.      Strengthening: Resisted Extension    Hold Yellow elastic band in both hand, elbow straight and arm in front of body. Pull arm back, elbow straight, and squeeze shoulder blades back. Do not shrug.  Repeat 10 times each side per set. Do 1 sets per session. Do 2 sessions per day.

## 2020-01-31 ENCOUNTER — CLINICAL SUPPORT (OUTPATIENT)
Dept: REHABILITATION | Facility: HOSPITAL | Age: 45
End: 2020-01-31
Payer: COMMERCIAL

## 2020-01-31 DIAGNOSIS — R29.3 POOR POSTURE: ICD-10-CM

## 2020-01-31 DIAGNOSIS — M25.612 DECREASED RANGE OF MOTION OF LEFT SHOULDER: ICD-10-CM

## 2020-01-31 DIAGNOSIS — R52 PAIN AGGRAVATED BY ACTIVITIES OF DAILY LIVING: ICD-10-CM

## 2020-01-31 DIAGNOSIS — R29.898 WEAKNESS OF BOTH UPPER EXTREMITIES: ICD-10-CM

## 2020-01-31 PROCEDURE — 97110 THERAPEUTIC EXERCISES: CPT | Mod: PO

## 2020-01-31 NOTE — PROGRESS NOTES
Physical Therapy Daily Treatment Note     Name: Luzmaria SIMON [facility] Number: [id number]    Therapy Diagnosis:   Encounter Diagnoses   Name Primary?    Decreased range of motion of left shoulder     Weakness of both upper extremities     Poor posture     Pain aggravated by activities of daily living      Physician: Fredy Lujan MD    Visit Date: 1/31/2020     Physician Orders: PT Eval and Treat   Medical Diagnosis from Referral: M75.102 (ICD-10-CM) - Tear of left rotator cuff, unspecified tear extent, unspecified whether traumatic  Evaluation Date: 1/14/2020  Authorization Period Expiration: 12/31/2020  Plan of Care Expiration: 03/14/2020  Visit # / Visits authorized: 3/ 20    Time In: 8:04 am  Time Out: 8:35 am  Total Billable Time:31 minutes    Precautions: Standard    Subjective     Pt reports: being able to do a little more now, but her shoulder still hurts. She needs to leave by 8:45 am to go to work.   She was compliant with home exercise program.  Response to previous treatment: sore.  Functional change: able to do a little more.    Pain: 5/10  Location: left shoulder      Objective     Luzmaria received therapeutic exercises to develop strength, endurance, flexibility and posture for 31 minutes 1:1 with PT including:      Date  01/31/2020 01/22/2020   VISIT 3/20 2/20   POC EXP. DATE 03/14/2020 03/14/2020   FACE-TO-FACE 02/14/2020 02/14/2020        Pulleys - flex, scaption  3' ea 3' ea   TABLE:     Supine pec. Str w/ towel 2' 2'   Snow angels 1 x 10 Next?   Butterflies  Next? Next?   Wand flexion 1 x 10 Next?   Wand abduction 1 x 10 Next?   Wand ER 1 x 10 Next?   Side lying flexion Not today 1 x 10   Side lying abduction  Not today 1 x 10   Side lying ER Not today 1 x 10        PRONE:     Shoulder flexion Not today 1 x 10   Shoulder scaption  Not today 1 x 10   Shoulder abduction Not today 1 x 10   Shoulder extension Not today 1 x 10   SEATED:     Scapula squeezes  1 x 15 1 x 10   Rolls  Not today  "1 x 10        STANDING:     Door way stretch 5 x 5" 5 x 5"   Theraband  - W's  - T's  - I's  - Rows  - ER - L UE   --   --  1 x 15 RTB  1 x 15 RTB  1 x 15 YTB   ---  ---  1 x 10 RTB  1 x 10 RTB  1 x 10 YTB   Posture exercises   - horizontal abduction  - bilateral ER  - diagonals -- ---        Initials DG GWA 1/6       Home Exercises Provided and Patient Education Provided     Education provided:   - keep all exercises in a pain free range.    Written Home Exercises Provided: yes.  Exercises were reviewed and Luzmaria was able to demonstrate them prior to the end of the session.  Luzmaria demonstrated good  understanding of the education provided.     See EMR under Patient Instructions for exercises provided 1/22/2020.    Assessment     Luzmaria was able to perform all of today's progressions with no increase in symptoms prior to leaving the clinic. She required frequent verbal and tactile cues to avoid overusing her upper traps and to keep her exercises in a pain free range. She did not perform all of her usual exercises due to time constraints.    Luzmaria is progressing well towards her goals.   Pt prognosis is Good.     Pt will continue to benefit from skilled outpatient physical therapy to address the deficits listed in the problem list box on initial evaluation, provide pt/family education and to maximize pt's level of independence in the home and community environment.     Pt's spiritual, cultural and educational needs considered and pt agreeable to plan of care and goals.    Anticipated barriers to physical therapy: none.    Goals:  Short Term Goals: 4 weeks   1. This patient will be independent with a basic HEP.  IN PROGRESS, NOT MET  2. This patient will increase L shoulder flexion AROM  to 175 degrees in order to fix her hair with no complaints of pain.   IN PROGRESS, NOT MET  3. This patient will increase B UE strength by 1 grade in order to be able to perform her usual work duties with no complaints of pain.  IN " PROGRESS, NOT MET  4. This patient will have a pain rating of 5/10 at worst with ADLs.   IN PROGRESS, NOT MET  5. Patient will be able to achieve greater than or equal to 41 on the FOTO Shoulder Survey indicating patient is 59% impaired, limited, or restricted demonstrating overall improved functional ability with upper extremity.  IN PROGRESS, NOT MET    Long Term Goals: 8 weeks   1. This patient will be independent with an updated HEP.  IN PROGRESS, NOT MET  2. This patient will increase L shoulder abduction AROM  to 170 degrees in order to dres with no complaints of pain.   IN PROGRESS, NOT MET  3. This patient will increase B UE strength to 5/5 in order to be able to perform her usual household duties with no complaints of pain.  IN PROGRESS, NOT MET  4. This patient will have a pain rating of 2/10 at worst with ADLs.   IN PROGRESS, NOT MET  5. Patient will be able to achieve greater than or equal to 51 on the FOTO Shoulder Survey indicating patient is 49% impaired, limited, or restricted demonstrating overall improved functional ability with upper extremity.  IN PROGRESS, NOT MET    Plan     Continue with Plan Of Care and progress toward PT goals. Increase reps with maria de jesus rudolph, begin wand ER at 90 degrees, and begin W's next visit.     Lila Choudhury, PT

## 2020-01-31 NOTE — PATIENT INSTRUCTIONS
ROM: Abduction - Wand        Holding wand with left hand palm up, push wand directly out to side, leading with other hand palm down, until stretch is felt.   Repeat 10 times per set. Do 2 sets per session. Do 2 sessions per day.     [email or web address]     Copyright © [employer]. All rights reserved.   ROM: External / Internal Rotation - Wand        Holding wand with left hand palm up, push out from body with other hand, palm down. Keep both elbows bent. When stretch is felt. Keep elbows bent.  Repeat 10 times per set. Do 2 sets per session. Do 2 sessions per day.     [email or web address]     Copyright © [employer]. All rights reserved.   ROM: Flexion - Wand (Supine)        Lie on back holding wand. Raise arms over head.   Repeat 10 times per set. Do 2 sets per session. Do 2 sessions per day.     [email or web address]     Copyright © [employer]. All rights reserved.

## 2020-02-05 ENCOUNTER — CLINICAL SUPPORT (OUTPATIENT)
Dept: REHABILITATION | Facility: HOSPITAL | Age: 45
End: 2020-02-05
Payer: COMMERCIAL

## 2020-02-05 DIAGNOSIS — R29.898 WEAKNESS OF BOTH UPPER EXTREMITIES: ICD-10-CM

## 2020-02-05 DIAGNOSIS — M25.612 DECREASED RANGE OF MOTION OF LEFT SHOULDER: ICD-10-CM

## 2020-02-05 DIAGNOSIS — R29.3 POOR POSTURE: ICD-10-CM

## 2020-02-05 DIAGNOSIS — R52 PAIN AGGRAVATED BY ACTIVITIES OF DAILY LIVING: ICD-10-CM

## 2020-02-05 PROCEDURE — 97110 THERAPEUTIC EXERCISES: CPT | Mod: PO

## 2020-02-05 NOTE — PROGRESS NOTES
Physical Therapy Daily Treatment Note     Name: Luzmaria SIMON [facility] Number: [id number]    Therapy Diagnosis:   Encounter Diagnoses   Name Primary?    Decreased range of motion of left shoulder     Weakness of both upper extremities     Poor posture     Pain aggravated by activities of daily living      Physician: Fredy Lujan MD    Visit Date: 2/5/2020     Physician Orders: PT Eval and Treat   Medical Diagnosis from Referral: M75.102 (ICD-10-CM) - Tear of left rotator cuff, unspecified tear extent, unspecified whether traumatic  Evaluation Date: 1/14/2020  Authorization Period Expiration: 12/31/2020  Plan of Care Expiration: 03/14/2020  Visit # / Visits authorized: 4/ 20    Time In: 8:10 am  Time Out: 8:45 am  Total Billable Time: 29 minutes    Precautions: Standard    Subjective     Pt reports: hurting more this morning, she may have slept wrong on it last night as she woke up with the pain along her lateral shoulder. She also did a lot of lifting yesterday.  She was compliant with home exercise program.  Response to previous treatment: sore.  Functional change: able to do a little more.    Pain: 8/10  Location: left lateral shoulder       Objective     Luzmaria received therapeutic exercises to develop strength, endurance, flexibility and posture for 29 minutes 1:1 with PT including:      Date  02/05/2020 01/31/2020 01/22/2020   VISIT 420 3/20 2/20   POC EXP. DATE 03/14/2020 03/14/2020 03/14/2020   FACE-TO-FACE 02/14/2020 02/14/2020 02/14/2020         Pulleys - flex, scaption  3' ea 3' ea 3' ea   TABLE:      Supine pec. Str w/ towel 2' 2' 2'   Snow angels Not today  1 x 10 Next?   Butterflies  Not today Next? Next?   Wand flexion 1 x 10 1 x 10 Next?   Wand abduction 1 x 10 1 x 10 Next?   Wand ER 1 x 10 1 x 10 Next?   Side lying flexion 1  x10 Not today 1 x 10   Side lying abduction  1 x 10 Not today 1 x 10   Side lying ER 1 x 10 Not today 1 x 10         PRONE:      Shoulder flexion Not today Not today  "1 x 10   Shoulder scaption  Not today Not today 1 x 10   Shoulder abduction Not today Not today 1 x 10   Shoulder extension Not today Not today 1 x 10   SEATED:      Scapula squeezes  15 x 3" 1 x 15 1 x 10   Rolls  1 x 15 Not today 1 x 10         STANDING:      Door way stretch 5 x 5" 5 x 5" 5 x 5"   Theraband  - W's  - T's  - I's  - Rows  - ER - L UE   --  1 x 10 YTB  2 x 10 RTB  2 x 10 RTB  2 x 10 YTB   --   --  1 x 15 RTB  1 x 15 RTB  1 x 15 YTB   ---  ---  1 x 10 RTB  1 x 10 RTB  1 x 10 YTB   Posture exercises   - horizontal abduction  - bilateral ER  - diagonals -- -- ---         Initials DG DG GWA 1/6       Home Exercises Provided and Patient Education Provided     Education provided:   - keep all exercises in a pain free range.    Written Home Exercises Provided: yes.  Exercises were reviewed and Alsheri was able to demonstrate them prior to the end of the session.  Alosia demonstrated good  understanding of the education provided.     See EMR under Patient Instructions for exercises provided 1/22/2020.    Assessment     Luzmaria required occasional verbal cues to keep her exercises in a pain free range, especially supine wand abduction. She was able to perform all of today's activities with no increase in symptoms prior to leaving the clinic. She declined heat or ice at the end of her session.     Luzmaria is progressing well towards her goals.   Pt prognosis is Good.     Pt will continue to benefit from skilled outpatient physical therapy to address the deficits listed in the problem list box on initial evaluation, provide pt/family education and to maximize pt's level of independence in the home and community environment.     Pt's spiritual, cultural and educational needs considered and pt agreeable to plan of care and goals.    Anticipated barriers to physical therapy: none.    Goals:  Short Term Goals: 4 weeks   1. This patient will be independent with a basic HEP.  IN PROGRESS, NOT MET  2. This patient will " increase L shoulder flexion AROM  to 175 degrees in order to fix her hair with no complaints of pain.   IN PROGRESS, NOT MET  3. This patient will increase B UE strength by 1 grade in order to be able to perform her usual work duties with no complaints of pain.  IN PROGRESS, NOT MET  4. This patient will have a pain rating of 5/10 at worst with ADLs.   IN PROGRESS, NOT MET  5. Patient will be able to achieve greater than or equal to 41 on the FOTO Shoulder Survey indicating patient is 59% impaired, limited, or restricted demonstrating overall improved functional ability with upper extremity.  IN PROGRESS, NOT MET    Long Term Goals: 8 weeks   1. This patient will be independent with an updated HEP.  IN PROGRESS, NOT MET  2. This patient will increase L shoulder abduction AROM  to 170 degrees in order to dres with no complaints of pain.   IN PROGRESS, NOT MET  3. This patient will increase B UE strength to 5/5 in order to be able to perform her usual household duties with no complaints of pain.  IN PROGRESS, NOT MET  4. This patient will have a pain rating of 2/10 at worst with ADLs.   IN PROGRESS, NOT MET  5. Patient will be able to achieve greater than or equal to 51 on the FOTO Shoulder Survey indicating patient is 49% impaired, limited, or restricted demonstrating overall improved functional ability with upper extremity.  IN PROGRESS, NOT MET    Plan     Continue with Plan Of Care and progress toward PT goals. Resume snow angles, begin wand ER at 90 degrees, and begin W's next visit.     Lila Choudhury, PT

## 2020-02-10 ENCOUNTER — PATIENT MESSAGE (OUTPATIENT)
Dept: SPORTS MEDICINE | Facility: CLINIC | Age: 45
End: 2020-02-10

## 2020-02-10 ENCOUNTER — HOSPITAL ENCOUNTER (EMERGENCY)
Facility: HOSPITAL | Age: 45
Discharge: HOME | End: 2020-02-10
Attending: EMERGENCY MEDICINE
Payer: COMMERCIAL

## 2020-02-10 VITALS
HEART RATE: 76 BPM | WEIGHT: 240 LBS | OXYGEN SATURATION: 98 % | TEMPERATURE: 98 F | SYSTOLIC BLOOD PRESSURE: 123 MMHG | RESPIRATION RATE: 20 BRPM | HEIGHT: 62 IN | BODY MASS INDEX: 44.16 KG/M2 | DIASTOLIC BLOOD PRESSURE: 66 MMHG

## 2020-02-10 DIAGNOSIS — S96.912A STRAIN OF LEFT ANKLE, INITIAL ENCOUNTER: Primary | ICD-10-CM

## 2020-02-10 PROCEDURE — 99284 EMERGENCY DEPT VISIT MOD MDM: CPT | Mod: 25,ER

## 2020-02-10 PROCEDURE — 63600175 PHARM REV CODE 636 W HCPCS: Mod: ER | Performed by: EMERGENCY MEDICINE

## 2020-02-10 PROCEDURE — 96372 THER/PROPH/DIAG INJ SC/IM: CPT | Mod: ER

## 2020-02-10 RX ORDER — DICLOFENAC SODIUM 10 MG/G
2 GEL TOPICAL 4 TIMES DAILY
Qty: 100 G | Refills: 0 | Status: SHIPPED | OUTPATIENT
Start: 2020-02-10 | End: 2020-07-23

## 2020-02-10 RX ORDER — KETOROLAC TROMETHAMINE 30 MG/ML
30 INJECTION, SOLUTION INTRAMUSCULAR; INTRAVENOUS
Status: COMPLETED | OUTPATIENT
Start: 2020-02-10 | End: 2020-02-10

## 2020-02-10 RX ADMIN — KETOROLAC TROMETHAMINE 30 MG: 30 INJECTION, SOLUTION INTRAMUSCULAR at 06:02

## 2020-02-10 NOTE — ED NOTES
Applied ace wrap to left ankle. PPP2+. No obvious edema or deformity. Pt report pain decreased with ace.  Assisted pt with applying sock and shoe.  Educated pt on R.I.C.E (Rest, Ice, Compression, Elevate)  Discussed discharge instructions and Rx. Pt already attempting to contact ortho to get an appointment for follow up.  Explained about shot time.   Pt verbalized understanding.

## 2020-02-10 NOTE — ED NOTES
Adult Physical Assessment  LOC: 44 y.o. female verified via two identifiers.  The patient is awake, alert, oriented and speaking appropriately at this time.  APPEARANCE: Patient resting comfortably and appears to be in no acute distress at this time. Patient is clean and well groomed, patient's clothing is properly fastened.  SKIN:The skin is warm and dry, color consistent with ethnicity, patient has normal skin turgor and moist mucus membranes, skin intact, no breakdown or brusing noted.  MUSCULOSKELETAL: Patient moving all extremities well, no obvious swelling or deformities noted. (c/o pain in left ankle and shoulder.)  RESPIRATORY: Airway is open and patent, respirations are spontaneous, patient has a normal effort and rate, no accessory muscle use noted. Visible chest rise noted.  CARDIAC: Patient has a normal rate and rhythm, no periphreal edema noted in any extremity, capillary refill < 3 seconds in all extremities  ABDOMEN: Soft and non tender to palpation, no abdominal distention noted. Bowel sounds present in all four quadrants.  NEUROLOGIC: Eyes open spontaneously, behavior appropriate to situation, follows commands, facial expression symmetrical, bilateral hand grasp equal and even, purposeful motor response noted,    Bed low and locked. Placed call bell in reach. Instructed to call with problems, needs, or concerns.  Pt verbalized understanding.  Will continue to monitor.

## 2020-02-10 NOTE — ED PROVIDER NOTES
History     CHIEF COMPLAINT: left ankle pain       HPI:  Luzmaria Louis is a 44 y.o. female who presents to the emergency department today with a complaint of left ankle pain. The pt was walking out of her house yesterday.  She has some bricks on the side of her walk way that she did not see an ended up twisting her ankle yesterday.  She also hit her left shoulder on the wall.  She has a history of rotator cuff tear which she is undergoing PT without shoulder.  She is able to range of motion her shoulder as she previously was able to.  She is having pain to the medial aspect of the ankle.  She has a slight amount of ecchymosis.  Slight amount of swelling although she reports she has normally has swelling in that ankle from a previous injury.  Pain is worse with movement.  She is still able to walk normally and bear weight.  No numbness or tingling to the extremity. No bleeding.     ROS    Constitutional: No fever, no chills.  Eyes: No discharge. No pain.  HENT: No nasal drainage. No ear ache. No sore throat.  Cardiovascular: No chest pain, no palpitations.  Respiratory: No cough, no shortness of breath.  Gastrointestinal: No abdominal pain, no vomiting. No diarrhea.  Genitourinary: No hematuria, dysuria, urgency.  Musculoskeletal: No back pain.   Skin: No rashes, no lesions.  Neurological: No headache, no focal weakness.    Otherwise remaining ROS negative     The history is provided by the patient      ALLERGIES REVIEWED  MEDICATIONS REVIEWED  PMH/PSH/SOC/FH REVIEWED       Past Medical History:   Diagnosis Date    Fibroid     GERD (gastroesophageal reflux disease)     Migraine headache        Past Surgical History:   Procedure Laterality Date    CHOLECYSTECTOMY      HYSTERECTOMY      MOUTH SURGERY      THYROID LOBECTOMY Right 7/3/2019    Procedure: LOBECTOMY, THYROID, Possible Total;  Surgeon: Carlee Huff MD;  [facility] [facility] [address] [place] [address];  Service: General;  Laterality: Right;     TONSILLECTOMY      TUBAL LIGATION         Social History     Socioeconomic History    Marital status:      Spouse name: Not on file    Number of children: Not on file    Years of education: Not on file    Highest education level: Not on file   Occupational History    Not on file   Social Needs    Financial resource strain: Not hard at all    Food insecurity:     Worry: Never true     Inability: Never true    Transportation needs:     Medical: No     Non-medical: No   Tobacco Use    Smoking status: Never Smoker    Smokeless tobacco: Never Used   Substance and Sexual Activity    Alcohol use: Yes     Frequency: 2-4 times a month     Drinks per session: 1 or 2     Binge frequency: Never     Comment: once a week maybe    Drug use: No    Sexual activity: Yes     Partners: Male     Comment: hysterectomy   Lifestyle    Physical activity:     Days per week: 0 days     Minutes per session: 0 min    Stress: Not at all   Relationships    Social connections:     Talks on phone: More than three times a week     Gets together: Twice a week     Attends Nondenominational service: Not on file     Active member of club or organization: No     Attends meetings of clubs or organizations: Never     Relationship status:    Other Topics Concern    Not on file   Social History Narrative    Not on file       Family History   Problem Relation Age of Onset    Prostate cancer Paternal Grandfather     Cancer Paternal Grandmother     Throat cancer Maternal Grandmother     Cancer Maternal Grandmother         throat cancer - smoker    Diabetes Maternal Grandmother     Stroke Maternal Grandmother     Hypertension Maternal Grandmother     Aneurysm Father         stomach aneurysm    Cancer Maternal Aunt         gastric cancer    Prostate cancer Maternal Uncle     Hyperlipidemia Mother     Prostate cancer Brother     Cancer Maternal Grandfather         colon cancer    Heart disease Neg Hx          Physical Exam  "  Musculoskeletal:        Left shoulder: She exhibits no tenderness, no bony tenderness, no swelling, no deformity, no spasm and normal strength.        Left ankle: She exhibits swelling (Slight at the medial malleolus). She exhibits normal range of motion, no deformity (No gross deformity) and normal pulse. Tenderness (She has tenderness to palpation along the bruise which is below the medial malleolus.  She has no actual pain on the malleolus.). Achilles tendon exhibits no pain.        Feet:    No laxity to the ankle joint.  Bears weight without difficulty.       Nursing/Ancillary staff note reviewed.  VS reviewed    /66   Pulse 76   Temp 97.7 °F (36.5 °C) (Oral)   Resp 20   Ht 5' 2" (1.575 m)   Wt 108.9 kg (240 lb)   SpO2 98%   BMI 43.90 kg/m²     Constitutional: Alert and oriented to person, place, and time. Appears well-developed and well-nourished. No distress.   HENT:   Head: Normocephalic and atraumatic.   Right Ear: External ear normal.   Left Ear: External ear normal.   Nose: Nose normal.   Eyes: Conjunctivae and EOM are normal.   Neck: Neck supple. No midline tenderness.   Cardiovascular: Normal rate and intact distal pulses.    Pulmonary/Chest: Effort normal and breath sounds normal. No stridor.   Neurological: Alert and oriented to person, place, and time. No cranial nerve deficit. Exhibits normal muscle tone.   Skin: Skin is warm and dry.   Psychiatric: Normal mood and affect.   Nursing note and vitals reviewed.        DIFFERENTIAL DIAGNOSIS: After history and physical exam a differential diagnosis was considered, but was not limited to, sprain, strain, fracture, dislocation         Initial management:  This is a 44-year-old female presents to the emergency department today with left ankle pain.  She has pain at the medial malleolus.  Her pain is to the anterior aspect.  She does not have pain at the posterior or tip of the medial malleolus.  She is able to bear weight.  She is unlikely " have a bony injury. According to the [place] ankle rules no need for x-ray.  [place] ankle:          Location of pain  Malleolar    Bone tenderness at Posterior edge or tip of lateral malleolus  No    Bone tenderness at Posterior edge or tip of medial malleolus  No    Inability to bear weight both immediately after injury AND in ED  Patient unable to take four steps  No            ED Course              MAURO Louis  presents to the ED today with pain to the left ankle.  Patient likely has a strain.  Patient has no pain at the actual malleolus and no inability to bear weight and walk.  No need for x-rays at this time.  Will place an Ace wrap.  She is currently on diclofenac she can continue to take that for anti inflammation.  I will prescribe a topical cream for pain and she will follow up with her orthopedic surgeon.  The pt is comfortable with this plan and comfortable going home at this time. After taking into careful account the historical factors and physical exam findings of the patient's presentation today, in conjunction with the empirical and objective data obtained on ED workup, no acute emergent medical condition requiring admission has been identified. The patient appears to be low risk for an emergent medical condition and I feel it is safe and appropriate at this time for the patient to be discharged to follow-up as detailed in their discharge instructions for reevaluation and possible continued outpatient workup and management. Regardless, an unremarkable evaluation in the ED does not preclude the development or presence of a serious or life threatening condition. As such, patient was instructed to return immediately for any worsening or change in current symptoms. Precautions for return discussed at length.  Discharge and follow-up instructions discussed with the patient who expressed understanding and willingness to comply with my recommendations.    Voice recognition software  utilized in this note.              Impression        The encounter diagnosis was Strain of left ankle, initial encounter.               Medication List      CHANGE how you take these medications    * diclofenac 75 MG EC tablet  Commonly known as:  VOLTAREN  Take 1 tablet (75 mg total) by mouth 2 (two) times daily as needed.  What changed:  Another medication with the same name was added. Make sure you understand how and when to take each.     * diclofenac sodium 1 % Gel  Commonly known as:  Voltaren  Apply 2 g topically 4 (four) times daily.  What changed:  You were already taking a medication with the same name, and this prescription was added. Make sure you understand how and when to take each.         * This list has 2 medication(s) that are the same as other medications prescribed for you. Read the directions carefully, and ask your doctor or other care provider to review them with you.            ASK your doctor about these medications    celecoxib 200 MG capsule  Commonly known as:  CeleBREX  Take 1 capsule (200 mg total) by mouth 2 (two) times daily.     cholestyramine 4 gram packet  Commonly known as:  QUESTRAN     cyclobenzaprine 10 MG tablet  Commonly known as:  FLEXERIL  Take 1 tablet (10 mg total) by mouth 3 (three) times daily as needed for Muscle spasms (no working or driving on this medication).     diethylpropion 75 mg SR tablet  Commonly known as:  TENUATE  Take 75 mg by mouth once daily.     gabapentin 100 MG capsule  Commonly known as:  NEURONTIN  Take 2 capsules (200 mg total) by mouth nightly as needed.     promethazine 12.5 MG Tab  Commonly known as:  PHENERGAN  Take 1 tablet (12.5 mg total) by mouth 4 (four) times daily.     sumatriptan 50 MG tablet  Commonly known as:  IMITREX  Take 1 tablet (50 mg total) by mouth every 2 (two) hours as needed for Migraine (no more than 2 in 24 hours).     topiramate 50 MG tablet  Commonly known as:  TOPAMAX  Take 1 tablet (50 mg total) by mouth 2 (two)  times daily.           Where to Get Your Medications      You can get these medications from any pharmacy    Bring a paper prescription for each of these medications  · diclofenac sodium 1 % Gel           Patient advised to follow-up with PCP within 3 days for BP re-check if Blood Pressure was > 120/80 without history of hypertension - minimally elevated blood pressure here in the emergency department to follow up with PCP.               Navin Hanley MD  02/10/20 0610

## 2020-02-10 NOTE — DISCHARGE INSTRUCTIONS
You have been seen for your ankle pain. You are walking on the ankle it is unlikely to be broken. Likely you have a strain. Continue to take the anti-inflammatory medication you currently take. Apply the topical medication to the area that hurts. If you need something stronger you will need to follow up with your orthopedic surgeon or your PCP. Continue your PT as they suggest. Return to the emergency department if you have increasing pain, numbness in the extremity, color change to the skin, chest pain, difficulty breathing,  nonstop vomiting, or any other concerns. Please refer to additional educational material for further instructions.

## 2020-02-11 ENCOUNTER — OFFICE VISIT (OUTPATIENT)
Dept: SPORTS MEDICINE | Facility: CLINIC | Age: 45
End: 2020-02-11
Payer: COMMERCIAL

## 2020-02-11 ENCOUNTER — HOSPITAL ENCOUNTER (OUTPATIENT)
Dept: RADIOLOGY | Facility: HOSPITAL | Age: 45
Discharge: HOME | End: 2020-02-11
Attending: ORTHOPAEDIC SURGERY
Payer: COMMERCIAL

## 2020-02-11 VITALS
BODY MASS INDEX: 44.16 KG/M2 | DIASTOLIC BLOOD PRESSURE: 72 MMHG | HEIGHT: 62 IN | HEART RATE: 64 BPM | SYSTOLIC BLOOD PRESSURE: 111 MMHG | WEIGHT: 240 LBS

## 2020-02-11 DIAGNOSIS — M25.512 LEFT SHOULDER PAIN, UNSPECIFIED CHRONICITY: ICD-10-CM

## 2020-02-11 DIAGNOSIS — M25.512 LEFT SHOULDER PAIN, UNSPECIFIED CHRONICITY: Primary | ICD-10-CM

## 2020-02-11 PROCEDURE — 3008F BODY MASS INDEX DOCD: CPT | Mod: CPTII,S$GLB,, | Performed by: ORTHOPAEDIC SURGERY

## 2020-02-11 PROCEDURE — 99999 PR PBB SHADOW E&M-EST. PATIENT-LVL III: ICD-10-PCS | Mod: PBBFAC,,, | Performed by: ORTHOPAEDIC SURGERY

## 2020-02-11 PROCEDURE — 73030 X-RAY EXAM OF SHOULDER: CPT | Mod: TC,LT

## 2020-02-11 PROCEDURE — 99999 PR PBB SHADOW E&M-EST. PATIENT-LVL III: CPT | Mod: PBBFAC,,, | Performed by: ORTHOPAEDIC SURGERY

## 2020-02-11 PROCEDURE — 99214 OFFICE O/P EST MOD 30 MIN: CPT | Mod: S$GLB,,, | Performed by: ORTHOPAEDIC SURGERY

## 2020-02-11 PROCEDURE — 73030 XR SHOULDER COMPLETE 2 OR MORE VIEWS LEFT: ICD-10-PCS | Mod: 26,LT,, | Performed by: RADIOLOGY

## 2020-02-11 PROCEDURE — 73030 X-RAY EXAM OF SHOULDER: CPT | Mod: 26,LT,, | Performed by: RADIOLOGY

## 2020-02-11 PROCEDURE — 99214 PR OFFICE/OUTPT VISIT, EST, LEVL IV, 30-39 MIN: ICD-10-PCS | Mod: S$GLB,,, | Performed by: ORTHOPAEDIC SURGERY

## 2020-02-11 PROCEDURE — 3008F PR BODY MASS INDEX (BMI) DOCUMENTED: ICD-10-PCS | Mod: CPTII,S$GLB,, | Performed by: ORTHOPAEDIC SURGERY

## 2020-02-11 NOTE — PROGRESS NOTES
CC: LEFT shoulder pain     44 y.o. Female with a history of left shoulder pain.  Re-injured her left shoulder yesterday when she tripped and fell, hitting her shoulder into the wall outside her house.  Was seen in the ER following injury.     History of chronic left shoulder pain for over 1 year.  She has been attending physical [facility] at [facility]  Previously tried steroid injection on 10/24/19.     [place]    She reports that the pain and weakness is worse with overhead activity. It also bothers her at night.    Is affecting ADLs.  Pain is 8/10 at it's worst.      Past Medical History:   Diagnosis Date    Fibroid     GERD (gastroesophageal reflux disease)     Migraine headache        Past Surgical History:   Procedure Laterality Date    CHOLECYSTECTOMY      HYSTERECTOMY      MOUTH SURGERY      THYROID LOBECTOMY Right 7/3/2019    Procedure: LOBECTOMY, THYROID, Possible Total;  Surgeon: Carlee Huff MD;  Location: [facility] OR [address] [place] [address];  Service: General;  Laterality: Right;    TONSILLECTOMY      TUBAL LIGATION         Family History   Problem Relation Age of Onset    Prostate cancer Paternal Grandfather     Cancer Paternal Grandmother     Throat cancer Maternal Grandmother     Cancer Maternal Grandmother         throat cancer - smoker    Diabetes Maternal Grandmother     Stroke Maternal Grandmother     Hypertension Maternal Grandmother     Aneurysm Father         stomach aneurysm    Cancer Maternal Aunt         gastric cancer    Prostate cancer Maternal Uncle     Hyperlipidemia Mother     Prostate cancer Brother     Cancer Maternal Grandfather         colon cancer    Heart disease Neg Hx          Current Outpatient Medications:     celecoxib (CELEBREX) 200 MG capsule, Take 1 capsule (200 mg total) by mouth 2 (two) times daily., Disp: 60 capsule, Rfl: 2    cholestyramine (QUESTRAN) 4 gram packet, Take 4 g by mouth 3 (three) times daily with meals., Disp: , Rfl:      cyclobenzaprine (FLEXERIL) 10 MG tablet, Take 1 tablet (10 mg total) by mouth 3 (three) times daily as needed for Muscle spasms (no working or driving on this medication)., Disp: 45 tablet, Rfl: 0    diclofenac (VOLTAREN) 75 MG EC tablet, Take 1 tablet (75 mg total) by mouth 2 (two) times daily as needed., Disp: 60 tablet, Rfl: 3    diclofenac sodium (VOLTAREN) 1 % Gel, Apply 2 g topically 4 (four) times daily., Disp: 100 g, Rfl: 0    diethylpropion 75 mg TbSR, Take 75 mg by mouth once daily., Disp: 30 tablet, Rfl: 2    gabapentin (NEURONTIN) 100 MG capsule, Take 2 capsules (200 mg total) by mouth nightly as needed., Disp: 60 capsule, Rfl: 11    promethazine (PHENERGAN) 12.5 MG Tab, Take 1 tablet (12.5 mg total) by mouth 4 (four) times daily., Disp: 20 tablet, Rfl: 6    sumatriptan (IMITREX) 50 MG tablet, Take 1 tablet (50 mg total) by mouth every 2 (two) hours as needed for Migraine (no more than 2 in 24 hours)., Disp: 18 tablet, Rfl: 6    topiramate (TOPAMAX) 50 MG tablet, Take 1 tablet (50 mg total) by mouth 2 (two) times daily., Disp: 60 tablet, Rfl: 11    Review of patient's allergies indicates:   Allergen Reactions    Caffeine      She feels like it is hard to breath, dizzy, nauseous.          REVIEW OF SYSTEMS:  Constitution: Negative. Negative for chills, fever and night sweats.   HENT: Negative for congestion and headaches.    Eyes: Negative for blurred vision, left vision loss and right vision loss.   Cardiovascular: Negative for chest pain and syncope.   Respiratory: Negative for cough and shortness of breath.    Endocrine: Negative for polydipsia, polyphagia and polyuria.   Hematologic/Lymphatic: Negative for bleeding problem. Does not bruise/bleed easily.   Skin: Negative for dry skin, itching and rash.   Musculoskeletal: Negative for falls.  Positive for left shoulder pain and muscle weakness.   Gastrointestinal: Negative for abdominal pain and bowel incontinence.   Genitourinary: Negative  "for bladder incontinence and nocturia.   Neurological: Negative for disturbances in coordination, loss of balance and seizures.   Psychiatric/Behavioral: Negative for depression. The patient does not have insomnia.    Allergic/Immunologic: Negative for hives and persistent infections.      PHYSICAL EXAMINATION:  Vitals:  /72   Pulse 64   Ht 5' 2" (1.575 m)   Wt 108.9 kg (240 lb)   BMI 43.90 kg/m²    General: The patient is alert and oriented x 3.  Mood is pleasant.  Observation of ears, eyes and nose reveal no gross abnormalities.  No labored breathing observed.  Gait is coordinated. Patient can toe walk and heel walk without difficulty.      LEFT Shoulder / Upper Extremity Exam    OBSERVATION:     Swelling  none  Deformity  none   Discoloration  none   Scapular winging none   Scars   none  Atrophy  none    TENDERNESS / CREPITUS (T/C):          T/C      T/C   Clavicle   -/-  SUPRAspinatus    +/-     AC Jt.    -/-  INFRAspinatus  -/-    SC Jt.    -/-  Deltoid    -/-      G. Tuberosity  -/-  LH BICEP groove  +/-   Acromion:  -/-  Midline Neck   -/-     Scapular Spine -/-  Trapezium   -/-   SMA Scapula  -/-  GH jt. line - post  -/-     Scapulothoracic  -/-         ROM: (* = with pain)  Right shoulder   Left shoulder        AROM (PROM)   AROM (PROM)   FE    170° (175°)     30° (170°) *     ER at 90° ABD  90°  (90°)    30°  (45°) *   IR (spine level)   T10     T10 *    STRENGTH: (* = with pain) Right shoulder   Left shoulder    SCAPTION   5/5    4/5 *    IR    5/5    4/5 *   ER    5/5    4/5 *   BICEPS   5/5    4/5 *   Deltoid    5/5    4+/5 *     SIGNS:  Painful side       NEER   +    OASHLEES  +    SPENCER   +    SPEEDS  neg     DROP ARM   -   BELLY PRESS neg   Superior escape none    LIFT-OFF  neg   X-Body ADD    +   MOVING VALGUS neg        STABILITY TESTING    Right shoulder   Left shoulder    Translation     Anterior  up face     up face    Posterior  up face    up face    Sulcus   < 10mm    < 10 " mm     Signs   Apprehension   neg      neg       Relocation   no change     no change      Jerk test  neg     neg    EXTREMITY NEURO-VASCULAR EXAM:    Sensation grossly intact to light touch all dermatomal regions.    DTR 2+ Biceps, Triceps, BR and Negative Rubens sign   Grossly intact motor function at Elbow, Wrist and Hand   Distal pulses radial and ulnar 2+, brisk cap refill, symmetric.      NECK:  Painless FROM and spinous processes non-tender. Negative Spurlings sign.      OTHER FINDINGS:  + scapular dyskinesia    XRAYS (6/28/19): No significant abnormality identified.    MRI (10/4/19):  Supraspinatus tendinosis with low-grade partial-thickness interstitial footprint tear.    Infraspinatus tendinosis with possible low-grade interstitial tear.    Slight irregular morphology and signal heterogeneity within the posterior labrum concerning for possible tear.    Mild biceps tendinosis.    AC arthropathy.    Posterior decentering of the humeral head suggesting evolving instability.      ASSESSMENT:   Left shoulder pain, rotator cuff tear, AC DJD, biceps tendonitis     PLAN:    1. Continue therapy for shoulder  2. Follow up after [place].  If no improvement in shoulder pain, recommend repeat MRI

## 2020-02-12 ENCOUNTER — CLINICAL SUPPORT (OUTPATIENT)
Dept: REHABILITATION | Facility: HOSPITAL | Age: 45
End: 2020-02-12
Payer: COMMERCIAL

## 2020-02-12 DIAGNOSIS — M25.612 DECREASED RANGE OF MOTION OF LEFT SHOULDER: ICD-10-CM

## 2020-02-12 DIAGNOSIS — R52 PAIN AGGRAVATED BY ACTIVITIES OF DAILY LIVING: ICD-10-CM

## 2020-02-12 DIAGNOSIS — R29.3 POOR POSTURE: ICD-10-CM

## 2020-02-12 DIAGNOSIS — R29.898 WEAKNESS OF BOTH UPPER EXTREMITIES: ICD-10-CM

## 2020-02-12 PROCEDURE — 97110 THERAPEUTIC EXERCISES: CPT | Mod: PO,CQ

## 2020-02-12 NOTE — PROGRESS NOTES
Physical Therapy Daily Treatment Note     Name: Luzmaria SIMON [facility] Number: [id number]    Therapy Diagnosis:   Encounter Diagnoses   Name Primary?    Decreased range of motion of left shoulder     Weakness of both upper extremities     Poor posture     Pain aggravated by activities of daily living      Physician: Fredy Lujan MD    Visit Date: 2/12/2020     Physician Orders: PT Eval and Treat   Medical Diagnosis from Referral: M75.102 (ICD-10-CM) - Tear of left rotator cuff, unspecified tear extent, unspecified whether traumatic  Evaluation Date: 1/14/2020  Authorization Period Expiration: 12/31/2020  Plan of Care Expiration: 03/14/2020  Visit # / Visits authorized: 5/ 20    Time In: 8:05 am  Time Out: 8:45 am  Total Billable Time: 40 minutes    Precautions: Standard    Subjective     Pt reports: she tripped over some bricks in front of her house on Sunday, caught herself with her left arm, went to the ED on Monday where she received a shot to calm her pain down.  Patient states she went to her doctor yesterday and he said if it is not better by her return date on 3/3 that he will order an MRI.  She was compliant with home exercise program.  Response to previous treatment: sore.  Functional change: able to do a little more.    Pain: 5/10  Location: left lateral shoulder       Objective     Luzmaria received therapeutic exercises to develop strength, endurance, flexibility and posture for 40 minutes 1:1 with PTA including:      Date  02/12/2020 02/05/2020 01/31/2020 01/22/2020   VISIT 5/20 420 3/20 2/20   POC EXP. DATE 03/14/2020 03/14/2020 03/14/2020 03/14/2020   FACE-TO-FACE 02/14/2020 02/14/2020 02/14/2020 02/14/2020          Pulleys - flex, scaption  3' ea 3' ea 3' ea 3' ea   TABLE:       Supine pec. Str w/ towel Not today  2' 2' 2'   Snow angels Not today  Not today  1 x 10 Next?   Butterflies  Not today  Not today Next? Next?   Wand flexion 1 x 10 1 x 10 1 x 10 Next?   Wand abduction 1 x 10 1 x 10  "1 x 10 Next?   Wand ER 1 x 10 1 x 10 1 x 10 Next?   Side lying flexion 1 x 10 1  x10 Not today 1 x 10   Side lying abduction  1 x 10 1 x 10 Not today 1 x 10   Side lying ER 1 x 10 1 x 10 Not today 1 x 10          PRONE:       Shoulder flexion Not today  Not today Not today 1 x 10   Shoulder scaption  Not today  Not today Not today 1 x 10   Shoulder abduction Not today  Not today Not today 1 x 10   Shoulder extension Not today  Not today Not today 1 x 10   SEATED:       Scapula squeezes  15 x 3" 15 x 3" 1 x 15 1 x 10   Rolls  1 x 15 1 x 15 Not today 1 x 10          STANDING:       Door way stretch Not today  5 x 5" 5 x 5" 5 x 5"   Theraband  - W's  - T's  - I's  - Rows  - ER - L UE   ---  Not today   Not today   Not today   Not today    --  1 x 10 YTB  2 x 10 RTB  2 x 10 RTB  2 x 10 YTB   --   --  1 x 15 RTB  1 x 15 RTB  1 x 15 YTB   ---  ---  1 x 10 RTB  1 x 10 RTB  1 x 10 YTB   Posture exercises   - horizontal abduction  - bilateral ER  - diagonals --- -- -- ---          Initials GWA 1/6 DG DG GWA 1/6     Functional limitation reporting disability percentage was obtained through use of FOTO shoulder Survey indicating 50% disability     Luzmaria received a cold pack to her left shoulder x 5 minutes.    Home Exercises Provided and Patient Education Provided     Education provided:   - keep all exercises in a pain free range.    Written Home Exercises Provided: yes.  Exercises were reviewed and Luzmaria was able to demonstrate them prior to the end of the session.  Luzmaria demonstrated good  understanding of the education provided.     See EMR under Patient Instructions for exercises provided 1/22/2020.    Assessment     Luzmaria did not complete all of her exercises due to complaint of increased pain when attempting.  Patient received very gentle PROM to her left shoulder for 5 minutes but was not able to relax and allow more than 20 to 30 degrees of movement in all directions.     Luzmaria is progressing well towards her " goals.   Pt prognosis is Good.     Pt will continue to benefit from skilled outpatient physical therapy to address the deficits listed in the problem list box on initial evaluation, provide pt/family education and to maximize pt's level of independence in the home and community environment.     Pt's spiritual, cultural and educational needs considered and pt agreeable to plan of care and goals.    Anticipated barriers to physical therapy: none.    Goals:  Short Term Goals: 4 weeks   1. This patient will be independent with a basic HEP.  IN PROGRESS, NOT MET  2. This patient will increase L shoulder flexion AROM  to 175 degrees in order to fix her hair with no complaints of pain.   IN PROGRESS, NOT MET  3. This patient will increase B UE strength by 1 grade in order to be able to perform her usual work duties with no complaints of pain.  IN PROGRESS, NOT MET  4. This patient will have a pain rating of 5/10 at worst with ADLs.   IN PROGRESS, NOT MET  5. Patient will be able to achieve greater than or equal to 41 on the FOTO Shoulder Survey indicating patient is 59% impaired, limited, or restricted demonstrating overall improved functional ability with upper extremity.  IN PROGRESS, NOT MET    Long Term Goals: 8 weeks   1. This patient will be independent with an updated HEP.  IN PROGRESS, NOT MET  2. This patient will increase L shoulder abduction AROM  to 170 degrees in order to dres with no complaints of pain.   IN PROGRESS, NOT MET  3. This patient will increase B UE strength to 5/5 in order to be able to perform her usual household duties with no complaints of pain.  IN PROGRESS, NOT MET  4. This patient will have a pain rating of 2/10 at worst with ADLs.   IN PROGRESS, NOT MET  5. Patient will be able to achieve greater than or equal to 51 on the FOTO Shoulder Survey indicating patient is 49% impaired, limited, or restricted demonstrating overall improved functional ability with upper extremity.  IN PROGRESS, NOT  MET    Plan     Continue with Plan Of Care and progress toward PT goals. Continue with modified exercises until after MD visit on 3/3.     Alex Kaminski, PTA

## 2020-02-12 NOTE — PATIENT INSTRUCTIONS
RECIPE FOR ICE PACK    Flexible homemade alcohol water ice pack    2 cups water  1 cup rubbing alcohol  Food coloring for the blue tint (optional)  2 zip-top bags - quart or gallon-size or vacuum sealer bags  Mix the water and alcohol together in one of your zip-top bags and add food coloring, if desired, until you get that perfect blue tint. Release as much air as possible and seal the bag. I recommend double bagging for strength. (If you have a vacuum sealer use a vacuum seal bag for the outer layer to further prevent accidents.) My trusty Foodsaver works like a charm.     Stick your new flexible homemade alcohol ice pack in the freezer for about 12 hours before using it for the first time. It will be icy, a little slushy, and perfectly flexible for any body injury that needs the cold treatment.

## 2020-02-13 ENCOUNTER — OFFICE VISIT (OUTPATIENT)
Dept: BARIATRICS | Facility: CLINIC | Age: 45
End: 2020-02-13
Payer: COMMERCIAL

## 2020-02-13 VITALS
WEIGHT: 246.94 LBS | DIASTOLIC BLOOD PRESSURE: 64 MMHG | HEART RATE: 61 BPM | BODY MASS INDEX: 45.44 KG/M2 | HEIGHT: 62 IN | SYSTOLIC BLOOD PRESSURE: 110 MMHG

## 2020-02-13 DIAGNOSIS — G43.809 OTHER MIGRAINE WITHOUT STATUS MIGRAINOSUS, NOT INTRACTABLE: Primary | Chronic | ICD-10-CM

## 2020-02-13 DIAGNOSIS — E66.01 CLASS 3 SEVERE OBESITY DUE TO EXCESS CALORIES WITH SERIOUS COMORBIDITY AND BODY MASS INDEX (BMI) OF 45.0 TO 49.9 IN ADULT: ICD-10-CM

## 2020-02-13 PROCEDURE — 99213 OFFICE O/P EST LOW 20 MIN: CPT | Mod: S$GLB,,, | Performed by: INTERNAL MEDICINE

## 2020-02-13 PROCEDURE — 3008F PR BODY MASS INDEX (BMI) DOCUMENTED: ICD-10-PCS | Mod: CPTII,S$GLB,, | Performed by: INTERNAL MEDICINE

## 2020-02-13 PROCEDURE — 3008F BODY MASS INDEX DOCD: CPT | Mod: CPTII,S$GLB,, | Performed by: INTERNAL MEDICINE

## 2020-02-13 PROCEDURE — 99999 PR PBB SHADOW E&M-EST. PATIENT-LVL III: CPT | Mod: PBBFAC,,, | Performed by: INTERNAL MEDICINE

## 2020-02-13 PROCEDURE — 99213 PR OFFICE/OUTPT VISIT, EST, LEVL III, 20-29 MIN: ICD-10-PCS | Mod: S$GLB,,, | Performed by: INTERNAL MEDICINE

## 2020-02-13 PROCEDURE — 99999 PR PBB SHADOW E&M-EST. PATIENT-LVL III: ICD-10-PCS | Mod: PBBFAC,,, | Performed by: INTERNAL MEDICINE

## 2020-02-13 RX ORDER — TOPIRAMATE 50 MG/1
50 CAPSULE, EXTENDED RELEASE ORAL DAILY
Qty: 30 CAPSULE | Refills: 3 | Status: SHIPPED | OUTPATIENT
Start: 2020-02-13 | End: 2022-05-31

## 2020-02-13 RX ORDER — DIETHYLPROPION HYDROCHLORIDE 75 MG/1
75 TABLET, EXTENDED RELEASE ORAL DAILY
Qty: 30 TABLET | Refills: 2 | Status: SHIPPED | OUTPATIENT
Start: 2020-02-13 | End: 2021-02-22

## 2020-02-13 NOTE — PATIENT INSTRUCTIONS
Patient was informed that topiramate is used for migraine prevention and seizures. Weight loss is a common side effect that is well documented. S/he understands this. S/he was informed of the potential side effects such as serious and possibly fatal rash in which case the medication should be discontinued immediately. Paresthesias, forgetfulness, fatigue, kidney stones, GI symptoms, and changes in lab values such as electrolytes, blood counts and kidney function.    W[employer] with coupon.     Patient warned of common side effects of diethylpropion including anxiety, insomnia, palpitations and increased blood pressure. It was also explained that it is for short-term usage along with diet and exercise, and that stopping the medication without making lifestyle changes will result in regain of weight. Patient states understanding.    Weight loss medications are controlled substances.  They require routine follow up. Prescription or pills that are lost or destroyed will not be replaced.       Exercise 30 min 3 days week. Gradually increase.         3 meals a day made up of the following:  Unlimited green vegetables, tomatoes, mushrooms, spaghetti squash, cauliflower, meat, poultry, seafood, eggs and hard cheeses.   Milk and plain yogurt  Dressings, seasonings, condiments, etc should have less than 2 g sugars.   Beans (1-1.5 cups) or nuts (1/4 cup) can have 1 x a day.   1-2 servings of citrus fruits, berries, pineapple or melon a day (1/2 cup)  Avoid fried foods    No grains, rice, pasta, potatoes, bread, corn, peas, oatmeal, grits, tortillas, crackers, chips    No soda, sweet tea, juices or lemonade    Www.dietdoctor.com for recipes. Moderate carb intake.      Dinner in Under 30 minutes and 400 calories.     Baked Chicken breast with Broccoli Cheese Casserole  2-3 chicken breast (approximately 6-8 oz each)    2 tsp each garlic and herb  Dash seasoning   2 tsp your favorite creole seasoning  2 tablespoons of  balsamic vinegar  2 - 10 oz packages of frozen broccoli  1 egg   ½ cup skim milk  ½ tsp paprika   ½ tsp cayenne pepper   1 can fat free cream of mushroom soup.   1 .5 cups of shredded cheddar cheese    Pre-heat oven to 450 degrees. Toss 2-3 chicken breast (approximately 6-8 oz each) in 2 tsp each garlic and herb Mrs. Dash seasoning, 2 tsp your favorite creole seasoning, and 2 tablespoons of balsamic vinegar. This can also be done up to 24 hours ahead of time, and the chicken can be left in the refrigerator to marinate. Place on a baking sheet sprayed with non-stick cooking spray and bake on 450 degrees for 10 min, then reduce heat to 350 degrees and cook an addition 10-15 minutes until chicken is cooked through.   While chicken is baking, microwave safe dish, thaw 2 - 10 oz packages of frozen broccoli. Drain any excess water, season with salt, pepper, all-purpose seasoning of your choice. In another bowl, whisk together 1 egg, ½ cup skim milk, ½ tsp paprika, ½ tsp cayenne pepper and 1 can fat free cream of mushroom soup. Combine broccoli, soup mixture, 1 cup of shredded cheddar cheese in baking dish sprayed with cooking spray. Top with remaining cheese. Bake in the oven with chicken for about 20 min or until set cheese is melted and martin.     Makes 4 servings. 389 calories per serving.10 g fat, 13 g carbs, 54g protein     Sheet Pan [place] Shrimp  1 pound large shrimp, shelled, peeled and deveined.   2 tablespoon olive oil  The zest and the juice of 1 lime  ½ tsp chili powder  ½-1 tsp cayenne pepper  1 tsp cumin  ½ tsp dry oregano  1 red bell pepper  1 green bell pepper  1 red onion  2 cups mushrooms, quartered  1 avocado  Whole darcie lettuce leaves  Preheat oven to 375 degrees.  In a bowl combine shrimp, lime juice, lime zest, cumin, cayenne pepper, chili powder, and a pinch of salt. Set aside.   Slice peppers and onions into thin strips. Toss in 1 tablespoon of olive oil. Sprinkle with salt and pepper and  arrange in a single layer over 1/3 of a large sheet pan  Toss mushrooms with remaining olive oil, oregano, salt and pepper. Arrange in single layer on sheet pan. Place sheet pan in oven for about 15-20 minutes until vegetables are softened, cooked about ¾ of the way through. Remove the sheet pan from oven.  Change setting on oven to low broil.  If needed, rearrange vegetables to make room for shrimp. Arrange the shrimp in a single layer on the sheet pan and return pan to oven. After 5 minutes, flip shrimp. Cook 3-5 additional minutes, or until  Shrimp are opaque.   Serve shrimp and cooked vegetables on lettuce leaves with sliced avocado.   Makes 4 servings. Calories per servin; 23g fat, 19 g carbohydrates, 27g protein.    Zoodles Primavera with Seasoned Ricotta  8 cups zucchini noodles. These can be purchased already prepared, or you can make them on your own with whole zucchini and a vegetable spiralizer.   1.5 cups cherry tomatoes, quartered  2 cups sliced mushrooms  1 cup chopped fresh broccoli  1 cup frozen peas and carrots  2 cloves garlic, finely chopped  16 oz part skim ricotta cheese  2 oz Neufchatel cream cream cheese, cut into small cubes  Juice and zest of 1 lemon  1 pinch red pepper flakes    2 tsp Italian seasoning  4-5 leaves fresh basil, thinly sliced  1 tablespoon of olive oil  Parmesan cheese for topping (optional)     In a bowl, combine ricotta cheese, 1 tsp Italian seasoning, ½ teaspoon lemon zest. Season with salt and pepper to taste. Mix well. Fold in half of fresh basil. Set aside.   Heat a large skillet to medium high. Add olive oil. Sautee mushrooms until starting to become tender. Season them with salt and pepper while cooking.  Add broccoli and peas and carrots. Reduce heat to medium. Cover pan and cook for 4-5 minutes until broccoli is tender and peas and carrots are warmed through. Add Neufchatel cheese, Italian seasoning, red pepper flakes, 1 tsp lemon zest and lemon juice. Stir  until a smooth sauce is formed. Add zucchini noodles (zoodles) to skillet and toss in sauce, then add cherry tomatoes.  Separate zoodle and vegetables into 4 equal portions. Serve each with a ¼ cup serving of seasoned ricotta cheese. Sprinkle with grated parmesan cheese or fresh basil,  if desired.   Makes 4 servings. Calories per servin calories, 32 g carbs, 33 g protein, 18 g fat

## 2020-02-13 NOTE — PROGRESS NOTES
"Subjective:       Patient ID: Luzmaria Louis is a 44 y.o. female.    Chief Complaint: Follow-up    Pt here today for follow-up. Has gained 1 lbs, net neg 9 lbs. Started LCHF diet and diethylpropion. Last filled 12/6/19. Denies SE. She does feel it has her appetite down. She did not start right away.States she gained weight with holidays. Had decided to just eat whatever. Going to PT. No exercise.  Nodule was benign. C/o of fatigue. No exercise.   Should be on topiramate for migraines, has not been taking it 2/2 to paresthesia. Took phentermine throughout 2018 and did not lose weight      Lab Results       Component                Value               Date                       TSH                      2.227               08/19/2019                Follow-up   Associated symptoms include arthralgias and myalgias. Pertinent negatives include no chest pain, chills or fever.     Review of Systems   Constitutional: Negative for chills and fever.   Respiratory: Negative for shortness of breath.         + Snores   Cardiovascular: Negative for chest pain and leg swelling.   Gastrointestinal: Negative for constipation and diarrhea.        + GERD   Genitourinary: Negative for difficulty urinating and dysuria.   Musculoskeletal: Positive for arthralgias and myalgias. Negative for back pain.   Neurological: Positive for dizziness. Negative for light-headedness.   Psychiatric/Behavioral: Negative for dysphoric mood. The patient is not nervous/anxious.        Objective:     /64   Pulse 61   Ht 5' 2" (1.575 m)   Wt 112 kg (246 lb 14.6 oz)   BMI 45.16 kg/m²     Physical Exam   Constitutional: She is oriented to person, place, and time. She appears well-developed. No distress.   Morbidly obese     HENT:   Head: Normocephalic and atraumatic.   Mouth/Throat: No oropharyngeal exudate.   Eyes: Pupils are equal, round, and reactive to light. EOM are normal. No scleral icterus.   Neck: Normal range of motion. Neck supple. "   Cardiovascular: Normal rate.   Pulmonary/Chest: Effort normal.   Musculoskeletal: Normal range of motion. She exhibits no edema.   Neurological: She is alert and oriented to person, place, and time. No cranial nerve deficit.   Skin: Skin is warm and dry. No erythema.   Psychiatric: She has a normal mood and affect. Her behavior is normal. Judgment normal.   Vitals reviewed.      Assessment:       1. Other migraine without status migrainosus, not intractable    2. Class 3 severe obesity due to excess calories with serious comorbidity and body mass index (BMI) of 45.0 to 49.9 in adult        Plan:         Luzmaria was seen today for follow-up.    Diagnoses and all orders for this visit:    Other migraine without status migrainosus, not intractable  -     topiramate (TROKENDI XR) 50 mg Cp24; Take 50 mg by mouth once daily.    Class 3 severe obesity due to excess calories with serious comorbidity and body mass index (BMI) of 45.0 to 49.9 in adult  -     diethylpropion (TENUATE) 75 mg SR tablet; Take 1 tablet (75 mg total) by mouth once daily.             Exercise 30 min 3 days week. Gradually increase.         3 meals a day made up of the following:  Unlimited green vegetables, tomatoes, mushrooms, spaghetti squash, cauliflower, meat, poultry, seafood, eggs and hard cheeses.   Milk and plain yogurt  Dressings, seasonings, condiments, etc should have less than 2 g sugars.   Beans (1-1.5 cups) or nuts (1/4 cup) can have 1 x a day.   1-2 servings of citrus fruits, berries, pineapple or melon a day (1/2 cup)  Avoid fried foods    No grains, rice, pasta, potatoes, bread, corn, peas, oatmeal, grits, tortillas, crackers, chips    No soda, sweet tea, juices or lemonade    Www.dietdoctor.com for recipes. Moderate carb intake.    30 min recipes given.

## 2020-02-19 ENCOUNTER — CLINICAL SUPPORT (OUTPATIENT)
Dept: REHABILITATION | Facility: HOSPITAL | Age: 45
End: 2020-02-19
Payer: COMMERCIAL

## 2020-02-19 ENCOUNTER — DOCUMENTATION ONLY (OUTPATIENT)
Dept: REHABILITATION | Facility: HOSPITAL | Age: 45
End: 2020-02-19

## 2020-02-19 DIAGNOSIS — R29.898 WEAKNESS OF BOTH UPPER EXTREMITIES: ICD-10-CM

## 2020-02-19 DIAGNOSIS — M25.612 DECREASED RANGE OF MOTION OF LEFT SHOULDER: ICD-10-CM

## 2020-02-19 DIAGNOSIS — R29.3 POOR POSTURE: ICD-10-CM

## 2020-02-19 DIAGNOSIS — R52 PAIN AGGRAVATED BY ACTIVITIES OF DAILY LIVING: ICD-10-CM

## 2020-02-19 PROCEDURE — 97110 THERAPEUTIC EXERCISES: CPT | Mod: PO,CQ

## 2020-02-19 NOTE — PROGRESS NOTES
Physical Therapy Daily Treatment Note     Name: Luzmaria SIMON [facility] Number: [id number]    Therapy Diagnosis:   Encounter Diagnoses   Name Primary?    Decreased range of motion of left shoulder     Weakness of both upper extremities     Poor posture     Pain aggravated by activities of daily living      Physician: Fredy Lujan MD    Visit Date: 2/19/2020     Physician Orders: PT Eval and Treat   Medical Diagnosis from Referral: M75.102 (ICD-10-CM) - Tear of left rotator cuff, unspecified tear extent, unspecified whether traumatic  Evaluation Date: 1/14/2020  Authorization Period Expiration: 12/31/2020  Plan of Care Expiration: 03/14/2020  Visit # / Visits authorized: 5/ 20    Time In: 8:10 am  Time Out: 8:45 am  Total Billable Time: 25 minutes    Precautions: Standard    Subjective     Pt reports: her shoulder is still hurting a lot and it increases with most movements since her repeated injury.   Patient reports she also has a bad headache today.  She was compliant with home exercise program.  Response to previous treatment: sore.  Functional change: able to do a little more.    Pain: 5/10  Location: left lateral shoulder       Objective     Luzmaria received therapeutic exercises to develop strength, endurance, flexibility and posture for 25 minutes 1:1 with PTA including:      Date  02/19/2020 02/12/2020 02/05/2020 01/31/2020 01/22/2020   VISIT 6/20 5/20 420 3/20 2/20   POC EXP. DATE 03/14/2020 03/14/2020 03/14/2020 03/14/2020 03/14/2020   FACE-TO-FACE 03/19/2020 02/14/2020 02/14/2020 02/14/2020 02/14/2020           Pulleys - flex, scaption  3' ea 3' ea 3' ea 3' ea 3' ea   TABLE:        Supine pec. Str w/ towel Not today Not today  2' 2' 2'   Snow angels Not today Not today  Not today  1 x 10 Next?   Butterflies  Not today Not today  Not today Next? Next?   Wand flexion 1 x 10 1 x 10 1 x 10 1 x 10 Next?   Wand abduction Not today 1 x 10 1 x 10 1 x 10 Next?   Wand ER 1 x 10 1 x 10 1 x 10 1 x 10 Next?  "  Side lying flexion 1 x 10 1 x 10 1  x10 Not today 1 x 10   Side lying abduction  1 x 10 1 x 10 1 x 10 Not today 1 x 10   Side lying ER 1 x 10 1 x 10 1 x 10 Not today 1 x 10           PRONE:        Shoulder flexion Not today Not today  Not today Not today 1 x 10   Shoulder scaption  Not today Not today  Not today Not today 1 x 10   Shoulder abduction Not today Not today  Not today Not today 1 x 10   Shoulder extension Not today Not today  Not today Not today 1 x 10   SEATED:        Scapula squeezes  20 x 3" 15 x 3" 15 x 3" 1 x 15 1 x 10   Rolls  1 x 15 1 x 15 1 x 15 Not today 1 x 10           STANDING:        Door way stretch Not today Not today  5 x 5" 5 x 5" 5 x 5"   Theraband  - W's  - T's  - I's  - Rows  - ER - L UE   ---  Not today   Not today   Not today   Not today   ---  Not today   Not today   Not today   Not today    --  1 x 10 YTB  2 x 10 RTB  2 x 10 RTB  2 x 10 YTB   --   --  1 x 15 RTB  1 x 15 RTB  1 x 15 YTB   ---  ---  1 x 10 RTB  1 x 10 RTB  1 x 10 YTB   Posture exercises   - horizontal abduction  - bilateral ER  - diagonals --- --- -- -- ---           Initials GWA 2/6 GWA 1/6 DG DG GWA 1/6       Luzmaria received a cold pack to her left shoulder x 5 minutes.  NOT TODAY    Home Exercises Provided and Patient Education Provided     Education provided:   - keep all exercises in a pain free range.    Written Home Exercises Provided: yes.  Exercises were reviewed and Luzmaria was able to demonstrate them prior to the end of the session.  Luzmaria demonstrated good  understanding of the education provided.     See EMR under Patient Instructions for exercises provided 1/22/2020.    Assessment     Luzmaria did not complete all of her exercises again due to complaint of increased pain when attempting.  Patient did not receive PROM to her left shoulder at her request.     Luzmaria is progressing well towards her goals.   Pt prognosis is Good.     Pt will continue to benefit from skilled outpatient physical therapy to " address the deficits listed in the problem list box on initial evaluation, provide pt/family education and to maximize pt's level of independence in the home and community environment.     Pt's spiritual, cultural and educational needs considered and pt agreeable to plan of care and goals.    Anticipated barriers to physical therapy: none.    Goals:  Short Term Goals: 4 weeks   1. This patient will be independent with a basic HEP.  IN PROGRESS, NOT MET  2. This patient will increase L shoulder flexion AROM  to 175 degrees in order to fix her hair with no complaints of pain.   IN PROGRESS, NOT MET  3. This patient will increase B UE strength by 1 grade in order to be able to perform her usual work duties with no complaints of pain.  IN PROGRESS, NOT MET  4. This patient will have a pain rating of 5/10 at worst with ADLs.   IN PROGRESS, NOT MET  5. Patient will be able to achieve greater than or equal to 41 on the FOTO Shoulder Survey indicating patient is 59% impaired, limited, or restricted demonstrating overall improved functional ability with upper extremity.  IN PROGRESS, NOT MET    Long Term Goals: 8 weeks   1. This patient will be independent with an updated HEP.  IN PROGRESS, NOT MET  2. This patient will increase L shoulder abduction AROM  to 170 degrees in order to dres with no complaints of pain.   IN PROGRESS, NOT MET  3. This patient will increase B UE strength to 5/5 in order to be able to perform her usual household duties with no complaints of pain.  IN PROGRESS, NOT MET  4. This patient will have a pain rating of 2/10 at worst with ADLs.   IN PROGRESS, NOT MET  5. Patient will be able to achieve greater than or equal to 51 on the FOTO Shoulder Survey indicating patient is 49% impaired, limited, or restricted demonstrating overall improved functional ability with upper extremity.  IN PROGRESS, NOT MET    Plan     Continue with Plan Of Care and progress toward PT goals. Continue with modified exercises  until after MD visit on 3/3.     Alex Kaminski, PTA

## 2020-02-19 NOTE — PROGRESS NOTES
Face to Face PTA Conference performed with Alex Kaminski, PTA regarding patient's current status, overall progress, and plan of care    Face to Face PTA Conference performed with Lila Choudhury, PT regarding patient's current status, overall progress, and plan of care    Alex Kaminski  2/19/2020

## 2020-02-27 ENCOUNTER — HOSPITAL ENCOUNTER (OUTPATIENT)
Dept: RADIOLOGY | Facility: HOSPITAL | Age: 45
Discharge: HOME | End: 2020-02-27
Attending: ORTHOPAEDIC SURGERY
Payer: COMMERCIAL

## 2020-02-27 ENCOUNTER — OFFICE VISIT (OUTPATIENT)
Dept: ORTHOPEDICS | Facility: CLINIC | Age: 45
End: 2020-02-27
Payer: COMMERCIAL

## 2020-02-27 DIAGNOSIS — R52 PAIN: ICD-10-CM

## 2020-02-27 DIAGNOSIS — M76.822 POSTERIOR TIBIAL TENDINITIS OF LEFT LEG: ICD-10-CM

## 2020-02-27 DIAGNOSIS — R52 PAIN: Primary | ICD-10-CM

## 2020-02-27 PROCEDURE — 73610 X-RAY EXAM OF ANKLE: CPT | Mod: 26,LT,, | Performed by: RADIOLOGY

## 2020-02-27 PROCEDURE — 99213 PR OFFICE/OUTPT VISIT, EST, LEVL III, 20-29 MIN: ICD-10-PCS | Mod: S$GLB,,, | Performed by: ORTHOPAEDIC SURGERY

## 2020-02-27 PROCEDURE — 99999 PR PBB SHADOW E&M-EST. PATIENT-LVL II: ICD-10-PCS | Mod: PBBFAC,,, | Performed by: ORTHOPAEDIC SURGERY

## 2020-02-27 PROCEDURE — 99213 OFFICE O/P EST LOW 20 MIN: CPT | Mod: S$GLB,,, | Performed by: ORTHOPAEDIC SURGERY

## 2020-02-27 PROCEDURE — 99999 PR PBB SHADOW E&M-EST. PATIENT-LVL II: CPT | Mod: PBBFAC,,, | Performed by: ORTHOPAEDIC SURGERY

## 2020-02-27 PROCEDURE — 73610 X-RAY EXAM OF ANKLE: CPT | Mod: TC,LT

## 2020-02-27 PROCEDURE — 73610 XR ANKLE COMPLETE 3 VIEW LEFT: ICD-10-PCS | Mod: 26,LT,, | Performed by: RADIOLOGY

## 2020-02-27 NOTE — PROGRESS NOTES
CHIEF COMPLAINT: Left ankle pain.                                                          HISTORY OF PRESENT ILLNESS:  The patient is a 45 y.o. female  who presents for evaluation of her left ankle pain. The pain has been going on since October 2019 is located near the medial aspect of her left ankle. She states she did roll her ankle 3 weeks ago, and since that time her pain has been increased. The patient was seen in ED 3 weeks ago and diagnosed with ankle sprain. She was discharged with voltaren gel which she did not fill. The pain is constant, 8/10 and has associated swelling of her medial ankle. She has tried compression socks, diclofenac daily, ankle brace, and OTC icy/hot creme which has helped minimally. The patient states the pain has been worsening since October, and has worsened significantly since 3 weeks ago when it began. The patient denies N/T.              PAST MEDICAL HISTORY:   Past Medical History:   Diagnosis Date    Fibroid     GERD (gastroesophageal reflux disease)     Migraine headache      PAST SURGICAL HISTORY:   Past Surgical History:   Procedure Laterality Date    CHOLECYSTECTOMY      HYSTERECTOMY      MOUTH SURGERY      THYROID LOBECTOMY Right 7/3/2019    Procedure: LOBECTOMY, THYROID, Possible Total;  Surgeon: Carlee Huff MD;  Location: [facility] OR [address] [place] [address];  Service: General;  Laterality: Right;    TONSILLECTOMY      TUBAL LIGATION       FAMILY HISTORY:   Family History   Problem Relation Age of Onset    Prostate cancer Paternal Grandfather     Cancer Paternal Grandmother     Throat cancer Maternal Grandmother     Cancer Maternal Grandmother         throat cancer - smoker    Diabetes Maternal Grandmother     Stroke Maternal Grandmother     Hypertension Maternal Grandmother     Aneurysm Father         stomach aneurysm    Cancer Maternal Aunt         gastric cancer    Prostate cancer Maternal Uncle     Hyperlipidemia Mother     Prostate cancer Brother     Cancer  Maternal Grandfather         colon cancer    Heart disease Neg Hx      SOCIAL HISTORY:   Social History     Socioeconomic History    Marital status:      Spouse name: Not on file    Number of children: Not on file    Years of education: Not on file    Highest education level: Not on file   Occupational History    Not on file   Social Needs    Financial resource strain: Not hard at all    Food insecurity:     Worry: Never true     Inability: Never true    Transportation needs:     Medical: No     Non-medical: No   Tobacco Use    Smoking status: Never Smoker    Smokeless tobacco: Never Used   Substance and Sexual Activity    Alcohol use: Yes     Frequency: 2-4 times a month     Drinks per session: 1 or 2     Binge frequency: Never     Comment: once a week maybe    Drug use: No    Sexual activity: Yes     Partners: Male     Comment: hysterectomy   Lifestyle    Physical activity:     Days per week: 0 days     Minutes per session: 0 min    Stress: Not at all   Relationships    Social connections:     Talks on phone: More than three times a week     Gets together: Twice a week     Attends L[employer] day service: Not on file     Active member of club or organization: No     Attends meetings of clubs or organizations: Never     Relationship status:    Other Topics Concern    Not on file   Social History Narrative    Not on file       MEDICATIONS:   Current Outpatient Medications:     celecoxib (CELEBREX) 200 MG capsule, Take 1 capsule (200 mg total) by mouth 2 (two) times daily., Disp: 60 capsule, Rfl: 2    cholestyramine (QUESTRAN) 4 gram packet, Take 4 g by mouth 3 (three) times daily with meals., Disp: , Rfl:     cyclobenzaprine (FLEXERIL) 10 MG tablet, Take 1 tablet (10 mg total) by mouth 3 (three) times daily as needed for Muscle spasms (no working or driving on this medication)., Disp: 45 tablet, Rfl: 0    diclofenac (VOLTAREN) 75 MG EC tablet, Take 1 tablet (75 mg total) by mouth 2  (two) times daily as needed., Disp: 60 tablet, Rfl: 3    diclofenac sodium (VOLTAREN) 1 % Gel, Apply 2 g topically 4 (four) times daily., Disp: 100 g, Rfl: 0    diethylpropion (TENUATE) 75 mg SR tablet, Take 1 tablet (75 mg total) by mouth once daily., Disp: 30 tablet, Rfl: 2    gabapentin (NEURONTIN) 100 MG capsule, Take 2 capsules (200 mg total) by mouth nightly as needed., Disp: 60 capsule, Rfl: 11    promethazine (PHENERGAN) 12.5 MG Tab, Take 1 tablet (12.5 mg total) by mouth 4 (four) times daily., Disp: 20 tablet, Rfl: 6    sumatriptan (IMITREX) 50 MG tablet, Take 1 tablet (50 mg total) by mouth every 2 (two) hours as needed for Migraine (no more than 2 in 24 hours). (Patient not taking: Reported on 2/13/2020), Disp: 18 tablet, Rfl: 6    topiramate (TROKENDI XR) 50 mg Cp24, Take 50 mg by mouth once daily., Disp: 30 capsule, Rfl: 3  ALLERGIES:   Review of patient's allergies indicates:   Allergen Reactions    Caffeine      She feels like it is hard to breath, dizzy, nauseous.       VITAL SIGNS: There were no vitals taken for this visit.     Review of Systems   Constitution: Negative for chills, fever, weakness and weight loss.   HENT: Negative for congestion.   Cardiovascular: Negative for chest pain and dyspnea on exertion.   Respiratory: Negative for cough and shortness of breath.   Hematologic/Lymphatic: Does not bruise/bleed easily.   Skin: Negative for rash and suspicious lesions.   Musculoskeletal: see HPI  Gastrointestinal: Negative for bowel incontinence, constipation,diarrhea, vomiting.   Genitourinary: Negative for bladder incontinence.   Neurological: Negative for numbness, paresthesias and sensory change.           PHYSICAL EXAMINATION    General:  The patient is alert and oriented x 3.  Mood is pleasant.  Observation of ears, eyes and nose reveal no gross abnormalities.  No labored breathing observed.    left Foot and Ankle Exam    INSPECTION:      ALIGNMENT:  Gait:    Normal    Hindfoot   valgus  Scars:   None    Midfoot: Pes planovalgus  Swelling:  About medial ankle  Forefoot: Varus, Abduction  Color:   Normal      Atrophy:  None    Collective Ankle-Hindfoot Alignment    Heel / Toe Walking: Difficulty   malaligned     TENDERNESS:  LATERAL:    ANTERIOR:  Sinus tarsi:  None  Anteromedial joint line:  none  Syndesmosis:  none  Anterolateral joint line:   none  ATFL:   none  Talonavicular:    none   CFL:   none  Anterior tibialis:   none  Anterolateral gutter: none  Extensor tendons:   none  Fibula:   none  Peroneal tendons: none  POSTERIOR:  Peroneal tubercle.  None  Medial/lateral achilles:   none       Medial/lateral achilles insertion: none  MEDIAL:      Deltoid:  none  CALCANEUS:  Malleolus:  none  Retrocalcaneal:   none  PTT:   TTP  Medial achilles:   none  Navicular:  none  Lateral achilles:   none       Calcaneal tuberosity:   none  FOOT:    Calcaneal cuboid  none MT / MT heads:  none   Navicular   none  Medial cord origin PF:  none  Cuneiforms:   none  Web space:   none  Lisfranc    none  Tarsal tunnel:   none  Base of the fifth metatarsal  none Tinels sign   neg        RANGE OF MOTION:  RIGHT/ LEFT   STRENGTH: (affected)  Ankle DF/PF:  10/45  15/45    Anterior tibialis: 5/5     Eversion/Inversion: 15/25 15/25  Posterior tibialis: 5/5   Midfoot ABD/ADD: 10/10 10/10  Gastroc-soleus: 5/5   First MTP DF/PF: 60/25 60/25  Peroneals:  5/5         EHL:   5/5   (* = pain)     FHL:   5/5         (* = pain)         NEUROLOGIC TESTING:  All dermatomes foot, ankle and leg have normal sensation light touch  Ankle Reflexes 2+, symmetric   Negative Babinski and No Clonus    VASCULAR:  2+ pulses PT/DT with brisk capillary refill toes.    Other Findings:  Pain increased with dorsiflexion and resisted internal rotation  -patient unable to perform single-leg heel raise on left      XRAYS:  Left Ankle 3 views (AP, lateral,mortise)  were ordered and reviewed.   No evidence of any fracture or dislocation.   The osseous structures appear well mineralized and well aligned. No mortise displacement.    ASSESSMENT:     Diagnoses and all orders for this visit:    Pain  -     Cancel: X-Ray Ankle Complete Right; Future  -     X-Ray Ankle Complete Left; Future    Posterior tibial tendinitis of left leg         PLAN:  I have discussed the nature of this problem with the patient today. We discussed treatment options. Recommend 4 weeks in walking boot. At that time if the pain has resolved, we will refer her for PT and recommend arch support orthotics. If the pain has not resolved, we will consider MRI and discuss surgical options thereafter. RTC in 4 weeks.    I have personally taken the history and examined this patient and agree with the residents note as stated above.

## 2020-03-02 ENCOUNTER — PATIENT MESSAGE (OUTPATIENT)
Dept: BARIATRICS | Facility: CLINIC | Age: 45
End: 2020-03-02

## 2020-03-03 ENCOUNTER — OFFICE VISIT (OUTPATIENT)
Dept: SPORTS MEDICINE | Facility: CLINIC | Age: 45
End: 2020-03-03
Payer: COMMERCIAL

## 2020-03-03 VITALS
SYSTOLIC BLOOD PRESSURE: 135 MMHG | BODY MASS INDEX: 45.27 KG/M2 | DIASTOLIC BLOOD PRESSURE: 70 MMHG | WEIGHT: 246 LBS | HEIGHT: 62 IN | HEART RATE: 67 BPM

## 2020-03-03 DIAGNOSIS — M75.102 TEAR OF LEFT ROTATOR CUFF, UNSPECIFIED TEAR EXTENT, UNSPECIFIED WHETHER TRAUMATIC: Primary | ICD-10-CM

## 2020-03-03 PROCEDURE — 99213 PR OFFICE/OUTPT VISIT, EST, LEVL III, 20-29 MIN: ICD-10-PCS | Mod: S$GLB,,, | Performed by: ORTHOPAEDIC SURGERY

## 2020-03-03 PROCEDURE — 3008F PR BODY MASS INDEX (BMI) DOCUMENTED: ICD-10-PCS | Mod: CPTII,S$GLB,, | Performed by: ORTHOPAEDIC SURGERY

## 2020-03-03 PROCEDURE — 99213 OFFICE O/P EST LOW 20 MIN: CPT | Mod: S$GLB,,, | Performed by: ORTHOPAEDIC SURGERY

## 2020-03-03 PROCEDURE — 3008F BODY MASS INDEX DOCD: CPT | Mod: CPTII,S$GLB,, | Performed by: ORTHOPAEDIC SURGERY

## 2020-03-03 PROCEDURE — 99999 PR PBB SHADOW E&M-EST. PATIENT-LVL III: CPT | Mod: PBBFAC,,, | Performed by: ORTHOPAEDIC SURGERY

## 2020-03-03 PROCEDURE — 99999 PR PBB SHADOW E&M-EST. PATIENT-LVL III: ICD-10-PCS | Mod: PBBFAC,,, | Performed by: ORTHOPAEDIC SURGERY

## 2020-03-03 NOTE — PROGRESS NOTES
CC: LEFT shoulder pain    Interval history 3/3/2020:  Patient presents for follow-up evaluation today.  She has not had any improvement in her shoulders with physical therapy over the last few weeks.  No new traumas or falls since the last fall.     44 y.o. Female with a history of left shoulder pain.  Re-injured her left shoulder yesterday when she tripped and fell, hitting her shoulder into the wall outside her house.  Was seen in the ER following injury.     History of chronic left shoulder pain for over 1 year.  She has [place] [facility] [place]  Previously tried steroid injection on 10/24/19.     She works at the [employer].    She reports that the pain and weakness is worse with overhead activity. It also bothers her at night.    Is affecting ADLs.  Pain is 8/10 at it's worst.      Past Medical History:   Diagnosis Date    Fibroid     GERD (gastroesophageal reflux disease)     Migraine headache        Past Surgical History:   Procedure Laterality Date    CHOLECYSTECTOMY      HYSTERECTOMY      MOUTH SURGERY      THYROID LOBECTOMY Right 7/3/2019    Procedure: LOBECTOMY, THYROID, Possible Total;  Surgeon: Carlee Huff MD;  Location: [facility] OR [address] [place] [address];  Service: General;  Laterality: Right;    TONSILLECTOMY      TUBAL LIGATION         Family History   Problem Relation Age of Onset    Prostate cancer Paternal Grandfather     Cancer Paternal Grandmother     Throat cancer Maternal Grandmother     Cancer Maternal Grandmother         throat cancer - smoker    Diabetes Maternal Grandmother     Stroke Maternal Grandmother     Hypertension Maternal Grandmother     Aneurysm Father         stomach aneurysm    Cancer Maternal Aunt         gastric cancer    Prostate cancer Maternal Uncle     Hyperlipidemia Mother     Prostate cancer Brother     Cancer Maternal Grandfather         colon cancer    Heart disease Neg Hx          Current Outpatient Medications:     celecoxib  (CELEBREX) 200 MG capsule, Take 1 capsule (200 mg total) by mouth 2 (two) times daily., Disp: 60 capsule, Rfl: 2    cholestyramine (QUESTRAN) 4 gram packet, Take 4 g by mouth 3 (three) times daily with meals., Disp: , Rfl:     cyclobenzaprine (FLEXERIL) 10 MG tablet, Take 1 tablet (10 mg total) by mouth 3 (three) times daily as needed for Muscle spasms (no working or driving on this medication)., Disp: 45 tablet, Rfl: 0    diclofenac (VOLTAREN) 75 MG EC tablet, Take 1 tablet (75 mg total) by mouth 2 (two) times daily as needed., Disp: 60 tablet, Rfl: 3    diclofenac sodium (VOLTAREN) 1 % Gel, Apply 2 g topically 4 (four) times daily., Disp: 100 g, Rfl: 0    diethylpropion (TENUATE) 75 mg SR tablet, Take 1 tablet (75 mg total) by mouth once daily., Disp: 30 tablet, Rfl: 2    gabapentin (NEURONTIN) 100 MG capsule, Take 2 capsules (200 mg total) by mouth nightly as needed., Disp: 60 capsule, Rfl: 11    promethazine (PHENERGAN) 12.5 MG Tab, Take 1 tablet (12.5 mg total) by mouth 4 (four) times daily., Disp: 20 tablet, Rfl: 6    topiramate (TROKENDI XR) 50 mg Cp24, Take 50 mg by mouth once daily., Disp: 30 capsule, Rfl: 3    sumatriptan (IMITREX) 50 MG tablet, Take 1 tablet (50 mg total) by mouth every 2 (two) hours as needed for Migraine (no more than 2 in 24 hours). (Patient not taking: Reported on 2/13/2020), Disp: 18 tablet, Rfl: 6    Review of patient's allergies indicates:   Allergen Reactions    Caffeine      She feels like it is hard to breath, dizzy, nauseous.          REVIEW OF SYSTEMS:  Constitution: Negative. Negative for chills, fever and night sweats.   HENT: Negative for congestion and headaches.    Eyes: Negative for blurred vision, left vision loss and right vision loss.   Cardiovascular: Negative for chest pain and syncope.   Respiratory: Negative for cough and shortness of breath.    Endocrine: Negative for polydipsia, polyphagia and polyuria.   Hematologic/Lymphatic: Negative for bleeding  "problem. Does not bruise/bleed easily.   Skin: Negative for dry skin, itching and rash.   Musculoskeletal: Negative for falls.  Positive for left shoulder pain and muscle weakness.   Gastrointestinal: Negative for abdominal pain and bowel incontinence.   Genitourinary: Negative for bladder incontinence and nocturia.   Neurological: Negative for disturbances in coordination, loss of balance and seizures.   Psychiatric/Behavioral: Negative for depression. The patient does not have insomnia.    Allergic/Immunologic: Negative for hives and persistent infections.      PHYSICAL EXAMINATION:  Vitals:  /70   Pulse 67   Ht 5' 2" (1.575 m)   Wt 111.6 kg (246 lb)   BMI 44.99 kg/m²    General: The patient is alert and oriented x 3.  Mood is pleasant.  Observation of ears, eyes and nose reveal no gross abnormalities.  No labored breathing observed.  Gait is coordinated. Patient can toe walk and heel walk without difficulty.      LEFT Shoulder / Upper Extremity Exam    OBSERVATION:     Swelling  none  Deformity  none   Discoloration  none   Scapular winging none   Scars   none  Atrophy  none    TENDERNESS / CREPITUS (T/C):          T/C      T/C   Clavicle   -/-  SUPRAspinatus    +/-     AC Jt.    -/-  INFRAspinatus  -/-    SC Jt.    -/-  Deltoid    -/-      G. Tuberosity  -/-  LH BICEP groove  +/-   Acromion:  -/-  Midline Neck   -/-     Scapular Spine -/-  Trapezium   -/-   SMA Scapula  -/-  GH jt. line - post  -/-     Scapulothoracic  -/-         ROM: (* = with pain)  Right shoulder   Left shoulder        AROM (PROM)   AROM (PROM)   FE    170° (175°)     30° (170°) *     ER at 90° ABD  90°  (90°)    30°  (45°) *   IR (spine level)   T10     T10 *    STRENGTH: (* = with pain) Right shoulder   Left shoulder    SCAPTION   5/5    4/5 *    IR    5/5    4/5 *   ER    5/5    4/5 *   BICEPS   5/5    4/5 *   Deltoid    5/5    4+/5 *     SIGNS:  Painful side       NEER   +    OASHLEES  +    SPENCER   + "    SPEEDS  neg     DROP ARM   -   BELLY PRESS neg   Superior escape none    LIFT-OFF  neg   X-Body ADD    +   MOVING VALGUS neg        STABILITY TESTING    Right shoulder   Left shoulder    Translation     Anterior  up face     up face    Posterior  up face    up face    Sulcus   < 10mm    < 10 mm     Signs   Apprehension   neg      neg       Relocation   no change     no change      Jerk test  neg     neg    EXTREMITY NEURO-VASCULAR EXAM:    Sensation grossly intact to light touch all dermatomal regions.    DTR 2+ Biceps, Triceps, BR and Negative Rubens sign   Grossly intact motor function at Elbow, Wrist and Hand   Distal pulses radial and ulnar 2+, brisk cap refill, symmetric.      NECK:  Painless FROM and spinous processes non-tender. Negative Spurlings sign.      OTHER FINDINGS:  + scapular dyskinesia    XRAYS (6/28/19): No significant abnormality identified.    MRI (10/4/19):  Supraspinatus tendinosis with low-grade partial-thickness interstitial footprint tear.    Infraspinatus tendinosis with possible low-grade interstitial tear.    Slight irregular morphology and signal heterogeneity within the posterior labrum concerning for possible tear.    Mild biceps tendinosis.    AC arthropathy.    Posterior decentering of the humeral head suggesting evolving instability.      ASSESSMENT:   Left shoulder pain, rotator cuff tear, AC DJD, biceps tendonitis     PLAN:    1.  MRI of the left shoulder given no improvement in her symptoms with conservative management  2.  Return to clinic after MRI is to discuss

## 2020-03-05 ENCOUNTER — TELEPHONE (OUTPATIENT)
Dept: ORTHOPEDICS | Facility: CLINIC | Age: 45
End: 2020-03-05

## 2020-03-05 NOTE — TELEPHONE ENCOUNTER
Spoke to patient, I told her  recommends the boot for 4 weeks before considering MRI. She decided to hold off on the MRI until her next appointment with .

## 2020-03-05 NOTE — TELEPHONE ENCOUNTER
----- Message from Bee Hernandes sent at 3/5/2020 10:20 AM CST -----  Contact: self   Pt stating that she is going in on Monday for a MRI for her left shoulder, and the office said that it is okay for her to also get the MRI for left ankle.  She stated that she would like a call back in regards to this.      Contact [facility]- [phone]

## 2020-03-09 ENCOUNTER — HOSPITAL ENCOUNTER (OUTPATIENT)
Dept: RADIOLOGY | Facility: HOSPITAL | Age: 45
Discharge: HOME | End: 2020-03-09
Attending: ORTHOPAEDIC SURGERY
Payer: COMMERCIAL

## 2020-03-09 DIAGNOSIS — M75.102 TEAR OF LEFT ROTATOR CUFF, UNSPECIFIED TEAR EXTENT, UNSPECIFIED WHETHER TRAUMATIC: ICD-10-CM

## 2020-03-09 PROCEDURE — 73221 MRI JOINT UPR EXTREM W/O DYE: CPT | Mod: 26,LT,, | Performed by: RADIOLOGY

## 2020-03-09 PROCEDURE — 73221 MRI SHOULDER WITHOUT CONTRAST LEFT: ICD-10-PCS | Mod: 26,LT,, | Performed by: RADIOLOGY

## 2020-03-09 PROCEDURE — 73221 MRI JOINT UPR EXTREM W/O DYE: CPT | Mod: TC,LT

## 2020-03-10 ENCOUNTER — OFFICE VISIT (OUTPATIENT)
Dept: SPORTS MEDICINE | Facility: CLINIC | Age: 45
End: 2020-03-10
Payer: COMMERCIAL

## 2020-03-10 ENCOUNTER — DOCUMENTATION ONLY (OUTPATIENT)
Dept: RESEARCH | Facility: HOSPITAL | Age: 45
End: 2020-03-10

## 2020-03-10 VITALS
BODY MASS INDEX: 45.27 KG/M2 | DIASTOLIC BLOOD PRESSURE: 74 MMHG | HEIGHT: 62 IN | SYSTOLIC BLOOD PRESSURE: 124 MMHG | HEART RATE: 63 BPM | WEIGHT: 246 LBS

## 2020-03-10 DIAGNOSIS — M75.112 NONTRAUMATIC INCOMPLETE TEAR OF LEFT ROTATOR CUFF: Primary | ICD-10-CM

## 2020-03-10 DIAGNOSIS — M19.012 ARTHRITIS OF LEFT ACROMIOCLAVICULAR JOINT: ICD-10-CM

## 2020-03-10 PROCEDURE — 99999 PR PBB SHADOW E&M-EST. PATIENT-LVL III: ICD-10-PCS | Mod: PBBFAC,,, | Performed by: PHYSICIAN ASSISTANT

## 2020-03-10 PROCEDURE — 3008F BODY MASS INDEX DOCD: CPT | Mod: CPTII,S$GLB,, | Performed by: PHYSICIAN ASSISTANT

## 2020-03-10 PROCEDURE — 99999 PR PBB SHADOW E&M-EST. PATIENT-LVL III: CPT | Mod: PBBFAC,,, | Performed by: PHYSICIAN ASSISTANT

## 2020-03-10 PROCEDURE — 3008F PR BODY MASS INDEX (BMI) DOCUMENTED: ICD-10-PCS | Mod: CPTII,S$GLB,, | Performed by: PHYSICIAN ASSISTANT

## 2020-03-10 PROCEDURE — 99214 PR OFFICE/OUTPT VISIT, EST, LEVL IV, 30-39 MIN: ICD-10-PCS | Mod: S$GLB,,, | Performed by: PHYSICIAN ASSISTANT

## 2020-03-10 PROCEDURE — 99214 OFFICE O/P EST MOD 30 MIN: CPT | Mod: S$GLB,,, | Performed by: PHYSICIAN ASSISTANT

## 2020-03-10 NOTE — PROGRESS NOTES
CC: LEFT shoulder pain    Current HPI:  Patient presents for follow up evaluation today. She is here to discuss left shoulder MRI results. Patient is interested in discussing conservative versus operative management. Patient has been attending physical therapy with little to no relief. Patient had a left shoulder CSI on 10/24/19 which gave her relief for about 2 weeks.       Interval history 3/10/2020:  Patient presents for follow-up evaluation today.  She has not had any improvement in her shoulders with physical therapy over the last few weeks.  No new traumas or falls since the last fall.     44 y.o. Female with a history of left shoulder pain.  Re-injured her left shoulder yesterday when she tripped and fell, hitting her shoulder into the wall outside her house.  Was seen in the ER following injury.     History of chronic left shoulder pain for over 1 year.  [place] [facility] [place]  Previously tried steroid injection on 10/24/19.     [place]    She reports that the pain and weakness is worse with overhead activity. It also bothers her at night.    Is affecting ADLs.  Pain is 8/10 at it's worst.      Past Medical History:   Diagnosis Date    Fibroid     GERD (gastroesophageal reflux disease)     Migraine headache        Past Surgical History:   Procedure Laterality Date    CHOLECYSTECTOMY      HYSTERECTOMY      MOUTH SURGERY      THYROID LOBECTOMY Right 7/3/2019    Procedure: LOBECTOMY, THYROID, Possible Total;  Surgeon: Carlee Huff MD;  Location: [facility] OR [address] [place] [address];  Service: General;  Laterality: Right;    TONSILLECTOMY      TUBAL LIGATION         Family History   Problem Relation Age of Onset    Prostate cancer Paternal Grandfather     Cancer Paternal Grandmother     Throat cancer Maternal Grandmother     Cancer Maternal Grandmother         throat cancer - smoker    Diabetes Maternal Grandmother     Stroke Maternal Grandmother      Hypertension Maternal Grandmother     Aneurysm Father         stomach aneurysm    Cancer Maternal Aunt         gastric cancer    Prostate cancer Maternal Uncle     Hyperlipidemia Mother     Prostate cancer Brother     Cancer Maternal Grandfather         colon cancer    Heart disease Neg Hx          Current Outpatient Medications:     celecoxib (CELEBREX) 200 MG capsule, Take 1 capsule (200 mg total) by mouth 2 (two) times daily., Disp: 60 capsule, Rfl: 2    cholestyramine (QUESTRAN) 4 gram packet, Take 4 g by mouth 3 (three) times daily with meals., Disp: , Rfl:     cyclobenzaprine (FLEXERIL) 10 MG tablet, Take 1 tablet (10 mg total) by mouth 3 (three) times daily as needed for Muscle spasms (no working or driving on this medication)., Disp: 45 tablet, Rfl: 0    diclofenac (VOLTAREN) 75 MG EC tablet, Take 1 tablet (75 mg total) by mouth 2 (two) times daily as needed., Disp: 60 tablet, Rfl: 3    diclofenac sodium (VOLTAREN) 1 % Gel, Apply 2 g topically 4 (four) times daily., Disp: 100 g, Rfl: 0    diethylpropion (TENUATE) 75 mg SR tablet, Take 1 tablet (75 mg total) by mouth once daily., Disp: 30 tablet, Rfl: 2    gabapentin (NEURONTIN) 100 MG capsule, Take 2 capsules (200 mg total) by mouth nightly as needed., Disp: 60 capsule, Rfl: 11    promethazine (PHENERGAN) 12.5 MG Tab, Take 1 tablet (12.5 mg total) by mouth 4 (four) times daily., Disp: 20 tablet, Rfl: 6    sumatriptan (IMITREX) 50 MG tablet, Take 1 tablet (50 mg total) by mouth every 2 (two) hours as needed for Migraine (no more than 2 in 24 hours). (Patient not taking: Reported on 2/13/2020), Disp: 18 tablet, Rfl: 6    topiramate (TROKENDI XR) 50 mg Cp24, Take 50 mg by mouth once daily., Disp: 30 capsule, Rfl: 3    Review of patient's allergies indicates:   Allergen Reactions    Caffeine      She feels like it is hard to breath, dizzy, nauseous.          REVIEW OF SYSTEMS:  Constitution: Negative. Negative for chills, fever and night  sweats.   HENT: Negative for congestion and headaches.    Eyes: Negative for blurred vision, left vision loss and right vision loss.   Cardiovascular: Negative for chest pain and syncope.   Respiratory: Negative for cough and shortness of breath.    Endocrine: Negative for polydipsia, polyphagia and polyuria.   Hematologic/Lymphatic: Negative for bleeding problem. Does not bruise/bleed easily.   Skin: Negative for dry skin, itching and rash.   Musculoskeletal: Negative for falls.  Positive for left shoulder pain and muscle weakness.   Gastrointestinal: Negative for abdominal pain and bowel incontinence.   Genitourinary: Negative for bladder incontinence and nocturia.   Neurological: Negative for disturbances in coordination, loss of balance and seizures.   Psychiatric/Behavioral: Negative for depression. The patient does not have insomnia.    Allergic/Immunologic: Negative for hives and persistent infections.      PHYSICAL EXAMINATION:  Vitals:  There were no vitals taken for this visit.   General: The patient is alert and oriented x 3.  Mood is pleasant.  Observation of ears, eyes and nose reveal no gross abnormalities.  No labored breathing observed.  Gait is coordinated. Patient can toe walk and heel walk without difficulty.      LEFT Shoulder / Upper Extremity Exam    OBSERVATION:     Swelling  none  Deformity  none   Discoloration  none   Scapular winging none   Scars   none  Atrophy  none    TENDERNESS / CREPITUS (T/C):          T/C      T/C   Clavicle   -/-  SUPRAspinatus    +/-     AC Jt.    -/-  INFRAspinatus  -/-    SC Jt.    -/-  Deltoid    -/-      G. Tuberosity  -/-  LH BICEP groove  +/-   Acromion:  -/-  Midline Neck   -/-     Scapular Spine -/-  Trapezium   -/-   SMA Scapula  -/-  GH jt. line - post  -/-     Scapulothoracic  -/-         ROM: (* = with pain)  Right shoulder   Left shoulder        AROM (PROM)   AROM (PROM)   FE    170° (175°)     60° (170°) *     ER at 90° ABD  90°  (90°)    30°   (45°) *   IR (spine level)   T10     L2    STRENGTH: (* = with pain) Right shoulder   Left shoulder    SCAPTION   5/5    4/5 *    IR    5/5    4/5 *   ER    5/5    4/5 *   BICEPS   5/5    4/5 *   Deltoid    5/5    4+/5 *     SIGNS:  Painful side       NEER   +    OASHLEES  +    SPENCER   +    SPEEDS  neg     DROP ARM   -   BELLY PRESS neg   Superior escape none    LIFT-OFF  neg   X-Body ADD    +   MOVING VALGUS neg        STABILITY TESTING    Right shoulder   Left shoulder    Translation     Anterior  up face     up face    Posterior  up face    up face    Sulcus   < 10mm    < 10 mm     Signs   Apprehension   neg      neg       Relocation   no change     no change      Jerk test  neg     neg    EXTREMITY NEURO-VASCULAR EXAM:    Sensation grossly intact to light touch all dermatomal regions.    DTR 2+ Biceps, Triceps, BR and Negative Rubens sign   Grossly intact motor function at Elbow, Wrist and Hand   Distal pulses radial and ulnar 2+, brisk cap refill, symmetric.      NECK:  Painless FROM and spinous processes non-tender. Negative Spurlings sign.      OTHER FINDINGS:  + scapular dyskinesia    XRAYS (6/28/19): No significant abnormality identified.    MRI (10/4/19):  Supraspinatus tendinosis with low-grade partial-thickness interstitial footprint tear.    Infraspinatus tendinosis with possible low-grade interstitial tear.    Slight irregular morphology and signal heterogeneity within the posterior labrum concerning for possible tear.    Mild biceps tendinosis.    AC arthropathy.    Posterior decentering of the humeral head suggesting evolving instability.    MRI Left shoulder (3/9/2020):  Impression       1. High-grade partial-thickness tear of the supraspinatus tendon and low-grade interstitial fraying of the infraspinatus tendon.  2. Long head biceps tendinosis.  3. Subacromial subdeltoid bursitis.  4. Acromioclavicular arthrosis.           ASSESSMENT:   Left shoulder pain, rotator cuff tear, AC DJD,  biceps tendonitis     PLAN:    1.  I made the decision to obtain old records of the patient including previous notes and imaging. I independently reviewed and interpreted the radiographs and/or MRIs today as well as prior imaging. Reviewed MRI and radiograph results with patient in detail.    2.  Treatment options were discussed with the patient about shoulder.  I reviewed the MRI images with her and what this means for her shoulder. We discussed both non-operative and operative options for her shoulder and the risks and benefits of each. Patient also spoke with Eric Burden PTA [facility]. She was given printed information to go over should she have any questions when she sees Dr. Prince at her next appointment.     3. Discussed conservative management options in the interim such as activity modification, icing after activity, NSAIDs, and topical analgesics.    4. Follow up with Dr. Prince to discuss surgery.       All questions were answered, patient will contact us for questions or concerns in the interim.

## 2020-03-10 NOTE — PROGRESS NOTES
Research Study: Regeneten Shoulder Study  PI: Checo Angeles MD  Sub-Investigator: Tejas Prince MD  IRB: 2019.025  Visit: Pre-Screening       Mrs. Louis saw ASHKAN Santillan in clinic today for her (L) shoulder pain.  After performing a chart review prior to her visit, Mrs. Louis was identified as a potential candidate for the Regeneten Shoulder study (IRB#2019.025). I met with  in a private exam room to discuss the Regeneten study along with the informed consent form which was reviewed.  She reported that she has not had relief from previous conservative measures to include: PT, injections and activity modification.  She was advised that her participation in this and any study is voluntary.  I provided Mrs. Louis with a copy of the consent form to take home for her review.  She was encouraged to take her time to look over this information at her convenience and to contact me with any questions or concerns which she voiced understanding.  Mrs. Louis will be following up with Dr. Tejas Prince ( Sub-Investigator for the Regeneten Study) to further discuss her options and to further assess her eligibility for the Regeneten Shoulder study.

## 2020-03-19 ENCOUNTER — OFFICE VISIT (OUTPATIENT)
Dept: SPORTS MEDICINE | Facility: CLINIC | Age: 45
End: 2020-03-19
Payer: COMMERCIAL

## 2020-03-19 ENCOUNTER — DOCUMENTATION ONLY (OUTPATIENT)
Dept: RESEARCH | Facility: HOSPITAL | Age: 45
End: 2020-03-19

## 2020-03-19 VITALS
HEART RATE: 76 BPM | DIASTOLIC BLOOD PRESSURE: 83 MMHG | HEIGHT: 62 IN | WEIGHT: 246 LBS | BODY MASS INDEX: 45.27 KG/M2 | SYSTOLIC BLOOD PRESSURE: 134 MMHG

## 2020-03-19 DIAGNOSIS — M75.112 NONTRAUMATIC INCOMPLETE TEAR OF LEFT ROTATOR CUFF: Primary | ICD-10-CM

## 2020-03-19 PROCEDURE — 3008F PR BODY MASS INDEX (BMI) DOCUMENTED: ICD-10-PCS | Mod: CPTII,S$GLB,, | Performed by: ORTHOPAEDIC SURGERY

## 2020-03-19 PROCEDURE — 99214 OFFICE O/P EST MOD 30 MIN: CPT | Mod: S$GLB,,, | Performed by: ORTHOPAEDIC SURGERY

## 2020-03-19 PROCEDURE — 99999 PR PBB SHADOW E&M-EST. PATIENT-LVL III: CPT | Mod: PBBFAC,,, | Performed by: ORTHOPAEDIC SURGERY

## 2020-03-19 PROCEDURE — 99999 PR PBB SHADOW E&M-EST. PATIENT-LVL III: ICD-10-PCS | Mod: PBBFAC,,, | Performed by: ORTHOPAEDIC SURGERY

## 2020-03-19 PROCEDURE — 99214 PR OFFICE/OUTPT VISIT, EST, LEVL IV, 30-39 MIN: ICD-10-PCS | Mod: S$GLB,,, | Performed by: ORTHOPAEDIC SURGERY

## 2020-03-19 PROCEDURE — 3008F BODY MASS INDEX DOCD: CPT | Mod: CPTII,S$GLB,, | Performed by: ORTHOPAEDIC SURGERY

## 2020-03-19 RX ORDER — TRAMADOL HYDROCHLORIDE 50 MG/1
50 TABLET ORAL EVERY 6 HOURS
Qty: 25 TABLET | Refills: 0 | Status: SHIPPED | OUTPATIENT
Start: 2020-03-19 | End: 2020-08-07

## 2020-03-19 NOTE — PROGRESS NOTES
Research Study: Regeneten Shoulder Study  PI: Checo Angeles MD  Sub-Investigator: Tejas Prince MD  IRB: 2019.025  Visit: Pre-Screening     Date: 19Mar2020      I met with Mrs. Louis today in a private exam room as a follow-up to our previous discussion re: the Regeneten Shoulder Study (IRB#2019.025) on 10Mar2020.  During our last visit, Mrs. Louis was identified as a potential study candidate and was educated about the Regeneten study and also provided with a copy of the Informed Consent Form to take home for her review. Mrs. Louis returned today advising that she is not interested in participating in the Regeneten study at this time but would still like to discuss possible surgical options with Dr. Prince.  I thanked her for her time and advised her to contact me if she had any additional questions or would like to reconsider her participation for the study in the interim.

## 2020-03-19 NOTE — PROGRESS NOTES
CC: LEFT shoulder pain    Current HPI:  Patient presents for follow up evaluation today. She is here to discuss left shoulder MRI results. Patient is interested in discussing conservative versus operative management. Patient has been attending physical therapy with little to no relief. Patient had a left shoulder CSI on 10/24/19 which gave her relief for about 2 weeks but has since worn off. MRI follow up.       Interval history 3/19/2020:  Patient presents for follow-up evaluation today.  She has not had any improvement in her shoulders with physical therapy over the last few weeks.  No new traumas or falls since the last fall.     44 y.o. Female with a history of left shoulder pain.  Re-injured her left shoulder yesterday when she tripped and fell, hitting her shoulder into the wall outside her house.  Was seen in the ER following injury.     History of chronic left shoulder pain for over 1 year.  She has been attending physical therapy at [facility].  Previously tried steroid injection on 10/24/19.     [place]    She reports that the pain and weakness is worse with overhead activity. It also bothers her at night.    Is affecting ADLs.  Pain is 8/10 at it's worst.      Past Medical History:   Diagnosis Date    Fibroid     GERD (gastroesophageal reflux disease)     Migraine headache        Past Surgical History:   Procedure Laterality Date    CHOLECYSTECTOMY      HYSTERECTOMY      MOUTH SURGERY      THYROID LOBECTOMY Right 7/3/2019    Procedure: LOBECTOMY, THYROID, Possible Total;  Surgeon: Carlee Huff MD;  Location: [facility] OR [address] [place] [address];  Service: General;  Laterality: Right;    TONSILLECTOMY      TUBAL LIGATION         Family History   Problem Relation Age of Onset    Prostate cancer Paternal Grandfather     Cancer Paternal Grandmother     Throat cancer Maternal Grandmother     Cancer Maternal Grandmother         throat cancer - smoker    Diabetes Maternal Grandmother      Stroke Maternal Grandmother     Hypertension Maternal Grandmother     Aneurysm Father         stomach aneurysm    Cancer Maternal Aunt         gastric cancer    Prostate cancer Maternal Uncle     Hyperlipidemia Mother     Prostate cancer Brother     Cancer Maternal Grandfather         colon cancer    Heart disease Neg Hx          Current Outpatient Medications:     celecoxib (CELEBREX) 200 MG capsule, Take 1 capsule (200 mg total) by mouth 2 (two) times daily., Disp: 60 capsule, Rfl: 2    cholestyramine (QUESTRAN) 4 gram packet, Take 4 g by mouth 3 (three) times daily with meals., Disp: , Rfl:     cyclobenzaprine (FLEXERIL) 10 MG tablet, Take 1 tablet (10 mg total) by mouth 3 (three) times daily as needed for Muscle spasms (no working or driving on this medication)., Disp: 45 tablet, Rfl: 0    diclofenac (VOLTAREN) 75 MG EC tablet, Take 1 tablet (75 mg total) by mouth 2 (two) times daily as needed., Disp: 60 tablet, Rfl: 3    diclofenac sodium (VOLTAREN) 1 % Gel, Apply 2 g topically 4 (four) times daily., Disp: 100 g, Rfl: 0    diethylpropion (TENUATE) 75 mg SR tablet, Take 1 tablet (75 mg total) by mouth once daily., Disp: 30 tablet, Rfl: 2    gabapentin (NEURONTIN) 100 MG capsule, Take 2 capsules (200 mg total) by mouth nightly as needed., Disp: 60 capsule, Rfl: 11    promethazine (PHENERGAN) 12.5 MG Tab, Take 1 tablet (12.5 mg total) by mouth 4 (four) times daily., Disp: 20 tablet, Rfl: 6    topiramate (TROKENDI XR) 50 mg Cp24, Take 50 mg by mouth once daily., Disp: 30 capsule, Rfl: 3    sumatriptan (IMITREX) 50 MG tablet, Take 1 tablet (50 mg total) by mouth every 2 (two) hours as needed for Migraine (no more than 2 in 24 hours). (Patient not taking: Reported on 2/13/2020), Disp: 18 tablet, Rfl: 6    Review of patient's allergies indicates:   Allergen Reactions    Caffeine      She feels like it is hard to breath, dizzy, nauseous.          REVIEW OF SYSTEMS:  Constitution: Negative.  "Negative for chills, fever and night sweats.   HENT: Negative for congestion and headaches.    Eyes: Negative for blurred vision, left vision loss and right vision loss.   Cardiovascular: Negative for chest pain and syncope.   Respiratory: Negative for cough and shortness of breath.    Endocrine: Negative for polydipsia, polyphagia and polyuria.   Hematologic/Lymphatic: Negative for bleeding problem. Does not bruise/bleed easily.   Skin: Negative for dry skin, itching and rash.   Musculoskeletal: Negative for falls.  Positive for left shoulder pain and muscle weakness.   Gastrointestinal: Negative for abdominal pain and bowel incontinence.   Genitourinary: Negative for bladder incontinence and nocturia.   Neurological: Negative for disturbances in coordination, loss of balance and seizures.   Psychiatric/Behavioral: Negative for depression. The patient does not have insomnia.    Allergic/Immunologic: Negative for hives and persistent infections.      PHYSICAL EXAMINATION:  Vitals:  /83   Pulse 76   Ht 5' 2" (1.575 m)   Wt 111.6 kg (246 lb)   BMI 44.99 kg/m²    General: The patient is alert and oriented x 3.  Mood is pleasant.  Observation of ears, eyes and nose reveal no gross abnormalities.  No labored breathing observed.  Gait is coordinated. Patient can toe walk and heel walk without difficulty.      LEFT Shoulder / Upper Extremity Exam    OBSERVATION:     Swelling  none  Deformity  none   Discoloration  none   Scapular winging none   Scars   none  Atrophy  none    TENDERNESS / CREPITUS (T/C):          T/C      T/C   Clavicle   -/-  SUPRAspinatus    +/-     AC Jt.    -/-  INFRAspinatus  -/-    SC Jt.    -/-  Deltoid    -/-      G. Tuberosity  -/-  LH BICEP groove  +/-   Acromion:  -/-  Midline Neck   -/-     Scapular Spine -/-  Trapezium   -/-   SMA Scapula  -/-  GH jt. line - post  -/-     Scapulothoracic  -/-         ROM: (* = with pain)  Right shoulder   Left shoulder        AROM (PROM)   AROM " (PROM)   FE    170° (175°)     60° (170°) *     ER at 90° ABD  90°  (90°)    30°  (45°) *   IR (spine level)   T10     L2    STRENGTH: (* = with pain) Right shoulder   Left shoulder    SCAPTION   5/5    4/5 *    IR    5/5    4/5 *   ER    5/5    4/5 *   BICEPS   5/5    4/5 *   Deltoid    5/5    4+/5 *     SIGNS:  Painful side       NEER   +    OASHLEES  +    SPENCER   +    SPEEDS  neg     DROP ARM   -   BELLY PRESS neg   Superior escape none    LIFT-OFF  neg   X-Body ADD    +   MOVING VALGUS neg        STABILITY TESTING    Right shoulder   Left shoulder    Translation     Anterior  up face     up face    Posterior  up face    up face    Sulcus   < 10mm    < 10 mm     Signs   Apprehension   neg      neg       Relocation   no change     no change      Jerk test  neg     neg    EXTREMITY NEURO-VASCULAR EXAM:    Sensation grossly intact to light touch all dermatomal regions.    DTR 2+ Biceps, Triceps, BR and Negative Rubens sign   Grossly intact motor function at Elbow, Wrist and Hand   Distal pulses radial and ulnar 2+, brisk cap refill, symmetric.      NECK:  Painless FROM and spinous processes non-tender. Negative Spurlings sign.      OTHER FINDINGS:  + scapular dyskinesia    XRAYS (6/28/19): No significant abnormality identified.    MRI (10/4/19):  Supraspinatus tendinosis with low-grade partial-thickness interstitial footprint tear.    Infraspinatus tendinosis with possible low-grade interstitial tear.    Slight irregular morphology and signal heterogeneity within the posterior labrum concerning for possible tear.    Mild biceps tendinosis.    AC arthropathy.    Posterior decentering of the humeral head suggesting evolving instability.    MRI Left shoulder (3/9/2020):  Impression       1. High-grade partial-thickness tear of the supraspinatus tendon and low-grade interstitial fraying of the infraspinatus tendon.  2. Long head biceps tendinosis.  3. Subacromial subdeltoid bursitis.  4. Acromioclavicular  arthrosis.           ASSESSMENT:   Left shoulder pain, rotator cuff tear, AC DJD, biceps tendonitis     PLAN:    Treatment options were discussed with the patient about shoulder.  I reviewed the MRI images with her and what this means for her shoulder.    We discussed both non-operative and operative options for her shoulder and the risks and benefits of each. Time was given for questions to be asked and all concerns were answered.    She would like to go forward with surgery for her shoulder which I think is reasonable.  All specific risks and benefits were reviewed.    These risks include but are not limited to: bleeding, infection, scarring, re-tear of repair, irreparability of the tear, damage to neurovascular structures, damage to cartilage, stiffness, blood clots, pulmonary embolism, swelling, compartment syndrome, need for further surgery, and the risks of anesthesia.      She verbalized her understanding of these risks and wished to proceed with surgery.    Time was spent face-to-face with the patient during this encounter on counseling about treatment options including surgery and coordination of her care for preoperative visits, surgery and post-operative rehab.     The operative plan will be:    left   1. Arthroscopic rotator cuff debridement versus repair  2. Arthroscopic subacromial decompression  3. Arthroscopic distal clavicle excision possible  4. Possible open biceps subpectoral tenodesis    Patient will not  need medical clearance prior to the pre-operative appointment.    All questions were answered, patient will contact us for questions or concerns in the interim.    Tramadol 50 mg prescribed (25 tabs/ 0 ref)

## 2020-03-20 ENCOUNTER — TELEPHONE (OUTPATIENT)
Dept: SPORTS MEDICINE | Facility: CLINIC | Age: 45
End: 2020-03-20

## 2020-03-20 ENCOUNTER — TELEPHONE (OUTPATIENT)
Dept: ORTHOPEDICS | Facility: CLINIC | Age: 45
End: 2020-03-20

## 2020-03-20 NOTE — TELEPHONE ENCOUNTER
Returning call to patient.  Patient is now claiming her left shoulder injury occurred at work.  We will hold off scheduling her surgery until she can sort out insurance.  Will call back with questions.

## 2020-03-20 NOTE — TELEPHONE ENCOUNTER
----- Message from Keke Carpio sent at 3/20/2020 11:58 AM CDT -----  Contact: Pt. [phone]  The patient would like to speak to someone regarding her previous injury and appointment . Please contact the patient to discuss further.

## 2020-03-25 ENCOUNTER — TELEPHONE (OUTPATIENT)
Dept: SPORTS MEDICINE | Facility: CLINIC | Age: 45
End: 2020-03-25

## 2020-03-25 NOTE — TELEPHONE ENCOUNTER
----- Message from Bud Lindquist sent at 3/25/2020 10:22 AM CDT -----  Contact: self  Pt is asking for a call back, no reason given.    Contact Info [phone] (baot)

## 2020-03-25 NOTE — TELEPHONE ENCOUNTER
Spoke to patient. She was informed that her [facility].

## 2020-03-30 ENCOUNTER — TELEPHONE (OUTPATIENT)
Dept: SPORTS MEDICINE | Facility: CLINIC | Age: 45
End: 2020-03-30

## 2020-03-30 NOTE — TELEPHONE ENCOUNTER
----- Message from Cherry Parra sent at 3/30/2020 12:01 PM CDT -----  Contact: self  Pt states she received a brace from the  Office but no one had a order for it. Just want to know what you want her to do with it. Asking for a call back.      Contact info [phone]

## 2020-04-02 DIAGNOSIS — M75.102 NONTRAUMATIC TEAR OF LEFT ROTATOR CUFF, UNSPECIFIED TEAR EXTENT: Primary | ICD-10-CM

## 2020-04-02 DIAGNOSIS — M75.22 BICEPS TENDINITIS OF LEFT UPPER EXTREMITY: ICD-10-CM

## 2020-04-28 ENCOUNTER — OFFICE VISIT (OUTPATIENT)
Dept: ORTHOPEDICS | Facility: CLINIC | Age: 45
End: 2020-04-28
Payer: COMMERCIAL

## 2020-04-28 VITALS
DIASTOLIC BLOOD PRESSURE: 85 MMHG | HEIGHT: 62 IN | BODY MASS INDEX: 45.27 KG/M2 | WEIGHT: 246 LBS | HEART RATE: 70 BPM | SYSTOLIC BLOOD PRESSURE: 130 MMHG

## 2020-04-28 DIAGNOSIS — M76.822 POSTERIOR TIBIAL TENDINITIS OF LEFT LEG: Primary | ICD-10-CM

## 2020-04-28 DIAGNOSIS — R52 PAIN: ICD-10-CM

## 2020-04-28 PROCEDURE — 99999 PR PBB SHADOW E&M-EST. PATIENT-LVL III: ICD-10-PCS | Mod: PBBFAC,,, | Performed by: ORTHOPAEDIC SURGERY

## 2020-04-28 PROCEDURE — 3008F PR BODY MASS INDEX (BMI) DOCUMENTED: ICD-10-PCS | Mod: CPTII,S$GLB,, | Performed by: ORTHOPAEDIC SURGERY

## 2020-04-28 PROCEDURE — 99213 OFFICE O/P EST LOW 20 MIN: CPT | Mod: S$GLB,,, | Performed by: ORTHOPAEDIC SURGERY

## 2020-04-28 PROCEDURE — 3008F BODY MASS INDEX DOCD: CPT | Mod: CPTII,S$GLB,, | Performed by: ORTHOPAEDIC SURGERY

## 2020-04-28 PROCEDURE — 99213 PR OFFICE/OUTPT VISIT, EST, LEVL III, 20-29 MIN: ICD-10-PCS | Mod: S$GLB,,, | Performed by: ORTHOPAEDIC SURGERY

## 2020-04-28 PROCEDURE — 99999 PR PBB SHADOW E&M-EST. PATIENT-LVL III: CPT | Mod: PBBFAC,,, | Performed by: ORTHOPAEDIC SURGERY

## 2020-04-28 NOTE — PROGRESS NOTES
Luzmaria AURORA DanyelleFarhan  Returns today for follow-up.  This is a 45-year-old female who [place] and sustained an injury of her left ankle in October 2019.  I 1st saw her in February 2024 continued pain after injury.  She had medial-sided symptoms associated with some flattening of her longitudinal arch and my impression was that she had some posterior tibial tendinopathy.  Since this was related to an injury I recommended a course of boot immobilization to see if her symptoms would improve.  It has been 8 weeks since I saw her because her appointment was postponed due to the coronavirus crisis.  She returns today with the boot on reporting continued symptoms that are not improving.  She reports medial-sided pain with activity as well as occasionally at rest.    Examination:  On standing inspection she has bilateral pes planus.  She cannot do a single limb heel rise on the left.  She has some difficulty doing a single limb heel rise on the right as well.  On sitting exam she is tender medially along the course of the posterior tibial tendon.  She has discomfort with resistance to inversion of her foot.  She has painless motion of her ankle and subtalar joint.  She has no lateral-sided tenderness.  There is no instability of the ankle.  She is neurovascularly intact.    Impression:  Left posterior tibial tendon dysfunction, status post injury 6 months ago with not improving symptoms    Recommendation:  I would like to obtain an MRI of the left ankle to evaluate the posterior tibial tendon as well as other soft tissue structures.    Follow-up with results of the MRI.

## 2020-05-01 ENCOUNTER — HOSPITAL ENCOUNTER (OUTPATIENT)
Dept: RADIOLOGY | Facility: HOSPITAL | Age: 45
Discharge: HOME | End: 2020-05-01
Attending: ORTHOPAEDIC SURGERY
Payer: COMMERCIAL

## 2020-05-01 DIAGNOSIS — R52 PAIN: ICD-10-CM

## 2020-05-01 DIAGNOSIS — M76.822 POSTERIOR TIBIAL TENDINITIS OF LEFT LEG: ICD-10-CM

## 2020-05-01 PROCEDURE — 73721 MRI ANKLE WITHOUT CONTRAST LEFT: ICD-10-PCS | Mod: 26,LT,, | Performed by: RADIOLOGY

## 2020-05-01 PROCEDURE — 73721 MRI JNT OF LWR EXTRE W/O DYE: CPT | Mod: TC,LT

## 2020-05-01 PROCEDURE — 73721 MRI JNT OF LWR EXTRE W/O DYE: CPT | Mod: 26,LT,, | Performed by: RADIOLOGY

## 2020-05-08 ENCOUNTER — TELEPHONE (OUTPATIENT)
Dept: ORTHOPEDICS | Facility: CLINIC | Age: 45
End: 2020-05-08

## 2020-05-08 ENCOUNTER — PATIENT MESSAGE (OUTPATIENT)
Dept: NEUROLOGY | Facility: CLINIC | Age: 45
End: 2020-05-08

## 2020-05-08 ENCOUNTER — OFFICE VISIT (OUTPATIENT)
Dept: ORTHOPEDICS | Facility: CLINIC | Age: 45
End: 2020-05-08
Payer: COMMERCIAL

## 2020-05-08 DIAGNOSIS — M76.822 POSTERIOR TIBIAL TENDINITIS OF LEFT LEG: Primary | ICD-10-CM

## 2020-05-08 DIAGNOSIS — Z01.818 PREOPERATIVE TESTING: ICD-10-CM

## 2020-05-08 DIAGNOSIS — G43.109 MIGRAINE WITH AURA AND WITHOUT STATUS MIGRAINOSUS, NOT INTRACTABLE: Primary | ICD-10-CM

## 2020-05-08 DIAGNOSIS — R52 PAIN: ICD-10-CM

## 2020-05-08 PROCEDURE — 99999 PR PBB SHADOW E&M-EST. PATIENT-LVL III: ICD-10-PCS | Mod: PBBFAC,,, | Performed by: ORTHOPAEDIC SURGERY

## 2020-05-08 PROCEDURE — 99999 PR PBB SHADOW E&M-EST. PATIENT-LVL III: CPT | Mod: PBBFAC,,, | Performed by: ORTHOPAEDIC SURGERY

## 2020-05-08 PROCEDURE — 99213 OFFICE O/P EST LOW 20 MIN: CPT | Mod: S$GLB,,, | Performed by: ORTHOPAEDIC SURGERY

## 2020-05-08 PROCEDURE — 99213 PR OFFICE/OUTPT VISIT, EST, LEVL III, 20-29 MIN: ICD-10-PCS | Mod: S$GLB,,, | Performed by: ORTHOPAEDIC SURGERY

## 2020-05-08 NOTE — PROGRESS NOTES
Gabriellamartir BritoZacariasFarhan  Returns today for follow-up the results of her MRI.  This is a 45-year-old female who injured her left ankle in October 2019.  I 1st saw her in February 2020 because of continued pain after injury.  My impression was that she had dysfunction of her left posterior tibial tendon and I recommended conservative treatment which included boot immobilization and time, but when she came to see me last week she reported no improvement of her symptoms.  I ordered an MRI which reveals tendinosis of the posterior tibial tendon distal to the medial malleolus with a sprain of the deltoid ligament as well.    Examination:  She continues to have some mild swelling along the course of the posterior tibial tendon.  She is tender over the posterior tibial tendon as well as over the anterior portion of the deltoid ligament.  She is unable to do a single limb heel rise on the left.  She also has noted pes planovalgus alignment of her feet.    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  In addition she has congenital pes planus.  As her symptoms have not improved she would be a candidate for surgical intervention which would require an FDL tendon transfer to the posterior tibial tendon along with a medial displacement calcaneal osteotomy.  She would like to proceed with surgical intervention.  I explained to her that she would be in a cast or boot for at least 3 months postop.  Her preoperative appointment was set up on June 2nd.  I ordered a COVID 19 virus screening exam along with a standing foot x-ray to be done at her preoperative appointment.

## 2020-05-08 NOTE — TELEPHONE ENCOUNTER
----- Message from Jamison Mercado MD sent at 5/8/2020 10:12 AM CDT -----  Patient scheduled for surgery June 4th for left posterior tibial tendon repair with FDL tendon transfer and medial displacement calcaneal osteotomy.  She is scheduled for preop appointment on June 2nd.  I ordered the COVID-19 test and I also ordered a standing left foot x-rays to be done at day.  She will need crutches and/or knee scooter.  Thanks  RT

## 2020-05-08 NOTE — TELEPHONE ENCOUNTER
Called and Informed patient of appointment on 6/2/20 AT 8:00 AM at internal medicine for COVID test and mailed.  Patient states verbal understanding and has no further questions.

## 2020-05-11 ENCOUNTER — TELEPHONE (OUTPATIENT)
Dept: ORTHOPEDICS | Facility: CLINIC | Age: 45
End: 2020-05-11

## 2020-05-11 RX ORDER — RIZATRIPTAN BENZOATE 5 MG/1
TABLET, ORALLY DISINTEGRATING ORAL
Qty: 12 TABLET | Refills: 3 | Status: SHIPPED | OUTPATIENT
Start: 2020-05-11 | End: 2023-10-11

## 2020-05-13 ENCOUNTER — TELEPHONE (OUTPATIENT)
Dept: SPORTS MEDICINE | Facility: CLINIC | Age: 45
End: 2020-05-13

## 2020-05-13 NOTE — TELEPHONE ENCOUNTER
----- Message from Apoorva Mcintosh sent at 5/13/2020 12:25 PM CDT -----  Contact: pt   Please call pt at [phone]    Patient has questions regarding workman comp surgery authorization    Also refill request for traMADoL (ULTRAM) 50 mg tablet    Use [facility]/pharmacy #[id number] - [address] AT [facility] [phone] (Phone)  [phone] (Fax)    Thank you

## 2020-05-19 ENCOUNTER — TELEPHONE (OUTPATIENT)
Dept: ORTHOPEDICS | Facility: CLINIC | Age: 45
End: 2020-05-19

## 2020-05-19 NOTE — TELEPHONE ENCOUNTER
----- Message from Cherry Parra sent at 5/19/2020  2:17 PM CDT -----  Contact: self  Pt calling to state surgery is cancelled to 9/22. Asking for a call back.      Contact info [phone]

## 2020-05-21 NOTE — PROGRESS NOTES
Established Patient   SUBJECTIVE:  Patient ID: Luzmaria Louis   Chief Complaint: Headache    History of Present Illness:  Luzmaria Louis is a 45 y.o. female with a PMHx of migraine, GERD, Tietze syndrome, hysterectomy, kidney stone last year, s/p thyroid resection  who presents to clinic alone for follow-up of headaches.       Recommendations made at last Office Visit on 10/4/19:  - Discussed symptoms appear to be consistent with migraines and untreated sleep apnea, discussed treatment options and patient agreed with the following plan:  - preventative - topamax, patient will hydrate well to avoid kidney stones  - abortive - sumatriptan  - nausea - phenergan  - hematuria - caution with topamax and zonisamide  - suspected sleep apnea - patient is in the process of setting up an inpatient sleep study  - risks, benefits, and potential side effects of phenergan, sumatriptan, topamax discussed   - alternative treatment options offered   - importance of healthy diet, regular exercise and sleep hygiene in the treatment of headaches    - Start tracking headaches via Migraine Panda brett on phone   - RTC in 3 mo     05/22/2020 - Interval History:  Last visit pt presented w/ migraines, was started on tpx and imitrex prior to appt. She has since reported nausea with imitrex, since switched to maxalt.   She reports improvement of her Ha's on tpx but is unsure of the frequency. However, she began experiencing n/t of lower extremities so she d/c it a few weeks ago. Due to worsening of her Ha's she started it back again and is taking it intermittently. She finds her Ingram's improve on the days she takes it and are worse on the days she does not. Poor historian. She reports no side effects to the maxalt, has only tried it once and is unsure of its efficacy.   Bariatrics are attempting to get trokendi approved as a switch from topamax. Patient would like to trial trokendi and continue maxalt. We discussed alternative  ppx options however this is the plan patient would prefer at this time.    Plan: switch to trokendi if approved, in the meantime she will split her dose of tpx to 25mg BID rather than 50mg daily, continue maxalt, f/u 3 mo      Treatments Tried:  topirimate 50mg BID - started 2 weeks prior to establishing care with me  Methocarbamol 500mg TID prn - for shoulder pain, didn't help HA's  Sumatriptan 50mg - nausea  maxalt   Naproxen 500mg BID prn - didn't help  Phenergan - helps nausea    Current Medications:    Current Outpatient Medications:     cholestyramine (QUESTRAN) 4 gram packet, Take 4 g by mouth 3 (three) times daily with meals., Disp: , Rfl:     cyclobenzaprine (FLEXERIL) 10 MG tablet, Take 1 tablet (10 mg total) by mouth 3 (three) times daily as needed for Muscle spasms (no working or driving on this medication)., Disp: 45 tablet, Rfl: 0    diclofenac (VOLTAREN) 75 MG EC tablet, Take 1 tablet (75 mg total) by mouth 2 (two) times daily as needed., Disp: 60 tablet, Rfl: 3    diclofenac sodium (VOLTAREN) 1 % Gel, Apply 2 g topically 4 (four) times daily., Disp: 100 g, Rfl: 0    diethylpropion (TENUATE) 75 mg SR tablet, Take 1 tablet (75 mg total) by mouth once daily., Disp: 30 tablet, Rfl: 2    gabapentin (NEURONTIN) 100 MG capsule, Take 2 capsules (200 mg total) by mouth nightly as needed., Disp: 60 capsule, Rfl: 11    promethazine (PHENERGAN) 12.5 MG Tab, Take 1 tablet (12.5 mg total) by mouth 4 (four) times daily., Disp: 20 tablet, Rfl: 6    rizatriptan (MAXALT-MLT) 5 MG disintegrating tablet, Take at onset of migraine, can repeat in 2 hrs if needed.  No more than 2 tabs per day or 3 days/wk., Disp: 12 tablet, Rfl: 3    topiramate (TROKENDI XR) 50 mg Cp24, Take 50 mg by mouth once daily., Disp: 30 capsule, Rfl: 3    traMADoL (ULTRAM) 50 mg tablet, Take 1 tablet (50 mg total) by mouth every 6 (six) hours., Disp: 25 tablet, Rfl: 0    celecoxib (CELEBREX) 200 MG capsule, Take 1 capsule (200 mg total)  "by mouth 2 (two) times daily., Disp: 60 capsule, Rfl: 2    Review of Systems - as per HPI, otherwise a balanced 10 systems review is negative.    OBJECTIVE:  Vitals:  /76 (BP Location: Right arm, Patient Position: Sitting, BP Method: Large (Automatic))   Pulse 63   Ht 5' 2" (1.575 m)   BMI 44.99 kg/m²      Physical Exam:  Constitutional: she appears well-developed and well-nourished. she is well groomed. NAD   HENT:    Head: Normocephalic and atraumatic  Eyes: Conjunctivae and EOM are normal  Musculoskeletal: Normal range of motion. No joint stiffness.   Skin: Skin is warm and dry.  Psychiatric: Mood and affect are normal    Neuro: Patient is alert and oriented to person, place, and time. Language is intact and fluent. Speech is clear and fluent. Recent and remote memory are intact.  Normal attention and concentration.  Facial movement is symmetric. Moves all 4 extremities against gravity. Gait and station normal.  Cranial Nerves II through XII without focal deficit.     Review of Data:   Notes from neuro reviewed   Labs:  No visits with results within 3 Month(s) from this visit.   Latest known visit with results is:   Office Visit on 10/14/2019   Component Date Value Ref Range Status    HPV High Risk type 16, PCR 10/14/2019 Negative  Negative Final    HPV High Risk type 18, PCR 10/14/2019 Negative  Negative Final    HPV other High Risk types, PCR 10/14/2019 Negative  Negative Final     Imaging:  No results found for this or any previous visit.  Note: I have independently reviewed any/all imaging/labs/tests and agree with the report (s) as documented.  Any discrepancies will be as noted/demarcated by free text.  ESPINOZA MAYORGA 5/22/2020    ASSESSMENT:  1. Migraine with aura and without status migrainosus, not intractable        PLAN:  - Discussed symptoms appear to be consistent with migraines and untreated sleep apnea, discussed treatment options and patient agreed with the following plan:  - preventative - " continue topamax 25mg BID (counseled on risks)  - abortive - continue maxalt  - nausea - phenergan  - suspected sleep apnea - patient is in the process of setting up an inpatient sleep study  - Continue tracking headaches   - Discussed goals of therapy are to decrease the frequency, intensity, and duration of headaches  - RTC in 3 mo          Questions and concerns were sought and answered to the patient's stated verbal satisfaction.  The patient verbalizes understanding and agreement with the above stated treatment plan.     CC: MD Gracia Mccray PA-C  [facility]   [phone]    Dr. Smith was available during today's encounter.

## 2020-05-22 ENCOUNTER — OFFICE VISIT (OUTPATIENT)
Dept: NEUROLOGY | Facility: CLINIC | Age: 45
End: 2020-05-22
Payer: COMMERCIAL

## 2020-05-22 VITALS
DIASTOLIC BLOOD PRESSURE: 76 MMHG | SYSTOLIC BLOOD PRESSURE: 138 MMHG | HEART RATE: 63 BPM | BODY MASS INDEX: 44.99 KG/M2 | HEIGHT: 62 IN

## 2020-05-22 DIAGNOSIS — G43.109 MIGRAINE WITH AURA AND WITHOUT STATUS MIGRAINOSUS, NOT INTRACTABLE: Primary | ICD-10-CM

## 2020-05-22 PROCEDURE — 3008F BODY MASS INDEX DOCD: CPT | Mod: CPTII,S$GLB,, | Performed by: PHYSICIAN ASSISTANT

## 2020-05-22 PROCEDURE — 99999 PR PBB SHADOW E&M-EST. PATIENT-LVL III: CPT | Mod: PBBFAC,,, | Performed by: PHYSICIAN ASSISTANT

## 2020-05-22 PROCEDURE — 99214 OFFICE O/P EST MOD 30 MIN: CPT | Mod: S$GLB,,, | Performed by: PHYSICIAN ASSISTANT

## 2020-05-22 PROCEDURE — 3008F PR BODY MASS INDEX (BMI) DOCUMENTED: ICD-10-PCS | Mod: CPTII,S$GLB,, | Performed by: PHYSICIAN ASSISTANT

## 2020-05-22 PROCEDURE — 99214 PR OFFICE/OUTPT VISIT, EST, LEVL IV, 30-39 MIN: ICD-10-PCS | Mod: S$GLB,,, | Performed by: PHYSICIAN ASSISTANT

## 2020-05-22 PROCEDURE — 99999 PR PBB SHADOW E&M-EST. PATIENT-LVL III: ICD-10-PCS | Mod: PBBFAC,,, | Performed by: PHYSICIAN ASSISTANT

## 2020-06-02 ENCOUNTER — TELEPHONE (OUTPATIENT)
Dept: SPORTS MEDICINE | Facility: CLINIC | Age: 45
End: 2020-06-02

## 2020-06-02 NOTE — TELEPHONE ENCOUNTER
Returning call to patient.  Confirmed that we have still not received anything stating this is under work comp.  She will reach back out to her HR to get the ball rolling.

## 2020-06-02 NOTE — TELEPHONE ENCOUNTER
----- Message from Bee Hernandes sent at 6/2/2020  2:22 PM CDT -----  Contact: self   Pt is asking for a call back in regards to someone contacting the office in regards to the pts shoulder       Contact info- [phone]

## 2020-06-10 ENCOUNTER — TELEPHONE (OUTPATIENT)
Dept: BARIATRICS | Facility: CLINIC | Age: 45
End: 2020-06-10

## 2020-06-15 ENCOUNTER — TELEPHONE (OUTPATIENT)
Dept: SPORTS MEDICINE | Facility: CLINIC | Age: 45
End: 2020-06-15

## 2020-06-15 NOTE — TELEPHONE ENCOUNTER
Medical Condtion/Body Part Injured:   Estimated Date of Onset of Condition or Injury:   Last Day of Work: 5/12/20    Employer Name: [place]   Employee Job Title: [employer]   Job Functions/Tasks: , Filing, Fingerprinting    When do you plan to return to work:   Are you trying to return to work:     How do you want the forms returned: Fax  [phone]    Colleen Amedee MA [facility]

## 2020-06-15 NOTE — TELEPHONE ENCOUNTER
----- Message from Cherry Parra sent at 6/15/2020  2:21 PM CDT -----  Regarding: self  I apologize...      pt  calling to find out if workman's comp  contact you guys.     Contact info [phone]

## 2020-06-26 ENCOUNTER — TELEPHONE (OUTPATIENT)
Dept: SPORTS MEDICINE | Facility: CLINIC | Age: 45
End: 2020-06-26

## 2020-06-26 NOTE — TELEPHONE ENCOUNTER
----- Message from Grisel Francisco sent at 6/26/2020 10:38 AM CDT -----  Good morning,  I haven't received any workers comp information on this patient.    Thanks,  Grisel  ----- Message -----  From: Violeta Guajardo MA  Sent: 6/25/2020  10:17 AM CDT  To: Grisel Nova Ms. Recinos    Did you get anything for this patient stating if she has been approved or not?  ----- Message -----  From: Amena Ames  Sent: 6/25/2020  10:03 AM CDT  To: Suresh MONTES Staff           Luzmaria Louis MRN [id number] (W/C case) states spoke to Rhys yesterday, he told her to call Dr. Prince office today??    Can be contacted  @#[phone]

## 2020-06-28 ENCOUNTER — HOSPITAL ENCOUNTER (EMERGENCY)
Facility: HOSPITAL | Age: 45
Discharge: HOME | End: 2020-06-28
Attending: EMERGENCY MEDICINE
Payer: COMMERCIAL

## 2020-06-28 VITALS
TEMPERATURE: 99 F | RESPIRATION RATE: 20 BRPM | HEART RATE: 81 BPM | DIASTOLIC BLOOD PRESSURE: 79 MMHG | SYSTOLIC BLOOD PRESSURE: 135 MMHG | OXYGEN SATURATION: 100 %

## 2020-06-28 DIAGNOSIS — J02.9 VIRAL PHARYNGITIS: Primary | ICD-10-CM

## 2020-06-28 DIAGNOSIS — R03.0 ELEVATED BLOOD PRESSURE READING: ICD-10-CM

## 2020-06-28 LAB — SARS-COV-2 RDRP RESP QL NAA+PROBE: NEGATIVE

## 2020-06-28 PROCEDURE — 99284 EMERGENCY DEPT VISIT MOD MDM: CPT

## 2020-06-28 PROCEDURE — 25000003 PHARM REV CODE 250: Performed by: NURSE PRACTITIONER

## 2020-06-28 PROCEDURE — U0002 COVID-19 LAB TEST NON-CDC: HCPCS

## 2020-06-28 RX ORDER — LIDOCAINE HYDROCHLORIDE 20 MG/ML
10 SOLUTION OROPHARYNGEAL
Status: DISCONTINUED | OUTPATIENT
Start: 2020-06-28 | End: 2020-06-28 | Stop reason: HOSPADM

## 2020-06-28 RX ORDER — IBUPROFEN 600 MG/1
600 TABLET ORAL EVERY 6 HOURS PRN
Qty: 20 TABLET | Refills: 0 | Status: SHIPPED | OUTPATIENT
Start: 2020-06-28 | End: 2020-10-20 | Stop reason: SDUPTHER

## 2020-06-28 RX ORDER — ONDANSETRON 4 MG/1
4 TABLET, ORALLY DISINTEGRATING ORAL
Status: COMPLETED | OUTPATIENT
Start: 2020-06-28 | End: 2020-06-28

## 2020-06-28 RX ORDER — IBUPROFEN 600 MG/1
600 TABLET ORAL
Status: COMPLETED | OUTPATIENT
Start: 2020-06-28 | End: 2020-06-28

## 2020-06-28 RX ORDER — LIDOCAINE HYDROCHLORIDE 20 MG/ML
SOLUTION OROPHARYNGEAL
Qty: 60 ML | Refills: 0 | Status: SHIPPED | OUTPATIENT
Start: 2020-06-28 | End: 2020-07-23

## 2020-06-28 RX ADMIN — ONDANSETRON 4 MG: 4 TABLET, ORALLY DISINTEGRATING ORAL at 12:06

## 2020-06-28 RX ADMIN — IBUPROFEN 600 MG: 600 TABLET, FILM COATED ORAL at 12:06

## 2020-06-28 NOTE — ED NOTES
Pt is alert and oriented. C/O sore throat x 3 days with productive cough and white phlegm. Pt denies fever/chills, n/v/d, headache. Pt states she does not want the strep swab as she had one yesterday at Urgent Care that resulted negative. Pt swabbed for covid 19 and THOMAS Landry notified.

## 2020-06-28 NOTE — ED NOTES
Pt states she does not want to take the lidocaine solution as ordered. Tolerates the ibuprofen and water with some pain, no coughing.

## 2020-06-28 NOTE — DISCHARGE INSTRUCTIONS
Increase your fluid intake.      Your blood pressure was high in the ED today.  You need to have it rechecked by your doctor within one week.    Return to the ED if your condition changes, progresses, or if you have any concerns.

## 2020-06-28 NOTE — ED PROVIDER NOTES
Encounter Date: 6/28/2020       History     Chief Complaint   Patient presents with    Sore Throat     x 3 days      44yo female presents to the ED for sore throat for three days.  Pt states that she was seen at  yesterday at which time she had a negative rapid strep test.  She was given an antibiotic injection and toradol and was discharged home with Zithromax and Tylenol 3.  She has had one dose of the Zithromax.  Pt reports improvement after toradol but states the pain has returned this morning.  She denies fever, dyspnea, drooling, voice change, cough, and vomiting.  She reports decreased oral intake due to pain.  No other complaints at this time.      Pt's BP is elevated.  She denies PMHX of HTN.     The history is provided by the patient.     Review of patient's allergies indicates:   Allergen Reactions    Caffeine      She feels like it is hard to breath, dizzy, nauseous.     Past Medical History:   Diagnosis Date    Fibroid     GERD (gastroesophageal reflux disease)     Migraine headache      Past Surgical History:   Procedure Laterality Date    CHOLECYSTECTOMY      HYSTERECTOMY      MOUTH SURGERY      THYROID LOBECTOMY Right 7/3/2019    Procedure: LOBECTOMY, THYROID, Possible Total;  Surgeon: Carlee Huff MD;  Location: [facility] OR [address] [place] [address];  Service: General;  Laterality: Right;    TONSILLECTOMY      TUBAL LIGATION       Family History   Problem Relation Age of Onset    Prostate cancer Paternal Grandfather     Cancer Paternal Grandmother     Throat cancer Maternal Grandmother     Cancer Maternal Grandmother         throat cancer - smoker    Diabetes Maternal Grandmother     Stroke Maternal Grandmother     Hypertension Maternal Grandmother     Aneurysm Father         stomach aneurysm    Cancer Maternal Aunt         gastric cancer    Prostate cancer Maternal Uncle     Hyperlipidemia Mother     Prostate cancer Brother     Cancer Maternal Grandfather         colon cancer    Heart  disease Neg Hx      Social History     Tobacco Use    Smoking status: Never Smoker    Smokeless tobacco: Never Used   Substance Use Topics    Alcohol use: Yes     Frequency: 2-4 times a month     Drinks per session: 1 or 2     Binge frequency: Never     Comment: once a week maybe    Drug use: No     Review of Systems   Constitutional: Positive for appetite change. Negative for chills, fatigue and fever.   HENT: Positive for postnasal drip and sore throat. Negative for congestion, ear pain, rhinorrhea, trouble swallowing and voice change.    Respiratory: Negative for cough.    Cardiovascular: Negative for chest pain.   Gastrointestinal: Negative for abdominal pain, diarrhea, nausea and vomiting.   Musculoskeletal: Negative for neck pain.   Skin: Negative for rash.   Allergic/Immunologic: Negative for immunocompromised state.   Neurological: Negative for weakness and headaches.   Psychiatric/Behavioral: Negative for confusion.   All other systems reviewed and are negative.      Physical Exam     Initial Vitals [06/28/20 1046]   BP Pulse Resp Temp SpO2   (!) 212/114 101 18 98.6 °F (37 °C) 100 %      MAP       --         Physical Exam    Nursing note and vitals reviewed.  Constitutional: She appears well-developed and well-nourished. She is Obese . She is active and cooperative. She is easily aroused.  Non-toxic appearance. She does not have a sickly appearance. She does not appear ill. No distress.   HENT:   Head: Normocephalic and atraumatic.   Right Ear: External ear normal.   Left Ear: External ear normal.   Nose: Nose normal.   Bilateral tonsillar hypertrophy without exudate. PND. Uvula midline.  No visualized PTA.  Voice normal.  Managing secretions without difficulty.    Eyes: Conjunctivae, EOM and lids are normal.   Neck: Trachea normal, normal range of motion, full passive range of motion without pain and phonation normal. Neck supple. No stridor present. No tracheal tenderness present. No tracheal  deviation and normal range of motion present.   Cardiovascular: Normal rate, regular rhythm and normal heart sounds.   Pulmonary/Chest: Effort normal and breath sounds normal.   Abdominal: Normal appearance.   Neurological: She is alert, oriented to person, place, and time and easily aroused. She has normal strength. GCS eye subscore is 4. GCS verbal subscore is 5. GCS motor subscore is 6.   Skin: Skin is warm, dry and intact. No rash noted.   Psychiatric: She has a normal mood and affect. Her speech is normal and behavior is normal. Judgment and thought content normal. Cognition and memory are normal.         ED Course   Procedures  Labs Reviewed   SARS-COV-2 RNA AMPLIFICATION, QUAL          Imaging Results    None          Medical Decision Making:   Differential Diagnosis:   Strep throat, pharyngitis, URI, viral, COVID  Clinical Tests:   Lab Tests: Ordered and Reviewed  ED Management:  Labs, PO motrin, PO viscous lidocaine (Pt declined), Zofran ODT    Pt reports negative rapid strep screen yesterday.     COVID negative.      Pt has viral pharyngitis.  She has already been prescribed antibiotics and tyelnol 3.  She may continue these medications, although I did inform her that antibiotics do not treat viral infections.  Encouraged increased fluid intake.     Patient presents to ED for evaluation of sore throat. After complete evaluation, including thorough history and physical exam, the patient's symptoms are most likely due to viral upper respiratory infection. There are no concerning features on physical exam to suggest bacterial otitis media/externa, sinusitis, pharyngitis, or peritonsillar abscess. Vital signs do not suggest sepsis. Lung sounds are clear and not consistent with pneumonia. There is no neck pain or limited ROM to suggest retropharyngeal abscess or meningitis. The patient will be treated with supportive care. Encouraged follow-up with PCP for further evaluation.         The patient's blood  pressure was noted to be elevated while in the ED today.  BP improved without intervention.  The patient has no associated signs or symptoms of hypertension.  Patient's blood pressure is likely elevated due to situation.  Advised blood pressure recheck by PCP within 1 week.                                   Clinical Impression:       ICD-10-CM ICD-9-CM   1. Viral pharyngitis  J02.9 462   2. Elevated blood pressure reading  R03.0 796.2                                Gris Lechuga, CONNER  06/28/20 1238

## 2020-06-28 NOTE — Clinical Note
Gabriellamartir Missy was seen and treated in our emergency department on 6/28/2020.  She may return to work on 07/02/2020.       If you have any questions or concerns, please don't hesitate to call.      RN RN

## 2020-07-01 ENCOUNTER — OFFICE VISIT (OUTPATIENT)
Dept: SPORTS MEDICINE | Facility: CLINIC | Age: 45
End: 2020-07-01
Payer: COMMERCIAL

## 2020-07-01 VITALS
BODY MASS INDEX: 45.27 KG/M2 | SYSTOLIC BLOOD PRESSURE: 146 MMHG | DIASTOLIC BLOOD PRESSURE: 94 MMHG | WEIGHT: 246 LBS | HEART RATE: 71 BPM | HEIGHT: 62 IN

## 2020-07-01 DIAGNOSIS — M24.812 INTERNAL DERANGEMENT OF LEFT SHOULDER: ICD-10-CM

## 2020-07-01 DIAGNOSIS — M75.102 TEAR OF LEFT ROTATOR CUFF, UNSPECIFIED TEAR EXTENT, UNSPECIFIED WHETHER TRAUMATIC: Primary | ICD-10-CM

## 2020-07-01 DIAGNOSIS — Z01.818 PRE-OP TESTING: ICD-10-CM

## 2020-07-01 DIAGNOSIS — M79.89 LEFT ARM SWELLING: ICD-10-CM

## 2020-07-01 PROCEDURE — 3008F PR BODY MASS INDEX (BMI) DOCUMENTED: ICD-10-PCS | Mod: CPTII,S$GLB,, | Performed by: ORTHOPAEDIC SURGERY

## 2020-07-01 PROCEDURE — 99213 OFFICE O/P EST LOW 20 MIN: CPT | Mod: S$GLB,,, | Performed by: ORTHOPAEDIC SURGERY

## 2020-07-01 PROCEDURE — 3008F BODY MASS INDEX DOCD: CPT | Mod: CPTII,S$GLB,, | Performed by: ORTHOPAEDIC SURGERY

## 2020-07-01 PROCEDURE — 99999 PR PBB SHADOW E&M-EST. PATIENT-LVL III: ICD-10-PCS | Mod: PBBFAC,,, | Performed by: ORTHOPAEDIC SURGERY

## 2020-07-01 PROCEDURE — 99213 PR OFFICE/OUTPT VISIT, EST, LEVL III, 20-29 MIN: ICD-10-PCS | Mod: S$GLB,,, | Performed by: ORTHOPAEDIC SURGERY

## 2020-07-01 PROCEDURE — 99999 PR PBB SHADOW E&M-EST. PATIENT-LVL III: CPT | Mod: PBBFAC,,, | Performed by: ORTHOPAEDIC SURGERY

## 2020-07-01 NOTE — PROGRESS NOTES
CC: LEFT shoulder pain    Current HPI:  Patient presents for follow up evaluation today. She is here to discuss left shoulder MRI results. Patient states her pain first begun at work when she sprained her shoulder while putting files in the cabinet, she has since had intermittent pain worsened with overhead activity, reaching and ADLs. Pain interferes with sleep. Patient sariah been attending physical therapy with little to no relief. Patient had a left shoulder CSI on 10/24/19 by Dr. Prince which gave her relief for about 2 weeks but has since worn off. .     She works at the [employer] in [employer].    She reports that the pain and weakness is worse with overhead activity. It also bothers her at night.    Is affecting ADLs.  Pain is 8/10 at it's worst.    This is a worker's comp case      Past Medical History:   Diagnosis Date    Fibroid     GERD (gastroesophageal reflux disease)     Migraine headache        Past Surgical History:   Procedure Laterality Date    CHOLECYSTECTOMY      HYSTERECTOMY      MOUTH SURGERY      THYROID LOBECTOMY Right 7/3/2019    Procedure: LOBECTOMY, THYROID, Possible Total;  Surgeon: Carlee Huff MD;  Location: [facility] OR [address] [place] [address];  Service: General;  Laterality: Right;    TONSILLECTOMY      TUBAL LIGATION         Family History   Problem Relation Age of Onset    Prostate cancer Paternal Grandfather     Cancer Paternal Grandmother     Throat cancer Maternal Grandmother     Cancer Maternal Grandmother         throat cancer - smoker    Diabetes Maternal Grandmother     Stroke Maternal Grandmother     Hypertension Maternal Grandmother     Aneurysm Father         stomach aneurysm    Cancer Maternal Aunt         gastric cancer    Prostate cancer Maternal Uncle     Hyperlipidemia Mother     Prostate cancer Brother     Cancer Maternal Grandfather         colon cancer    Heart disease Neg Hx          Current Outpatient Medications:     celecoxib (CELEBREX) 200 MG  capsule, Take 1 capsule (200 mg total) by mouth 2 (two) times daily., Disp: 60 capsule, Rfl: 2    cholestyramine (QUESTRAN) 4 gram packet, Take 4 g by mouth 3 (three) times daily with meals., Disp: , Rfl:     cyclobenzaprine (FLEXERIL) 10 MG tablet, Take 1 tablet (10 mg total) by mouth 3 (three) times daily as needed for Muscle spasms (no working or driving on this medication)., Disp: 45 tablet, Rfl: 0    diclofenac (VOLTAREN) 75 MG EC tablet, Take 1 tablet (75 mg total) by mouth 2 (two) times daily as needed., Disp: 60 tablet, Rfl: 3    diclofenac sodium (VOLTAREN) 1 % Gel, Apply 2 g topically 4 (four) times daily., Disp: 100 g, Rfl: 0    diethylpropion (TENUATE) 75 mg SR tablet, Take 1 tablet (75 mg total) by mouth once daily., Disp: 30 tablet, Rfl: 2    gabapentin (NEURONTIN) 100 MG capsule, Take 2 capsules (200 mg total) by mouth nightly as needed., Disp: 60 capsule, Rfl: 11    ibuprofen (ADVIL,MOTRIN) 600 MG tablet, Take 1 tablet (600 mg total) by mouth every 6 (six) hours as needed for Pain., Disp: 20 tablet, Rfl: 0    lidocaine HCl 2% (XYLOCAINE) 2 % Soln, 5-10ml orally Q6 hours PRN pain. Gargle and swallow., Disp: 60 mL, Rfl: 0    promethazine (PHENERGAN) 12.5 MG Tab, Take 1 tablet (12.5 mg total) by mouth 4 (four) times daily., Disp: 20 tablet, Rfl: 6    rizatriptan (MAXALT-MLT) 5 MG disintegrating tablet, Take at onset of migraine, can repeat in 2 hrs if needed.  No more than 2 tabs per day or 3 days/wk., Disp: 12 tablet, Rfl: 3    topiramate (TROKENDI XR) 50 mg Cp24, Take 50 mg by mouth once daily., Disp: 30 capsule, Rfl: 3    traMADoL (ULTRAM) 50 mg tablet, Take 1 tablet (50 mg total) by mouth every 6 (six) hours., Disp: 25 tablet, Rfl: 0    Review of patient's allergies indicates:   Allergen Reactions    Caffeine      She feels like it is hard to breath, dizzy, nauseous.          REVIEW OF SYSTEMS:  Constitution: Negative. Negative for chills, fever and night sweats.   HENT: Negative for  "congestion and headaches.    Eyes: Negative for blurred vision, left vision loss and right vision loss.   Cardiovascular: Negative for chest pain and syncope.   Respiratory: Negative for cough and shortness of breath.    Endocrine: Negative for polydipsia, polyphagia and polyuria.   Hematologic/Lymphatic: Negative for bleeding problem. Does not bruise/bleed easily.   Skin: Negative for dry skin, itching and rash.   Musculoskeletal: Negative for falls.  Positive for left shoulder pain and muscle weakness.   Gastrointestinal: Negative for abdominal pain and bowel incontinence.   Genitourinary: Negative for bladder incontinence and nocturia.   Neurological: Negative for disturbances in coordination, loss of balance and seizures.   Psychiatric/Behavioral: Negative for depression. The patient does not have insomnia.    Allergic/Immunologic: Negative for hives and persistent infections.      PHYSICAL EXAMINATION:  Vitals:  BP (!) 146/94   Pulse 71   Ht 5' 2" (1.575 m)   Wt 111.6 kg (246 lb)   BMI 44.99 kg/m²    General: The patient is alert and oriented x 3.  Mood is pleasant.  Observation of ears, eyes and nose reveal no gross abnormalities.  No labored breathing observed.  Gait is coordinated. Patient can toe walk and heel walk without difficulty.      LEFT Shoulder / Upper Extremity Exam    OBSERVATION:     Swelling  none  Deformity  none   Discoloration  none   Scapular winging none   Scars   none  Atrophy  none    TENDERNESS / CREPITUS (T/C):          T/C      T/C   Clavicle   -/-  SUPRAspinatus    +/-     AC Jt.    -/-  INFRAspinatus  -/-    SC Jt.    -/-  Deltoid    -/-      G. Tuberosity  -/-  LH BICEP groove  +/-   Acromion:  -/-  Midline Neck   -/-     Scapular Spine -/-  Trapezium   -/-   SMA Scapula  -/-  GH jt. line - post  -/-     Scapulothoracic  -/-         ROM: (* = with pain)  Right shoulder   Left shoulder        AROM (PROM)   AROM (PROM)   FE    170° (175°)     60° (170°) *     ER at 90° ABD  90° "  (90°)    30°  (45°) *   IR (spine level)   T10     L2    STRENGTH: (* = with pain) Right shoulder   Left shoulder    SCAPTION   5/5    4/5 *    IR    5/5    4/5 *   ER    5/5    4/5 *   BICEPS   5/5    4/5 *   Deltoid    5/5    4+/5 *     SIGNS:  Painful side       NEER   +    OASHLEES  +    SPENCER   +    SPEEDS  neg     DROP ARM   -   BELLY PRESS neg   Superior escape none    LIFT-OFF  neg   X-Body ADD    +   MOVING VALGUS neg        STABILITY TESTING    Right shoulder   Left shoulder    Translation     Anterior  up face     up face    Posterior  up face    up face    Sulcus   < 10mm    < 10 mm     Signs   Apprehension   neg      neg       Relocation   no change     no change      Jerk test  neg     neg    EXTREMITY NEURO-VASCULAR EXAM:    Sensation grossly intact to light touch all dermatomal regions.    DTR 2+ Biceps, Triceps, BR and Negative Rubens sign   Grossly intact motor function at Elbow, Wrist and Hand   Distal pulses radial and ulnar 2+, brisk cap refill, symmetric.      NECK:  Painless FROM and spinous processes non-tender. Negative Spurlings sign.      OTHER FINDINGS:  + scapular dyskinesia    XRAYS (6/28/19): No significant abnormality identified.    MRI (10/4/19):  Supraspinatus tendinosis with low-grade partial-thickness interstitial footprint tear.    Infraspinatus tendinosis with possible low-grade interstitial tear.    Slight irregular morphology and signal heterogeneity within the posterior labrum concerning for possible tear.    Mild biceps tendinosis.    AC arthropathy.    Posterior decentering of the humeral head suggesting evolving instability.    MRI Left shoulder (3/9/2020):  Impression       1. High-grade partial-thickness tear of the supraspinatus tendon and low-grade interstitial fraying of the infraspinatus tendon.  2. Long head biceps tendinosis.  3. Subacromial subdeltoid bursitis.  4. Acromioclavicular arthrosis.           ASSESSMENT:   Left shoulder pain, rotator cuff  tear, AC DJD, biceps tendonitis     PLAN:    Treatment options were discussed with the patient about shoulder.  I reviewed the MRI images with her and what this means for her shoulder.    We discussed both non-operative and operative options for her shoulder and the risks and benefits of each. Time was given for questions to be asked and all concerns were answered.    She would like to go forward with surgery for her shoulder which I think is reasonable.  All specific risks and benefits were reviewed.    These risks include but are not limited to: bleeding, infection, scarring, re-tear of repair, irreparability of the tear, damage to neurovascular structures, damage to cartilage, stiffness, blood clots, pulmonary embolism, swelling, compartment syndrome, need for further surgery, and the risks of anesthesia.      She verbalized her understanding of these risks and wished to proceed with surgery.    Time was spent face-to-face with the patient during this encounter on counseling about treatment options including surgery and coordination of her care for preoperative visits, surgery and post-operative rehab.     The operative plan will be:    left   1. Arthroscopic rotator cuff debridement versus repair  2. Arthroscopic subacromial decompression  3. Arthroscopic distal clavicle excision possible  4. Possible open biceps subpectoral tenodesis    Patient will not  need medical clearance prior to the pre-operative appointment.    All questions were answered, patient will contact us for questions or concerns in the interim.

## 2020-07-01 NOTE — LETTER
[place] [id number]  [facility] [address]  Phone: [phone] July 1, 2020     Patient: Luzmaria Louis   YOB: 1975   Date of Visit: 7/1/2020       To Whom It May Concern:    Luzmaria Louis is a patient of mine. Please excuse her from missed work 7/1/20 and 7/2/20 while she attended medical appointment.    If you have any questions or concerns, please don't hesitate to contact my office.    Sincerely,    Tejas Prince MD

## 2020-07-02 ENCOUNTER — CLINICAL SUPPORT (OUTPATIENT)
Dept: CARDIOLOGY | Facility: CLINIC | Age: 45
End: 2020-07-02
Attending: ORTHOPAEDIC SURGERY
Payer: COMMERCIAL

## 2020-07-02 DIAGNOSIS — M79.89 LEFT ARM SWELLING: ICD-10-CM

## 2020-07-02 PROCEDURE — 93971 CV US DOPPLER VENOUS ARM LEFT (CUPID ONLY): ICD-10-PCS | Mod: LT,S$GLB,, | Performed by: INTERNAL MEDICINE

## 2020-07-02 PROCEDURE — 93971 EXTREMITY STUDY: CPT | Mod: LT,S$GLB,, | Performed by: INTERNAL MEDICINE

## 2020-07-06 ENCOUNTER — TELEPHONE (OUTPATIENT)
Dept: SPORTS MEDICINE | Facility: CLINIC | Age: 45
End: 2020-07-06

## 2020-07-06 DIAGNOSIS — M75.102 NONTRAUMATIC TEAR OF LEFT ROTATOR CUFF, UNSPECIFIED TEAR EXTENT: Primary | ICD-10-CM

## 2020-07-06 DIAGNOSIS — M75.22 BICEPS TENDINITIS OF LEFT UPPER EXTREMITY: ICD-10-CM

## 2020-07-06 DIAGNOSIS — M25.512 LEFT SHOULDER PAIN, UNSPECIFIED CHRONICITY: Primary | ICD-10-CM

## 2020-07-06 NOTE — TELEPHONE ENCOUNTER
Medical Condtion/Body Part Injured:     Estimated Date of Onset of Condition or Injury:   Last Day of Work:     Employer Name: [employer]  Employee Job Title: [employer]   Job Functions/Tasks: Fingerprint/Data Processing    When do you plan to return to work:   Are you trying to return to work: NoRestrictions/Restrictions: NO Restrictions    How do you want the forms returned:     Colleen Amedee MA [facility]

## 2020-07-06 NOTE — TELEPHONE ENCOUNTER
Left RCR 8/3    ----- Message from Violeta Guajardo MA sent at 7/6/2020 10:24 AM CDT -----  Patient will do PT at [facility]    Pre op - 07/27/20    Date of surgery - 07/29/20

## 2020-07-08 ENCOUNTER — TELEPHONE (OUTPATIENT)
Dept: SPORTS MEDICINE | Facility: CLINIC | Age: 45
End: 2020-07-08

## 2020-07-08 NOTE — TELEPHONE ENCOUNTER
Patient dropped off FMLA. Forms completed and patient notified. I emailed the forms to her through secure email to the address on file.    Emilia Huynh MA  [facility]

## 2020-07-23 ENCOUNTER — OFFICE VISIT (OUTPATIENT)
Dept: INTERNAL MEDICINE | Facility: CLINIC | Age: 45
End: 2020-07-23
Payer: COMMERCIAL

## 2020-07-23 ENCOUNTER — TELEPHONE (OUTPATIENT)
Dept: SPORTS MEDICINE | Facility: CLINIC | Age: 45
End: 2020-07-23

## 2020-07-23 VITALS
TEMPERATURE: 97 F | SYSTOLIC BLOOD PRESSURE: 90 MMHG | WEIGHT: 251.56 LBS | DIASTOLIC BLOOD PRESSURE: 60 MMHG | HEIGHT: 62 IN | HEART RATE: 68 BPM | BODY MASS INDEX: 46.29 KG/M2

## 2020-07-23 DIAGNOSIS — M75.112 INCOMPLETE TEAR OF LEFT ROTATOR CUFF, UNSPECIFIED WHETHER TRAUMATIC: ICD-10-CM

## 2020-07-23 DIAGNOSIS — E04.1 THYROID NODULE: ICD-10-CM

## 2020-07-23 DIAGNOSIS — G43.809 OTHER MIGRAINE WITHOUT STATUS MIGRAINOSUS, NOT INTRACTABLE: ICD-10-CM

## 2020-07-23 DIAGNOSIS — Z01.818 PREOPERATIVE CLEARANCE: Primary | ICD-10-CM

## 2020-07-23 PROCEDURE — 93010 ELECTROCARDIOGRAM REPORT: CPT | Mod: S$GLB,,, | Performed by: INTERNAL MEDICINE

## 2020-07-23 PROCEDURE — 99999 PR PBB SHADOW E&M-EST. PATIENT-LVL III: CPT | Mod: PBBFAC,,, | Performed by: NURSE PRACTITIONER

## 2020-07-23 PROCEDURE — 99999 PR PBB SHADOW E&M-EST. PATIENT-LVL III: ICD-10-PCS | Mod: PBBFAC,,, | Performed by: NURSE PRACTITIONER

## 2020-07-23 PROCEDURE — 93010 EKG 12-LEAD: ICD-10-PCS | Mod: S$GLB,,, | Performed by: INTERNAL MEDICINE

## 2020-07-23 PROCEDURE — 3008F PR BODY MASS INDEX (BMI) DOCUMENTED: ICD-10-PCS | Mod: CPTII,S$GLB,, | Performed by: NURSE PRACTITIONER

## 2020-07-23 PROCEDURE — 93005 EKG 12-LEAD: ICD-10-PCS | Mod: S$GLB,,, | Performed by: NURSE PRACTITIONER

## 2020-07-23 PROCEDURE — 99214 OFFICE O/P EST MOD 30 MIN: CPT | Mod: S$GLB,,, | Performed by: NURSE PRACTITIONER

## 2020-07-23 PROCEDURE — 3008F BODY MASS INDEX DOCD: CPT | Mod: CPTII,S$GLB,, | Performed by: NURSE PRACTITIONER

## 2020-07-23 PROCEDURE — 99214 PR OFFICE/OUTPT VISIT, EST, LEVL IV, 30-39 MIN: ICD-10-PCS | Mod: S$GLB,,, | Performed by: NURSE PRACTITIONER

## 2020-07-23 PROCEDURE — 93005 ELECTROCARDIOGRAM TRACING: CPT | Mod: S$GLB,,, | Performed by: NURSE PRACTITIONER

## 2020-07-23 RX ORDER — ACETAMINOPHEN AND CODEINE PHOSPHATE 300; 30 MG/1; MG/1
TABLET ORAL
COMMUNITY
Start: 2020-06-27 | End: 2021-02-22

## 2020-07-23 NOTE — PROGRESS NOTES
INTERNAL MEDICINE PROGRESS NOTE    CHIEF COMPLAINT     Chief Complaint   Patient presents with    Pre-op Exam       HPI     Luzmaria Louis is a 45 y.o. female with ____ who presents for an follow up visit today.  Pt has surgery scheduled 7/29 for L rotator cuff repair with Dr. Tejas Prince.  [place]  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    Sleep stage dysfunction     Thyroid nodule 07/03/2019    Decreased range of motion of left shoulder 01/15/2020    Weakness of both upper extremities 01/15/2020    Poor posture 01/15/2020    Pain aggravated by activities of daily living 01/15/2020    Posterior tibial tendinitis of left leg 05/08/2020    Tear of left rotator cuff 07/01/2020    Internal derangement of left shoulder 07/01/2020     Resolved Ambulatory Problems     Diagnosis Date Noted    No Resolved Ambulatory Problems     Past Medical History:   Diagnosis Date    Fibroid     Migraine headache        Home Medications:  Prior to Admission medications    Medication Sig Start Date End Date Taking? Authorizing Provider   acetaminophen-codeine 300-30mg (TYLENOL #3) 300-30 mg Tab TAKE 1 2 TABLETS BY MOUTH EVERY 6 HOURS AS NEEDED FOR PAIN. TAKE WITH FOOD MAY CAUSE DROWSINESS 6/27/20  Yes Historical Provider, MD   cholestyramine (QUESTRAN) 4 gram packet Take 4 g by mouth 3 (three) times daily with meals.   Yes Historical Provider, MD   diclofenac (VOLTAREN) 75 MG EC tablet Take 1 tablet (75 mg total) by mouth 2 (two) times daily as needed.  10/11/19  Yes Bharat Reza MD   ibuprofen (ADVIL,MOTRIN) 600 MG tablet Take 1 tablet (600 mg total) by mouth every 6 (six) hours as needed for Pain. 6/28/20  Yes CONNER Chopra   rizatriptan (MAXALT-MLT) 5 MG disintegrating tablet Take at onset of migraine, can repeat in 2 hrs if needed.  No more than 2 tabs per day or 3 days/wk. 5/11/20  Yes Gracia Westfall PA-C   diethylpropion (TENUATE) 75 mg SR tablet Take 1 tablet (75 mg total) by mouth once daily.  Patient not taking: Reported on 7/23/2020 2/13/20   Fabiana Rueda MD   gabapentin (NEURONTIN) 100 MG capsule Take 2 capsules (200 mg total) by mouth nightly as needed.  Patient not taking: Reported on 7/23/2020 10/11/19 10/10/20  Bharat Reza MD   promethazine (PHENERGAN) 12.5 MG Tab Take 1 tablet (12.5 mg total) by mouth 4 (four) times daily.  Patient not taking: Reported on 7/23/2020 10/4/19   Gracia Westfall PA-C   topiramate (TROKENDI XR) 50 mg Cp24 Take 50 mg by mouth once daily.  Patient not taking: Reported on 7/23/2020 2/13/20   Fabiana Rueda MD   traMADoL (ULTRAM) 50 mg tablet Take 1 tablet (50 mg total) by mouth every 6 (six) hours. 3/19/20   Kvng Sigala MD   celecoxib (CELEBREX) 200 MG capsule Take 1 capsule (200 mg total) by mouth 2 (two) times daily. 1/7/20 7/23/20  Fredy Lujan MD   cyclobenzaprine (FLEXERIL) 10 MG tablet Take 1 tablet (10 mg total) by mouth 3 (three) times daily as needed for Muscle spasms (no working or driving on this medication). 10/11/19 7/23/20  Bharat Reza MD   diclofenac sodium (VOLTAREN) 1 % Gel Apply 2 g topically 4 (four) times daily. 2/10/20 7/23/20  Navin Hanley MD   lidocaine HCl 2% (XYLOCAINE) 2 % Soln 5-10ml orally Q6 hours PRN pain. Gargle and swallow. 6/28/20 7/23/20  CONNER Chopra       Review of Systems:  Review of Systems   Constitutional: Negative for chills and fever.   Respiratory: Negative for chest tightness and shortness of breath.    Cardiovascular:  "Negative for chest pain and palpitations.   Gastrointestinal: Negative for abdominal pain, constipation, diarrhea, nausea and vomiting.   Endocrine: Negative for polydipsia and polyuria.   Genitourinary: Negative for dysuria and urgency.   Musculoskeletal: Positive for myalgias (L shoulder - will radiate to L hand occasionally).   Skin: Negative.    Neurological: Negative for dizziness, weakness and numbness.   Psychiatric/Behavioral: Negative.        Health Maintainence:   Immunizations:  Health Maintenance       Date Due Completion Date    Hepatitis C Screening 1975 ---    HIV Screening 02/15/1990 ---    Influenza Vaccine (1) 09/01/2020 10/12/2019    Mammogram 12/18/2020 12/18/2018    Lipid Panel 10/11/2024 10/11/2019    TETANUS VACCINE 10/26/2028 10/26/2018           PHYSICAL EXAM     BP 90/60   Pulse 68   Temp 97.3 °F (36.3 °C) (Oral)   Ht 5' 2" (1.575 m)   Wt 114.1 kg (251 lb 8.7 oz)   BMI 46.01 kg/m²     Physical Exam    LABS     Lab Results   Component Value Date    HGBA1C 5.2 10/11/2019     CMP  Sodium   Date Value Ref Range Status   06/28/2019 139 136 - 145 mmol/L Final   06/28/2019 139 136 - 145 mmol/L Final     Potassium   Date Value Ref Range Status   06/28/2019 3.7 3.5 - 5.1 mmol/L Final   06/28/2019 3.7 3.5 - 5.1 mmol/L Final     Chloride   Date Value Ref Range Status   06/28/2019 102 95 - 110 mmol/L Final   06/28/2019 102 95 - 110 mmol/L Final     CO2   Date Value Ref Range Status   06/28/2019 26 23 - 29 mmol/L Final   06/28/2019 26 23 - 29 mmol/L Final     Glucose   Date Value Ref Range Status   06/28/2019 89 70 - 110 mg/dL Final   06/28/2019 89 70 - 110 mg/dL Final     BUN, Bld   Date Value Ref Range Status   06/28/2019 9 6 - 20 mg/dL Final   06/28/2019 9 6 - 20 mg/dL Final     Creatinine   Date Value Ref Range Status   06/28/2019 0.7 0.5 - 1.4 mg/dL Final   06/28/2019 0.7 0.5 - 1.4 mg/dL Final     Calcium   Date Value Ref Range Status   06/28/2019 9.0 8.7 - 10.5 mg/dL Final   06/28/2019 " 9.0 8.7 - 10.5 mg/dL Final     Total Protein   Date Value Ref Range Status   06/28/2019 7.1 6.0 - 8.4 g/dL Final     Albumin   Date Value Ref Range Status   06/28/2019 3.8 3.5 - 5.2 g/dL Final   06/28/2019 3.8 3.5 - 5.2 g/dL Final     Total Bilirubin   Date Value Ref Range Status   06/28/2019 0.4 0.1 - 1.0 mg/dL Final     Comment:     For infants and newborns, interpretation of results should be based  on gestational age, weight and in agreement with clinical  observations.  Premature Infant recommended reference ranges:  Up to 24 hours.............<8.0 mg/dL  Up to 48 hours............<12.0 mg/dL  3-5 days..................<15.0 mg/dL  6-29 days.................<15.0 mg/dL       Alkaline Phosphatase   Date Value Ref Range Status   06/28/2019 49 (L) 55 - 135 U/L Final     AST   Date Value Ref Range Status   06/28/2019 21 10 - 40 U/L Final     ALT   Date Value Ref Range Status   06/28/2019 27 10 - 44 U/L Final     Anion Gap   Date Value Ref Range Status   06/28/2019 11 8 - 16 mmol/L Final   06/28/2019 11 8 - 16 mmol/L Final     eGFR if    Date Value Ref Range Status   06/28/2019 >60.0 >60 mL/min/1.73 m^2 Final   06/28/2019 >60.0 >60 mL/min/1.73 m^2 Final     eGFR if non    Date Value Ref Range Status   06/28/2019 >60.0 >60 mL/min/1.73 m^2 Final     Comment:     Calculation used to obtain the estimated glomerular filtration  rate (eGFR) is the CKD-EPI equation.      06/28/2019 >60.0 >60 mL/min/1.73 m^2 Final     Comment:     Calculation used to obtain the estimated glomerular filtration  rate (eGFR) is the CKD-EPI equation.        Lab Results   Component Value Date    WBC 7.37 06/28/2019    WBC 7.37 06/28/2019    HGB 12.5 06/28/2019    HGB 12.5 06/28/2019    HCT 39.4 06/28/2019    HCT 39.4 06/28/2019    MCV 92 06/28/2019    MCV 92 06/28/2019     06/28/2019     06/28/2019     Lab Results   Component Value Date    CHOL 209 (H) 10/11/2019    CHOL 218 (H) 10/26/2018     Lab  Results   Component Value Date    HDL 34 (L) 10/11/2019    HDL 40 10/26/2018     Lab Results   Component Value Date    LDLCALC 132.6 10/11/2019    LDLCALC 145.6 10/26/2018     Lab Results   Component Value Date    TRIG 212 (H) 10/11/2019    TRIG 162 (H) 10/26/2018     Lab Results   Component Value Date    CHOLHDL 16.3 (L) 10/11/2019    CHOLHDL 18.3 (L) 10/26/2018     Lab Results   Component Value Date    TSH 2.227 08/19/2019       ASSESSMENT/PLAN     Gabriellamartir AURORA Louis is a 45 y.o. female           Follow up with PCP in *** for ***    Patient education provided from [facility]. Patient was counseled on when and how to seek emergent care.       Benita GONG, APRN, FNP-c   Department of Internal Medicine - [facility]  3:59 PM

## 2020-07-23 NOTE — PROGRESS NOTES
INTERNAL MEDICINE PROGRESS NOTE    CHIEF COMPLAINT     Chief Complaint   Patient presents with    Pre-op Exam       HPI     Alsheri Louis is a 45 y.o. female with GERD, BMI 46.01, migraine, thyroid nodule, L rotator cuff tear, thyroid nodule, and tibial tendonitis of LLE presents for an follow up visit today - needs surgical clearance for L rotator cuff repair.  Pt has surgery scheduled 7/29 for L rotator cuff repair with Dr. Tejas Prince.  [place]  States pain worse with  Movement. S/s relieved with medication; no pain relief with physical therapy. Denies numbness/tingling to LUE. Denies chest pain, shortness of breath with exercise or at rest.     MET>6 - able to clean home and walk up two flights of stairs without SOB or Chest pain.     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    Sleep stage dysfunction     Thyroid nodule 07/03/2019    Decreased range of motion of left shoulder 01/15/2020    Weakness of both upper extremities 01/15/2020    Poor posture 01/15/2020    Pain aggravated by activities of daily living 01/15/2020    Posterior tibial tendinitis of left leg 05/08/2020    Tear of left rotator cuff 07/01/2020    Internal derangement of left shoulder 07/01/2020     Resolved Ambulatory Problems     Diagnosis Date Noted    No Resolved Ambulatory Problems     Past Medical History:   Diagnosis Date    Fibroid     Migraine headache        Home Medications:  Prior to Admission medications     Medication Sig Start Date End Date Taking? Authorizing Provider   acetaminophen-codeine 300-30mg (TYLENOL #3) 300-30 mg Tab TAKE 1 2 TABLETS BY MOUTH EVERY 6 HOURS AS NEEDED FOR PAIN. TAKE WITH FOOD MAY CAUSE DROWSINESS 6/27/20  Yes Historical Provider, MD   cholestyramine (QUESTRAN) 4 gram packet Take 4 g by mouth 3 (three) times daily with meals.   Yes Historical Provider, MD   diclofenac (VOLTAREN) 75 MG EC tablet Take 1 tablet (75 mg total) by mouth 2 (two) times daily as needed. 10/11/19  Yes Bharat Reza MD   ibuprofen (ADVIL,MOTRIN) 600 MG tablet Take 1 tablet (600 mg total) by mouth every 6 (six) hours as needed for Pain. 6/28/20  Yes CONNER Chopra   rizatriptan (MAXALT-MLT) 5 MG disintegrating tablet Take at onset of migraine, can repeat in 2 hrs if needed.  No more than 2 tabs per day or 3 days/wk. 5/11/20  Yes Gracia Westfall PA-C   diethylpropion (TENUATE) 75 mg SR tablet Take 1 tablet (75 mg total) by mouth once daily.  Patient not taking: Reported on 7/23/2020 2/13/20   Fabiana Rueda MD   gabapentin (NEURONTIN) 100 MG capsule Take 2 capsules (200 mg total) by mouth nightly as needed.  Patient not taking: Reported on 7/23/2020 10/11/19 10/10/20  Bharat Reza MD   promethazine (PHENERGAN) 12.5 MG Tab Take 1 tablet (12.5 mg total) by mouth 4 (four) times daily.  Patient not taking: Reported on 7/23/2020 10/4/19   Gracia Westfall PA-C   topiramate (TROKENDI XR) 50 mg Cp24 Take 50 mg by mouth once daily.  Patient not taking: Reported on 7/23/2020 2/13/20   Fabiana Rueda MD   traMADoL (ULTRAM) 50 mg tablet Take 1 tablet (50 mg total) by mouth every 6 (six) hours. 3/19/20   Kvng Sigala MD   celecoxib (CELEBREX) 200 MG capsule Take 1 capsule (200 mg total) by mouth 2 (two) times daily. 1/7/20 7/23/20  Fredy Lujan MD   cyclobenzaprine (FLEXERIL) 10 MG tablet Take 1 tablet (10 mg total) by mouth 3 (three) times daily as needed for Muscle spasms (no working or driving on this  "medication). 10/11/19 7/23/20  Bharat Reza MD   diclofenac sodium (VOLTAREN) 1 % Gel Apply 2 g topically 4 (four) times daily. 2/10/20 7/23/20  Navin Hanley MD   lidocaine HCl 2% (XYLOCAINE) 2 % Soln 5-10ml orally Q6 hours PRN pain. Gargle and swallow. 6/28/20 7/23/20  CONNER Chopra       Review of Systems:  Review of Systems   Constitutional: Negative for chills and fever.   Respiratory: Negative for cough, chest tightness and shortness of breath.    Cardiovascular: Negative for chest pain and palpitations.   Gastrointestinal: Negative for abdominal pain, constipation, diarrhea, nausea and vomiting.   Endocrine: Negative for polydipsia and polyuria.   Genitourinary: Negative for dysuria and urgency.   Musculoskeletal: Positive for myalgias (L shoulder - will radiate to L hand occasionally).   Skin: Negative.    Neurological: Negative for dizziness, weakness and numbness.   Psychiatric/Behavioral: Negative.        Health Maintainence:   Immunizations:  Health Maintenance       Date Due Completion Date    Hepatitis C Screening 1975 ---    HIV Screening 02/15/1990 ---    Influenza Vaccine (1) 09/01/2020 10/12/2019    Mammogram 12/18/2020 12/18/2018    Lipid Panel 10/11/2024 10/11/2019    TETANUS VACCINE 10/26/2028 10/26/2018           PHYSICAL EXAM     BP 90/60   Pulse 68   Temp 97.3 °F (36.3 °C) (Oral)   Ht 5' 2" (1.575 m)   Wt 114.1 kg (251 lb 8.7 oz)   BMI 46.01 kg/m²     Physical Exam  Constitutional:       Appearance: She is obese.   HENT:      Head: Normocephalic and atraumatic.   Neck:      Musculoskeletal: Normal range of motion and neck supple.   Cardiovascular:      Rate and Rhythm: Normal rate and regular rhythm.      Pulses: Normal pulses.      Heart sounds: Normal heart sounds.   Pulmonary:      Effort: Pulmonary effort is normal.      Breath sounds: Normal breath sounds.   Abdominal:      General: Abdomen is protuberant. Bowel sounds are normal.      Palpations: Abdomen is " soft.   Musculoskeletal:      Left shoulder: She exhibits decreased range of motion and tenderness. She exhibits no swelling, no crepitus and no deformity.      Comments: Unable to rotate L shoulder. Anterior and posterior L shoulder tender to palpation. L radial pulse +2. ROM intact to elbow and wrist. Denies decreased sensation to LUE.   Skin:     General: Skin is warm and dry.      Capillary Refill: Capillary refill takes less than 2 seconds.   Neurological:      General: No focal deficit present.      Mental Status: She is alert and oriented to person, place, and time.   Psychiatric:         Mood and Affect: Mood normal.         Behavior: Behavior normal.         Thought Content: Thought content normal.         Judgment: Judgment normal.         LABS     Lab Results   Component Value Date    HGBA1C 5.2 10/11/2019     CMP  Sodium   Date Value Ref Range Status   06/28/2019 139 136 - 145 mmol/L Final   06/28/2019 139 136 - 145 mmol/L Final     Potassium   Date Value Ref Range Status   06/28/2019 3.7 3.5 - 5.1 mmol/L Final   06/28/2019 3.7 3.5 - 5.1 mmol/L Final     Chloride   Date Value Ref Range Status   06/28/2019 102 95 - 110 mmol/L Final   06/28/2019 102 95 - 110 mmol/L Final     CO2   Date Value Ref Range Status   06/28/2019 26 23 - 29 mmol/L Final   06/28/2019 26 23 - 29 mmol/L Final     Glucose   Date Value Ref Range Status   06/28/2019 89 70 - 110 mg/dL Final   06/28/2019 89 70 - 110 mg/dL Final     BUN, Bld   Date Value Ref Range Status   06/28/2019 9 6 - 20 mg/dL Final   06/28/2019 9 6 - 20 mg/dL Final     Creatinine   Date Value Ref Range Status   06/28/2019 0.7 0.5 - 1.4 mg/dL Final   06/28/2019 0.7 0.5 - 1.4 mg/dL Final     Calcium   Date Value Ref Range Status   06/28/2019 9.0 8.7 - 10.5 mg/dL Final   06/28/2019 9.0 8.7 - 10.5 mg/dL Final     Total Protein   Date Value Ref Range Status   06/28/2019 7.1 6.0 - 8.4 g/dL Final     Albumin   Date Value Ref Range Status   06/28/2019 3.8 3.5 - 5.2 g/dL Final    06/28/2019 3.8 3.5 - 5.2 g/dL Final     Total Bilirubin   Date Value Ref Range Status   06/28/2019 0.4 0.1 - 1.0 mg/dL Final     Comment:     For infants and newborns, interpretation of results should be based  on gestational age, weight and in agreement with clinical  observations.  Premature Infant recommended reference ranges:  Up to 24 hours.............<8.0 mg/dL  Up to 48 hours............<12.0 mg/dL  3-5 days..................<15.0 mg/dL  6-29 days.................<15.0 mg/dL       Alkaline Phosphatase   Date Value Ref Range Status   06/28/2019 49 (L) 55 - 135 U/L Final     AST   Date Value Ref Range Status   06/28/2019 21 10 - 40 U/L Final     ALT   Date Value Ref Range Status   06/28/2019 27 10 - 44 U/L Final     Anion Gap   Date Value Ref Range Status   06/28/2019 11 8 - 16 mmol/L Final   06/28/2019 11 8 - 16 mmol/L Final     eGFR if    Date Value Ref Range Status   06/28/2019 >60.0 >60 mL/min/1.73 m^2 Final   06/28/2019 >60.0 >60 mL/min/1.73 m^2 Final     eGFR if non    Date Value Ref Range Status   06/28/2019 >60.0 >60 mL/min/1.73 m^2 Final     Comment:     Calculation used to obtain the estimated glomerular filtration  rate (eGFR) is the CKD-EPI equation.      06/28/2019 >60.0 >60 mL/min/1.73 m^2 Final     Comment:     Calculation used to obtain the estimated glomerular filtration  rate (eGFR) is the CKD-EPI equation.        Lab Results   Component Value Date    WBC 7.37 06/28/2019    WBC 7.37 06/28/2019    HGB 12.5 06/28/2019    HGB 12.5 06/28/2019    HCT 39.4 06/28/2019    HCT 39.4 06/28/2019    MCV 92 06/28/2019    MCV 92 06/28/2019     06/28/2019     06/28/2019     Lab Results   Component Value Date    CHOL 209 (H) 10/11/2019    CHOL 218 (H) 10/26/2018     Lab Results   Component Value Date    HDL 34 (L) 10/11/2019    HDL 40 10/26/2018     Lab Results   Component Value Date    LDLCALC 132.6 10/11/2019    LDLCALC 145.6 10/26/2018     Lab Results    Component Value Date    TRIG 212 (H) 10/11/2019    TRIG 162 (H) 10/26/2018     Lab Results   Component Value Date    CHOLHDL 16.3 (L) 10/11/2019    CHOLHDL 18.3 (L) 10/26/2018     Lab Results   Component Value Date    TSH 2.227 08/19/2019       ASSESSMENT/PLAN     Luzmaria Louis is a 45 y.o. female     Preoperative clearance- pt to schedule labs Monday for pre-op clearance. Pt is low risk for intermediate risk surgery. Labs reviewed. EKG reviewed.    -     CBC auto differential; Future; Expected date: 07/23/2020  -     Comprehensive metabolic panel; Future; Expected date: 07/23/2020  -     TSH; Future; Expected date: 07/23/2020  -     Protime-INR; Future; Expected date: 07/23/2020  -     APTT; Future; Expected date: 07/23/2020  -     IN OFFICE EKG 12-LEAD (to Muse)    Other migraine without status migrainosus, not intractable- established, stable    Incomplete tear of left rotator cuff, unspecified whether traumatic- established, unstable   -surgery with COLLEEN Prince 7/29.  Will send clearance note after ekg and labs reviewed. Low risk on ASC NSQIP surgical calculator. Discussed post-op care - wound healing and PT.     Thyroid nodule- established, stable    BMI 45.0-49.9, adult- established, unstable   -not taking tenuate at this time.    Follow up with PCP.    Patient education provided from [facility]. Patient was counseled on when and how to seek emergent care.     Benita GONG, APRN, FNP-c   Department of Internal Medicine - [facility]  3:59 PM

## 2020-07-23 NOTE — TELEPHONE ENCOUNTER
Called patient to inform her that her surgery hasn't been auth through her WC claim. Patient stated that she will reach out to her adjustor to work on getting auth.

## 2020-07-27 ENCOUNTER — LAB VISIT (OUTPATIENT)
Dept: SPORTS MEDICINE | Facility: CLINIC | Age: 45
End: 2020-07-27
Payer: COMMERCIAL

## 2020-07-27 ENCOUNTER — OFFICE VISIT (OUTPATIENT)
Dept: SPORTS MEDICINE | Facility: CLINIC | Age: 45
End: 2020-07-27
Payer: COMMERCIAL

## 2020-07-27 ENCOUNTER — LAB VISIT (OUTPATIENT)
Dept: LAB | Facility: HOSPITAL | Age: 45
End: 2020-07-27
Attending: NURSE PRACTITIONER
Payer: COMMERCIAL

## 2020-07-27 VITALS — WEIGHT: 253 LBS | BODY MASS INDEX: 46.56 KG/M2 | HEIGHT: 62 IN

## 2020-07-27 DIAGNOSIS — S46.012D TRAUMATIC INCOMPLETE TEAR OF LEFT ROTATOR CUFF, SUBSEQUENT ENCOUNTER: Primary | ICD-10-CM

## 2020-07-27 DIAGNOSIS — Z01.818 PRE-OP TESTING: ICD-10-CM

## 2020-07-27 DIAGNOSIS — M75.22 BICEPS TENDONITIS ON LEFT: ICD-10-CM

## 2020-07-27 DIAGNOSIS — Z01.818 PREOPERATIVE CLEARANCE: ICD-10-CM

## 2020-07-27 LAB
APTT BLDCRRT: 27.1 SEC (ref 21–32)
BASOPHILS # BLD AUTO: 0.01 K/UL (ref 0–0.2)
BASOPHILS NFR BLD: 0.2 % (ref 0–1.9)
DIFFERENTIAL METHOD: ABNORMAL
EOSINOPHIL # BLD AUTO: 0.1 K/UL (ref 0–0.5)
EOSINOPHIL NFR BLD: 0.8 % (ref 0–8)
ERYTHROCYTE [DISTWIDTH] IN BLOOD BY AUTOMATED COUNT: 13.5 % (ref 11.5–14.5)
HCT VFR BLD AUTO: 41.4 % (ref 37–48.5)
HGB BLD-MCNC: 13.1 G/DL (ref 12–16)
IMM GRANULOCYTES # BLD AUTO: 0.02 K/UL (ref 0–0.04)
IMM GRANULOCYTES NFR BLD AUTO: 0.3 % (ref 0–0.5)
INR PPP: 1 (ref 0.8–1.2)
LYMPHOCYTES # BLD AUTO: 3.2 K/UL (ref 1–4.8)
LYMPHOCYTES NFR BLD: 48.6 % (ref 18–48)
MCH RBC QN AUTO: 29.1 PG (ref 27–31)
MCHC RBC AUTO-ENTMCNC: 31.6 G/DL (ref 32–36)
MCV RBC AUTO: 92 FL (ref 82–98)
MONOCYTES # BLD AUTO: 0.4 K/UL (ref 0.3–1)
MONOCYTES NFR BLD: 5.9 % (ref 4–15)
NEUTROPHILS # BLD AUTO: 2.9 K/UL (ref 1.8–7.7)
NEUTROPHILS NFR BLD: 44.2 % (ref 38–73)
NRBC BLD-RTO: 0 /100 WBC
PLATELET # BLD AUTO: 322 K/UL (ref 150–350)
PMV BLD AUTO: 10.5 FL (ref 9.2–12.9)
PROTHROMBIN TIME: 11.2 SEC (ref 9–12.5)
RBC # BLD AUTO: 4.5 M/UL (ref 4–5.4)
WBC # BLD AUTO: 6.48 K/UL (ref 3.9–12.7)

## 2020-07-27 PROCEDURE — 99999 PR PBB SHADOW E&M-EST. PATIENT-LVL III: CPT | Mod: PBBFAC,,, | Performed by: PHYSICIAN ASSISTANT

## 2020-07-27 PROCEDURE — 99499 NO LOS: ICD-10-PCS | Mod: S$GLB,,, | Performed by: PHYSICIAN ASSISTANT

## 2020-07-27 PROCEDURE — 36415 COLL VENOUS BLD VENIPUNCTURE: CPT

## 2020-07-27 PROCEDURE — 80053 COMPREHEN METABOLIC PANEL: CPT

## 2020-07-27 PROCEDURE — 85025 COMPLETE CBC W/AUTO DIFF WBC: CPT

## 2020-07-27 PROCEDURE — U0003 INFECTIOUS AGENT DETECTION BY NUCLEIC ACID (DNA OR RNA); SEVERE ACUTE RESPIRATORY SYNDROME CORONAVIRUS 2 (SARS-COV-2) (CORONAVIRUS DISEASE [COVID-19]), AMPLIFIED PROBE TECHNIQUE, MAKING USE OF HIGH THROUGHPUT TECHNOLOGIES AS DESCRIBED BY CMS-2020-01-R: HCPCS

## 2020-07-27 PROCEDURE — 85730 THROMBOPLASTIN TIME PARTIAL: CPT

## 2020-07-27 PROCEDURE — 99999 PR PBB SHADOW E&M-EST. PATIENT-LVL III: ICD-10-PCS | Mod: PBBFAC,,, | Performed by: PHYSICIAN ASSISTANT

## 2020-07-27 PROCEDURE — 84443 ASSAY THYROID STIM HORMONE: CPT

## 2020-07-27 PROCEDURE — 99499 UNLISTED E&M SERVICE: CPT | Mod: S$GLB,,, | Performed by: PHYSICIAN ASSISTANT

## 2020-07-27 PROCEDURE — 85610 PROTHROMBIN TIME: CPT

## 2020-07-27 PROCEDURE — 84439 ASSAY OF FREE THYROXINE: CPT

## 2020-07-27 RX ORDER — SODIUM CHLORIDE 9 MG/ML
INJECTION, SOLUTION INTRAVENOUS CONTINUOUS
Status: CANCELLED | OUTPATIENT
Start: 2020-07-27

## 2020-07-27 RX ORDER — TRAMADOL HYDROCHLORIDE 50 MG/1
50 TABLET ORAL EVERY 6 HOURS PRN
Qty: 28 TABLET | Refills: 0 | Status: SHIPPED | OUTPATIENT
Start: 2020-07-27 | End: 2020-08-07

## 2020-07-27 RX ORDER — ASPIRIN 325 MG
325 TABLET, DELAYED RELEASE (ENTERIC COATED) ORAL 2 TIMES DAILY
Qty: 42 TABLET | Refills: 0 | Status: SHIPPED | OUTPATIENT
Start: 2020-07-27 | End: 2021-03-18

## 2020-07-27 RX ORDER — OXYCODONE AND ACETAMINOPHEN 10; 325 MG/1; MG/1
1 TABLET ORAL EVERY 6 HOURS PRN
Qty: 28 TABLET | Refills: 0 | Status: SHIPPED | OUTPATIENT
Start: 2020-07-27 | End: 2020-08-07

## 2020-07-27 RX ORDER — PROMETHAZINE HYDROCHLORIDE 25 MG/1
25 TABLET ORAL EVERY 6 HOURS PRN
Qty: 30 TABLET | Refills: 0 | Status: SHIPPED | OUTPATIENT
Start: 2020-07-27 | End: 2020-08-26

## 2020-07-27 NOTE — H&P
Luzmaria BritoZacariasFarhan  is here for a completion of her perioperative paperwork. she  Is scheduled to undergo left   1. Arthroscopic rotator cuff debridement versus repair  2. Arthroscopic subacromial decompression  3. Arthroscopic distal clavicle excision possible  4. Possible open biceps subpectoral tenodesis on 7/29/20.  She is a healthy individual and does need clearance for this procedure.     Pending PCP clearance. Saw PCP , Benita Parada NP, on 7/23/20.    Risks, indications and benefits of the surgical procedure were discussed with the patient. All questions with regard to surgery, rehab, expected return to functional activities, activities of daily living and recreational endeavors were answered to her satisfaction.    Patient was informed and understands the risks of surgery are greater for patients with a current condition or history of heart disease, obesity, clotting disorders, recurrent infections, steroid use, current or past smoking, and factors such as sedentary lifestyle and noncompliance with medications, therapy or follow-up. The degree of the increased risk is hard to estimate with any degree of precision.    Once no other questions were asked, a brief history and physical exam was then performed.    PAST MEDICAL HISTORY:   Past Medical History:   Diagnosis Date    Fibroid     GERD (gastroesophageal reflux disease)     Migraine headache      PAST SURGICAL HISTORY:   Past Surgical History:   Procedure Laterality Date    CHOLECYSTECTOMY      HYSTERECTOMY      MOUTH SURGERY      THYROID LOBECTOMY Right 7/3/2019    Procedure: LOBECTOMY, THYROID, Possible Total;  Surgeon: Carlee Huff MD;  Location: [facility] OR [address] [place] [address];  Service: General;  Laterality: Right;    TONSILLECTOMY      TUBAL LIGATION       FAMILY HISTORY:   Family History   Problem Relation Age of Onset    Prostate cancer Paternal Grandfather     Cancer Paternal Grandmother     Throat cancer Maternal Grandmother      Cancer Maternal Grandmother         throat cancer - smoker    Diabetes Maternal Grandmother     Stroke Maternal Grandmother     Hypertension Maternal Grandmother     Aneurysm Father         stomach aneurysm    Cancer Maternal Aunt         gastric cancer    Prostate cancer Maternal Uncle     Hyperlipidemia Mother     Prostate cancer Brother     Cancer Maternal Grandfather         colon cancer    Heart disease Neg Hx      SOCIAL HISTORY:   Social History     Socioeconomic History    Marital status:      Spouse name: Not on file    Number of children: Not on file    Years of education: Not on file    Highest education level: Not on file   Occupational History    Not on file   Social Needs    Financial resource strain: Not hard at all    Food insecurity     Worry: Never true     Inability: Never true    Transportation needs     Medical: No     Non-medical: No   Tobacco Use    Smoking status: Never Smoker    Smokeless tobacco: Never Used   Substance and Sexual Activity    Alcohol use: Yes     Frequency: 2-4 times a month     Drinks per session: 1 or 2     Binge frequency: Never     Comment: once a week maybe    Drug use: No    Sexual activity: Yes     Partners: Male     Comment: hysterectomy   Lifestyle    Physical activity     Days per week: 0 days     Minutes per session: 0 min    Stress: Not at all   Relationships    Social connections     Talks on phone: More than three times a week     Gets together: Twice a week     Attends Mandaeism service: Not on file     Active member of club or organization: No     Attends meetings of clubs or organizations: Never     Relationship status:    Other Topics Concern    Not on file   Social History Narrative    Not on file       MEDICATIONS:   Current Outpatient Medications:     acetaminophen-codeine 300-30mg (TYLENOL #3) 300-30 mg Tab, TAKE 1 2 TABLETS BY MOUTH EVERY 6 HOURS AS NEEDED FOR PAIN. TAKE WITH FOOD MAY CAUSE DROWSINESS,  Disp: , Rfl:     cholestyramine (QUESTRAN) 4 gram packet, Take 4 g by mouth 3 (three) times daily with meals., Disp: , Rfl:     diclofenac (VOLTAREN) 75 MG EC tablet, Take 1 tablet (75 mg total) by mouth 2 (two) times daily as needed., Disp: 60 tablet, Rfl: 3    diethylpropion (TENUATE) 75 mg SR tablet, Take 1 tablet (75 mg total) by mouth once daily., Disp: 30 tablet, Rfl: 2    gabapentin (NEURONTIN) 100 MG capsule, Take 2 capsules (200 mg total) by mouth nightly as needed., Disp: 60 capsule, Rfl: 11    ibuprofen (ADVIL,MOTRIN) 600 MG tablet, Take 1 tablet (600 mg total) by mouth every 6 (six) hours as needed for Pain., Disp: 20 tablet, Rfl: 0    promethazine (PHENERGAN) 12.5 MG Tab, Take 1 tablet (12.5 mg total) by mouth 4 (four) times daily., Disp: 20 tablet, Rfl: 6    rizatriptan (MAXALT-MLT) 5 MG disintegrating tablet, Take at onset of migraine, can repeat in 2 hrs if needed.  No more than 2 tabs per day or 3 days/wk., Disp: 12 tablet, Rfl: 3    topiramate (TROKENDI XR) 50 mg Cp24, Take 50 mg by mouth once daily., Disp: 30 capsule, Rfl: 3    traMADoL (ULTRAM) 50 mg tablet, Take 1 tablet (50 mg total) by mouth every 6 (six) hours., Disp: 25 tablet, Rfl: 0  ALLERGIES:   Review of patient's allergies indicates:   Allergen Reactions    Caffeine      She feels like it is hard to breath, dizzy, nauseous.       Review of Systems   Constitution: Negative. Negative for chills, fever and night sweats.   HENT: Negative for congestion and headaches.    Eyes: Negative for blurred vision, left vision loss and right vision loss.   Cardiovascular: Negative for chest pain and syncope.   Respiratory: Negative for cough and shortness of breath.    Endocrine: Negative for polydipsia, polyphagia and polyuria.   Hematologic/Lymphatic: Negative for bleeding problem. Does not bruise/bleed easily.   Skin: Negative for dry skin, itching and rash.   Musculoskeletal: Negative for falls and muscle weakness.   Gastrointestinal:  Negative for abdominal pain and bowel incontinence.   Genitourinary: Negative for bladder incontinence and nocturia.   Neurological: Negative for disturbances in coordination, loss of balance and seizures.   Psychiatric/Behavioral: Negative for depression. The patient does not have insomnia.    Allergic/Immunologic: Negative for hives and persistent infections.     PHYSICAL EXAM:  GEN: A&Ox3, WD WN NAD  HEENT: WNL  CHEST: CTAB, no W/R/R  HEART: RRR, no M/R/G   ABD: Soft, NT ND, BS x4 QUADS  MS: Refer to previous note for detailed MS exam  NEURO: CN II-XII intact       The surgical consent was then reviewed with the patient, who agreed with all the contents of the consent form and it was signed. she was then given the [facility] surgery packet to bring with her to [facility] for the anesthesia portion of her perioperative paperwork.     PHYSICAL THERAPY:  She was also instructed regarding physical therapy and will begin POD # 1-3 at [facility].    POST OP CARE:instructions were reviewed including care of the wound and dressing after surgery and when she can shower.     PAIN MANAGEMENT: Luzmaria BritoZacariasFarhan was also given a pain management regime, which includes the TENS unit given to her by [employer] along with the education required for its use. She was also instructed regarding the Polar ice unit that will be in place after surgery and her postoperative pain medications.     PAIN MEDICATION:  Percocet 10/325mg 1 po q 4-6 hours prn pain  Ultram 50 mg one p.o. q.4-6 hours p.r.n. breakthrough pain,   Phenergan 25 mg one p.o. q.4-6 hours p.r.n. nausea and vomiting.  ASA 325mg BID x 3 weeks post op    POST OP MEDS TO BE DELIVERED BEDSIDE AT [place]  Patient denies history of seizures.      Patient was also told to buy over the counter Prilosec medication and take it once daily for GI protection as long as they are taking NSAIDs or Aspirin.    DVT prophylaxis was discussed with the patient today including risk factors  for developing DVTs and history of DVTs. The patient was asked if any specific recommendations were given from the doctor/s that did pre-operative surgical clearance.      Patient was asked if they were taking or using OCP pills or devices. If they answered yes, then they were instructed to stop using OCPs at this pre-operative appointment until 2 months post-op to help prevent DVT development. They understand that there are other forms of birth control that do not involve hormones. They expressed understanding that ignoring/not following this instruction could result in a DVT which could turn into a deadly pulmonary embolism.      The patient was told that narcotic pain medications may make them drowsy and instructions were given to not sign legal documents, drive or operate heavy machinery, cars, or equipment while under the influence of narcotic medications.     As there were no other questions to be asked, she was given my business card along with Tejas Prince MD business card if she has any questions or concerns prior to surgery or in the postop period.

## 2020-07-27 NOTE — H&P (VIEW-ONLY)
Luzmaria BritoZacariasFarhan  is here for a completion of her perioperative paperwork. she  Is scheduled to undergo left   1. Arthroscopic rotator cuff debridement versus repair  2. Arthroscopic subacromial decompression  3. Arthroscopic distal clavicle excision possible  4. Possible open biceps subpectoral tenodesis on 7/29/20.  She is a healthy individual and does need clearance for this procedure.     Pending PCP clearance. Saw PCP , Benita Parada NP, on 7/23/20.    Risks, indications and benefits of the surgical procedure were discussed with the patient. All questions with regard to surgery, rehab, expected return to functional activities, activities of daily living and recreational endeavors were answered to her satisfaction.    Patient was informed and understands the risks of surgery are greater for patients with a current condition or history of heart disease, obesity, clotting disorders, recurrent infections, steroid use, current or past smoking, and factors such as sedentary lifestyle and noncompliance with medications, therapy or follow-up. The degree of the increased risk is hard to estimate with any degree of precision.    Once no other questions were asked, a brief history and physical exam was then performed.    PAST MEDICAL HISTORY:   Past Medical History:   Diagnosis Date    Fibroid     GERD (gastroesophageal reflux disease)     Migraine headache      PAST SURGICAL HISTORY:   Past Surgical History:   Procedure Laterality Date    CHOLECYSTECTOMY      HYSTERECTOMY      MOUTH SURGERY      THYROID LOBECTOMY Right 7/3/2019    Procedure: LOBECTOMY, THYROID, Possible Total;  Surgeon: Carlee Huff MD;  Location: [facility] OR [address] [place] [address];  Service: General;  Laterality: Right;    TONSILLECTOMY      TUBAL LIGATION       FAMILY HISTORY:   Family History   Problem Relation Age of Onset    Prostate cancer Paternal Grandfather     Cancer Paternal Grandmother     Throat cancer Maternal Grandmother      Cancer Maternal Grandmother         throat cancer - smoker    Diabetes Maternal Grandmother     Stroke Maternal Grandmother     Hypertension Maternal Grandmother     Aneurysm Father         stomach aneurysm    Cancer Maternal Aunt         gastric cancer    Prostate cancer Maternal Uncle     Hyperlipidemia Mother     Prostate cancer Brother     Cancer Maternal Grandfather         colon cancer    Heart disease Neg Hx      SOCIAL HISTORY:   Social History     Socioeconomic History    Marital status:      Spouse name: Not on file    Number of children: Not on file    Years of education: Not on file    Highest education level: Not on file   Occupational History    Not on file   Social Needs    Financial resource strain: Not hard at all    Food insecurity     Worry: Never true     Inability: Never true    Transportation needs     Medical: No     Non-medical: No   Tobacco Use    Smoking status: Never Smoker    Smokeless tobacco: Never Used   Substance and Sexual Activity    Alcohol use: Yes     Frequency: 2-4 times a month     Drinks per session: 1 or 2     Binge frequency: Never     Comment: once a week maybe    Drug use: No    Sexual activity: Yes     Partners: Male     Comment: hysterectomy   Lifestyle    Physical activity     Days per week: 0 days     Minutes per session: 0 min    Stress: Not at all   Relationships    Social connections     Talks on phone: More than three times a week     Gets together: Twice a week     Attends Pentecostalism service: Not on file     Active member of club or organization: No     Attends meetings of clubs or organizations: Never     Relationship status:    Other Topics Concern    Not on file   Social History Narrative    Not on file       MEDICATIONS:   Current Outpatient Medications:     acetaminophen-codeine 300-30mg (TYLENOL #3) 300-30 mg Tab, TAKE 1 2 TABLETS BY MOUTH EVERY 6 HOURS AS NEEDED FOR PAIN. TAKE WITH FOOD MAY CAUSE DROWSINESS,  Disp: , Rfl:     cholestyramine (QUESTRAN) 4 gram packet, Take 4 g by mouth 3 (three) times daily with meals., Disp: , Rfl:     diclofenac (VOLTAREN) 75 MG EC tablet, Take 1 tablet (75 mg total) by mouth 2 (two) times daily as needed., Disp: 60 tablet, Rfl: 3    diethylpropion (TENUATE) 75 mg SR tablet, Take 1 tablet (75 mg total) by mouth once daily., Disp: 30 tablet, Rfl: 2    gabapentin (NEURONTIN) 100 MG capsule, Take 2 capsules (200 mg total) by mouth nightly as needed., Disp: 60 capsule, Rfl: 11    ibuprofen (ADVIL,MOTRIN) 600 MG tablet, Take 1 tablet (600 mg total) by mouth every 6 (six) hours as needed for Pain., Disp: 20 tablet, Rfl: 0    promethazine (PHENERGAN) 12.5 MG Tab, Take 1 tablet (12.5 mg total) by mouth 4 (four) times daily., Disp: 20 tablet, Rfl: 6    rizatriptan (MAXALT-MLT) 5 MG disintegrating tablet, Take at onset of migraine, can repeat in 2 hrs if needed.  No more than 2 tabs per day or 3 days/wk., Disp: 12 tablet, Rfl: 3    topiramate (TROKENDI XR) 50 mg Cp24, Take 50 mg by mouth once daily., Disp: 30 capsule, Rfl: 3    traMADoL (ULTRAM) 50 mg tablet, Take 1 tablet (50 mg total) by mouth every 6 (six) hours., Disp: 25 tablet, Rfl: 0  ALLERGIES:   Review of patient's allergies indicates:   Allergen Reactions    Caffeine      She feels like it is hard to breath, dizzy, nauseous.       Review of Systems   Constitution: Negative. Negative for chills, fever and night sweats.   HENT: Negative for congestion and headaches.    Eyes: Negative for blurred vision, left vision loss and right vision loss.   Cardiovascular: Negative for chest pain and syncope.   Respiratory: Negative for cough and shortness of breath.    Endocrine: Negative for polydipsia, polyphagia and polyuria.   Hematologic/Lymphatic: Negative for bleeding problem. Does not bruise/bleed easily.   Skin: Negative for dry skin, itching and rash.   Musculoskeletal: Negative for falls and muscle weakness.   Gastrointestinal:  Negative for abdominal pain and bowel incontinence.   Genitourinary: Negative for bladder incontinence and nocturia.   Neurological: Negative for disturbances in coordination, loss of balance and seizures.   Psychiatric/Behavioral: Negative for depression. The patient does not have insomnia.    Allergic/Immunologic: Negative for hives and persistent infections.     PHYSICAL EXAM:  GEN: A&Ox3, WD WN NAD  HEENT: WNL  CHEST: CTAB, no W/R/R  HEART: RRR, no M/R/G   ABD: Soft, NT ND, BS x4 QUADS  MS: Refer to previous note for detailed MS exam  NEURO: CN II-XII intact       The surgical consent was then reviewed with the patient, who agreed with all the contents of the consent form and it was signed. she was then given the [facility] surgery packet to bring with her to [facility] [place] for the anesthesia portion of her perioperative paperwork.     PHYSICAL THERAPY:  She was also instructed regarding physical therapy and will begin POD # 1-3 at [facility].    POST OP CARE:instructions were reviewed including care of the wound and dressing after surgery and when she can shower.     PAIN MANAGEMENT: Luzmaria BritoZacariasFarhan was also given a pain management regime, which includes the TENS unit given to her by [employer] along with the education required for its use. She was also instructed regarding the Polar ice unit that will be in place after surgery and her postoperative pain medications.     PAIN MEDICATION:  Percocet 10/325mg 1 po q 4-6 hours prn pain  Ultram 50 mg one p.o. q.4-6 hours p.r.n. breakthrough pain,   Phenergan 25 mg one p.o. q.4-6 hours p.r.n. nausea and vomiting.  ASA 325mg BID x 3 weeks post op    POST OP MEDS TO BE DELIVERED BEDSIDE AT [facility]  Patient denies history of seizures.      Patient was also told to buy over the counter Prilosec medication and take it once daily for GI protection as long as they are taking NSAIDs or Aspirin.    DVT prophylaxis was discussed with the patient today including risk factors  for developing DVTs and history of DVTs. The patient was asked if any specific recommendations were given from the doctor/s that did pre-operative surgical clearance.      Patient was asked if they were taking or using OCP pills or devices. If they answered yes, then they were instructed to stop using OCPs at this pre-operative appointment until 2 months post-op to help prevent DVT development. They understand that there are other forms of birth control that do not involve hormones. They expressed understanding that ignoring/not following this instruction could result in a DVT which could turn into a deadly pulmonary embolism.      The patient was told that narcotic pain medications may make them drowsy and instructions were given to not sign legal documents, drive or operate heavy machinery, cars, or equipment while under the influence of narcotic medications.     As there were no other questions to be asked, she was given my business card along with Tejas Prince MD business card if she has any questions or concerns prior to surgery or in the postop period.

## 2020-07-28 ENCOUNTER — TELEPHONE (OUTPATIENT)
Dept: SPORTS MEDICINE | Facility: CLINIC | Age: 45
End: 2020-07-28

## 2020-07-28 ENCOUNTER — ANESTHESIA EVENT (OUTPATIENT)
Dept: SURGERY | Facility: HOSPITAL | Age: 45
End: 2020-07-28
Payer: COMMERCIAL

## 2020-07-28 ENCOUNTER — TELEPHONE (OUTPATIENT)
Dept: INTERNAL MEDICINE | Facility: CLINIC | Age: 45
End: 2020-07-28

## 2020-07-28 LAB
ALBUMIN SERPL BCP-MCNC: 3.8 G/DL (ref 3.5–5.2)
ALP SERPL-CCNC: 53 U/L (ref 55–135)
ALT SERPL W/O P-5'-P-CCNC: 17 U/L (ref 10–44)
ANION GAP SERPL CALC-SCNC: 12 MMOL/L (ref 8–16)
AST SERPL-CCNC: 18 U/L (ref 10–40)
BILIRUB SERPL-MCNC: 0.3 MG/DL (ref 0.1–1)
BUN SERPL-MCNC: 9 MG/DL (ref 6–20)
CALCIUM SERPL-MCNC: 9.1 MG/DL (ref 8.7–10.5)
CHLORIDE SERPL-SCNC: 105 MMOL/L (ref 95–110)
CO2 SERPL-SCNC: 24 MMOL/L (ref 23–29)
CREAT SERPL-MCNC: 0.8 MG/DL (ref 0.5–1.4)
EST. GFR  (AFRICAN AMERICAN): >60 ML/MIN/1.73 M^2
EST. GFR  (NON AFRICAN AMERICAN): >60 ML/MIN/1.73 M^2
GLUCOSE SERPL-MCNC: 86 MG/DL (ref 70–110)
POTASSIUM SERPL-SCNC: 4.1 MMOL/L (ref 3.5–5.1)
PROT SERPL-MCNC: 7.5 G/DL (ref 6–8.4)
SARS-COV-2 RNA RESP QL NAA+PROBE: NOT DETECTED
SODIUM SERPL-SCNC: 141 MMOL/L (ref 136–145)
T4 FREE SERPL-MCNC: 0.95 NG/DL (ref 0.71–1.51)
TSH SERPL DL<=0.005 MIU/L-ACNC: 4.33 UIU/ML (ref 0.4–4)

## 2020-07-28 NOTE — TELEPHONE ENCOUNTER
"----- Message from Malou Weems RN sent at 7/28/2020  7:58 AM CDT -----  Regarding: pre op clearance  Mrs. Danyelle Real was seen in your office on 7/23 for pre op clearance.  I do not see that the chart states she is "medically cleared for [place]".  When clearing this patient for surgery, please be aware she is being cleared to come to a stand alone surgery center.  Should she need cardiac monitoring or a high acquity level of care,  he/she will be transferred to [facility] via ambulance.      Please let me know when she clearance in her chart, so I may present her case folder to anesthesia for anesthesia clearance.  Thank you.        Her surgery is scheduled for tomorrow 7/29 with Dr. Prince.    Malou Weems RN  Pre/Post Anesthesia Triage Team  [facility]   [phone]           "

## 2020-07-28 NOTE — ANESTHESIA PAT ROS NOTE
07/28/2020  Luzmaria Louis is a 45 y.o., female.      Pre-op Assessment    I have reviewed the Patient Summary Reports.     I have reviewed the Nursing Notes. I have reviewed the NPO Status.   I have reviewed the Medications.     Review of Systems  Anesthesia Hx:  No problems with previous Anesthesia  History of prior surgery of interest to airway management or planning: Denies Family Hx of Anesthesia complications.   Denies Personal Hx of Anesthesia complications.   Social:  Non-Smoker, No Alcohol Use  Denies Tobacco Use. Denies Alcohol Use.   Hematology/Oncology:  Hematology Normal   Oncology Normal     EENT/Dental:  EENT/Dental Normal  Denies ear symptoms    Cardiovascular:   Exercise tolerance: good Last echo 2019  Denies Congenital Heart Disease.    Denies Congenital Heart Disease.     Pulmonary:  Denies Asthma.  Denies Chronic Obstructive Pulmonary Disease (COPD).    Hepatic/GI:  Denies Hepatic/GI Symptoms  Denies Liver Disease    Musculoskeletal:   Arthritis   Musculoskeletal General/Symptoms: joint pain.    Neurological:   Headaches  Dx of Headaches, Migraine Headache   Endocrine:  Denies Diabetes  Thyroid Disease    Dermatological:  Skin Normal  Denies Skin Symptoms/Problems   Psych:  Psychiatric Normal              Anesthesia Assessment: Preoperative EQUATION    Planned Procedure: Procedure(s) (LRB):  REPAIR, ROTATOR CUFF, ARTHROSCOPIC (Left)  FIXATION, TENDON (Left)  Requested Anesthesia Type:General  Surgeon: Tjeas Prince MD  Service: Orthopedics  Known or anticipated Date of Surgery:7/29/2020    Surgeon notes: reviewed   Past Surgical History:   Procedure Laterality Date    CHOLECYSTECTOMY      HYSTERECTOMY      MOUTH SURGERY      THYROID LOBECTOMY Right 7/3/2019    Procedure: LOBECTOMY, THYROID, Possible Total;  Surgeon: Carlee Huff MD;  Location: [facility] OR [address] [place] [address];   Service: General;  Laterality: Right;    TONSILLECTOMY      TUBAL LIGATION         Electronic QUestionnaire Assessment completed via nurse interview with patient.        Triage considerations:     The patient has no apparent active cardiac condition (No unstable coronary Syndrome such as severe unstable angina or recent [<1 month] myocardial infarction, decompensated CHF, severe valvular   disease or significant arrhythmia)    Previous anesthesia records:[facility]    Last PCP note: within 3 months , [facility]   Subspecialty notes: Endocrinology, Ortho, Pain Management    Other important co-morbidities: GERD, HTN and Hypothyroid      Tests already available:  Results have been reviewed.             Instructions given. Per md office    Optimization:  Anesthesia Preop Clinic Assessment  Indicated    Medical Opinion Indicated       Sub-specialist consult indicated:   TBD       Plan:    Testing:  See Orders.   Pre-anesthesia  visit       Visit focus: concerns in complex and/or prolonged anesthesia     Consultation:Patient's PCP for a statement of optimization         Pending PCP clearance. Saw PCP , Benita Parada NP, on 7/23/20.              Patient  has previously scheduled Medical Appointment:    Navigation: Tests Scheduled.              Consults scheduled.             Results will be tracked by Preop Clinic.

## 2020-07-28 NOTE — PLAN OF CARE
Contacted patient to review the Covid-19 Procedure/Surgery Confirmation questionnaire.  Patient receptive to questions and all information provided on instructions concerning our temporary temperature, hand washing/sanitizing and visitor policy as per CDC recommendations. No visitors will be allowed into the hospital at this time.  Patient will be dropped off and picked up at the same location.  We will receive permission to text that person with updates,(if patient wishes to do so) as well as when the patient is ready for discharge.  Phone number [phone] provided for patients to call if they were to develop fever, cough or SOB prior to surgery. Instructed to take temperature the night before and the morning of surgery. Travel questions as per travel questionnaire reviewed. Our cleaning protocols for every surgery and every procedure were reviewed, and reassurance provided that safety, as well as following CDC guidelines, are our top priority. Voiced understanding.      Arrival time will be given by ortho clinic for surgery tomorrow at [facility], [address] [place] [address], 1st floor [facility]. Instructions given on food/fluid intake, medications, COVID info and visitor policy.  Voiced understanding

## 2020-07-29 ENCOUNTER — HOSPITAL ENCOUNTER (OUTPATIENT)
Facility: HOSPITAL | Age: 45
Discharge: HOME | End: 2020-07-29
Attending: ORTHOPAEDIC SURGERY | Admitting: ORTHOPAEDIC SURGERY
Payer: COMMERCIAL

## 2020-07-29 ENCOUNTER — ANESTHESIA (OUTPATIENT)
Dept: SURGERY | Facility: HOSPITAL | Age: 45
End: 2020-07-29
Payer: COMMERCIAL

## 2020-07-29 VITALS
HEART RATE: 58 BPM | DIASTOLIC BLOOD PRESSURE: 75 MMHG | HEIGHT: 62 IN | TEMPERATURE: 98 F | SYSTOLIC BLOOD PRESSURE: 147 MMHG | OXYGEN SATURATION: 100 % | RESPIRATION RATE: 24 BRPM | WEIGHT: 255 LBS | BODY MASS INDEX: 46.93 KG/M2

## 2020-07-29 DIAGNOSIS — S46.012D TRAUMATIC INCOMPLETE TEAR OF LEFT ROTATOR CUFF, SUBSEQUENT ENCOUNTER: Primary | ICD-10-CM

## 2020-07-29 DIAGNOSIS — M75.22 BICEPS TENDONITIS ON LEFT: ICD-10-CM

## 2020-07-29 PROBLEM — S46.012A TRAUMATIC INCOMPLETE TEAR OF LEFT ROTATOR CUFF: Status: ACTIVE | Noted: 2020-07-29

## 2020-07-29 PROCEDURE — 27201423 OPTIME MED/SURG SUP & DEVICES STERILE SUPPLY: Performed by: ORTHOPAEDIC SURGERY

## 2020-07-29 PROCEDURE — 63600175 PHARM REV CODE 636 W HCPCS: Performed by: NURSE ANESTHETIST, CERTIFIED REGISTERED

## 2020-07-29 PROCEDURE — S0028 INJECTION, FAMOTIDINE, 20 MG: HCPCS | Performed by: NURSE ANESTHETIST, CERTIFIED REGISTERED

## 2020-07-29 PROCEDURE — 25000003 PHARM REV CODE 250: Performed by: NURSE ANESTHETIST, CERTIFIED REGISTERED

## 2020-07-29 PROCEDURE — 25000003 PHARM REV CODE 250: Performed by: PHYSICIAN ASSISTANT

## 2020-07-29 PROCEDURE — 37000008 HC ANESTHESIA 1ST 15 MINUTES: Performed by: ORTHOPAEDIC SURGERY

## 2020-07-29 PROCEDURE — 71000033 HC RECOVERY, INTIAL HOUR: Performed by: ORTHOPAEDIC SURGERY

## 2020-07-29 PROCEDURE — 63600175 PHARM REV CODE 636 W HCPCS: Performed by: ANESTHESIOLOGY

## 2020-07-29 PROCEDURE — 29823 SHO ARTHRS SRG XTNSV DBRDMT: CPT | Mod: LT,,, | Performed by: ORTHOPAEDIC SURGERY

## 2020-07-29 PROCEDURE — 99900035 HC TECH TIME PER 15 MIN (STAT)

## 2020-07-29 PROCEDURE — 94770 HC EXHALED C02 TEST: CPT | Mod: 59

## 2020-07-29 PROCEDURE — 64416 NJX AA&/STRD BRCH PL NFS IMG: CPT | Performed by: STUDENT IN AN ORGANIZED HEALTH CARE EDUCATION/TRAINING PROGRAM

## 2020-07-29 PROCEDURE — 63600175 PHARM REV CODE 636 W HCPCS: Performed by: PHYSICIAN ASSISTANT

## 2020-07-29 PROCEDURE — 63600175 PHARM REV CODE 636 W HCPCS

## 2020-07-29 PROCEDURE — 63600175 PHARM REV CODE 636 W HCPCS: Performed by: ORTHOPAEDIC SURGERY

## 2020-07-29 PROCEDURE — 29826 SHO ARTHRS SRG DECOMPRESSION: CPT | Mod: LT,,, | Performed by: ORTHOPAEDIC SURGERY

## 2020-07-29 PROCEDURE — 36000710: Performed by: ORTHOPAEDIC SURGERY

## 2020-07-29 PROCEDURE — 25000003 PHARM REV CODE 250: Performed by: STUDENT IN AN ORGANIZED HEALTH CARE EDUCATION/TRAINING PROGRAM

## 2020-07-29 PROCEDURE — D9220A PRA ANESTHESIA: ICD-10-PCS | Mod: ,,, | Performed by: ANESTHESIOLOGY

## 2020-07-29 PROCEDURE — 36000711: Performed by: ORTHOPAEDIC SURGERY

## 2020-07-29 PROCEDURE — D9220A PRA ANESTHESIA: Mod: ,,, | Performed by: ANESTHESIOLOGY

## 2020-07-29 PROCEDURE — 25000003 PHARM REV CODE 250: Performed by: ANESTHESIOLOGY

## 2020-07-29 PROCEDURE — 29823 PR SHLDR ARTHROSCOP,EXTEN DEBRIDE: ICD-10-PCS | Mod: LT,,, | Performed by: ORTHOPAEDIC SURGERY

## 2020-07-29 PROCEDURE — 76942 ECHO GUIDE FOR BIOPSY: CPT | Performed by: STUDENT IN AN ORGANIZED HEALTH CARE EDUCATION/TRAINING PROGRAM

## 2020-07-29 PROCEDURE — A4216 STERILE WATER/SALINE, 10 ML: HCPCS | Performed by: NURSE ANESTHETIST, CERTIFIED REGISTERED

## 2020-07-29 PROCEDURE — 29826 PR SHLDR ARTHROSCOP,PART ACROMIOPLAS: ICD-10-PCS | Mod: LT,,, | Performed by: ORTHOPAEDIC SURGERY

## 2020-07-29 PROCEDURE — 71000015 HC POSTOP RECOV 1ST HR: Performed by: ORTHOPAEDIC SURGERY

## 2020-07-29 PROCEDURE — 37000009 HC ANESTHESIA EA ADD 15 MINS: Performed by: ORTHOPAEDIC SURGERY

## 2020-07-29 PROCEDURE — 94761 N-INVAS EAR/PLS OXIMETRY MLT: CPT

## 2020-07-29 RX ORDER — HYDROCODONE BITARTRATE AND ACETAMINOPHEN 5; 325 MG/1; MG/1
1 TABLET ORAL EVERY 4 HOURS PRN
Status: CANCELLED | OUTPATIENT
Start: 2020-07-29

## 2020-07-29 RX ORDER — ONDANSETRON 2 MG/ML
4 INJECTION INTRAMUSCULAR; INTRAVENOUS ONCE AS NEEDED
Status: DISCONTINUED | OUTPATIENT
Start: 2020-07-29 | End: 2020-07-29 | Stop reason: HOSPADM

## 2020-07-29 RX ORDER — ACETAMINOPHEN 500 MG
1000 TABLET ORAL ONCE
Status: COMPLETED | OUTPATIENT
Start: 2020-07-29 | End: 2020-07-29

## 2020-07-29 RX ORDER — LABETALOL HYDROCHLORIDE 5 MG/ML
INJECTION, SOLUTION INTRAVENOUS
Status: DISCONTINUED | OUTPATIENT
Start: 2020-07-29 | End: 2020-07-29

## 2020-07-29 RX ORDER — ROCURONIUM BROMIDE 10 MG/ML
INJECTION, SOLUTION INTRAVENOUS
Status: DISCONTINUED | OUTPATIENT
Start: 2020-07-29 | End: 2020-07-29

## 2020-07-29 RX ORDER — LIDOCAINE HYDROCHLORIDE 20 MG/ML
INJECTION INTRAVENOUS
Status: DISCONTINUED | OUTPATIENT
Start: 2020-07-29 | End: 2020-07-29

## 2020-07-29 RX ORDER — ONDANSETRON 4 MG/1
8 TABLET, ORALLY DISINTEGRATING ORAL EVERY 8 HOURS PRN
Status: CANCELLED | OUTPATIENT
Start: 2020-07-29

## 2020-07-29 RX ORDER — BUPIVACAINE HYDROCHLORIDE 2.5 MG/ML
INJECTION, SOLUTION EPIDURAL; INFILTRATION; INTRACAUDAL
Status: DISCONTINUED | OUTPATIENT
Start: 2020-07-29 | End: 2020-07-29

## 2020-07-29 RX ORDER — NEOSTIGMINE METHYLSULFATE 1 MG/ML
INJECTION, SOLUTION INTRAVENOUS
Status: DISCONTINUED | OUTPATIENT
Start: 2020-07-29 | End: 2020-07-29

## 2020-07-29 RX ORDER — SODIUM CHLORIDE 0.9 % (FLUSH) 0.9 %
3 SYRINGE (ML) INJECTION
Status: DISCONTINUED | OUTPATIENT
Start: 2020-07-29 | End: 2020-07-29 | Stop reason: HOSPADM

## 2020-07-29 RX ORDER — FENTANYL CITRATE 50 UG/ML
INJECTION, SOLUTION INTRAMUSCULAR; INTRAVENOUS
Status: COMPLETED
Start: 2020-07-29 | End: 2020-07-29

## 2020-07-29 RX ORDER — DEXAMETHASONE SODIUM PHOSPHATE 4 MG/ML
INJECTION, SOLUTION INTRA-ARTICULAR; INTRALESIONAL; INTRAMUSCULAR; INTRAVENOUS; SOFT TISSUE
Status: DISCONTINUED | OUTPATIENT
Start: 2020-07-29 | End: 2020-07-29

## 2020-07-29 RX ORDER — ONDANSETRON 2 MG/ML
INJECTION INTRAMUSCULAR; INTRAVENOUS
Status: DISCONTINUED | OUTPATIENT
Start: 2020-07-29 | End: 2020-07-29

## 2020-07-29 RX ORDER — GLYCOPYRROLATE 0.2 MG/ML
INJECTION INTRAMUSCULAR; INTRAVENOUS
Status: DISCONTINUED | OUTPATIENT
Start: 2020-07-29 | End: 2020-07-29

## 2020-07-29 RX ORDER — DEXMEDETOMIDINE HYDROCHLORIDE 100 UG/ML
INJECTION, SOLUTION INTRAVENOUS
Status: DISCONTINUED | OUTPATIENT
Start: 2020-07-29 | End: 2020-07-29

## 2020-07-29 RX ORDER — SUCCINYLCHOLINE CHLORIDE 20 MG/ML
INJECTION INTRAMUSCULAR; INTRAVENOUS
Status: DISCONTINUED | OUTPATIENT
Start: 2020-07-29 | End: 2020-07-29

## 2020-07-29 RX ORDER — CEFAZOLIN SODIUM 1 G/3ML
2 INJECTION, POWDER, FOR SOLUTION INTRAMUSCULAR; INTRAVENOUS
Status: DISCONTINUED | OUTPATIENT
Start: 2020-07-29 | End: 2020-07-29 | Stop reason: HOSPADM

## 2020-07-29 RX ORDER — FENTANYL CITRATE 50 UG/ML
INJECTION, SOLUTION INTRAMUSCULAR; INTRAVENOUS
Status: DISCONTINUED | OUTPATIENT
Start: 2020-07-29 | End: 2020-07-29

## 2020-07-29 RX ORDER — SODIUM CHLORIDE 9 MG/ML
INJECTION, SOLUTION INTRAVENOUS CONTINUOUS
Status: DISCONTINUED | OUTPATIENT
Start: 2020-07-29 | End: 2020-07-29 | Stop reason: HOSPADM

## 2020-07-29 RX ORDER — ACETAMINOPHEN 500 MG
1000 TABLET ORAL ONCE
Status: DISCONTINUED | OUTPATIENT
Start: 2020-07-29 | End: 2020-07-29 | Stop reason: HOSPADM

## 2020-07-29 RX ORDER — MIDAZOLAM HYDROCHLORIDE 1 MG/ML
INJECTION, SOLUTION INTRAMUSCULAR; INTRAVENOUS
Status: DISCONTINUED | OUTPATIENT
Start: 2020-07-29 | End: 2020-07-29

## 2020-07-29 RX ORDER — MORPHINE SULFATE 2 MG/ML
2 INJECTION, SOLUTION INTRAMUSCULAR; INTRAVENOUS EVERY 4 HOURS PRN
Status: CANCELLED | OUTPATIENT
Start: 2020-07-29

## 2020-07-29 RX ORDER — KETAMINE HYDROCHLORIDE 100 MG/ML
INJECTION, SOLUTION INTRAMUSCULAR; INTRAVENOUS
Status: DISCONTINUED | OUTPATIENT
Start: 2020-07-29 | End: 2020-07-29

## 2020-07-29 RX ORDER — CELECOXIB 200 MG/1
400 CAPSULE ORAL ONCE
Status: COMPLETED | OUTPATIENT
Start: 2020-07-29 | End: 2020-07-29

## 2020-07-29 RX ORDER — FENTANYL CITRATE 50 UG/ML
100 INJECTION, SOLUTION INTRAMUSCULAR; INTRAVENOUS ONCE
Status: COMPLETED | OUTPATIENT
Start: 2020-07-29 | End: 2020-07-29

## 2020-07-29 RX ORDER — MIDAZOLAM HYDROCHLORIDE 1 MG/ML
0.5 INJECTION INTRAMUSCULAR; INTRAVENOUS
Status: DISCONTINUED | OUTPATIENT
Start: 2020-07-29 | End: 2020-07-29 | Stop reason: HOSPADM

## 2020-07-29 RX ORDER — FENTANYL CITRATE 50 UG/ML
25 INJECTION, SOLUTION INTRAMUSCULAR; INTRAVENOUS ONCE
Status: COMPLETED | OUTPATIENT
Start: 2020-07-29 | End: 2020-07-29

## 2020-07-29 RX ORDER — PROPOFOL 10 MG/ML
VIAL (ML) INTRAVENOUS
Status: DISCONTINUED | OUTPATIENT
Start: 2020-07-29 | End: 2020-07-29

## 2020-07-29 RX ORDER — EPINEPHRINE 1 MG/ML
INJECTION, SOLUTION INTRACARDIAC; INTRAMUSCULAR; INTRAVENOUS; SUBCUTANEOUS
Status: DISCONTINUED | OUTPATIENT
Start: 2020-07-29 | End: 2020-07-29 | Stop reason: HOSPADM

## 2020-07-29 RX ORDER — FAMOTIDINE 10 MG/ML
INJECTION INTRAVENOUS
Status: DISCONTINUED | OUTPATIENT
Start: 2020-07-29 | End: 2020-07-29

## 2020-07-29 RX ADMIN — SODIUM CHLORIDE 0.05 MCG/KG/HR: 9 INJECTION INTRAMUSCULAR; INTRAVENOUS; SUBCUTANEOUS at 03:07

## 2020-07-29 RX ADMIN — SODIUM CHLORIDE 1000 ML: 0.9 INJECTION, SOLUTION INTRAVENOUS at 10:07

## 2020-07-29 RX ADMIN — MIDAZOLAM HYDROCHLORIDE 2 MG: 1 INJECTION, SOLUTION INTRAMUSCULAR; INTRAVENOUS at 02:07

## 2020-07-29 RX ADMIN — CELECOXIB 400 MG: 200 CAPSULE ORAL at 10:07

## 2020-07-29 RX ADMIN — ROCURONIUM BROMIDE 10 MG: 10 INJECTION, SOLUTION INTRAVENOUS at 02:07

## 2020-07-29 RX ADMIN — FENTANYL CITRATE 50 MCG: 50 INJECTION INTRAMUSCULAR; INTRAVENOUS at 12:07

## 2020-07-29 RX ADMIN — FENTANYL CITRATE 100 MCG: 50 INJECTION, SOLUTION INTRAMUSCULAR; INTRAVENOUS at 02:07

## 2020-07-29 RX ADMIN — ACETAMINOPHEN 1000 MG: 500 TABLET ORAL at 10:07

## 2020-07-29 RX ADMIN — BUPIVACAINE HYDROCHLORIDE 25 ML: 2.5 INJECTION, SOLUTION EPIDURAL; INFILTRATION; INTRACAUDAL; PERINEURAL at 12:07

## 2020-07-29 RX ADMIN — PROPOFOL 200 MG: 10 INJECTION, EMULSION INTRAVENOUS at 02:07

## 2020-07-29 RX ADMIN — GLYCOPYRROLATE 0.4 MG: 0.2 INJECTION, SOLUTION INTRAMUSCULAR; INTRAVENOUS at 03:07

## 2020-07-29 RX ADMIN — SUCCINYLCHOLINE CHLORIDE 200 MG: 20 INJECTION, SOLUTION INTRAMUSCULAR; INTRAVENOUS at 02:07

## 2020-07-29 RX ADMIN — FENTANYL CITRATE 50 MCG: 50 INJECTION, SOLUTION INTRAMUSCULAR; INTRAVENOUS at 12:07

## 2020-07-29 RX ADMIN — SODIUM CHLORIDE, SODIUM GLUCONATE, SODIUM ACETATE, POTASSIUM CHLORIDE, MAGNESIUM CHLORIDE, SODIUM PHOSPHATE, DIBASIC, AND POTASSIUM PHOSPHATE: .53; .5; .37; .037; .03; .012; .00082 INJECTION, SOLUTION INTRAVENOUS at 02:07

## 2020-07-29 RX ADMIN — ONDANSETRON 4 MG: 2 INJECTION INTRAMUSCULAR; INTRAVENOUS at 02:07

## 2020-07-29 RX ADMIN — FAMOTIDINE 20 MG: 10 INJECTION, SOLUTION INTRAVENOUS at 02:07

## 2020-07-29 RX ADMIN — KETAMINE HYDROCHLORIDE 30 MG: 100 INJECTION, SOLUTION, CONCENTRATE INTRAMUSCULAR; INTRAVENOUS at 02:07

## 2020-07-29 RX ADMIN — ROPIVACAINE HYDROCHLORIDE: 2 INJECTION, SOLUTION EPIDURAL; INFILTRATION at 04:07

## 2020-07-29 RX ADMIN — MIDAZOLAM 2 MG: 1 INJECTION INTRAMUSCULAR; INTRAVENOUS at 12:07

## 2020-07-29 RX ADMIN — LIDOCAINE HYDROCHLORIDE 50 MG: 20 INJECTION, SOLUTION INTRAVENOUS at 02:07

## 2020-07-29 RX ADMIN — NEOSTIGMINE METHYLSULFATE 4 MG: 1 INJECTION INTRAVENOUS at 03:07

## 2020-07-29 RX ADMIN — GLYCOPYRROLATE 0.2 MG: 0.2 INJECTION, SOLUTION INTRAMUSCULAR; INTRAVENOUS at 03:07

## 2020-07-29 RX ADMIN — DEXAMETHASONE SODIUM PHOSPHATE 8 MG: 4 INJECTION, SOLUTION INTRAMUSCULAR; INTRAVENOUS at 02:07

## 2020-07-29 RX ADMIN — ROCURONIUM BROMIDE 40 MG: 10 INJECTION, SOLUTION INTRAVENOUS at 02:07

## 2020-07-29 RX ADMIN — DEXMEDETOMIDINE HYDROCHLORIDE 29 MCG: 100 INJECTION, SOLUTION, CONCENTRATE INTRAVENOUS at 03:07

## 2020-07-29 RX ADMIN — GLYCOPYRROLATE 0.2 MG: 0.2 INJECTION, SOLUTION INTRAMUSCULAR; INTRAVENOUS at 04:07

## 2020-07-29 RX ADMIN — LABETALOL HYDROCHLORIDE 20 MG: 5 INJECTION, SOLUTION INTRAVENOUS at 02:07

## 2020-07-29 RX ADMIN — CEFAZOLIN 2 G: 330 INJECTION, POWDER, FOR SOLUTION INTRAMUSCULAR; INTRAVENOUS at 02:07

## 2020-07-29 NOTE — ANESTHESIA PREPROCEDURE EVALUATION
07/29/2020  Luzmaria Louis is a 45 y.o., female.    Pre-operative evaluation for Procedure(s) (LRB):  REPAIR, ROTATOR CUFF, ARTHROSCOPIC (Left)  FIXATION, TENDON (Left)    Luzmaria Louis is a 45 y.o. female w/ GERD, morbid obesity and likely FLORIN here for the above procedure.     Prev airway: G1 w/ a Rizo    Patient Active Problem List   Diagnosis    GERD (gastroesophageal reflux disease)    Bile salt-induced diarrhea    BMI 45.0-49.9, adult    Migraine without status migrainosus, not intractable    Nausea alone    Heartburn    Encounter for monitoring chronic NSAID therapy    Tietze's syndrome    Sleep stage dysfunction    Thyroid nodule    Decreased range of motion of left shoulder    Weakness of both upper extremities    Poor posture    Pain aggravated by activities of daily living    Posterior tibial tendinitis of left leg    Tear of left rotator cuff    Internal derangement of left shoulder    Traumatic incomplete tear of left rotator cuff       Review of patient's allergies indicates:   Allergen Reactions    Caffeine      She feels like it is hard to breath, dizzy, nauseous.        No current facility-administered medications on file prior to encounter.      Current Outpatient Medications on File Prior to Encounter   Medication Sig Dispense Refill    cholestyramine (QUESTRAN) 4 gram packet Take 4 g by mouth 3 (three) times daily with meals.      diclofenac (VOLTAREN) 75 MG EC tablet Take 1 tablet (75 mg total) by mouth 2 (two) times daily as needed. 60 tablet 3    promethazine (PHENERGAN) 12.5 MG Tab Take 1 tablet (12.5 mg total) by mouth 4 (four) times daily. 20 tablet 6    diethylpropion (TENUATE) 75 mg SR tablet Take 1 tablet (75 mg total) by mouth once daily. 30 tablet 2    gabapentin (NEURONTIN) 100 MG capsule Take 2 capsules (200 mg total) by mouth  nightly as needed. 60 capsule 11    ibuprofen (ADVIL,MOTRIN) 600 MG tablet Take 1 tablet (600 mg total) by mouth every 6 (six) hours as needed for Pain. 20 tablet 0    rizatriptan (MAXALT-MLT) 5 MG disintegrating tablet Take at onset of migraine, can repeat in 2 hrs if needed.  No more than 2 tabs per day or 3 days/wk. 12 tablet 3    topiramate (TROKENDI XR) 50 mg Cp24 Take 50 mg by mouth once daily. 30 capsule 3    traMADoL (ULTRAM) 50 mg tablet Take 1 tablet (50 mg total) by mouth every 6 (six) hours. 25 tablet 0       Past Surgical History:   Procedure Laterality Date    CHOLECYSTECTOMY      HYSTERECTOMY      MOUTH SURGERY      THYROID LOBECTOMY Right 7/3/2019    Procedure: LOBECTOMY, THYROID, Possible Total;  Surgeon: Carlee Huff MD;  Location: [address];  Service: General;  Laterality: Right;    TONSILLECTOMY      TUBAL LIGATION         Social History     Socioeconomic History    Marital status:      Spouse name: Not on file    Number of children: Not on file    Years of education: Not on file    Highest education level: Not on file   Occupational History    Not on file   Social Needs    Financial resource strain: Not hard at all    Food insecurity     Worry: Never true     Inability: Never true    Transportation needs     Medical: No     Non-medical: No   Tobacco Use    Smoking status: Never Smoker    Smokeless tobacco: Never Used   Substance and Sexual Activity    Alcohol use: Yes     Frequency: 2-4 times a month     Drinks per session: 1 or 2     Binge frequency: Never     Comment: once a week maybe    Drug use: No    Sexual activity: Yes     Partners: Male     Comment: hysterectomy   Lifestyle    Physical activity     Days per week: 0 days     Minutes per session: 0 min    Stress: Not at all   Relationships    Social connections     Talks on phone: More than three times a week     Gets together: Twice a week     Attends Worship service: Not on file      Active member of club or organization: No     Attends meetings of clubs or organizations: Never     Relationship status:    Other Topics Concern    Not on file   Social History Narrative    Not on file         Vital Signs Range (Last 24H):  Temp:  [36.8 °C (98.2 °F)]   Pulse:  [65-82]   Resp:  [12-16]   BP: (118-143)/(58-68)   SpO2:  [100 %]       CBC:   Recent Labs     07/27/20  1112   WBC 6.48   RBC 4.50   HGB 13.1   HCT 41.4      MCV 92   MCH 29.1   MCHC 31.6*       CMP:   Recent Labs     07/27/20  1113      K 4.1      CO2 24   BUN 9   CREATININE 0.8   GLU 86   CALCIUM 9.1   ALBUMIN 3.8   PROT 7.5   ALKPHOS 53*   ALT 17   AST 18   BILITOT 0.3       INR  Recent Labs     07/27/20  1113   INR 1.0   APTT 27.1           Anesthesia Evaluation    I have reviewed the Patient Summary Reports.    I have reviewed the Nursing Notes. I have reviewed the NPO Status.      Review of Systems  Anesthesia Hx:  No problems with previous Anesthesia  History of prior surgery of interest to airway management or planning: Previous anesthesia: General Denies Family Hx of Anesthesia complications.   Denies Personal Hx of Anesthesia complications.   Social:  Non-Smoker, Social Alcohol Use    Hematology/Oncology:  Hematology Normal      Oncology Comments: S/p thyroidectomy    EENT/Dental:   chronic allergic rhinitis Throat Symptoms (D/T thyroid nodule)  include Hoarseness   Jaw Problems:  Pain Saw PCP last week for jaw muscle pain-no TMJ diagnosis-no limited mouth opening.  Cardiovascular:    Denies Angina. HX bradycardia Functional Capacity good / => 4 METS    Pulmonary:   Denies Shortness of breath.  Denies Recent URI.    Renal/:   renal calculi    Hepatic/GI:   GERD (on no meds-adjusted diet)    Musculoskeletal:   Arthritis  Left arm pain-no dx   Neurological:   Headaches  Dx of Headaches, Migraine Headache   Endocrine:   Thyroid nodule Metabolic Disorders, Morbid Obesity / BMI > 40  Psych:  Psychiatric  Normal           Physical Exam  General:  Well nourished, Morbid Obesity    Airway/Jaw/Neck:  Airway Findings: Mouth Opening: Small, but > 3cm Tongue: Normal  General Airway Assessment: Adult, Possible difficult intubation  Mallampati: III  Improves to II with phonation.  TM Distance: Normal, at least 6 cm  Jaw/Neck Findings:  Neck ROM: Normal ROM  Neck Findings:  Girth Increased      Dental:  Dental Findings: In tact     Abdomen:  Abdomen Findings: Normal    Musculoskeletal:  Musculoskeletal Findings: Normal   Skin:  Skin Findings: Normal    Mental Status:  Mental Status Findings:  Cooperative, Alert and Oriented         Anesthesia Plan  Type of Anesthesia, risks & benefits discussed:  Anesthesia Type:  regional, general  Patient's Preference:   Intra-op Monitoring Plan: standard ASA monitors  Intra-op Monitoring Plan Comments:   Post Op Pain Control Plan: multimodal analgesia  Post Op Pain Control Plan Comments:   Induction:   IV  Beta Blocker:  Patient is not currently on a Beta-Blocker (No further documentation required).       Informed Consent: Patient understands risks and agrees with Anesthesia plan.  Questions answered. Anesthesia consent signed with patient.  ASA Score: 3     Day of Surgery Review of History & Physical:    H&P update referred to the surgeon.         Ready For Surgery From Anesthesia Perspective.

## 2020-07-29 NOTE — ANESTHESIA PROCEDURE NOTES
Interscalene Brachial Plexus Catheter    Patient location during procedure: pre-op   Block not for primary anesthetic.  Reason for block: at surgeon's request and post-op pain management   Post-op Pain Location: Left shoulder pain  Start time: 7/29/2020 12:30 PM  Timeout: 7/29/2020 12:27 PM   End time: 7/29/2020 12:45 PM    Staffing  Authorizing Provider: Wenceslao Rudolph MD  Performing Provider: Wenceslao Rudolph MD    Preanesthetic Checklist  Completed: patient identified, site marked, surgical consent, pre-op evaluation, timeout performed, IV checked, risks and benefits discussed and monitors and equipment checked  Peripheral Block  Patient position: sitting  Prep: ChloraPrep and site prepped and draped  Patient monitoring: heart rate, cardiac monitor, continuous pulse ox, continuous capnometry and frequent blood pressure checks  Block type: interscalene  Laterality: left  Injection technique: continuous  Needle  Needle type: Tuohy   Needle gauge: 18 G  Needle length: 2 in  Needle localization: anatomical landmarks and ultrasound guidance  Catheter type: non-stimulating  Catheter size: 20 G  Test dose: lidocaine 1.5% with Epi 1-to-200,000 and negative   -ultrasound image captured on disc.  Assessment  Injection assessment: negative aspiration, negative parasthesia and local visualized surrounding nerve  Paresthesia pain: none  Heart rate change: no  Slow fractionated injection: yes  Additional Notes  VSS.  [facility] RN monitoring vitals throughout procedure.  Patient tolerated procedure well. 25ml 0.25 bupiv + epi

## 2020-07-29 NOTE — BRIEF OP NOTE
[facility]  Brief Operative Note    Surgery Date: 7/29/2020     Surgeon(s) and Role:     * Tejas Prince MD - Primary    Assisting Surgeon: None    Pre-op Diagnosis:  Nontraumatic tear of left rotator cuff, unspecified tear extent [M75.102]  Biceps tendinitis of left upper extremity [M75.22]    Post-op Diagnosis:  Post-Op Diagnosis Codes:     * Nontraumatic tear of left rotator cuff, unspecified tear extent [M75.102]     * Biceps tendinitis of left upper extremity [M75.22]    Procedure(s) (LRB):  DEBRIDEMENT, ROTATOR CUFF, ARTHROSCOPIC (Left)  ARTHROSCOPY, SHOULDER, WITH SUBACROMIAL SPACE DECOMPRESSION  [id number] (Left)    Anesthesia: General    Description of the findings of the procedure(s): pls see op report    Estimated Blood Loss: * No values recorded between 7/29/2020  3:05 PM and 7/29/2020  4:00 PM *         Specimens:   Specimen (12h ago, onward)    None            Discharge Note    OUTCOME: Patient tolerated treatment/procedure well without complication and is now ready for discharge.    DISPOSITION: Home or Self Care    FINAL DIAGNOSIS:  Traumatic incomplete tear of left rotator cuff    FOLLOWUP: In clinic    DISCHARGE INSTRUCTIONS:    Discharge Procedure Orders   Diet general     Call MD for:  temperature >100.4     Call MD for:  persistent nausea and vomiting     Call MD for:  severe uncontrolled pain     Call MD for:  difficulty breathing, headache or visual disturbances     Call MD for:  redness, tenderness, or signs of infection (pain, swelling, redness, odor or green/yellow discharge around incision site)     Call MD for:  hives     Call MD for:  persistent dizziness or light-headedness     Call MD for:  extreme fatigue     No driving, operating heavy equipment or signing legal documents while taking pain medication     Remove dressing in 72 hours

## 2020-07-29 NOTE — PROGRESS NOTES
On q teaching done. Agreement signed and telephone numbers obtained. Opportunity to ask questions given.

## 2020-07-29 NOTE — INTERVAL H&P NOTE
The patient has been examined and the H&P has been reviewed:    I concur with the findings and no changes have occurred since H&P was written.    Anesthesia/Surgery risks, benefits and alternative options discussed and understood by patient/family.          Active Hospital Problems    Diagnosis  POA    Traumatic incomplete tear of left rotator cuff [S46.012A]  Yes      Resolved Hospital Problems   No resolved problems to display.

## 2020-07-29 NOTE — DISCHARGE INSTRUCTIONS
[address], [place]                                    [phone]             Postoperative Instructions for Shoulder Surgery               Your Surgery Included:   Open              Arthroscopic [] Instability Repair     [] Diagnostic   [] Rotator Cuff Repair     [x] Lysis of Adhesions / Manipulation [] Distal Clavicle Resection    [x] Debridement [] Biceps Tenodesis       [x] Labrum  [x] Rotator Cuff   [] Cartilage   [] Contracture Release    [] SLAP Repair   [] Fracture Fixation    [] Instability Repair  [] AC Joint Reconstruction      [] Rotator Cuff Repair [] Joint Replacement     [] Subacromial Decompression / Bursectomy   [] Hemiarthroplasty  [] Total Shoulder    [] Biceps Tenotomy / Tenodesis    [] Reverse Total Shoulder       [] Distal Clavicle Resection          [] Amniox Arthrocentesis    [] Contracture Release                 Call our office ([phone]) immediately if you experience any of the following:      Excessive bleeding or pus like drainage at the incision site      Uncontrollable pain not relieved by pain medication      Excessive swelling or redness at the incision site      Fever above 101.5 degrees not controlled with Tylenol or Motrin      Shortness of Breath      Any foul odor or blistering from the surgery site   FOR EMERGENCIES: If any unusual problems or difficulties occur, call our office at [phone], or page the  at [phone] who will direct your call appropriately   1.   Pain Management: A cold therapy cuff, pain medications, local injections, and in some cases, regional anesthesia injections are used to manage your post-operative pain. The decision to use each of these options is based on their risks and benefits.    Medications: You were given one or more of the following medication prescriptions before leaving the hospital. Have the prescriptions filled at a pharmacy on your way home and follow the instructions on the  bottles. If you need a refill, please call your pharmacy.     Narcotic Medication (usually Vicodin ES, Lortab, Percocet or Nucynta): Begin taking the medication before your shoulder starts to hurt. Some patients do not like to take any medication, but if you wait until your pain is severe before taking, you will be very uncomfortable for several hours waiting for the narcotic to work. Always take with food.    Nausea / Vomiting: For this issue, we prescribe Phenergan, use this medication as directed.    Cold Therapy: You may have been sent home with a documistic cold therapy unit and wrap for your shoulder. Fill with ice and water to the indicated fill line and use throughout the day for the first two days and then as needed to help relieve pain and control swelling.     Regional Anesthesia Injections (Blocks): You may have been given a regional nerve block either before or after surgery. This may make your entire shoulder numb for 24-36 hours.                    2.   Diet: Eat a bland diet for the first day after surgery. Progress your diet as tolerated. Constipation may occur with Narcotic usage, contact our office if you are experiencing constipation.   3.   Activity: After you arrive at home, spend most of the first 24 hours resting in bed, on the couch, or in a reclining chair. After the first 24 hours at home, slowly increase your activity level based on your symptoms.   4.   Dressing Change: Remove the dressing on the 3rd day. It is normal for some blood to be seen on the dressings. It is also normal for you to see apparent bruising on the skin around your shoulder when you remove the dressing. If present, leave the steri-strip tape across the incisions. If you are concerned by the drainage or the appearance of your shoulder, please call one of the numbers listed below.   5.   Showering: You may shower on the 3rd day after surgery with waterproof bandages over small incisions. If you have an incision with  Prineo (clear mesh), it does not need to be covered. Do not submerge in any water until after your postoperative appointment in clinic.   6.   Shoulder Sling (with/without Pillow attachment): You may have been sent home with a sling / pillow attachment holding your arm away from your body. You may remove the sling when changing clothes or bathing. Make sure to wear the sling while sleeping unless instructed otherwise. You may remove at rest or for exercises.           [x] You need to wear the sling with pillow for 24 hours a day for 1 week.   7.  Shoulder Exercises: Begin these exercises the first day after surgery in order to help you regain your motion and strength. You may do the following marked exercises for 2-5 mins five times a day:   [x] Shoulder shrugs - Shrug your shoulders up and down.   [] Pendulums - Bend forward allowing your arm to hang down in front of you. Gently swing your arm side-to-side and front to back.                                                                                                                               [] Passive abduction - Have a family member gently lift your arm away from your body bringing your elbow up to the level of your shoulder.                                                                                                                   [] Shoulder rotation - With your arm at your side, have a family member gently rotate your arm internally and externally.                                                                                                                  [x] Scapular retractions - (Squeeze shoulder blades together): Squeeze shoulder blades together while slightly pulling them down (do not shrug your shoulders upward); You can perform 10-15 reps, several times throughout the day, when seated at your desk, driving in the car, etc.                                                                                                                      [] Pulley exercises - Put a towel or long sleeve shirt over the top of a door. Stand facing the door. Use your good arm to gently pull your operative arm up in front of you.   [x] Elbow motion - Straighten and bend your elbow.                                                                                                               [x] Ball squeezes - use ball attached to sling/pillow or soft (nerf) ball for  strengthening                                                                                                                 8.  Physical Therapy: Physical therapy is an essential component to your recovery from surgery. Your physical therapy will start in 5 days.   FIRST POSTOPERATIVE VISIT: As scheduled.

## 2020-07-29 NOTE — PLAN OF CARE
Pre-op complete, all questions answered, pt is stable and ready for next phase of care. Pt's belongings are with her .

## 2020-07-29 NOTE — OP NOTE
[facility]  Surgery Department  Operative Note    SUMMARY     Date of Procedure: 7/29/2020     Procedure: Procedure(s) (LRB):  DEBRIDEMENT, ROTATOR CUFF, ARTHROSCOPIC (Left)  ARTHROSCOPY, SHOULDER, WITH SUBACROMIAL SPACE DECOMPRESSION (Left)  [id number] (Left)     Surgeon(s) and Role:     * Tejas Prince MD - Primary    Assisting Surgeon:   MD Denis Chao PA-C    Pre-Operative Diagnosis: Nontraumatic tear of left rotator cuff, unspecified tear extent [M75.102]  Biceps tendinitis of left upper extremity [M75.22]    Post-Operative Diagnosis: Post-Op Diagnosis Codes:     * Nontraumatic tear of left rotator cuff, unspecified tear extent [M75.102]     * Biceps tendinitis of left upper extremity [M75.22]    Anesthesia: General    Technical Procedures Used:     DATE OF SURGERY:  7/29/2020     PREOPERATIVE DIAGNOSES:   1. Left shoulder rotator cuff tear.   2. Left shoulder labral tear  3. Left shoulder adhesive capsulitis    POSTOPERATIVE DIAGNOSES:   1. Left shoulder rotator cuff tear.   2. Left shoulder labral tear  3. Left shoulder adhesive capsulitis    PROCEDURE:   1. Left shoulder arthroscopic debridement rotator cuff .   2. Left shoulder arthroscopic subacromial decompression.   4. Left shoulder arthroscopic lysis of adhesions    SURGEON: Tejas Prince M.D.     ASSISTANT:   MD Denis Chao PA-C    COMPLICATIONS: None.     POSITION: Beach chair.     ANESTHESIA: General endotracheal plus left upper extremity interscalene   block.     EXAMINATION UNDER ANESTHESIA: Left shoulder forward flexion 180 degrees,   abduction 120 degrees, full internal and external rotation   1+ anterior drawer, 1+ sulcus sign, 1+ posterior drawer.     ARTHROSCOPIC FINDINGS:   1. Partial thickness, crescent-shaped undersurface supraspinatus rotator cuff tear.   2. Small anterior acromial spur.   3. Intact distal clavicle  4. Intact glenohumeral cartilage  surface  5. Biceps: intact  6. Subscapularis: frayed  7. Labrum:  Degenerative SLAP1      INDICATIONS FOR PROCEDURE: The patient is a 45 y.o.  year-old female  who has pain in her left shoulder. MRI confirms a partial tear of her rotator cuff.  After risks and benefits of surgery were discussed, she elects to proceed with operative  intervention. All risks and benefits have been discussed including the risks of stiffness for recurrent instability, irreparability of the tear, damage to neurovascular structures, damage to cartilage and the risk of anesthesia including stroke and heart attack. The patient seemed to understand and wished to proceed.     DESCRIPTION OF PROCEDURE: The patient was brought in the room. After undergoing general endotracheal anesthesia and left upper extremity interscalene block, she was placed in a well-padded modified beachchair position. Perioperative antibiotics were given.  her left upper extremity was prepped and draped in usual sterile fashion including the use of a sterile Spider arm pemberton.     After time-out was performed, the standard posterior portal and anterior superior portal were created. Diagnostic arthroscopy performed. The glenohumeral joint was entered. There was no chondromalacia noted in the glenoid or humeral head. There was minimal mild fraying at the superior labrum. The anterior inferior labrum and posterior labrum were intact without evidence of tearing. The subscapularis had mild fraying.  Damaged subscap tissue was debrided down.  The remainder of the insertion was intact.    The supraspinatus tendon had a low grade partial tear.  The unstable flap of tissue was debrided.  There was no evidence of full thickness tearing.     DEBRIDEMENT: Oscillating shaver, straight and curved biters were used to debride a small flap of tissue off the superior labrum. The biceps was intact.    LYSIS OF ADHESIONS: The rotator interval was thickened with erythema and contracted  tissue between the biceps and subscapularis.  Using a Mitek VAPR device, krishan and biters, all contracted tissue was released in the rotator interval.  Significantly improved motion was noted in external rotation following release.  Attention was then turned to the anterior-inferior capsule.  Using a combination of VAPR device and straight and curved biters inferiorly, the tight anterior-inferior capsule was released to the 6-o'clock position.  Fibers of the subscapularis were seen under the capsule following release.  The scope was placed in the anterior portal and attention was then turned to the posterior capsule.  Using a biter and shaver, the tight posterior capsule was released down to the 6 o'clock position.  Significantly improved internal rotation was achieved following posterior release.      SUBACROMIAL DECOMPRESSION: The scope was taken out and redirected in the subacromial space. After excellent visualization was achieved, a lateral portal was created. The bursal tissue was cleaned off. The rotator cuff was intact. The undersurface of the acromion was cleaned off of soft tissues including smoothing of the underlying bone.     Portal sites were closed with 4-0 Monocryl, covered with Mastisol, Steri-Strips, Xeroform, 4 x 4 fluffs, ABD pads and tape. The patient was placed in a sling and pillow for protection, was extubated in the room, transferred to recovery room in stable condition accompanied by her physician.    There were no complications in the case. I was present, scrubbed and did perform all critical portions of the case.     Postop plan for the patient is to follow the decompresion protocol.         Tejas Prince M.D.       Description of the Findings of the Procedure: above    Significant Surgical Tasks Conducted by the Assistant(s), if Applicable: none    Complications: No    Estimated Blood Loss (EBL): * No values recorded between 7/29/2020  3:05 PM and 7/29/2020  4:07 PM *            Implants: * No implants in log *    Specimens:   Specimen (12h ago, onward)    None                  Condition: Good    Disposition: PACU - hemodynamically stable.    Attestation: I was present and scrubbed for the entire procedure.

## 2020-07-29 NOTE — TRANSFER OF CARE
"Anesthesia Transfer of Care Note    Patient: Luzmaria Louis    Procedure(s) Performed: Procedure(s) (LRB):  DEBRIDEMENT, ROTATOR CUFF, ARTHROSCOPIC (Left)  ARTHROSCOPY, SHOULDER, WITH SUBACROMIAL SPACE DECOMPRESSION (Left)  [id number] (Left)    Patient location: PACU    Anesthesia Type: general    Transport from OR: Transported from OR on 6-10 L/min O2 by face mask with adequate spontaneous ventilation    Post pain: adequate analgesia    Post assessment: no apparent anesthetic complications and tolerated procedure well    Post vital signs: stable    Level of consciousness: responds to stimulation and sedated    Nausea/Vomiting: no nausea/vomiting    Complications: none    Transfer of care protocol was followed      Last vitals:   Visit Vitals  BP (!) 146/67 (BP Location: Right arm, Patient Position: Lying)   Pulse 63   Temp 36.8 °C (98.2 °F) (Oral)   Resp 16   Ht 5' 2" (1.575 m)   Wt 115.7 kg (255 lb)   SpO2 100%   Breastfeeding No   BMI 46.64 kg/m²     "

## 2020-07-29 NOTE — ANESTHESIA PROCEDURE NOTES
Intubation  Performed by: Macy Calderon CRNA  Authorized by: Sloan Schwarz MD     Intubation:     Induction:  Intravenous    Intubated:  Postinduction    Mask Ventilation:  Easy mask    Attempts:  1    Attempted By:  CRNA    Blade:  Yousif 2    Laryngeal View Grade: Grade IIA - cords partially seen      Difficult Airway Encountered?: No      Complications:  None    Airway Device:  Oral endotracheal tube    Airway Device Size:  7.5    Style/Cuff Inflation:  Cuffed    Inflation Amount (mL):  8    Tube secured:  22    Secured at:  The lips    Placement Verified By:  Capnometry    Complicating Factors:  Small mouth, obesity, short neck and poor neck/head extension    Findings Post-Intubation:  BS equal bilateral

## 2020-07-30 NOTE — ANESTHESIA POSTPROCEDURE EVALUATION
Anesthesia Post Evaluation    Patient: Luzmaria Louis    Procedure(s) Performed: Procedure(s) (LRB):  DEBRIDEMENT, ROTATOR CUFF, ARTHROSCOPIC (Left)  ARTHROSCOPY, SHOULDER, WITH SUBACROMIAL SPACE DECOMPRESSION (Left)  [id number] (Left)    Final Anesthesia Type: general    Patient location during evaluation: PACU  Patient participation: Yes- Able to Participate  Level of consciousness: awake and alert and oriented  Post-procedure vital signs: reviewed and stable  Pain management: adequate  Airway patency: patent    PONV status at discharge: No PONV  Anesthetic complications: no      Cardiovascular status: blood pressure returned to baseline and hemodynamically stable  Respiratory status: unassisted, room air and spontaneous ventilation  Hydration status: euvolemic  Follow-up not needed.          Vitals Value Taken Time   /77 07/29/20 1732   Temp 36.4 °C (97.6 °F) 07/29/20 1700   Pulse 58 07/29/20 1733   Resp 25 07/29/20 1733   SpO2 100 % 07/29/20 1733   Vitals shown include unvalidated device data.      Event Time   Out of Recovery 16:45:00         Pain/Reynaldo Score: Pain Rating Prior to Med Admin: 0 (7/29/2020  4:25 PM)  Pain Rating Post Med Admin: 0 (7/29/2020  4:10 PM)  Reynaldo Score: 10 (7/29/2020  5:00 PM)

## 2020-07-30 NOTE — PROGRESS NOTES
7/30/2020 1337    Patient called at home. Reports pain well controlled with On-Q. Patient denies signs of local anesthetic toxicity. PNC dressing remains clean, dry, and intact. All questions answered. Encouraged to call if any issues arise.

## 2020-07-30 NOTE — ADDENDUM NOTE
Addendum  created 07/30/20 134 by Kenney Aguero RN    Clinical Note Signed       Patient arrived via medic for fall and syncope patient is current alert and oriented at this time

## 2020-07-31 ENCOUNTER — TELEPHONE (OUTPATIENT)
Dept: SPORTS MEDICINE | Facility: CLINIC | Age: 45
End: 2020-07-31

## 2020-07-31 NOTE — TELEPHONE ENCOUNTER
Returning call to patient.  She is having issues with her [facility] care unit (won't stay on).    Work status: Keep out of work for 6 weeks.  PT 2 x week.  Plan to return to work in 6 weeks with possible restrictions.

## 2020-07-31 NOTE — TELEPHONE ENCOUNTER
----- Message from Bud Lindquist sent at 7/31/2020  2:07 PM CDT -----  Pt is requesting a call back, she has a question regarding her sling. Please call back.    Contact Info

## 2020-07-31 NOTE — PROGRESS NOTES
7/31/2020 1358    Patient called at home. Reports pain well controlled with On-Q. Currently rating pain to left shoulder 0/10. Patient denies signs of local anesthetic toxicity. PNC dressing remains clean, dry, and intact. All questions answered. Encouraged to call if any issues arise.

## 2020-08-03 ENCOUNTER — CLINICAL SUPPORT (OUTPATIENT)
Dept: REHABILITATION | Facility: HOSPITAL | Age: 45
End: 2020-08-03
Payer: COMMERCIAL

## 2020-08-03 DIAGNOSIS — M25.512 PAIN IN JOINT OF LEFT SHOULDER REGION: ICD-10-CM

## 2020-08-03 DIAGNOSIS — M25.512 LEFT SHOULDER PAIN, UNSPECIFIED CHRONICITY: ICD-10-CM

## 2020-08-03 DIAGNOSIS — M25.612 DECREASED RANGE OF MOTION OF LEFT SHOULDER: ICD-10-CM

## 2020-08-03 DIAGNOSIS — R29.898 WEAKNESS OF LEFT UPPER EXTREMITY: ICD-10-CM

## 2020-08-03 PROBLEM — R29.3 POOR POSTURE: Status: RESOLVED | Noted: 2020-01-15 | Resolved: 2020-08-03

## 2020-08-03 PROBLEM — R52 PAIN AGGRAVATED BY ACTIVITIES OF DAILY LIVING: Status: RESOLVED | Noted: 2020-01-15 | Resolved: 2020-08-03

## 2020-08-03 PROCEDURE — 97110 THERAPEUTIC EXERCISES: CPT | Mod: PO

## 2020-08-03 PROCEDURE — 97162 PT EVAL MOD COMPLEX 30 MIN: CPT | Mod: PO

## 2020-08-03 NOTE — ADDENDUM NOTE
Addendum  created 08/03/20 1357 by Kenney Aguero RN    Clinical Note Signed, Intraprocedure Event edited

## 2020-08-03 NOTE — PATIENT INSTRUCTIONS
ROM: Abduction - Wand        Holding wand with left hand palm up, push wand directly out to side, leading with other hand palm down, until stretch is felt.   Repeat 10 times per set. Do 1 sets per session. Do 2 sessions per day.     [email or web address]     Copyright © [employer]. All rights reserved.   ROM: Flexion - Wand (Supine)        Lie on back holding wand. Raise arms over head.   Repeat 10 times per set. Do 1 sets per session. Do 2 sessions per day.     [email or web address]     Copyright © [employer]. All rights reserved.   ROM: Pendulum (Side-to-Side)        Let right arm swing freely from side to side by rocking body weight from side to side.  Repeat 10 times per set. Do 1 sets per session. Do 2 sessions per day.     [email or web address]     Copyright © [employer]. All rights reserved.      ROM: Pendulum (Circular)        Let right arm move in [place] clockwise, then counterclockwise, by rocking body weight in circular pattern.  Surprise 10 times each direction per set. Do 1 sets per session. Do 2 sessions per day.     [email or web address]     Copyright © [employer]. All rights reserved.

## 2020-08-03 NOTE — PROGRESS NOTES
8/3/2020 1352    Called and spoke with patient. Reported patient's On-Q pump removed this past Saturday 8/1 without difficulty. Stated that blue tip to end of catheter remained intact upon removal. Denies any concerns at this time.

## 2020-08-04 PROBLEM — M25.612 DECREASED RANGE OF MOTION OF LEFT SHOULDER: Status: ACTIVE | Noted: 2020-08-04

## 2020-08-04 PROBLEM — R29.898 WEAKNESS OF LEFT UPPER EXTREMITY: Status: ACTIVE | Noted: 2020-08-04

## 2020-08-04 PROBLEM — M25.512 PAIN IN JOINT OF LEFT SHOULDER REGION: Status: ACTIVE | Noted: 2020-08-04

## 2020-08-04 NOTE — PLAN OF CARE
[facility]  Physical Therapy Initial Evaluation    Date: 8/3/2020   Name: Luzmaria Louis  Clinic Number: [id number]    Therapy Diagnosis:   Encounter Diagnoses   Name Primary?    Left shoulder pain, unspecified chronicity     Decreased range of motion of left shoulder     Weakness of left upper extremity     Pain in joint of left shoulder region      Physician: Tejas Prince MD    Physician Orders: PT Eval and Treat   Medical Diagnosis from Referral: M25.512 (ICD-10-CM) - Left shoulder pain, unspecified chronicity  Evaluation Date: 8/3/2020  Authorization Period Expiration: 12/31/2020  Plan of Care Expiration: 1003/2020  Visit # / Visits authorized: 1/ 30    Time In: 9:00 am  Time Out: 9:45 am  Total Appointment Time (timed & untimed codes): 45 minutes    Precautions: Standard    Subjective   Date of onset: surgery 7/29/2020  History of current condition - Luzmaria reports: since her surgery she is having difficulty with getting dressed(pulling things over her head, hooking her bra(prefers to hook it in back)), trouble fixing hair, trouble putting a seatbelt on, currently sleeping on her recliner as she is afraid to sleep in bed as she prefers to sleep on her right side, having trouble bathing, performing her usual household duties, her usual work duties, and getting into her spouse's SUV.     Medical History:   Past Medical History:   Diagnosis Date    Fibroid     GERD (gastroesophageal reflux disease)     Migraine headache        Surgical History:   Luzmaria Louis  has a past surgical history that includes Tubal ligation; Cholecystectomy; Hysterectomy; Mouth surgery; Tonsillectomy; Thyroid lobectomy (Right, 7/3/2019); Arthroscopic debridement of rotator cuff (Left, 7/29/2020); and Arthroscopy of shoulder with decompression of subacromial space (Left, 7/29/2020).    Medications:   Luzmaria has a current medication list which includes the following  prescription(s): acetaminophen-codeine 300-30mg, aspirin, cholestyramine, diclofenac, diethylpropion, gabapentin, ibuprofen, oxycodone-acetaminophen, promethazine, promethazine, rizatriptan, topiramate, tramadol, and tramadol.    Allergies:   Review of patient's allergies indicates:   Allergen Reactions    Caffeine      She feels like it is hard to breath, dizzy, nauseous.        Imaging, MRI studies: without contrast L shoulder  Impression:     1. High-grade partial-thickness tear of the supraspinatus tendon and low-grade interstitial fraying of the infraspinatus tendon.  2. Long head biceps tendinosis.  3. Subacromial subdeltoid bursitis.  4. Acromioclavicular arthrosis.    Prior Therapy: Previously seen in this facility for left shoulder  Social History:single story home, no steps, lives with their family  Occupation: [employer]   Prior Level of Function: independent  Current Level of Function: min A with ADLs     Pain:  Current 3/10, worst 10/10, best 3/10   Location: left shoulder   Description: pressure, dull, someone has their fist in my arm and turning it  Aggravating Factors: Laying, Walking, Lifting and Getting out of bed/chair  Easing Factors: pain medication, ice and rest    Pts goals: to be able to be back 100%    Objective     Observation: Patient is a 45 year old female, who presents to the clinic in no apparent distress. She is right handed.    Posture: forward head and shoulders, L UE in sling with arm adducted and IR against abdomen.       Passive Range of Motion:   Shoulder Left Right   Flexion 170 180   Abduction 150 180   ER at 90 80 90   IR 90 90      Active Range of Motion:   Shoulder Left Right   Flexion 45 170   Abduction 50 180   ER at 0 50 90   IR 60 60     Upper Extremity Strength   (L) UE (R) UE   Shoulder flexion: 2+/5 5/5   Shoulder Abduction: 2+/5 5/5   Shoulder ER 2+/5 5/5   Shoulder IR 4/5 5/5   Lower Trap 3/5 3/5   Middle Trap 3/5 3/5   Rhomboids 3/5 3/5          Special Tests: N/A due to recent surgery    Joint Mobility: A/P, P/A L shoulder hypomobile and with complaints of pain    Palpation: tender to palpation around incision sites    Sensation: light touch intact    Flexibility: B pec tightness    Scapular Control/Dyskinesis: unable to accurately assess as patient limits AROM of L shoulder due to complaints of pain.       Limitation/Restriction for FOTO Shoulder Survey    Therapist reviewed FOTO scores for Luzmaria Louis on 8/3/2020.   FOTO documents entered into [employer] - see Media section.    Limitation Score: 56%         TREATMENT   Treatment Time In: 9:35 am  Treatment Time Out: 9:45 am  Total Treatment time (time-based codes) separate from Evaluation: 10 minutes    Luzmaria received therapeutic exercises to develop strength and ROM for 10 minutes including:    Date  8/3/2020   VISIT 1/30   FOTO 1/5   POC EXP. DATE 10/03/2020   FACE-TO-FACE 9/03/2020       TABLE:    Supine pec str w/ towel --   Snow angels --   Butterflies  --   Wand flexion 1 x 10   Wand abduction 1 x 10   Wand ER --   Side lying flexion --   Side lying abduction --   Side lying ER --   PRONE:    Shoulder flexion --   Shoulder scaption --   Shoulder abduction --   SEATED:    Scapula squeezes 1 x 10   Shoulder rolls 1 x 5       STANDING:    Door way stretch --   Wall walks ---   Theraband  - W's  - T's  - I's  - Rows  - ER   --  --  --  --  --   Initials DG     Home Exercises and Patient Education Provided    Education provided:   - compliance with HEP  - role of PT and goals for PT    Written Home Exercises Provided: yes.  Exercises were reviewed and Luzmaria was able to demonstrate them prior to the end of the session.  Luzmaria demonstrated good  understanding of the education provided.     See EMR under Patient Instructions for exercises provided 8/3/2020.    Assessment   Luzmaria is a 45 y.o. female referred to outpatient Physical Therapy with a medical diagnosis of Left shoulder pain. Pt  presents with decreased ROM of left shoulder, weakness of left upper extremity, pec tightness, decreased independence with ADLs and pain with ADLs, which is consistent with her diagnosis post operatively. Due to decreased ROM, weakness, and muscle tightness she has difficulty with bathing, dressing, driving, performing her usual household duties, work duties, and recreational activities. Her current score on FOTO Shoulder Survey indicates she is 56% impaired, limited, or restricted.    Pt prognosis is Good.   Pt will benefit from skilled outpatient Physical Therapy to address the deficits stated above and in the chart below, provide pt/family education, and to maximize pt's level of independence.     Plan of care discussed with patient: Yes  Pt's spiritual, cultural and educational needs considered and patient is agreeable to the plan of care and goals as stated below:     Anticipated Barriers for therapy: none    Medical Necessity is demonstrated by the following  History  Co-morbidities and personal factors that may impact the plan of care Co-morbidities:   high BMI and level of undertstanding of current condition    Personal Factors:   age     moderate   Examination  Body Structures and Functions, activity limitations and participation restrictions that may impact the plan of care Body Regions:   upper extremities    Body Systems:    ROM  strength  transfers  motor control    Participation Restrictions:   Difficulty with dressing, bathing, driving, performing transfers, performing usual household duties, work duties, recreational activities.     Activity limitations:   Learning and applying knowledge  no deficits    General Tasks and Commands  no deficits    Communication  no deficits    Mobility  lifting and carrying objects  fine hand use (grasping/picking up)  using transportation (bus, train, plane, car)  driving (bike, car, motorcycle)    Self care  washing oneself (bathing, drying, washing hands)  caring  for body parts (brushing teeth, shaving, grooming)  dressing    Domestic Life  shopping  cooking  doing house work (cleaning house, washing dishes, laundry)    Interactions/Relationships  no deficits    Life Areas  employment    Community and Social Life  community life  recreation and leisure         moderate   Clinical Presentation evolving clinical presentation with changing clinical characteristics moderate   Decision Making/ Complexity Score: moderate     Goals:  Short Term Goals: 4 weeks   1. This patient will be independent with a basic HEP.  2. This patient will have Left shoulder active range of motion of 120 degrees flexion in order to pull a shirt over her head with no complaints of pain.  3. This patient will increase Left shoulder strength 1 grade in order to be able to reach for a seatbelt with no increase in symptoms.   4. This patient will have a pain rating of 4/10 at worst with ADLs.   5. Patient will be able to achieve greater than or equal to 54 on the FOTO Shoulder Survey indicating patient is 46% impaired, limited, or restricted and demonstrating overall improved functional ability with upper extremity.    Long Term Goals: 8 weeks   1. This patient will be independent with an updated HEP.  2. This patient will have left shoulder active range of motion equal to right shoulder active range of motion  in order to perform her usual work duties with no complaints of pain.  3. This patient will have left shoulder strength of 5/5 in order to be able to perform her usual household duties with no increase in symptoms.   4. This patient will have a pain rating of 1/10 at worst with ADLs.   5. Patient will be able to achieve greater than or equal to 64 on the FOTO Shoulder Survey indicating patient is 36% impaired, limited, or restricted and demonstrating overall improved functional ability with upper extremity.       Plan   Plan of care Certification: 8/3/2020 to 10/03/2020.    Outpatient Physical  Therapy 2 times weekly for 8 weeks to include the following interventions: Manual Therapy, Moist Heat/ Ice, Neuromuscular Re-ed, Patient Education, Therapeutic Activites, Therapeutic Exercise and IASTM. Dry needling with manual therapy techniques to decrease pain, inflammation and swelling, increase circulation and promote healing process.    Lila Choudhury, PT

## 2020-08-07 DIAGNOSIS — Z98.890 S/P SHOULDER SURGERY: Primary | ICD-10-CM

## 2020-08-07 DIAGNOSIS — M25.512 LEFT SHOULDER PAIN, UNSPECIFIED CHRONICITY: ICD-10-CM

## 2020-08-07 RX ORDER — TRAMADOL HYDROCHLORIDE 50 MG/1
50 TABLET ORAL EVERY 8 HOURS PRN
Qty: 21 TABLET | Refills: 0 | Status: SHIPPED | OUTPATIENT
Start: 2020-08-07 | End: 2020-09-10 | Stop reason: SDUPTHER

## 2020-08-07 RX ORDER — OXYCODONE AND ACETAMINOPHEN 5; 325 MG/1; MG/1
1 TABLET ORAL EVERY 8 HOURS PRN
Qty: 21 TABLET | Refills: 0 | Status: SHIPPED | OUTPATIENT
Start: 2020-08-07 | End: 2020-10-20

## 2020-08-07 NOTE — TELEPHONE ENCOUNTER
"Returning call to patient regarding WC questions.      Patient reports injury from intubation - Closer to the back, "roof of mouth all cut up".  She has had difficulty eating and increased pain.  Reports it is just now starting to heal.  Will send message to anesthesia staff for follow up.    Discuss refill of pain medication.  Pain is controlled and she is slowly decreasing the frequency.  "

## 2020-08-07 NOTE — TELEPHONE ENCOUNTER
----- Message from Cherry Parar sent at 8/7/2020 10:42 AM CDT -----  Pt is asking for a call back regarding WC. And need refill on medications.    traMADoL (ULTRAM) 50 mg tablet  oxyCODONE-acetaminophen (PERCOCET)  mg per tablet        Pharmacy Contact  [facility]  Telephone Fax  [phone]    Address Hours  [address] [address] None Available            Contact info [phone]

## 2020-08-10 ENCOUNTER — CLINICAL SUPPORT (OUTPATIENT)
Dept: REHABILITATION | Facility: HOSPITAL | Age: 45
End: 2020-08-10
Payer: COMMERCIAL

## 2020-08-10 DIAGNOSIS — M25.612 DECREASED RANGE OF MOTION OF LEFT SHOULDER: Primary | ICD-10-CM

## 2020-08-10 DIAGNOSIS — R29.898 WEAKNESS OF LEFT UPPER EXTREMITY: ICD-10-CM

## 2020-08-10 DIAGNOSIS — M25.512 PAIN IN JOINT OF LEFT SHOULDER REGION: ICD-10-CM

## 2020-08-10 PROCEDURE — 97110 THERAPEUTIC EXERCISES: CPT | Mod: PO,CQ

## 2020-08-10 PROCEDURE — 97140 MANUAL THERAPY 1/> REGIONS: CPT | Mod: PO,CQ

## 2020-08-10 NOTE — PROGRESS NOTES
"  Physical Therapy Treatment Note     Name: Luzmaria SIMON [facility] Number: [id number]    Therapy Diagnosis:   Encounter Diagnoses   Name Primary?    Decreased range of motion of left shoulder Yes    Weakness of left upper extremity     Pain in joint of left shoulder region      Physician: Tejas Prince MD    Visit Date: 8/10/2020    Physician Orders: PT Eval and Treat   Medical Diagnosis from Referral: M25.512 (ICD-10-CM) - Left shoulder pain, unspecified chronicity  Evaluation Date: 8/3/2020  Authorization Period Expiration: 12/31/2020  Plan of Care Expiration: 10/03/2020  Visit # / Visits authorized: 2 / 30    Time In: 2:45 pm  Time Out: 3:30 pm  Total Billable Time: 45 minutes    Precautions: Standard    Subjective     Pt reports: she has been moving it as much as she can at home  She was compliant with home exercise program.  Response to previous treatment: little muscle soreness  Functional change: none at the moment    Pain: 3/10  Location: left shoulder      Objective     Luzmaria received therapeutic exercises to develop strength and ROM for 30 minutes including:     Date  8/10/2020 8/3/2020   VISIT 2/30 1/30   DOS 7/29/2020     FOTO 2/5 1/5   POC EXP. DATE 10/03/2020 10/03/2020   FACE-TO-FACE 9/03/2020 9/03/2020          Pulleys Flex/ABD 3'  Unable to ABD    TABLE:      Supine pec str w/ towel 1'  --   Snow angels 1 x 15 --   Butterfly str Hold  --   Wand flexion Table slide  1 x 15 1 x 10   Wand abduction 1 x 15 1 x 10   Wand ER 1 x 15 --   Side lying flexion 1 x 15  Pain reported at end --   Side lying abduction Unable  Isometric next --   Side lying ER 1 x 15 --   PRONE:      Shoulder flexion -- --   Shoulder scaption -- --   Shoulder abduction -- --   SEATED:      Scapula squeezes 15 x 3" 1 x 10   Shoulder rolls 1 x 15 1 x 5          STANDING:      Doorway stretch Hold  --   Wall walks Flex/ABD 5 x ea --   Isometric ROM:  - Flex  - Ext  - ABD Next    Theraband  - W's  - T's  - I's  - " Rows  - ER Hold      --  --  --  --  --   Ravi's Hold         Initials SB DG       Luzmaria received the following manual therapy techniques: Joint mobilizations and Soft tissue Mobilization were applied to the: L shoulder for 10 minutes, including:  - gentle mobilizations of glenohumeral joint  - grade 1 posterior glide of glenohumeral joint  - PROM through all planes of motion of L shoulder     Luzmaria received cold pack for 5 minutes to L shoulder.      Home Exercises Provided and Patient Education Provided     Education provided:   - continue with planned HEP   - importance of moving shoulder at home to tolerance  - icing for swelling reduction and pain relief    Written Home Exercises Provided: Patient instructed to cont prior HEP.  Exercises were reviewed and Luzmaria was able to demonstrate them prior to the end of the session.  Luzmaria demonstrated good  understanding of the education provided.     See EMR under Patient Instructions for exercises provided prior visit.    Assessment     Luzmaria returns to the clinic this afternoon without sling present and pain levels mild after OTC pain medication. Pt was able to complete therapy routine with minimal pain noted during ROM into flexion and ABD. Will transition to table slides next visit for flexion and isometrics for flexion/ABD. Pt was able to reduce muscle guarding throughout manual therapy and improved ROM by the end. Pt reported decreased pain levels and no negative symptoms upon leaving clinic.     Luzmaria is progressing well towards her goals.   Pt prognosis is Good.     Pt will continue to benefit from skilled outpatient physical therapy to address the deficits listed in the problem list box on initial evaluation, provide pt/family education and to maximize pt's level of independence in the home and community environment.     Pt's spiritual, cultural and educational needs considered and pt agreeable to plan of care and goals.     Anticipated barriers to  physical therapy: none    Goals:  Short Term Goals: 4 weeks   1. This patient will be independent with a basic HEP.  2. This patient will have Left shoulder active range of motion of 120 degrees flexion in order to pull a shirt over her head with no complaints of pain.  3. This patient will increase Left shoulder strength 1 grade in order to be able to reach for a seatbelt with no increase in symptoms.   4. This patient will have a pain rating of 4/10 at worst with ADLs.   5. Patient will be able to achieve greater than or equal to 54 on the FOTO Shoulder Survey indicating patient is 46% impaired, limited, or restricted and demonstrating overall improved functional ability with upper extremity.     Long Term Goals: 8 weeks   1. This patient will be independent with an updated HEP.  2. This patient will have left shoulder active range of motion equal to right shoulder active range of motion  in order to perform her usual work duties with no complaints of pain.  3. This patient will have left shoulder strength of 5/5 in order to be able to perform her usual household duties with no increase in symptoms.   4. This patient will have a pain rating of 1/10 at worst with ADLs.   5. Patient will be able to achieve greater than or equal to 64 on the FOTO Shoulder Survey indicating patient is 36% impaired, limited, or restricted and demonstrating overall improved functional ability with upper extremity.     Plan     Continue with planned PT POC as tolerated.     More Zimmer, PTA 1/6

## 2020-08-12 ENCOUNTER — CLINICAL SUPPORT (OUTPATIENT)
Dept: REHABILITATION | Facility: HOSPITAL | Age: 45
End: 2020-08-12
Payer: COMMERCIAL

## 2020-08-12 DIAGNOSIS — M25.512 PAIN IN JOINT OF LEFT SHOULDER REGION: ICD-10-CM

## 2020-08-12 DIAGNOSIS — M25.612 DECREASED RANGE OF MOTION OF LEFT SHOULDER: ICD-10-CM

## 2020-08-12 DIAGNOSIS — R29.898 WEAKNESS OF LEFT UPPER EXTREMITY: ICD-10-CM

## 2020-08-12 PROCEDURE — 97110 THERAPEUTIC EXERCISES: CPT | Mod: PO

## 2020-08-12 NOTE — PROGRESS NOTES
"  Physical Therapy Treatment Note     Name: Luzmaria SIMON [facility] Number: [id number]    Therapy Diagnosis:   Encounter Diagnoses   Name Primary?    Decreased range of motion of left shoulder     Weakness of left upper extremity     Pain in joint of left shoulder region      Physician: Tejas Prince MD    Visit Date: 8/12/2020    Physician Orders: PT Eval and Treat   Medical Diagnosis from Referral: M25.512 (ICD-10-CM) - Left shoulder pain, unspecified chronicity  Evaluation Date: 8/3/2020  Authorization Period Expiration: 12/31/2020  Plan of Care Expiration: 10/03/2020  Visit # / Visits authorized: 3 / 30    Time In: 2:50 pm  Time Out: 3:30 pm  Total Billable Time: 40 minutes    Precautions: Standard    Subjective     Pt reports: doing better and she has been trying to not baby her arm.   She was compliant with home exercise program.  Response to previous treatment: little muscle soreness  Functional change: none at the moment    Pain: 0/10  Location: left shoulder      Objective     Luzmaria received therapeutic exercises to develop strength and ROM for 30 minutes including:     Date  8/12/2020 8/10/2020 8/3/2020   VISIT 3/30 2/30 1/30   DOS 7/29/2020      FOTO 3/5 2/5 1/5   POC EXP. DATE 10/03/2020 10/03/2020 10/03/2020   FACE-TO-FACE 9/03/2020 9/03/2020 9/03/2020           Pulleys Flex/ABD 2' ea 3'  Unable to ABD    TABLE:       Supine pec str w/ towel 1'30" 1'  --   Snow angels 1 x 15 1 x 15 --   Butterfly str  Hold  --   Wand flexion 1 x 15 Table slide  1 x 15 1 x 10   Wand abduction 1 x 15 1 x 15 1 x 10   Wand ER 1 x 15 1 x 15 --   Side lying flexion 1 x 15 1 x 15  Pain reported at end --   Side lying abduction  1 x 10 Unable  Isometric next --   Side lying ER 1 x 15 1 x 15 --   PRONE:       Shoulder flexion -- -- --   Shoulder scaption -- -- --   Shoulder abduction -- -- --   SEATED:       Scapula squeezes 15 x 3" 15 x 3" 1 x 10   Shoulder rolls 1 x 15 1 x 15 1 x 5           STANDING:     "   Doorway stretch Not today Hold  --   Wall walks Flex/ABD 1 x 10 5 x ea --   Isometric ROM:  - Flex  - Ext  - ABD Next Next    Theraband  - W's  - T's  - I's  - Rows  - ER Next Hold      --  --  --  --  --   Ravi's next Hold          Initials DG SB DG       Luzmaria received the following manual therapy techniques: Joint mobilizations and Soft tissue Mobilization were applied to the: L shoulder for 0 minutes, including:  - gentle mobilizations of glenohumeral joint  - grade 1 posterior glide of glenohumeral joint  - PROM through all planes of motion of L shoulder     Luzmaria received cold pack for 5 minutes to L shoulder.    AROM L shoulder  Flex 90 degrees  abd 80 degrees  ER at 0 degrees abduction 85 degrees  Home Exercises Provided and Patient Education Provided     Education provided:   - continue with planned HEP   - importance of moving shoulder at home to tolerance  - how to use her TENS unit    Written Home Exercises Provided: Patient instructed to cont prior HEP.  Exercises were reviewed and Luzmaria was able to demonstrate them prior to the end of the session.  Luzmaria demonstrated good  understanding of the education provided.     See EMR under Patient Instructions for exercises provided prior visit.    Assessment     Luzmaria demonstrates increased AROM during the session as noted above. She was able to perform all of today's progressions and new exercises with no increase in symptoms prior to leaving the clinic. She required two verbal cues for scapula placement with side lying exercises.      Luzmaria is progressing well towards her goals.   Pt prognosis is Good.     Pt will continue to benefit from skilled outpatient physical therapy to address the deficits listed in the problem list box on initial evaluation, provide pt/family education and to maximize pt's level of independence in the home and community environment.     Pt's spiritual, cultural and educational needs considered and pt agreeable to plan of  care and goals.     Anticipated barriers to physical therapy: none    Goals:  Short Term Goals: 4 weeks   1. This patient will be independent with a basic HEP. In progress, not met  2. This patient will have Left shoulder active range of motion of 120 degrees flexion in order to pull a shirt over her head with no complaints of pain. In progress, not met  3. This patient will increase Left shoulder strength 1 grade in order to be able to reach for a seatbelt with no increase in symptoms. In progress, not met  4. This patient will have a pain rating of 4/10 at worst with ADLs. In progress, not met  5. Patient will be able to achieve greater than or equal to 54 on the FOTO Shoulder Survey indicating patient is 46% impaired, limited, or restricted and demonstrating overall improved functional ability with upper extremity. In progress, not met     Long Term Goals: 8 weeks   1. This patient will be independent with an updated HEP. In progress, not met  2. This patient will have left shoulder active range of motion equal to right shoulder active range of motion  in order to perform her usual work duties with no complaints of pain.In progress, not met  3. This patient will have left shoulder strength of 5/5 in order to be able to perform her usual household duties with no increase in symptoms. In progress, not met  4. This patient will have a pain rating of 1/10 at worst with ADLs. In progress, not met  5. Patient will be able to achieve greater than or equal to 64 on the FOTO Shoulder Survey indicating patient is 36% impaired, limited, or restricted and demonstrating overall improved functional ability with upper extremity. In progress, not met    Plan     Continue with planned PT POC as tolerated. Increase reps with all exercises and begin isometric strengthening next visit.     Lila Choudhury, PT

## 2020-08-13 ENCOUNTER — OFFICE VISIT (OUTPATIENT)
Dept: SPORTS MEDICINE | Facility: CLINIC | Age: 45
End: 2020-08-13
Payer: OTHER MISCELLANEOUS

## 2020-08-13 VITALS
SYSTOLIC BLOOD PRESSURE: 124 MMHG | WEIGHT: 254.5 LBS | BODY MASS INDEX: 46.83 KG/M2 | HEART RATE: 78 BPM | DIASTOLIC BLOOD PRESSURE: 74 MMHG | HEIGHT: 62 IN

## 2020-08-13 DIAGNOSIS — Z09 SURGERY FOLLOW-UP EXAMINATION: Primary | ICD-10-CM

## 2020-08-13 PROCEDURE — 99024 POSTOP FOLLOW-UP VISIT: CPT | Mod: S$GLB,,, | Performed by: PHYSICIAN ASSISTANT

## 2020-08-13 PROCEDURE — 99024 PR POST-OP FOLLOW-UP VISIT: ICD-10-PCS | Mod: S$GLB,,, | Performed by: PHYSICIAN ASSISTANT

## 2020-08-13 PROCEDURE — 99999 PR PBB SHADOW E&M-EST. PATIENT-LVL III: ICD-10-PCS | Mod: PBBFAC,,, | Performed by: PHYSICIAN ASSISTANT

## 2020-08-13 PROCEDURE — 99999 PR PBB SHADOW E&M-EST. PATIENT-LVL III: CPT | Mod: PBBFAC,,, | Performed by: PHYSICIAN ASSISTANT

## 2020-08-13 NOTE — PROGRESS NOTES
CC: Left shoulder post op 2 weeks    Patient is here for her 2 week post op appointment s/p below and is doing well. Patient is doing PT at [facility] and is progressing as expected. Patient is taking pain medication only before physical therapy visits. She denies any chest pain, SOB, fevers, chills, nausea, vomiting, or drainage from incision sites.       DATE OF SURGERY:  7/29/2020      PREOPERATIVE DIAGNOSES:   1. Left shoulder rotator cuff tear.   2. Left shoulder labral tear  3. Left shoulder adhesive capsulitis     POSTOPERATIVE DIAGNOSES:   1. Left shoulder rotator cuff tear.   2. Left shoulder labral tear  3. Left shoulder adhesive capsulitis     PROCEDURE:   1. Left shoulder arthroscopic debridement rotator cuff .   2. Left shoulder arthroscopic subacromial decompression.   4. Left shoulder arthroscopic lysis of adhesions     SURGEON: Tejas Prince M.D.      ASSISTANT:   MD Denis Chao PA-C    Pain well tolerated on pain medication  Sling in place  No issues reported    Review of Systems   Constitution: Negative. Negative for chills, fever and night sweats.    Cardiovascular: Negative for chest pain and syncope.   Respiratory: Negative for cough and shortness of breath.    Gastrointestinal: Negative for abdominal pain and bowel incontinence.      PE:    There were no vitals taken for this visit.     Left shoulder    Incision healed  No sign of infection  Swelling resolved  Compartments soft  Neurovascular status intact in extremity    PROM  Forward elevation 140 degrees  External rotation 30 degrees    Assessment:  2 weeks s/p left shoulder arthroscopic lysis of adhesions and manipulation under anesthesia    Plan:  1. Continue PT   2. Pain medication as needed for PT; try to wean off for next visit  3. Return to clinic in 4 weeks for 6 week post op follow up

## 2020-08-17 ENCOUNTER — CLINICAL SUPPORT (OUTPATIENT)
Dept: REHABILITATION | Facility: HOSPITAL | Age: 45
End: 2020-08-17
Payer: COMMERCIAL

## 2020-08-17 DIAGNOSIS — R29.898 WEAKNESS OF LEFT UPPER EXTREMITY: ICD-10-CM

## 2020-08-17 DIAGNOSIS — M25.512 PAIN IN JOINT OF LEFT SHOULDER REGION: ICD-10-CM

## 2020-08-17 DIAGNOSIS — M25.612 DECREASED RANGE OF MOTION OF LEFT SHOULDER: ICD-10-CM

## 2020-08-17 PROCEDURE — 97110 THERAPEUTIC EXERCISES: CPT | Mod: PO

## 2020-08-17 NOTE — PROGRESS NOTES
"  Physical Therapy Treatment Note     Name: Luzmaria SIMON [facility] Number: [id number]    Therapy Diagnosis:   Encounter Diagnoses   Name Primary?    Decreased range of motion of left shoulder     Weakness of left upper extremity     Pain in joint of left shoulder region      Physician: Tejas Prince MD    Visit Date: 8/17/2020    Physician Orders: PT Eval and Treat   Medical Diagnosis from Referral: M25.512 (ICD-10-CM) - Left shoulder pain, unspecified chronicity  Evaluation Date: 8/3/2020  Authorization Period Expiration: 12/31/2020  Plan of Care Expiration: 10/03/2020  Visit # / Visits authorized: 4 / 30    Time In: 3:00 pm   Time Out: 3:30 pm  Total Billable Time: 30 minutes    Precautions: Standard    Subjective     Pt reports: has been very busy with school work so didn't do her HEP as much as she should   She was not compliant with home exercise program.  Response to previous treatment: not a lot of soreness  Functional change: none at the moment    Pain: 4/10  Location: left shoulder      Objective     Luzmaria received therapeutic exercises to develop strength and ROM for 26 minutes including:     Date  8/17/2020 8/12/2020 8/10/2020 8/3/2020   VISIT 4/30 3/30 2/30 1/30   DOS 7/29/2020       FOTO 4/5 3/5 2/5 1/5   POC EXP. DATE 10/03/2020 10/03/2020 10/03/2020 10/03/2020   FACE-TO-FACE 9/03/2020 9/03/2020 9/03/2020 9/03/2020            Pulleys Flex/ABD 2' ea 2' ea 3'  Unable to ABD    TABLE:        Supine pec str w/ towel 2' 1'30" 1'  --   Snow angels 1 x 15 1 x 15 1 x 15 --   Butterfly str 1 x 10  Hold  --   Wand flexion 1 x 15 1 x 15 Table slide  1 x 15 1 x 10   Wand abduction 1 x 15 1 x 15 1 x 15 1 x 10   Wand ER 1 x 15 1 x 15 1 x 15 --   Side lying flexion 2 x 10 1 x 15 1 x 15  Pain reported at end --   Side lying abduction 2 x 10  1 x 10 Unable  Isometric next --   Side lying ER 2 x 10 1 x 15 1 x 15 --   PRONE:        Shoulder flexion -- -- -- --   Shoulder scaption -- -- -- --   Shoulder " "abduction -- -- -- --   SEATED:        Scapula squeezes 15 x 3" 15 x 3" 15 x 3" 1 x 10   Shoulder rolls 1 x 15 1 x 15 1 x 15 1 x 5            STANDING:        Doorway stretch Not today Not today Hold  --   Wall walks Flex/ABD 1 x 10 1 x 10 5 x ea --   Isometric ROM:  - Flex  - Ext  - ABD Next Next Next    Theraband  - W's  - T's  - I's  - Rows  - ER Next Next Hold      --  --  --  --  --   Bernard's Next next Hold           Initials DG DG SB DG       Luzmaria received the following manual therapy techniques: Joint mobilizations and Soft tissue Mobilization were applied to the: L shoulder for 0 minutes, including:  - gentle mobilizations of glenohumeral joint  - grade 1 posterior glide of glenohumeral joint  - PROM through all planes of motion of L shoulder     Luzmaria received cold pack for 0 minutes to L shoulder.    Home Exercises Provided and Patient Education Provided     Education provided:   - continue with planned HEP   - importance of moving shoulder at home to tolerance  - how to use her TENS unit    Written Home Exercises Provided: Patient instructed to cont prior HEP.  Exercises were reviewed and Luzmaria was able to demonstrate them prior to the end of the session.  Luzmaria demonstrated good  understanding of the education provided.     See EMR under Patient Instructions for exercises provided prior visit.    Assessment     Luzmaria did not complete all of her exercises due to arriving 15 minutes late for her scheduled appointment. She was able to perform all of today's progressions and new exercises with no increase in symptoms prior to leaving the clinic. She required one verbal cue for scapula placement with side lying shoulder abduction.     Luzmaria is progressing well towards her goals.   Pt prognosis is Good.     Pt will continue to benefit from skilled outpatient physical therapy to address the deficits listed in the problem list box on initial evaluation, provide pt/family education and to maximize pt's " level of independence in the home and community environment.     Pt's spiritual, cultural and educational needs considered and pt agreeable to plan of care and goals.     Anticipated barriers to physical therapy: none    Goals:  Short Term Goals: 4 weeks   1. This patient will be independent with a basic HEP. In progress, not met  2. This patient will have Left shoulder active range of motion of 120 degrees flexion in order to pull a shirt over her head with no complaints of pain. In progress, not met  3. This patient will increase Left shoulder strength 1 grade in order to be able to reach for a seatbelt with no increase in symptoms. In progress, not met  4. This patient will have a pain rating of 4/10 at worst with ADLs. In progress, not met  5. Patient will be able to achieve greater than or equal to 54 on the FOTO Shoulder Survey indicating patient is 46% impaired, limited, or restricted and demonstrating overall improved functional ability with upper extremity. In progress, not met     Long Term Goals: 8 weeks   1. This patient will be independent with an updated HEP. In progress, not met  2. This patient will have left shoulder active range of motion equal to right shoulder active range of motion  in order to perform her usual work duties with no complaints of pain.In progress, not met  3. This patient will have left shoulder strength of 5/5 in order to be able to perform her usual household duties with no increase in symptoms. In progress, not met  4. This patient will have a pain rating of 1/10 at worst with ADLs. In progress, not met  5. Patient will be able to achieve greater than or equal to 64 on the FOTO Shoulder Survey indicating patient is 36% impaired, limited, or restricted and demonstrating overall improved functional ability with upper extremity. In progress, not met    Plan     Continue with planned PT POC as tolerated. Begin isometric strengthening next visit.     Lila Choudhury, PT

## 2020-08-20 ENCOUNTER — CLINICAL SUPPORT (OUTPATIENT)
Dept: REHABILITATION | Facility: HOSPITAL | Age: 45
End: 2020-08-20
Payer: COMMERCIAL

## 2020-08-20 DIAGNOSIS — M25.512 PAIN IN JOINT OF LEFT SHOULDER REGION: ICD-10-CM

## 2020-08-20 DIAGNOSIS — R29.898 WEAKNESS OF LEFT UPPER EXTREMITY: ICD-10-CM

## 2020-08-20 DIAGNOSIS — M25.612 DECREASED RANGE OF MOTION OF LEFT SHOULDER: ICD-10-CM

## 2020-08-20 PROCEDURE — 97110 THERAPEUTIC EXERCISES: CPT | Mod: PO

## 2020-08-20 NOTE — PROGRESS NOTES
"  Physical Therapy Treatment Note     Name: Luzmaria SIMON [facility] Number: [id number]    Therapy Diagnosis:   Encounter Diagnoses   Name Primary?    Decreased range of motion of left shoulder     Weakness of left upper extremity     Pain in joint of left shoulder region      Physician: Tejas Prince MD    Visit Date: 8/20/2020    Physician Orders: PT Eval and Treat   Medical Diagnosis from Referral: M25.512 (ICD-10-CM) - Left shoulder pain, unspecified chronicity  Evaluation Date: 8/3/2020  Authorization Period Expiration: 12/31/2020  Plan of Care Expiration: 10/03/2020  Visit # / Visits authorized: 5 / 30    Time In: 9:35 am  Time Out: 10:10 am  Total Billable Time: 31 minutes    Precautions: Standard    Subjective     Pt reports:feeling better today, had some increased soreness after running into the wall the other day  She was compliant with home exercise program.  Response to previous treatment: not a lot of soreness  Functional change: none at the moment    Pain: 4/10  Location: left shoulder      Objective     Luzmaria received therapeutic exercises to develop strength and ROM for 31 minutes including:     Date  8/20/2020 8/17/2020 8/12/2020 8/10/2020 8/3/2020   VISIT 5/30 4/30 3/30 2/30 1/30   DOS 7/29/2020 --       FOTO 5/5 4/5 3/5 2/5 1/5   POC EXP. DATE 10/03/2020 10/03/2020 10/03/2020 10/03/2020 10/03/2020   FACE-TO-FACE 9/03/2020 9/03/2020 9/03/2020 9/03/2020 9/03/2020             Pulleys Flex/ABD 2'ea 2' ea 2' ea 3'  Unable to ABD    TABLE:         Supine pec str w/ towel 2' 2' 1'30" 1'  --   Snow angels 1 x 15 1 x 15 1 x 15 1 x 15 --   Butterfly str 1 x 15 1 x 10  Hold  --   Wand flexion 1 x 15 1 x 15 1 x 15 Table slide  1 x 15 1 x 10   Wand abduction 1 x 15 1 x 15 1 x 15 1 x 15 1 x 10   Wand ER 1 x 15 1 x 15 1 x 15 1 x 15 --   Side lying flexion 3 x 10 2 x 10 1 x 15 1 x 15  Pain reported at end --   Side lying abduction 3 x 10 2 x 10  1 x 10 Unable  Isometric next --   Side lying ER 3 x " "10  2 x 10 1 x 15 1 x 15 --   PRONE:         Shoulder flexion -- -- -- -- --   Shoulder scaption -- -- -- -- --   Shoulder abduction -- -- -- -- --   SEATED:         Scapula squeezes 15 x 3" 15 x 3" 15 x 3" 15 x 3" 1 x 10   Shoulder rolls D/C 1 x 15 1 x 15 1 x 15 1 x 5             STANDING:         Doorway stretch Next? Not today Not today Hold  --   Wall walks Flex/ABD Not today 1 x 10 1 x 10 5 x ea --   Isometric ROM:  - Flex  - Ext  - ABD  --- Next Next Next    Theraband  - W's  - T's  - I's  - Rows  - ER  -IR   --  --  1 x 10 RTB  1 x 10 RTB  1 x 10 RTB  1 x 10 RTB Next Next Hold      --  --  --  --  --   [place]'s  Next next Hold            Initials DG DG DG SB DG       Luzmaria received the following manual therapy techniques: Joint mobilizations and Soft tissue Mobilization were applied to the: L shoulder for 0 minutes, including:  - gentle mobilizations of glenohumeral joint  - grade 1 posterior glide of glenohumeral joint  - PROM through all planes of motion of L shoulder     Luzmaria received cold pack for 0 minutes to L shoulder.    FOTO Shoulder Survey 44%    Home Exercises Provided and Patient Education Provided     Education provided:   - continue with planned HEP   - importance of moving shoulder at home to tolerance  - how to use her TENS unit    Written Home Exercises Provided: Patient instructed to cont prior HEP.  Exercises were reviewed and Luzmaria was able to demonstrate them prior to the end of the session.  Luzmaria demonstrated good  understanding of the education provided.     See EMR under Patient Instructions for exercises provided prior visit.    Assessment     Luzmaria was able to perform all of today's progressions and new exercises with no increase in symptoms prior to leaving the clinic. She required one verbal cue for scapula placement with side lying exercises. Her score on FOTO Shoulder Survey indicates she is 44% impaired, limited, or restricted.     Luzmaria is progressing well towards her " goals.   Pt prognosis is Good.     Pt will continue to benefit from skilled outpatient physical therapy to address the deficits listed in the problem list box on initial evaluation, provide pt/family education and to maximize pt's level of independence in the home and community environment.     Pt's spiritual, cultural and educational needs considered and pt agreeable to plan of care and goals.     Anticipated barriers to physical therapy: none    Goals:  Short Term Goals: 4 weeks   1. This patient will be independent with a basic HEP. In progress, not met  2. This patient will have Left shoulder active range of motion of 120 degrees flexion in order to pull a shirt over her head with no complaints of pain. In progress, not met  3. This patient will increase Left shoulder strength 1 grade in order to be able to reach for a seatbelt with no increase in symptoms. In progress, not met  4. This patient will have a pain rating of 4/10 at worst with ADLs. In progress, not met  5. Patient will be able to achieve greater than or equal to 54 on the FOTO Shoulder Survey indicating patient is 46% impaired, limited, or restricted and demonstrating overall improved functional ability with upper extremity. In progress, not met     Long Term Goals: 8 weeks   1. This patient will be independent with an updated HEP. In progress, not met  2. This patient will have left shoulder active range of motion equal to right shoulder active range of motion  in order to perform her usual work duties with no complaints of pain.In progress, not met  3. This patient will have left shoulder strength of 5/5 in order to be able to perform her usual household duties with no increase in symptoms. In progress, not met  4. This patient will have a pain rating of 1/10 at worst with ADLs. In progress, not met  5. Patient will be able to achieve greater than or equal to 64 on the FOTO Shoulder Survey indicating patient is 36% impaired, limited, or  restricted and demonstrating overall improved functional ability with upper extremity. In progress, not met    Plan     Continue with planned PT POC as tolerated. Begin using 1 lb ankle weight with side lying exercises next visit.     Lila Choudhury, PT

## 2020-08-26 ENCOUNTER — CLINICAL SUPPORT (OUTPATIENT)
Dept: REHABILITATION | Facility: HOSPITAL | Age: 45
End: 2020-08-26
Payer: COMMERCIAL

## 2020-08-26 DIAGNOSIS — R29.898 WEAKNESS OF LEFT UPPER EXTREMITY: ICD-10-CM

## 2020-08-26 DIAGNOSIS — M25.512 PAIN IN JOINT OF LEFT SHOULDER REGION: ICD-10-CM

## 2020-08-26 DIAGNOSIS — M25.612 DECREASED RANGE OF MOTION OF LEFT SHOULDER: ICD-10-CM

## 2020-08-26 PROCEDURE — 97110 THERAPEUTIC EXERCISES: CPT | Mod: PO

## 2020-08-26 NOTE — PROGRESS NOTES
"  Physical Therapy Treatment Note     Name: Luzmaria SIMON [facility] Number: [id number]    Therapy Diagnosis:   Encounter Diagnoses   Name Primary?    Decreased range of motion of left shoulder     Weakness of left upper extremity     Pain in joint of left shoulder region      Physician: Tejas Prince MD    Visit Date: 8/26/2020    Physician Orders: PT Eval and Treat   Medical Diagnosis from Referral: M25.512 (ICD-10-CM) - Left shoulder pain, unspecified chronicity  Evaluation Date: 8/3/2020  Authorization Period Expiration: 12/31/2020  Plan of Care Expiration: 10/03/2020  Visit # / Visits authorized: 6 / 30    Time In: 1:45 pm  Time Out: 2:25 pm  Total Billable Time: 40 minutes    Precautions: Standard    Subjective     Pt reports: having slight soreness in shoulder this afternoon. She reports continued difficulty and pain while trying to reach overhead and behind her back.  She was compliant with home exercise program.  Response to previous treatment: not a lot of soreness  Functional change: none at the moment    Pain: 2/10  Location: left shoulder      Objective     Luzmaria received therapeutic exercises to develop strength and ROM for 40 minutes including:     Date  8/26/2020 8/20/2020 8/17/2020 8/12/2020 8/10/2020 8/3/2020   VISIT 6/30 5/30 4/30 3/30 2/30 1/30   DOS 7/29/2020  --       FOTO -- 5/5 4/5 3/5 2/5 1/5   POC EXP. DATE 10/3/2020 10/03/2020 10/03/2020 10/03/2020 10/03/2020 10/03/2020   FACE-TO-FACE 9/3/2020 9/03/2020 9/03/2020 9/03/2020 9/03/2020 9/03/2020              Pulleys Flex/ABD 2' ea 2'ea 2' ea 2' ea 3'  Unable to ABD    TABLE:          Supine pec str w/ towel 2' 2' 2' 1'30" 1'  --   Snow angels 1 x 15 1 x 15 1 x 15 1 x 15 1 x 15 --   Butterfly str 1 x 15 1 x 15 1 x 10  Hold  --   Wand flexion 1 x 15 1 x 15 1 x 15 1 x 15 Table slide  1 x 15 1 x 10   Wand abduction 1 x 15 1 x 15 1 x 15 1 x 15 1 x 15 1 x 10   Wand ER 1 x 15 1 x 15 1 x 15 1 x 15 1 x 15 --   Scapular protraction 2 x " "10        Reverse codman's 1 x 30 cw/ccw        Side lying flexion 2 x 10 x 1# 3 x 10 2 x 10 1 x 15 1 x 15  Pain reported at end --   Side lying abduction 2 x 10 x 1# 3 x 10 2 x 10  1 x 10 Unable  Isometric next --   Side lying ER 2 x 10 x 1# 3 x 10  2 x 10 1 x 15 1 x 15 --   PRONE:          Shoulder flexion -- -- -- -- -- --   Shoulder scaption -- -- -- -- -- --   Shoulder abduction -- -- -- -- -- --   SEATED:          Scapula squeezes 15 x 3" 15 x 3" 15 x 3" 15 x 3" 15 x 3" 1 x 10   Shoulder rolls --- D/C 1 x 15 1 x 15 1 x 15 1 x 5              STANDING:          Doorway stretch 3 x 10" Next? Not today Not today Hold  --   Wall walks Flex/ABD 1 x 10 Not today 1 x 10 1 x 10 5 x ea --   Isometric ROM:  - Flex  - Ext  - ABD   --- Next Next Next    Theraband  - W's  - T's  - I's  - Rows  - ER  -IR   --  --  1 x 15 RTB  1 x 15 RTB  1 x 15 RTB  1 x 15 RTB   --  --  1 x 10 RTB  1 x 10 RTB  1 x 10 RTB  1 x 10 RTB Next Next Hold      --  --  --  --  --   [place]'s   Next next Hold             Initials NUPUR ALEXANDRE       Alsheri received the following manual therapy techniques: Joint mobilizations and Soft tissue Mobilization were applied to the: L shoulder for 0 minutes, including:  - gentle mobilizations of glenohumeral joint  - grade 1 posterior glide of glenohumeral joint  - PROM through all planes of motion of L shoulder     Alosia received cold pack for 0 minutes to L shoulder.        Home Exercises Provided and Patient Education Provided     Education provided:   - continue with planned HEP   - importance of moving shoulder at home to tolerance  - how to use her TENS unit    Written Home Exercises Provided: Patient instructed to cont prior HEP.  Exercises were reviewed and Luzmaria was able to demonstrate them prior to the end of the session.  Gabriellaa demonstrated good  understanding of the education provided.     See EMR under Patient Instructions for exercises provided prior visit.    Assessment     Alosia is " progressed with strengthening exercises with addition of 1# cuff weight to side lying exercises as well as addition of scapular protractions and supine reverse codman's.  Pt will require continued progression of strengthening to improve AROM and tolerance to dressing and grooming activities.       Luzmaria is progressing well towards her goals.   Pt prognosis is Good.     Pt will continue to benefit from skilled outpatient physical therapy to address the deficits listed in the problem list box on initial evaluation, provide pt/family education and to maximize pt's level of independence in the home and community environment.     Pt's spiritual, cultural and educational needs considered and pt agreeable to plan of care and goals.     Anticipated barriers to physical therapy: none    Goals:  Short Term Goals: 4 weeks   1. This patient will be independent with a basic HEP. In progress, not met  2. This patient will have Left shoulder active range of motion of 120 degrees flexion in order to pull a shirt over her head with no complaints of pain. In progress, not met  3. This patient will increase Left shoulder strength 1 grade in order to be able to reach for a seatbelt with no increase in symptoms. In progress, not met  4. This patient will have a pain rating of 4/10 at worst with ADLs. In progress, not met  5. Patient will be able to achieve greater than or equal to 54 on the FOTO Shoulder Survey indicating patient is 46% impaired, limited, or restricted and demonstrating overall improved functional ability with upper extremity. In progress, not met     Long Term Goals: 8 weeks   1. This patient will be independent with an updated HEP. In progress, not met  2. This patient will have left shoulder active range of motion equal to right shoulder active range of motion  in order to perform her usual work duties with no complaints of pain.In progress, not met  3. This patient will have left shoulder strength of 5/5 in order  to be able to perform her usual household duties with no increase in symptoms. In progress, not met  4. This patient will have a pain rating of 1/10 at worst with ADLs. In progress, not met  5. Patient will be able to achieve greater than or equal to 64 on the FOTO Shoulder Survey indicating patient is 36% impaired, limited, or restricted and demonstrating overall improved functional ability with upper extremity. In progress, not met    Plan     Continue with planned PT POC as tolerated.      Boy Lara, PT

## 2020-08-27 DIAGNOSIS — E03.8 SUBCLINICAL HYPOTHYROIDISM: ICD-10-CM

## 2020-08-27 DIAGNOSIS — E04.1 THYROID NODULE: Primary | ICD-10-CM

## 2020-08-28 NOTE — PROGRESS NOTES
"  Physical Therapy Treatment Note     Name: Luzmaria SIMON [facility] Number: [id number]    Therapy Diagnosis:   Encounter Diagnoses   Name Primary?    Decreased range of motion of left shoulder Yes    Weakness of left upper extremity     Pain in joint of left shoulder region      Physician: Tejas Prince MD    Visit Date: 8/31/2020    Physician Orders: PT Eval and Treat   Medical Diagnosis from Referral: M25.512 (ICD-10-CM) - Left shoulder pain, unspecified chronicity  Evaluation Date: 8/3/2020  Authorization Period Expiration: 12/31/2020  Plan of Care Expiration: 10/03/2020  Visit # / Visits authorized: 7 / 30    Time In: *** am  Time Out: *** am  Total Billable Time: *** minutes    Precautions: Standard    Subjective     Pt reports: having slight soreness in shoulder this afternoon. She reports continued difficulty and pain while trying to reach overhead and behind her back.  She was compliant with home exercise program.  Response to previous treatment: not a lot of soreness  Functional change: none at the moment    Pain: 2/10  Location: left shoulder      Objective     Luzmaria received therapeutic exercises to develop strength and ROM for *** minutes including:     Date  8/31/2020 8/26/2020 8/20/2020 8/17/2020 8/12/2020 8/10/2020 8/3/2020   VISIT 7/30 6/30 5/30 4/30 3/30 2/30 1/30   DOS 7/29/2020   --       FOTO -- -- 5/5 4/5 3/5 2/5 1/5   POC EXP. DATE 10/3/2020 10/3/2020 10/03/2020 10/03/2020 10/03/2020 10/03/2020 10/03/2020   FACE-TO-FACE 9/3/2020 9/3/2020 9/03/2020 9/03/2020 9/03/2020 9/03/2020 9/03/2020               Pulleys Flex/ABD  2' ea 2'ea 2' ea 2' ea 3'  Unable to ABD    TABLE:           Supine pec str w/ towel  2' 2' 2' 1'30" 1'  --   Snow angels  1 x 15 1 x 15 1 x 15 1 x 15 1 x 15 --   Butterfly str  1 x 15 1 x 15 1 x 10  Hold  --   Wand flexion  1 x 15 1 x 15 1 x 15 1 x 15 Table slide  1 x 15 1 x 10   Wand abduction  1 x 15 1 x 15 1 x 15 1 x 15 1 x 15 1 x 10   Wand ER  1 x 15 1 x 15 1 " "x 15 1 x 15 1 x 15 --   Scapular protraction  2 x 10        Reverse codman's  1 x 30 cw/ccw        Side lying flexion  2 x 10 x 1# 3 x 10 2 x 10 1 x 15 1 x 15  Pain reported at end --   Side lying abduction  2 x 10 x 1# 3 x 10 2 x 10  1 x 10 Unable  Isometric next --   Side lying ER  2 x 10 x 1# 3 x 10  2 x 10 1 x 15 1 x 15 --   PRONE:           Shoulder flexion  -- -- -- -- -- --   Shoulder scaption  -- -- -- -- -- --   Shoulder abduction  -- -- -- -- -- --   SEATED:           Scapula squeezes  15 x 3" 15 x 3" 15 x 3" 15 x 3" 15 x 3" 1 x 10   Shoulder rolls  --- D/C 1 x 15 1 x 15 1 x 15 1 x 5               STANDING:           Doorway stretch  3 x 10" Next? Not today Not today Hold  --   Wall walks Flex/ABD  1 x 10 Not today 1 x 10 1 x 10 5 x ea --   Isometric ROM:  - Flex  - Ext  - ABD    --- Next Next Next    Theraband  - W's  - T's  - I's  - Rows  - ER  -IR    --  --  1 x 15 RTB  1 x 15 RTB  1 x 15 RTB  1 x 15 RTB   --  --  1 x 10 RTB  1 x 10 RTB  1 x 10 RTB  1 x 10 RTB Next Next Hold      --  --  --  --  --   Ravi's    Next next Hold              Initials CRISTINA Quiroza received the following manual therapy techniques: Joint mobilizations and Soft tissue Mobilization were applied to the: L shoulder for 0 minutes, including:  - gentle mobilizations of glenohumeral joint  - grade 1 posterior glide of glenohumeral joint  - PROM through all planes of motion of L shoulder     Alosia received cold pack for 0 minutes to L shoulder.      Home Exercises Provided and Patient Education Provided     Education provided:   - continue with planned HEP   - importance of moving shoulder at home to tolerance    Written Home Exercises Provided: Patient instructed to cont prior HEP.  Exercises were reviewed and Luzmaria was able to demonstrate them prior to the end of the session.  Alosia demonstrated good  understanding of the education provided.     See EMR under Patient Instructions for exercises provided " prior visit.    Assessment     Luzmaria is progressed with strengthening exercises with addition of 1# cuff weight to side lying exercises as well as addition of scapular protractions and supine reverse codman's.  Pt will require continued progression of strengthening to improve AROM and tolerance to dressing and grooming activities.       Luzmaria is progressing well towards her goals.   Pt prognosis is Good.     Pt will continue to benefit from skilled outpatient physical therapy to address the deficits listed in the problem list box on initial evaluation, provide pt/family education and to maximize pt's level of independence in the home and community environment.     Pt's spiritual, cultural and educational needs considered and pt agreeable to plan of care and goals.     Anticipated barriers to physical therapy: none    Goals:  Short Term Goals: 4 weeks   1. This patient will be independent with a basic HEP. In progress, not met  2. This patient will have Left shoulder active range of motion of 120 degrees flexion in order to pull a shirt over her head with no complaints of pain. In progress, not met  3. This patient will increase Left shoulder strength 1 grade in order to be able to reach for a seatbelt with no increase in symptoms. In progress, not met  4. This patient will have a pain rating of 4/10 at worst with ADLs. In progress, not met  5. Patient will be able to achieve greater than or equal to 54 on the FOTO Shoulder Survey indicating patient is 46% impaired, limited, or restricted and demonstrating overall improved functional ability with upper extremity. In progress, not met     Long Term Goals: 8 weeks   1. This patient will be independent with an updated HEP. In progress, not met  2. This patient will have left shoulder active range of motion equal to right shoulder active range of motion  in order to perform her usual work duties with no complaints of pain.In progress, not met  3. This patient will have  left shoulder strength of 5/5 in order to be able to perform her usual household duties with no increase in symptoms. In progress, not met  4. This patient will have a pain rating of 1/10 at worst with ADLs. In progress, not met  5. Patient will be able to achieve greater than or equal to 64 on the FOTO Shoulder Survey indicating patient is 36% impaired, limited, or restricted and demonstrating overall improved functional ability with upper extremity. In progress, not met    Plan     Continue with planned PT POC as tolerated.      More Zimmer, PTA 1/6

## 2020-08-31 ENCOUNTER — CLINICAL SUPPORT (OUTPATIENT)
Dept: REHABILITATION | Facility: HOSPITAL | Age: 45
End: 2020-08-31
Payer: COMMERCIAL

## 2020-08-31 DIAGNOSIS — R29.898 WEAKNESS OF LEFT UPPER EXTREMITY: ICD-10-CM

## 2020-08-31 DIAGNOSIS — M25.612 DECREASED RANGE OF MOTION OF LEFT SHOULDER: Primary | ICD-10-CM

## 2020-08-31 DIAGNOSIS — M25.512 PAIN IN JOINT OF LEFT SHOULDER REGION: ICD-10-CM

## 2020-08-31 PROCEDURE — 97110 THERAPEUTIC EXERCISES: CPT | Mod: PO,CQ

## 2020-08-31 NOTE — PROGRESS NOTES
"  Physical Therapy Treatment Note     Name: Luzmaria SIMON [facility] Number: [id number]    Therapy Diagnosis:   Encounter Diagnoses   Name Primary?    Decreased range of motion of left shoulder Yes    Weakness of left upper extremity     Pain in joint of left shoulder region      Physician: Tejas Prince MD    Visit Date: 8/31/2020    Physician Orders: PT Eval and Treat   Medical Diagnosis from Referral: M25.512 (ICD-10-CM) - Left shoulder pain, unspecified chronicity  Evaluation Date: 8/3/2020  Authorization Period Expiration: 12/31/2020  Plan of Care Expiration: 10/03/2020  Visit # / Visits authorized: 7 / 30    Time In: 9:52 am  Time Out: 10:30 am  Total Billable Time: 38 minutes    Precautions: Standard    Subjective     Pt reports: she is having a better day today, yesterday was very achy. Pt reports most achy when having arm hanging down by side.   She was compliant with home exercise program.  Response to previous treatment: slight muscle soreness  Functional change: none at the moment    Pain: 2/10  Location: left shoulder      Objective     Luzmaria received therapeutic exercises to develop strength and ROM for 38 minutes including:     Date  8/31/2020 8/26/2020 8/20/2020 8/17/2020 8/12/2020 8/10/2020 8/3/2020   VISIT 7/30 6/30 5/30 4/30 3/30 2/30 1/30   DOS 7/29/2020   --       FOTO -- -- 5/5 4/5 3/5 2/5 1/5   POC EXP. DATE 10/3/2020 10/3/2020 10/03/2020 10/03/2020 10/03/2020 10/03/2020 10/03/2020   FACE-TO-FACE 9/3/2020 9/3/2020 9/03/2020 9/03/2020 9/03/2020 9/03/2020 9/03/2020               Pulleys Flex/ABD 2' ea 2' ea 2'ea 2' ea 2' ea 3'  Unable to ABD    TABLE:           Supine pec str w/ towel 2' 2' 2' 2' 1'30" 1'  --   Snow angels 2 x 10 1 x 15 1 x 15 1 x 15 1 x 15 1 x 15 --   Butterfly str 2 x 10 1 x 15 1 x 15 1 x 10  Hold  --   Wand flexion 2 x 10 1 x 15 1 x 15 1 x 15 1 x 15 Table slide  1 x 15 1 x 10   Wand abduction 2 x 10 1 x 15 1 x 15 1 x 15 1 x 15 1 x 15 1 x 10   Wand ER NT 1 x 15 " "1 x 15 1 x 15 1 x 15 1 x 15 --   Scapular protraction 2 x 10 2 x 10        Reverse codman's 30 x ea cw/ccw 1 x 30 cw/ccw        Side lying flexion NT 2 x 10 x 1# 3 x 10 2 x 10 1 x 15 1 x 15  Pain reported at end --   Side lying ABD 2 x 10 x 1# 2 x 10 x 1# 3 x 10 2 x 10  1 x 10 Unable  Isometric next --   Side lying ER 2 x 10 x 1# 2 x 10 x 1# 3 x 10  2 x 10 1 x 15 1 x 15 --   PRONE:           Shoulder flexion -- -- -- -- -- -- --   Shoulder scaption -- -- -- -- -- -- --   Shoulder abduction -- -- -- -- -- -- --   SEATED:           Scapula squeezes 20 x 3" 15 x 3" 15 x 3" 15 x 3" 15 x 3" 15 x 3" 1 x 10   Shoulder rolls -- --- D/C 1 x 15 1 x 15 1 x 15 1 x 5               STANDING:           Doorway stretch 5 x 10" 3 x 10" Next? Not today Not today Hold  --   IR stretch with strap 5 x 10"         Wall walks Flex/ABD 1 x 10 1 x 10 Not today 1 x 10 1 x 10 5 x ea --   Isometric ROM:  - Flex  - Ext  - ABD D/C   --- Next Next Next    Theraband  - W's  - T's  - I's  - Rows  - ER  - IR       2 x 10 RTB  2 x 10 RTB  2 x 10 RTB  2 x 10 RTB   --  --  1 x 15 RTB  1 x 15 RTB  1 x 15 RTB  1 x 15 RTB   --  --  1 x 10 RTB  1 x 10 RTB  1 x 10 RTB  1 x 10 RTB Next Next Hold      --  --  --  --  --   Ravi's --   Next next Hold              Initials CRISTINA LAEXANDRE       Luzmaria received the following manual therapy techniques: Joint mobilizations and Soft tissue Mobilization were applied to the: L shoulder for 0 minutes, including: NOT TODAY  - gentle mobilizations of glenohumeral joint  - grade 1 posterior glide of glenohumeral joint  - PROM through all planes of motion of L shoulder     Alosia received cold pack for 0 minutes to L shoulder.      Home Exercises Provided and Patient Education Provided     Education provided:   - continue with planned HEP   - importance of moving shoulder at home to tolerance  - how to use her TENS unit    Written Home Exercises Provided: Patient instructed to cont prior HEP.  Exercises were " reviewed and Luzmaria was able to demonstrate them prior to the end of the session.  Luzmaria demonstrated good  understanding of the education provided.     See EMR under Patient Instructions for exercises provided prior visit.    Assessment     Luzmaria returns to the clinic this morning with improving pain levels and mobility. Pt was able to complete full routine and progressions above without increase in symptoms reported prior to leaving the clinic. Pt's ROM and strength continues to improve and progress towards all set goals.     Luzmaria is progressing well towards her goals.   Pt prognosis is Good.     Pt will continue to benefit from skilled outpatient physical therapy to address the deficits listed in the problem list box on initial evaluation, provide pt/family education and to maximize pt's level of independence in the home and community environment.     Pt's spiritual, cultural and educational needs considered and pt agreeable to plan of care and goals.     Anticipated barriers to physical therapy: none    Goals:  Short Term Goals: 4 weeks   1. This patient will be independent with a basic HEP. In progress, not met  2. This patient will have Left shoulder active range of motion of 120 degrees flexion in order to pull a shirt over her head with no complaints of pain. In progress, not met  3. This patient will increase Left shoulder strength 1 grade in order to be able to reach for a seatbelt with no increase in symptoms. In progress, not met  4. This patient will have a pain rating of 4/10 at worst with ADLs. In progress, not met  5. Patient will be able to achieve greater than or equal to 54 on the FOTO Shoulder Survey indicating patient is 46% impaired, limited, or restricted and demonstrating overall improved functional ability with upper extremity. In progress, not met     Long Term Goals: 8 weeks   1. This patient will be independent with an updated HEP. In progress, not met  2. This patient will have left  shoulder active range of motion equal to right shoulder active range of motion  in order to perform her usual work duties with no complaints of pain.In progress, not met  3. This patient will have left shoulder strength of 5/5 in order to be able to perform her usual household duties with no increase in symptoms. In progress, not met  4. This patient will have a pain rating of 1/10 at worst with ADLs. In progress, not met  5. Patient will be able to achieve greater than or equal to 64 on the FOTO Shoulder Survey indicating patient is 36% impaired, limited, or restricted and demonstrating overall improved functional ability with upper extremity. In progress, not met    Plan     Continue with planned PT POC as tolerated.      More Zimmer, PTA 1/6

## 2020-09-01 ENCOUNTER — DOCUMENTATION ONLY (OUTPATIENT)
Dept: REHABILITATION | Facility: HOSPITAL | Age: 45
End: 2020-09-01

## 2020-09-01 NOTE — PROGRESS NOTES
Face to Face PTA Conference performed with Boy Lara PT regarding patient's current status, overall progress, and plan of care. Pt will be seen by a physical therapist minimally every 6th visit or every 30 days.    More Zimmer PTA  9/1/2020      Face to Face PTA Conference performed with More Zimmer PTA regarding patient's current status, overall progress, and plan of care. Pt will be seen by a physical therapist minimally every 6th visit or every 30 days.

## 2020-09-08 NOTE — PROGRESS NOTES
"    Physical Therapy Treatment Note     Name: Luzmaria SIMON [facility] Number: [id number]    Therapy Diagnosis:   Encounter Diagnoses   Name Primary?    Decreased range of motion of left shoulder Yes    Weakness of left upper extremity     Pain in joint of left shoulder region      Physician: Tejas Prince MD    Visit Date: 9/9/2020    Physician Orders: PT Eval and Treat   Medical Diagnosis from Referral: M25.512 (ICD-10-CM) - Left shoulder pain, unspecified chronicity  Evaluation Date: 8/3/2020  Authorization Period Expiration: 12/31/2020  Plan of Care Expiration: 10/03/2020  Visit # / Visits authorized: 7 / 30    Time In: 9:50 am  Time Out: 10:30 am  Total Billable Time: 40 minutes    Precautions: Standard    Subjective     Pt reports: feeling okay today because she didn't take any pain medication prior to therapy today  She was compliant with home exercise program.  Response to previous treatment: slight muscle soreness  Functional change: none at the moment    Pain: 2/10  Location: left shoulder      Objective     Luzmaria received therapeutic exercises to develop strength and ROM for 40 minutes including:     Date  9/09/2020 8/31/2020 8/26/2020 8/20/2020 8/17/2020 8/12/2020 8/10/2020 8/3/2020   VISIT 8/30 7/30 6/30 5/30 4/30 3/30 2/30 1/30   DOS 7/29/2020    --       FOTO -- -- -- 5/5 4/5 3/5 2/5 1/5   POC EXP. DATE 10/03/2020 10/3/2020 10/3/2020 10/03/2020 10/03/2020 10/03/2020 10/03/2020 10/03/2020   FACE-TO-FACE 10/02/2020 9/3/2020 9/3/2020 9/03/2020 9/03/2020 9/03/2020 9/03/2020 9/03/2020                Pulleys Flex/ABD 2' ea 2' ea 2' ea 2'ea 2' ea 2' ea 3'  Unable to ABD    TABLE:            Supine pec str w/ towel 2' 2' 2' 2' 2' 1'30" 1'  --   Snow angels 2 x 10 2 x 10 1 x 15 1 x 15 1 x 15 1 x 15 1 x 15 --   Butterfly str 2 x 10 2 x 10 1 x 15 1 x 15 1 x 10  Hold  --   Wand flexion NT 2 x 10 1 x 15 1 x 15 1 x 15 1 x 15 Table slide  1 x 15 1 x 10   Wand abduction NT 2 x 10 1 x 15 1 x 15 1 x 15 1 " "x 15 1 x 15 1 x 10   Wand ER NT NT 1 x 15 1 x 15 1 x 15 1 x 15 1 x 15 --   Scapular protraction 2 x 10 x 1# 2 x 10 2 x 10        Reverse codman's 30 x 1# x ea  cw/ccw 30 x ea cw/ccw 1 x 30 cw/ccw        Side lying flexion 3 x 10 x 1# NT 2 x 10 x 1# 3 x 10 2 x 10 1 x 15 1 x 15  Pain reported at end --   Side lying ABD 3 x 10 x 1# 2 x 10 x 1# 2 x 10 x 1# 3 x 10 2 x 10  1 x 10 Unable  Isometric next --   Side lying ER NT 2 x 10 x 1# 2 x 10 x 1# 3 x 10  2 x 10 1 x 15 1 x 15 --   Supine T's 2 x 10 YTB                     PRONE:            Shoulder flexion -- -- -- -- -- -- -- --   Shoulder scaption -- -- -- -- -- -- -- --   Shoulder abduction -- -- -- -- -- -- -- --              SEATED:            Scapula squeezes -- 20 x 3" 15 x 3" 15 x 3" 15 x 3" 15 x 3" 15 x 3" 1 x 10   Shoulder rolls -- -- --- D/C 1 x 15 1 x 15 1 x 15 1 x 5                STANDING:            Doorway stretch 5 x 10" 5 x 10" 3 x 10" Next? Not today Not today Hold  --   IR stretch with strap 5 x 10" 5 x 10"         Wall walks Flex/ABD 1 x 10 1 x 10 1 x 10 Not today 1 x 10 1 x 10 5 x ea --   Isometric ROM:  - Flex  - Ext  - ABD -- D/C   --- Next Next Next    Theraband  - W's  - T's  - I's  - Rows  - ER  - IR   --  Unable  3 x 10 RTB  3 x 10 RTB  3 x 10 RTB  3 x 10 RTB       2 x 10 RTB  2 x 10 RTB  2 x 10 RTB  2 x 10 RTB   --  --  1 x 15 RTB  1 x 15 RTB  1 x 15 RTB  1 x 15 RTB   --  --  1 x 10 RTB  1 x 10 RTB  1 x 10 RTB  1 x 10 RTB Next Next Hold      --  --  --  --  --   Ravi's Fwd RTB 2 x 10 --   Next next Hold               Initials SB SB MA BETTIE ALEXANDRE       Alsheri received the following manual therapy techniques: Joint mobilizations and Soft tissue Mobilization were applied to the: L shoulder for 0 minutes, including: NOT TODAY  - gentle mobilizations of glenohumeral joint  - grade 1 posterior glide of glenohumeral joint  - PROM through all planes of motion of L shoulder     Alsheri received cold pack for 0 minutes to L shoulder.    Home " Exercises Provided and Patient Education Provided     Education provided:   - continue with planned HEP   - importance of moving shoulder at home to tolerance  - how to use her TENS unit    Written Home Exercises Provided: Patient instructed to cont prior HEP.  Exercises were reviewed and Luzmaria was able to demonstrate them prior to the end of the session.  Luzmaria demonstrated good  understanding of the education provided.     See EMR under Patient Instructions for exercises provided prior visit.    Assessment     Luzmaria returns to the clinic this morning with improving pain levels. Pt was able to complete full routine with slight discomfort noted during weighted reverse codman's , which subsided. Pt left clinic without increase in symptoms and improved pain levels.     Luzmaria is progressing well towards her goals.   Pt prognosis is Good.     Pt will continue to benefit from skilled outpatient physical therapy to address the deficits listed in the problem list box on initial evaluation, provide pt/family education and to maximize pt's level of independence in the home and community environment.     Pt's spiritual, cultural and educational needs considered and pt agreeable to plan of care and goals.     Anticipated barriers to physical therapy: none    Goals:  Short Term Goals: 4 weeks   1. This patient will be independent with a basic HEP. In progress, not met  2. This patient will have Left shoulder active range of motion of 120 degrees flexion in order to pull a shirt over her head with no complaints of pain. In progress, not met  3. This patient will increase Left shoulder strength 1 grade in order to be able to reach for a seatbelt with no increase in symptoms. In progress, not met  4. This patient will have a pain rating of 4/10 at worst with ADLs. In progress, not met  5. Patient will be able to achieve greater than or equal to 54 on the FOTO Shoulder Survey indicating patient is 46% impaired, limited, or  restricted and demonstrating overall improved functional ability with upper extremity. In progress, not met     Long Term Goals: 8 weeks   1. This patient will be independent with an updated HEP. In progress, not met  2. This patient will have left shoulder active range of motion equal to right shoulder active range of motion  in order to perform her usual work duties with no complaints of pain.In progress, not met  3. This patient will have left shoulder strength of 5/5 in order to be able to perform her usual household duties with no increase in symptoms. In progress, not met  4. This patient will have a pain rating of 1/10 at worst with ADLs. In progress, not met  5. Patient will be able to achieve greater than or equal to 64 on the FOTO Shoulder Survey indicating patient is 36% impaired, limited, or restricted and demonstrating overall improved functional ability with upper extremity. In progress, not met    Plan     Continue with planned PT POC as tolerated.      More Zimmer, PTA 2/6

## 2020-09-09 ENCOUNTER — CLINICAL SUPPORT (OUTPATIENT)
Dept: REHABILITATION | Facility: HOSPITAL | Age: 45
End: 2020-09-09
Payer: COMMERCIAL

## 2020-09-09 DIAGNOSIS — R29.898 WEAKNESS OF LEFT UPPER EXTREMITY: ICD-10-CM

## 2020-09-09 DIAGNOSIS — M25.612 DECREASED RANGE OF MOTION OF LEFT SHOULDER: Primary | ICD-10-CM

## 2020-09-09 DIAGNOSIS — M25.512 PAIN IN JOINT OF LEFT SHOULDER REGION: ICD-10-CM

## 2020-09-09 PROCEDURE — 97110 THERAPEUTIC EXERCISES: CPT | Mod: PO,CQ

## 2020-09-10 ENCOUNTER — OFFICE VISIT (OUTPATIENT)
Dept: SPORTS MEDICINE | Facility: CLINIC | Age: 45
End: 2020-09-10
Payer: COMMERCIAL

## 2020-09-10 VITALS
HEART RATE: 59 BPM | DIASTOLIC BLOOD PRESSURE: 84 MMHG | BODY MASS INDEX: 46.74 KG/M2 | SYSTOLIC BLOOD PRESSURE: 133 MMHG | HEIGHT: 62 IN | WEIGHT: 254 LBS

## 2020-09-10 DIAGNOSIS — Z98.890 POST-OPERATIVE STATE: Primary | ICD-10-CM

## 2020-09-10 DIAGNOSIS — Z98.890 S/P SHOULDER SURGERY: ICD-10-CM

## 2020-09-10 DIAGNOSIS — M25.512 LEFT SHOULDER PAIN, UNSPECIFIED CHRONICITY: ICD-10-CM

## 2020-09-10 PROCEDURE — 99024 PR POST-OP FOLLOW-UP VISIT: ICD-10-PCS | Mod: S$GLB,,, | Performed by: ORTHOPAEDIC SURGERY

## 2020-09-10 PROCEDURE — 99024 POSTOP FOLLOW-UP VISIT: CPT | Mod: S$GLB,,, | Performed by: ORTHOPAEDIC SURGERY

## 2020-09-10 PROCEDURE — 99999 PR PBB SHADOW E&M-EST. PATIENT-LVL III: CPT | Mod: PBBFAC,,, | Performed by: ORTHOPAEDIC SURGERY

## 2020-09-10 PROCEDURE — 99999 PR PBB SHADOW E&M-EST. PATIENT-LVL III: ICD-10-PCS | Mod: PBBFAC,,, | Performed by: ORTHOPAEDIC SURGERY

## 2020-09-10 RX ORDER — TRAMADOL HYDROCHLORIDE 50 MG/1
50 TABLET ORAL EVERY 8 HOURS PRN
Qty: 21 TABLET | Refills: 0 | Status: SHIPPED | OUTPATIENT
Start: 2020-09-10 | End: 2021-02-22

## 2020-09-10 NOTE — LETTER
[facility]  [address]  [place] [address]  Phone: [phone] September 10, 2020     Patient: Luzmaria Louis   YOB: 1975   Date of Visit: 9/10/2020       To Whom It May Concern:    Luzmaria Louis is a patient of mine. She is cleared to return back to work on September 21,2020 without restriction. She will continue with her physical therapy 2 time a week for the next 6 week.  If you have any questions or concerns, please don't hesitate to contact my office.    Sincerely,    Tejas Prince MD

## 2020-09-10 NOTE — PROGRESS NOTES
"    Physical Therapy Treatment Note     Name: Luzmaria SIMON [facility] Number: [id number]    Therapy Diagnosis:   Encounter Diagnoses   Name Primary?    Decreased range of motion of left shoulder Yes    Weakness of left upper extremity     Pain in joint of left shoulder region      Physician: Tejas Prince MD    Visit Date: 9/11/2020    Physician Orders: PT Eval and Treat   Medical Diagnosis from Referral: M25.512 (ICD-10-CM) - Left shoulder pain, unspecified chronicity  Evaluation Date: 8/3/2020  Authorization Period Expiration: 12/31/2020  Plan of Care Expiration: 10/03/2020  Visit # / Visits authorized: 9 / 30    Time In: 9:45 am  Time Out: 10:15 am  Total Billable Time: 30 minutes    Precautions: Standard    Subjective     Pt reports: feeling good today without medication again  She was compliant with home exercise program.  Response to previous treatment: slight muscle soreness  Functional change: sleeping better and shoulder is not waking her up anymore    Pain: 1/10  Location: left shoulder      Objective     Luzmaria received therapeutic exercises to develop strength and ROM for 30 minutes including:     Date  9/11/2020 9/09/2020 8/31/2020 8/26/2020 8/20/2020 8/17/2020 8/12/2020 8/10/2020 8/3/2020   VISIT 9/30 8/30 7/30 6/30 5/30 4/30 3/30 2/30 1/30   DOS 7/29/2020     --       FOTO -- -- -- -- 5/5 4/5 3/5 2/5 1/5   POC EXP. DATE 10/03/2020 10/03/2020 10/3/2020 10/3/2020 10/03/2020 10/03/2020 10/03/2020 10/03/2020 10/03/2020   FACE-TO-FACE 10/02/2020 10/02/2020 9/3/2020 9/3/2020 9/03/2020 9/03/2020 9/03/2020 9/03/2020 9/03/2020                 Pulleys Flex/ABD 2' ea 2' ea 2' ea 2' ea 2'ea 2' ea 2' ea 3'  Unable to ABD    TABLE:             Supine pec str w/ towel NT 2' 2' 2' 2' 2' 1'30" 1'  --   Snow angels NT 2 x 10 2 x 10 1 x 15 1 x 15 1 x 15 1 x 15 1 x 15 --   Butterfly str NT 2 x 10 2 x 10 1 x 15 1 x 15 1 x 10  Hold  --   Wand flexion NT NT 2 x 10 1 x 15 1 x 15 1 x 15 1 x 15 Table slide  1 x " "15 1 x 10   Wand abduction NT NT 2 x 10 1 x 15 1 x 15 1 x 15 1 x 15 1 x 15 1 x 10   Wand ER NT NT NT 1 x 15 1 x 15 1 x 15 1 x 15 1 x 15 --   Scapular protraction NT 2 x 10 x 1# 2 x 10 2 x 10        Reverse codman's NT 30 x 1# x ea  cw/ccw 30 x ea cw/ccw 1 x 30 cw/ccw        Side lying flexion 3 x 10 x 1# 3 x 10 x 1# NT 2 x 10 x 1# 3 x 10 2 x 10 1 x 15 1 x 15  Pain reported at end --   Side lying ABD 3 x 10 x 1# 3 x 10 x 1# 2 x 10 x 1# 2 x 10 x 1# 3 x 10 2 x 10  1 x 10 Unable  Isometric next --   Side lying ER 3 x 10 x 1# NT 2 x 10 x 1# 2 x 10 x 1# 3 x 10  2 x 10 1 x 15 1 x 15 --   Supine T's NT 2 x 10 YTB                      PRONE:             Shoulder flexion  -- -- -- -- -- -- -- --   Shoulder scaption  -- -- -- -- -- -- -- --   Shoulder abduction  -- -- -- -- -- -- -- --               SEATED:             Scapula squeezes  -- 20 x 3" 15 x 3" 15 x 3" 15 x 3" 15 x 3" 15 x 3" 1 x 10   Shoulder rolls  -- -- --- D/C 1 x 15 1 x 15 1 x 15 1 x 5                 STANDING:             Doorway stretch 3 x 20" 5 x 10" 5 x 10" 3 x 10" Next? Not today Not today Hold  --   IR stretch with strap 3 x 20" 5 x 10" 5 x 10"         Wall walks Flex/ABD 1 x 15 1 x 10 1 x 10 1 x 10 Not today 1 x 10 1 x 10 5 x ea --   Isometric ROM:  - Flex  - Ext  - ABD -- -- D/C   --- Next Next Next    Theraband  - W's  - T's  - I's  - Rows  - ER  - IR  - Punch out     --  2 x 10 GTB  2 x 10 GTB  2 x 10 GTB  2 x 10 GTB  Fwd GTB 2 x 10   --  Unable  3 x 10 RTB  3 x 10 RTB  3 x 10 RTB  3 x 10 RTB  Fwd RTB 2 x 10       2 x 10 RTB  2 x 10 RTB  2 x 10 RTB  2 x 10 RTB   --  --  1 x 15 RTB  1 x 15 RTB  1 x 15 RTB  1 x 15 RTB   --  --  1 x 10 RTB  1 x 10 RTB  1 x 10 RTB  1 x 10 RTB Next Next Hold      --  --  --  --  --   Shoulder ROM:  - Scaption  - Flexion  - Abduction Next?                                   Initials SB SB SB MA BETTIE Dutta received the following manual therapy techniques: Joint mobilizations and Soft tissue Mobilization were " applied to the: L shoulder for 0 minutes, including: NOT TODAY  - gentle mobilizations of glenohumeral joint  - grade 1 posterior glide of glenohumeral joint  - PROM through all planes of motion of L shoulder     Luzmaria received cold pack for 0 minutes to L shoulder.    Home Exercises Provided and Patient Education Provided     Education provided:   - continue with planned HEP   - importance of moving shoulder at home to tolerance      Written Home Exercises Provided: Patient instructed to cont prior HEP.  Exercises were reviewed and Luzmaria was able to demonstrate them prior to the end of the session.  Luzmaria demonstrated good  understanding of the education provided.     See EMR under Patient Instructions for exercises provided prior visit.    Assessment     Luzmaria returns to the clinic this morning with improved pain levels. Pt was able to complete partial routine due to pt arriving late to session. Pt left clinic without increase in symptoms and improved pain levels. Pt continues to demonstrate improving strength and ROM allowing for improved tolerance to ADL's and decreased pain with sleeping. Pt will progress to standing shoulder ROM next session.     Luzmaria is progressing well towards her goals.   Pt prognosis is Good.     Pt will continue to benefit from skilled outpatient physical therapy to address the deficits listed in the problem list box on initial evaluation, provide pt/family education and to maximize pt's level of independence in the home and community environment.     Pt's spiritual, cultural and educational needs considered and pt agreeable to plan of care and goals.     Anticipated barriers to physical therapy: none    Goals:  Short Term Goals: 4 weeks   1. This patient will be independent with a basic HEP. In progress, not met  2. This patient will have Left shoulder active range of motion of 120 degrees flexion in order to pull a shirt over her head with no complaints of pain. In progress, not  met  3. This patient will increase Left shoulder strength 1 grade in order to be able to reach for a seatbelt with no increase in symptoms. In progress, not met  4. This patient will have a pain rating of 4/10 at worst with ADLs. In progress, not met  5. Patient will be able to achieve greater than or equal to 54 on the FOTO Shoulder Survey indicating patient is 46% impaired, limited, or restricted and demonstrating overall improved functional ability with upper extremity. In progress, not met     Long Term Goals: 8 weeks   1. This patient will be independent with an updated HEP. In progress, not met  2. This patient will have left shoulder active range of motion equal to right shoulder active range of motion  in order to perform her usual work duties with no complaints of pain.In progress, not met  3. This patient will have left shoulder strength of 5/5 in order to be able to perform her usual household duties with no increase in symptoms. In progress, not met  4. This patient will have a pain rating of 1/10 at worst with ADLs. In progress, not met  5. Patient will be able to achieve greater than or equal to 64 on the FOTO Shoulder Survey indicating patient is 36% impaired, limited, or restricted and demonstrating overall improved functional ability with upper extremity. In progress, not met    Plan     Continue with planned PT POC as tolerated.  Perform standing shoulder ROM and supine T's next session.    More Zimmer, PTA 3/6

## 2020-09-10 NOTE — PROGRESS NOTES
"CC: Left shoulder post op 6 weeks    Luzmaria Louis returns to clinic for follow up evaluation of left shoulder.    Pain well tolerated on pain medication  Sling in place  No issues reported  Physical therapy at [facility] 2 x week    Review of Systems   Constitution: Negative. Negative for chills, fever and night sweats.    Cardiovascular: Negative for chest pain and syncope.   Respiratory: Negative for cough and shortness of breath.   Gastrointestinal: Negative for abdominal pain and bowel incontinence.     DATE OF SURGERY:  7/29/2020      PREOPERATIVE DIAGNOSES:   1. Left shoulder rotator cuff tear.   2. Left shoulder labral tear  3. Left shoulder adhesive capsulitis     POSTOPERATIVE DIAGNOSES:   1. Left shoulder rotator cuff tear.   2. Left shoulder labral tear  3. Left shoulder adhesive capsulitis     PROCEDURE:   1. Left shoulder arthroscopic debridement rotator cuff .   2. Left shoulder arthroscopic subacromial decompression.   4. Left shoulder arthroscopic lysis of adhesions     SURGEON: Tejas Prince M.D.     PE:  Vitals:    09/10/20 1129   BP: 133/84   Pulse: (!) 59   Weight: 115.2 kg (254 lb)   Height: 5' 2" (1.575 m)   PainSc: 0-No pain     Left shoulder  Incision healed  No sign of infection  Swelling resolved  Compartments soft  Neurovascular status intact in extremity    PROM  Forward elevation 90 degrees  External rotation 60 degrees    Assessment:  Appropriate rotator cuff surgery recovery at 6 weeks    Plan:  1. Continue PT   2. Follow up 6 weeks        "

## 2020-09-11 ENCOUNTER — CLINICAL SUPPORT (OUTPATIENT)
Dept: REHABILITATION | Facility: HOSPITAL | Age: 45
End: 2020-09-11
Payer: COMMERCIAL

## 2020-09-11 DIAGNOSIS — R29.898 WEAKNESS OF LEFT UPPER EXTREMITY: ICD-10-CM

## 2020-09-11 DIAGNOSIS — M25.612 DECREASED RANGE OF MOTION OF LEFT SHOULDER: Primary | ICD-10-CM

## 2020-09-11 DIAGNOSIS — M25.512 PAIN IN JOINT OF LEFT SHOULDER REGION: ICD-10-CM

## 2020-09-11 PROCEDURE — 97110 THERAPEUTIC EXERCISES: CPT | Mod: PO,CQ

## 2020-09-30 DIAGNOSIS — Z12.31 VISIT FOR SCREENING MAMMOGRAM: Primary | ICD-10-CM

## 2020-10-12 ENCOUNTER — DOCUMENTATION ONLY (OUTPATIENT)
Dept: REHABILITATION | Facility: HOSPITAL | Age: 45
End: 2020-10-12

## 2020-10-12 ENCOUNTER — CLINICAL SUPPORT (OUTPATIENT)
Dept: REHABILITATION | Facility: HOSPITAL | Age: 45
End: 2020-10-12
Payer: COMMERCIAL

## 2020-10-12 DIAGNOSIS — M25.612 DECREASED RANGE OF MOTION OF LEFT SHOULDER: Primary | ICD-10-CM

## 2020-10-12 DIAGNOSIS — R29.898 WEAKNESS OF LEFT UPPER EXTREMITY: ICD-10-CM

## 2020-10-12 DIAGNOSIS — M25.512 PAIN IN JOINT OF LEFT SHOULDER REGION: ICD-10-CM

## 2020-10-12 PROCEDURE — 97164 PT RE-EVAL EST PLAN CARE: CPT | Mod: PO,59

## 2020-10-12 PROCEDURE — 97110 THERAPEUTIC EXERCISES: CPT | Mod: PO

## 2020-10-12 NOTE — PROGRESS NOTES
"    Physical Therapy Treatment Note     Name: Luzmaria SIMON [facility] Number: [id number]    Therapy Diagnosis:   Encounter Diagnoses   Name Primary?    Decreased range of motion of left shoulder Yes    Weakness of left upper extremity     Pain in joint of left shoulder region      Physician: Tejas Prince MD    Visit Date: 10/12/2020    Physician Orders: PT Eval and Treat   Medical Diagnosis from Referral: M25.512 (ICD-10-CM) - Left shoulder pain, unspecified chronicity  Evaluation Date: 8/3/2020  Authorization Period Expiration: 12/31/2020  Plan of Care Expiration: 10/03/2020  Visit # / Visits authorized: 9 / 30    Time In: 9:45 am  Time Out: 10:15 am  Total Billable Time: 30 minutes    Precautions: Standard    Subjective     Pt reports: feeling good today without medication again  She was compliant with home exercise program.  Response to previous treatment: slight muscle soreness  Functional change: sleeping better and shoulder is not waking her up anymore    Pain: 1/10  Location: left shoulder      Objective     Luzmaria received therapeutic exercises to develop strength and ROM for 30 minutes including:     Date   9/11/2020 9/09/2020 8/31/2020 8/26/2020 8/20/2020 8/17/2020 8/12/2020 8/10/2020 8/3/2020   VISIT  9/30 8/30 7/30 6/30 5/30 4/30 3/30 2/30 1/30   DOS 7/29/2020      --       FOTO  -- -- -- -- 5/5 4/5 3/5 2/5 1/5   POC EXP. DATE  10/03/2020 10/03/2020 10/3/2020 10/3/2020 10/03/2020 10/03/2020 10/03/2020 10/03/2020 10/03/2020   FACE-TO-FACE  10/02/2020 10/02/2020 9/3/2020 9/3/2020 9/03/2020 9/03/2020 9/03/2020 9/03/2020 9/03/2020                  Pulleys Flex/ABD  2' ea 2' ea 2' ea 2' ea 2'ea 2' ea 2' ea 3'  Unable to ABD    TABLE:              Supine pec str w/ towel  NT 2' 2' 2' 2' 2' 1'30" 1'  --   Snow angels  NT 2 x 10 2 x 10 1 x 15 1 x 15 1 x 15 1 x 15 1 x 15 --   Butterfly str  NT 2 x 10 2 x 10 1 x 15 1 x 15 1 x 10  Hold  --   Wand flexion  NT NT 2 x 10 1 x 15 1 x 15 1 x 15 1 x 15 " "Table slide  1 x 15 1 x 10   Wand abduction  NT NT 2 x 10 1 x 15 1 x 15 1 x 15 1 x 15 1 x 15 1 x 10   Wand ER  NT NT NT 1 x 15 1 x 15 1 x 15 1 x 15 1 x 15 --   Scapular protraction  NT 2 x 10 x 1# 2 x 10 2 x 10        Reverse codman's  NT 30 x 1# x ea  cw/ccw 30 x ea cw/ccw 1 x 30 cw/ccw        Side lying flexion  3 x 10 x 1# 3 x 10 x 1# NT 2 x 10 x 1# 3 x 10 2 x 10 1 x 15 1 x 15  Pain reported at end --   Side lying ABD  3 x 10 x 1# 3 x 10 x 1# 2 x 10 x 1# 2 x 10 x 1# 3 x 10 2 x 10  1 x 10 Unable  Isometric next --   Side lying ER  3 x 10 x 1# NT 2 x 10 x 1# 2 x 10 x 1# 3 x 10  2 x 10 1 x 15 1 x 15 --   Supine T's  NT 2 x 10 YTB                       PRONE:              Shoulder flexion   -- -- -- -- -- -- -- --   Shoulder scaption   -- -- -- -- -- -- -- --   Shoulder abduction   -- -- -- -- -- -- -- --                SEATED:              Scapula squeezes   -- 20 x 3" 15 x 3" 15 x 3" 15 x 3" 15 x 3" 15 x 3" 1 x 10   Shoulder rolls   -- -- --- D/C 1 x 15 1 x 15 1 x 15 1 x 5                  STANDING:              Doorway stretch  3 x 20" 5 x 10" 5 x 10" 3 x 10" Next? Not today Not today Hold  --   IR stretch with strap  3 x 20" 5 x 10" 5 x 10"         Wall walks Flex/ABD  1 x 15 1 x 10 1 x 10 1 x 10 Not today 1 x 10 1 x 10 5 x ea --   Isometric ROM:  - Flex  - Ext  - ABD  -- -- D/C   --- Next Next Next    Theraband  - W's  - T's  - I's  - Rows  - ER  - IR  - Punch out      --  2 x 10 GTB  2 x 10 GTB  2 x 10 GTB  2 x 10 GTB  Fwd GTB 2 x 10   --  Unable  3 x 10 RTB  3 x 10 RTB  3 x 10 RTB  3 x 10 RTB  Fwd RTB 2 x 10       2 x 10 RTB  2 x 10 RTB  2 x 10 RTB  2 x 10 RTB   --  --  1 x 15 RTB  1 x 15 RTB  1 x 15 RTB  1 x 15 RTB   --  --  1 x 10 RTB  1 x 10 RTB  1 x 10 RTB  1 x 10 RTB Next Next Hold      --  --  --  --  --   Shoulder ROM:  - Scaption  - Flexion  - Abduction  Next?                                     Initials  SB SB SB NUPUR Dutta received the following manual therapy techniques: Joint " mobilizations and Soft tissue Mobilization were applied to the: L shoulder for 0 minutes, including: NOT TODAY  - gentle mobilizations of glenohumeral joint  - grade 1 posterior glide of glenohumeral joint  - PROM through all planes of motion of L shoulder     Luzmaria received cold pack for 0 minutes to L shoulder.    Home Exercises Provided and Patient Education Provided     Education provided:   - continue with planned HEP   - importance of moving shoulder at home to tolerance      Written Home Exercises Provided: Patient instructed to cont prior HEP.  Exercises were reviewed and Luzmaria was able to demonstrate them prior to the end of the session.  Luzmaria demonstrated good  understanding of the education provided.     See EMR under Patient Instructions for exercises provided prior visit.    Assessment     Luzmaria returns to the clinic this morning with improved pain levels. Pt was able to complete partial routine due to pt arriving late to session. Pt left clinic without increase in symptoms and improved pain levels. Pt continues to demonstrate improving strength and ROM allowing for improved tolerance to ADL's and decreased pain with sleeping. Pt will progress to standing shoulder ROM next session.     Luzmaria is progressing well towards her goals.   Pt prognosis is Good.     Pt will continue to benefit from skilled outpatient physical therapy to address the deficits listed in the problem list box on initial evaluation, provide pt/family education and to maximize pt's level of independence in the home and community environment.     Pt's spiritual, cultural and educational needs considered and pt agreeable to plan of care and goals.     Anticipated barriers to physical therapy: none    Goals:  Short Term Goals: 4 weeks   1. This patient will be independent with a basic HEP. In progress, not met  2. This patient will have Left shoulder active range of motion of 120 degrees flexion in order to pull a shirt over her  head with no complaints of pain. In progress, not met  3. This patient will increase Left shoulder strength 1 grade in order to be able to reach for a seatbelt with no increase in symptoms. In progress, not met  4. This patient will have a pain rating of 4/10 at worst with ADLs. In progress, not met  5. Patient will be able to achieve greater than or equal to 54 on the FOTO Shoulder Survey indicating patient is 46% impaired, limited, or restricted and demonstrating overall improved functional ability with upper extremity. In progress, not met     Long Term Goals: 8 weeks   1. This patient will be independent with an updated HEP. In progress, not met  2. This patient will have left shoulder active range of motion equal to right shoulder active range of motion  in order to perform her usual work duties with no complaints of pain.In progress, not met  3. This patient will have left shoulder strength of 5/5 in order to be able to perform her usual household duties with no increase in symptoms. In progress, not met  4. This patient will have a pain rating of 1/10 at worst with ADLs. In progress, not met  5. Patient will be able to achieve greater than or equal to 64 on the FOTO Shoulder Survey indicating patient is 36% impaired, limited, or restricted and demonstrating overall improved functional ability with upper extremity. In progress, not met    Plan     Continue with planned PT POC as tolerated.  Perform standing shoulder ROM and supine T's next session.    More Zimmer, PTA 4/6

## 2020-10-12 NOTE — PATIENT INSTRUCTIONS
"    W I T Y standing with band    WITY For Shoulder and Upper Back  W: Elevate arms lifting elbows to shoulder height, externally rotate to form "W"  I: Pull hands down toward hips to form "I"  T: Hands away from body, thumbs pointing behind you form "T"  Y: Raise arms overhead slightly away from vertical, form "Y"     Do 3 sets of 10 reps of each 2 x a day.  Strengthening: Resisted Flexion        Hold tubing with left arm at side. Pull forward and up. Move shoulder through pain-free range of motion.  Repeat 10 times per set. Do 3 sets per session. Do 2 sessions per day.     [email or web address]     Copyright © [employer]. All rights reserved.   Progressive Resisted: Abduction (Standing)        Holding 1 pound weights, raise arms out from sides.  Repeat 10 times per set. Do 3 sets per session. Do 2 sessions per day.     [email or web address]     Copyright © [employer]. All rights reserved.     "

## 2020-10-12 NOTE — PROGRESS NOTES
"    Physical Therapy Treatment Note     Name: Luzmaria SIMON [facility] Number: [id number]    Therapy Diagnosis:   Encounter Diagnoses   Name Primary?    Decreased range of motion of left shoulder Yes    Weakness of left upper extremity     Pain in joint of left shoulder region      Physician: Tejas Prince MD    Visit Date: 10/12/2020    Physician Orders: PT Eval and Treat   Medical Diagnosis from Referral: M25.512 (ICD-10-CM) - Left shoulder pain, unspecified chronicity  Evaluation Date: 8/3/2020  Authorization Period Expiration: 12/31/2020  Plan of Care Expiration: 10/03/2020  Visit # / Visits authorized: 10 / 30    Time In: 5:00 pm  Time Out: 5:32 pm  Total Billable Time: 28 minutes    Precautions: Standard    Subjective     Pt reports: doing good, still having some pain when raising it up  She was compliant with home exercise program.  Response to previous treatment: slight muscle soreness  Functional change: has returned to work and usual ADLs    Pain: 2/10  Location: left shoulder when raising overhead    Objective     Luzmaria received therapeutic exercises to develop strength and ROM, and reassessment for 28 minutes including:     Date  10/12/2020 9/11/2020 9/09/2020 8/31/2020 8/26/2020 8/20/2020 8/17/2020 8/12/2020 8/10/2020 8/3/2020   VISIT 10/30 9/30 8/30 7/30 6/30 5/30 4/30 3/30 2/30 1/30   DOS 7/29/2020      --       FOTO -- -- -- -- -- 5/5 4/5 3/5 2/5 1/5   POC EXP. DATE D/C 10/03/2020 10/03/2020 10/3/2020 10/3/2020 10/03/2020 10/03/2020 10/03/2020 10/03/2020 10/03/2020   FACE-TO-FACE D/C 10/02/2020 10/02/2020 9/3/2020 9/3/2020 9/03/2020 9/03/2020 9/03/2020 9/03/2020 9/03/2020                  Pulleys Flex/ABD 2' ea 2' ea 2' ea 2' ea 2' ea 2'ea 2' ea 2' ea 3'  Unable to ABD    TABLE:              Supine pec str w/ towel Not today NT 2' 2' 2' 2' 2' 1'30" 1'  --   Snow angels Not today NT 2 x 10 2 x 10 1 x 15 1 x 15 1 x 15 1 x 15 1 x 15 --   Butterfly str Not today NT 2 x 10 2 x 10 1 x 15 1 x " "15 1 x 10  Hold  --   Wand flexion Not today NT NT 2 x 10 1 x 15 1 x 15 1 x 15 1 x 15 Table slide  1 x 15 1 x 10   Wand abduction Not today NT NT 2 x 10 1 x 15 1 x 15 1 x 15 1 x 15 1 x 15 1 x 10   Wand ER Not today NT NT NT 1 x 15 1 x 15 1 x 15 1 x 15 1 x 15 --   Scapular protraction Not today NT 2 x 10 x 1# 2 x 10 2 x 10        Reverse codman's Not today NT 30 x 1# x ea  cw/ccw 30 x ea cw/ccw 1 x 30 cw/ccw        Side lying flexion Not today 3 x 10 x 1# 3 x 10 x 1# NT 2 x 10 x 1# 3 x 10 2 x 10 1 x 15 1 x 15  Pain reported at end --   Side lying ABD Not today 3 x 10 x 1# 3 x 10 x 1# 2 x 10 x 1# 2 x 10 x 1# 3 x 10 2 x 10  1 x 10 Unable  Isometric next --   Side lying ER Not today 3 x 10 x 1# NT 2 x 10 x 1# 2 x 10 x 1# 3 x 10  2 x 10 1 x 15 1 x 15 --   Supine T's Not today NT 2 x 10 YTB                       PRONE:              Shoulder flexion Not today  -- -- -- -- -- -- -- --   Shoulder scaption Not today  -- -- -- -- -- -- -- --   Shoulder abduction Not today  -- -- -- -- -- -- -- --                SEATED:              Scapula squeezes Not today  -- 20 x 3" 15 x 3" 15 x 3" 15 x 3" 15 x 3" 15 x 3" 1 x 10   Shoulder rolls Not today  -- -- --- D/C 1 x 15 1 x 15 1 x 15 1 x 5                  STANDING:              Doorway stretch Not today 3 x 20" 5 x 10" 5 x 10" 3 x 10" Next? Not today Not today Hold  --   IR stretch with strap Not today 3 x 20" 5 x 10" 5 x 10"         Wall walks Flex/ABD Not today 1 x 15 1 x 10 1 x 10 1 x 10 Not today 1 x 10 1 x 10 5 x ea --   Isometric ROM:  - Flex  - Ext  - ABD -- -- -- D/C   --- Next Next Next    Theraband  - W's  - T's  - I's  - Rows  - ER  - IR  - Punch out   --  1 x 10 RTB  2 x 10 GTB  2 x 10 GTB  At home  At home  At home     --  2 x 10 GTB  2 x 10 GTB  2 x 10 GTB  2 x 10 GTB  Fwd GTB 2 x 10   --  Unable  3 x 10 RTB  3 x 10 RTB  3 x 10 RTB  3 x 10 RTB  Fwd RTB 2 x 10       2 x 10 RTB  2 x 10 RTB  2 x 10 RTB  2 x 10 RTB   --  --  1 x 15 RTB  1 x 15 RTB  1 x 15 RTB  1 x 15 RTB "   --  --  1 x 10 RTB  1 x 10 RTB  1 x 10 RTB  1 x 10 RTB Next Next Hold      --  --  --  --  --   Shoulder ROM:  - Scaption  - Flexion  - Abduction   1 x 10 x 1#  1 x 10 x 1#  1 x 10 x 1# Next?                                     Initials DG SB SB SB MA DG DG DG SB DG       Active Range of Motion:   Shoulder Left Right   Flexion 170 170   Abduction 172 175   ER at 0 T2 T2   IR T9 T8      Upper Extremity Strength    (L) UE (R) UE   Shoulder flexion: 4-/5 4/5   Shoulder Abduction: 4-/5 4/5   Shoulder ER 4+/5 5/5   Shoulder IR 4+/5 5/5   Lower Trap 4/5 4/5   Middle Trap 4/5 4/5   Rhomboids 4/5 4/5     FOTO Shoulder Survey 29%    Home Exercises Provided and Patient Education Provided     Education provided:   - continue with planned HEP   - importance of moving shoulder at home to tolerance      Written Home Exercises Provided: Patient instructed to cont prior HEP.  Exercises were reviewed and Luzmaria was able to demonstrate them prior to the end of the session.  Luzmaria demonstrated good  understanding of the education provided.     See EMR under Patient Instructions for exercises provided prior visit.    Assessment     Luzmaria is able to demonstrate AROM of her L shoulder equal to her R shoulder with no complaints of pain. She is no longer limited in her ADLs by her AROM of her shoulder. Her L UE strength is grossly 4/5 with no complaints of pain and it no longer limits her ability to perform her usual ADLs. Her current score on FOTO Shoulder Survey indicates she is 29% impaired, limited, or restricted. She has met all goals set for her at this time and is independent with her HEP. She is ready for discharge to her [facility].     Luzmaria is progressing well towards her goals.   Pt prognosis is Good.     Pt will continue to benefit from skilled outpatient physical therapy to address the deficits listed in the problem list box on initial evaluation, provide pt/family education and to maximize pt's level of independence in the  home and community environment.     Pt's spiritual, cultural and educational needs considered and pt agreeable to plan of care and goals.     Anticipated barriers to physical therapy: none    Goals:  Short Term Goals: 4 weeks   1. This patient will be independent with a basic HEP. Met  2. This patient will have Left shoulder active range of motion of 120 degrees flexion in order to pull a shirt over her head with no complaints of pain. Met  3. This patient will increase Left shoulder strength 1 grade in order to be able to reach for a seatbelt with no increase in symptoms. Met  4. This patient will have a pain rating of 4/10 at worst with ADLs. Met  5. Patient will be able to achieve greater than or equal to 54 on the FOTO Shoulder Survey indicating patient is 46% impaired, limited, or restricted and demonstrating overall improved functional ability with upper extremity. Met     Long Term Goals: 8 weeks   1. This patient will be independent with an updated HEP. Met  2. This patient will have left shoulder active range of motion equal to right shoulder active range of motion  in order to perform her usual work duties with no complaints of pain.Met  3. This patient will have left shoulder strength of 5/5 in order to be able to perform her usual household duties with no increase in symptoms. Met  4. This patient will have a pain rating of 1/10 at worst with ADLs. Met  5. Patient will be able to achieve greater than or equal to 64 on the FOTO Shoulder Survey indicating patient is 36% impaired, limited, or restricted and demonstrating overall improved functional ability with upper extremity. Met    Plan     Discharge to [facility]    Lila Choudhury, PT

## 2020-10-12 NOTE — PLAN OF CARE
Outpatient Therapy Discharge Summary     Name: Luzmaria SIMON [facility] Number: [id number]    Therapy Diagnosis:   Encounter Diagnoses   Name Primary?    Decreased range of motion of left shoulder Yes    Weakness of left upper extremity     Pain in joint of left shoulder region      Physician: Tejas Prince MD    Physician Orders: PT Eval and Treat   Medical Diagnosis from Referral: M25.512 (ICD-10-CM) - Left shoulder pain, unspecified chronicity  Evaluation Date: 8/3/2020    Date of Last visit: 10/12/2020  Total Visits Received:10  Cancelled Visits: 7  No Show Visits: 1    Assessment    Goals:   Short Term Goals: 4 weeks   1. This patient will be independent with a basic HEP. Met  2. This patient will have Left shoulder active range of motion of 120 degrees flexion in order to pull a shirt over her head with no complaints of pain. Met  3. This patient will increase Left shoulder strength 1 grade in order to be able to reach for a seatbelt with no increase in symptoms. Met  4. This patient will have a pain rating of 4/10 at worst with ADLs. Met  5. Patient will be able to achieve greater than or equal to 54 on the FOTO Shoulder Survey indicating patient is 46% impaired, limited, or restricted and demonstrating overall improved functional ability with upper extremity. Met     Long Term Goals: 8 weeks   1. This patient will be independent with an updated HEP. Met  2. This patient will have left shoulder active range of motion equal to right shoulder active range of motion  in order to perform her usual work duties with no complaints of pain.Met  3. This patient will have left shoulder strength of 5/5 in order to be able to perform her usual household duties with no increase in symptoms. Met  4. This patient will have a pain rating of 1/10 at worst with ADLs. Met  5. Patient will be able to achieve greater than or equal to 64 on the FOTO Shoulder Survey indicating patient is 36% impaired, limited, or  restricted and demonstrating overall improved functional ability with upper extremity. Met    Discharge reason: Patient has met all of his/her goals    Plan   This patient is discharged from Physical Therapy

## 2020-10-19 ENCOUNTER — PATIENT OUTREACH (OUTPATIENT)
Dept: ADMINISTRATIVE | Facility: OTHER | Age: 45
End: 2020-10-19

## 2020-10-19 NOTE — PROGRESS NOTES
Health Maintenance Due   Topic Date Due    Hepatitis C Screening  1975    HIV Screening  02/15/1990    Sign Pain Contract  02/15/1993    Complete Opioid Risk Tool  02/15/1993    Influenza Vaccine (1) 08/01/2020    Mammogram  12/18/2020     Updates were requested from care everywhere.  Chart was reviewed for overdue Proactive [facility] Encounters (CYNDIE) topics (CRS, Breast Cancer Screening, Eye exam)  Health Maintenance has been updated.  LINKS immunization registry triggered.  Immunizations were reconciled.

## 2020-10-20 ENCOUNTER — OFFICE VISIT (OUTPATIENT)
Dept: SPORTS MEDICINE | Facility: CLINIC | Age: 45
End: 2020-10-20
Payer: COMMERCIAL

## 2020-10-20 ENCOUNTER — OFFICE VISIT (OUTPATIENT)
Dept: INTERNAL MEDICINE | Facility: CLINIC | Age: 45
End: 2020-10-20
Payer: COMMERCIAL

## 2020-10-20 ENCOUNTER — LAB VISIT (OUTPATIENT)
Dept: LAB | Facility: HOSPITAL | Age: 45
End: 2020-10-20
Attending: INTERNAL MEDICINE
Payer: COMMERCIAL

## 2020-10-20 ENCOUNTER — OFFICE VISIT (OUTPATIENT)
Dept: OBSTETRICS AND GYNECOLOGY | Facility: CLINIC | Age: 45
End: 2020-10-20
Payer: COMMERCIAL

## 2020-10-20 ENCOUNTER — HOSPITAL ENCOUNTER (OUTPATIENT)
Dept: RADIOLOGY | Facility: HOSPITAL | Age: 45
Discharge: HOME | End: 2020-10-20
Attending: INTERNAL MEDICINE
Payer: COMMERCIAL

## 2020-10-20 VITALS
SYSTOLIC BLOOD PRESSURE: 120 MMHG | DIASTOLIC BLOOD PRESSURE: 68 MMHG | TEMPERATURE: 98 F | BODY MASS INDEX: 46.38 KG/M2 | HEIGHT: 62 IN | HEART RATE: 59 BPM | RESPIRATION RATE: 18 BRPM | WEIGHT: 252 LBS

## 2020-10-20 VITALS
BODY MASS INDEX: 46.13 KG/M2 | WEIGHT: 252.19 LBS | DIASTOLIC BLOOD PRESSURE: 74 MMHG | SYSTOLIC BLOOD PRESSURE: 118 MMHG

## 2020-10-20 VITALS — BODY MASS INDEX: 46.38 KG/M2 | HEIGHT: 62 IN | WEIGHT: 252 LBS

## 2020-10-20 DIAGNOSIS — G89.29 CHRONIC MIDLINE LOW BACK PAIN WITHOUT SCIATICA: ICD-10-CM

## 2020-10-20 DIAGNOSIS — Z01.419 ENCOUNTER FOR ANNUAL ROUTINE GYNECOLOGICAL EXAMINATION: Primary | ICD-10-CM

## 2020-10-20 DIAGNOSIS — M54.50 CHRONIC MIDLINE LOW BACK PAIN WITHOUT SCIATICA: ICD-10-CM

## 2020-10-20 DIAGNOSIS — Z12.31 SCREENING MAMMOGRAM, ENCOUNTER FOR: ICD-10-CM

## 2020-10-20 DIAGNOSIS — Z11.59 NEED FOR HEPATITIS C SCREENING TEST: ICD-10-CM

## 2020-10-20 DIAGNOSIS — Z12.31 VISIT FOR SCREENING MAMMOGRAM: ICD-10-CM

## 2020-10-20 DIAGNOSIS — Z11.4 ENCOUNTER FOR SCREENING FOR HIV: ICD-10-CM

## 2020-10-20 DIAGNOSIS — Z00.00 ANNUAL PHYSICAL EXAM: Primary | ICD-10-CM

## 2020-10-20 DIAGNOSIS — Z12.4 PAP SMEAR FOR CERVICAL CANCER SCREENING: ICD-10-CM

## 2020-10-20 DIAGNOSIS — Z87.898 HISTORY OF ABNORMAL CELLS FROM CERVIX: ICD-10-CM

## 2020-10-20 DIAGNOSIS — M25.512 LEFT SHOULDER PAIN, UNSPECIFIED CHRONICITY: Primary | ICD-10-CM

## 2020-10-20 DIAGNOSIS — Z00.00 ANNUAL PHYSICAL EXAM: ICD-10-CM

## 2020-10-20 LAB
ALBUMIN SERPL BCP-MCNC: 3.6 G/DL (ref 3.5–5.2)
ALP SERPL-CCNC: 51 U/L (ref 55–135)
ALT SERPL W/O P-5'-P-CCNC: 21 U/L (ref 10–44)
ANION GAP SERPL CALC-SCNC: 9 MMOL/L (ref 8–16)
AST SERPL-CCNC: 18 U/L (ref 10–40)
BASOPHILS # BLD AUTO: 0.02 K/UL (ref 0–0.2)
BASOPHILS NFR BLD: 0.3 % (ref 0–1.9)
BILIRUB SERPL-MCNC: 0.4 MG/DL (ref 0.1–1)
BUN SERPL-MCNC: 11 MG/DL (ref 6–20)
CALCIUM SERPL-MCNC: 8.6 MG/DL (ref 8.7–10.5)
CHLORIDE SERPL-SCNC: 103 MMOL/L (ref 95–110)
CHOLEST SERPL-MCNC: 214 MG/DL (ref 120–199)
CHOLEST/HDLC SERPL: 5.4 {RATIO} (ref 2–5)
CO2 SERPL-SCNC: 27 MMOL/L (ref 23–29)
CREAT SERPL-MCNC: 0.7 MG/DL (ref 0.5–1.4)
DIFFERENTIAL METHOD: ABNORMAL
EOSINOPHIL # BLD AUTO: 0.1 K/UL (ref 0–0.5)
EOSINOPHIL NFR BLD: 1.2 % (ref 0–8)
ERYTHROCYTE [DISTWIDTH] IN BLOOD BY AUTOMATED COUNT: 13.3 % (ref 11.5–14.5)
EST. GFR  (AFRICAN AMERICAN): >60 ML/MIN/1.73 M^2
EST. GFR  (NON AFRICAN AMERICAN): >60 ML/MIN/1.73 M^2
GLUCOSE SERPL-MCNC: 80 MG/DL (ref 70–110)
HCT VFR BLD AUTO: 39.4 % (ref 37–48.5)
HDLC SERPL-MCNC: 40 MG/DL (ref 40–75)
HDLC SERPL: 18.7 % (ref 20–50)
HGB BLD-MCNC: 12.4 G/DL (ref 12–16)
IMM GRANULOCYTES # BLD AUTO: 0.02 K/UL (ref 0–0.04)
IMM GRANULOCYTES NFR BLD AUTO: 0.3 % (ref 0–0.5)
LDLC SERPL CALC-MCNC: 126.2 MG/DL (ref 63–159)
LYMPHOCYTES # BLD AUTO: 4.2 K/UL (ref 1–4.8)
LYMPHOCYTES NFR BLD: 62.2 % (ref 18–48)
MCH RBC QN AUTO: 29.5 PG (ref 27–31)
MCHC RBC AUTO-ENTMCNC: 31.5 G/DL (ref 32–36)
MCV RBC AUTO: 94 FL (ref 82–98)
MONOCYTES # BLD AUTO: 0.4 K/UL (ref 0.3–1)
MONOCYTES NFR BLD: 5.3 % (ref 4–15)
NEUTROPHILS # BLD AUTO: 2.1 K/UL (ref 1.8–7.7)
NEUTROPHILS NFR BLD: 30.7 % (ref 38–73)
NONHDLC SERPL-MCNC: 174 MG/DL
NRBC BLD-RTO: 0 /100 WBC
PLATELET # BLD AUTO: 276 K/UL (ref 150–350)
PMV BLD AUTO: 10.4 FL (ref 9.2–12.9)
POTASSIUM SERPL-SCNC: 3.8 MMOL/L (ref 3.5–5.1)
PROT SERPL-MCNC: 7 G/DL (ref 6–8.4)
RBC # BLD AUTO: 4.2 M/UL (ref 4–5.4)
SODIUM SERPL-SCNC: 139 MMOL/L (ref 136–145)
TRIGL SERPL-MCNC: 239 MG/DL (ref 30–150)
TSH SERPL DL<=0.005 MIU/L-ACNC: 3.15 UIU/ML (ref 0.4–4)
WBC # BLD AUTO: 6.74 K/UL (ref 3.9–12.7)

## 2020-10-20 PROCEDURE — 99396 PREV VISIT EST AGE 40-64: CPT | Mod: S$GLB,,, | Performed by: OBSTETRICS & GYNECOLOGY

## 2020-10-20 PROCEDURE — 86703 HIV-1/HIV-2 1 RESULT ANTBDY: CPT

## 2020-10-20 PROCEDURE — 86803 HEPATITIS C AB TEST: CPT

## 2020-10-20 PROCEDURE — 90471 IMMUNIZATION ADMIN: CPT | Mod: S$GLB,,, | Performed by: INTERNAL MEDICINE

## 2020-10-20 PROCEDURE — 90686 FLU VACCINE (QUAD) GREATER THAN OR EQUAL TO 3YO PRESERVATIVE FREE IM: ICD-10-PCS | Mod: S$GLB,,, | Performed by: INTERNAL MEDICINE

## 2020-10-20 PROCEDURE — 80053 COMPREHEN METABOLIC PANEL: CPT

## 2020-10-20 PROCEDURE — 83036 HEMOGLOBIN GLYCOSYLATED A1C: CPT

## 2020-10-20 PROCEDURE — 90686 IIV4 VACC NO PRSV 0.5 ML IM: CPT | Mod: S$GLB,,, | Performed by: INTERNAL MEDICINE

## 2020-10-20 PROCEDURE — 3008F BODY MASS INDEX DOCD: CPT | Mod: CPTII,S$GLB,, | Performed by: INTERNAL MEDICINE

## 2020-10-20 PROCEDURE — 36415 COLL VENOUS BLD VENIPUNCTURE: CPT | Mod: PO

## 2020-10-20 PROCEDURE — 99999 PR PBB SHADOW E&M-EST. PATIENT-LVL IV: CPT | Mod: PBBFAC,,, | Performed by: INTERNAL MEDICINE

## 2020-10-20 PROCEDURE — 99024 POSTOP FOLLOW-UP VISIT: CPT | Mod: S$GLB,,, | Performed by: ORTHOPAEDIC SURGERY

## 2020-10-20 PROCEDURE — 77067 SCR MAMMO BI INCL CAD: CPT | Mod: TC,PO

## 2020-10-20 PROCEDURE — 84443 ASSAY THYROID STIM HORMONE: CPT

## 2020-10-20 PROCEDURE — 99396 PR PREVENTIVE VISIT,EST,40-64: ICD-10-PCS | Mod: 25,S$GLB,, | Performed by: INTERNAL MEDICINE

## 2020-10-20 PROCEDURE — 3008F BODY MASS INDEX DOCD: CPT | Mod: CPTII,S$GLB,, | Performed by: OBSTETRICS & GYNECOLOGY

## 2020-10-20 PROCEDURE — 99999 PR PBB SHADOW E&M-EST. PATIENT-LVL III: ICD-10-PCS | Mod: PBBFAC,,, | Performed by: ORTHOPAEDIC SURGERY

## 2020-10-20 PROCEDURE — 99999 PR PBB SHADOW E&M-EST. PATIENT-LVL IV: ICD-10-PCS | Mod: PBBFAC,,, | Performed by: INTERNAL MEDICINE

## 2020-10-20 PROCEDURE — 3008F PR BODY MASS INDEX (BMI) DOCUMENTED: ICD-10-PCS | Mod: CPTII,S$GLB,, | Performed by: INTERNAL MEDICINE

## 2020-10-20 PROCEDURE — 99396 PR PREVENTIVE VISIT,EST,40-64: ICD-10-PCS | Mod: S$GLB,,, | Performed by: OBSTETRICS & GYNECOLOGY

## 2020-10-20 PROCEDURE — 90471 FLU VACCINE (QUAD) GREATER THAN OR EQUAL TO 3YO PRESERVATIVE FREE IM: ICD-10-PCS | Mod: S$GLB,,, | Performed by: INTERNAL MEDICINE

## 2020-10-20 PROCEDURE — 3008F PR BODY MASS INDEX (BMI) DOCUMENTED: ICD-10-PCS | Mod: CPTII,S$GLB,, | Performed by: OBSTETRICS & GYNECOLOGY

## 2020-10-20 PROCEDURE — 85025 COMPLETE CBC W/AUTO DIFF WBC: CPT

## 2020-10-20 PROCEDURE — 99999 PR PBB SHADOW E&M-EST. PATIENT-LVL III: CPT | Mod: PBBFAC,,, | Performed by: ORTHOPAEDIC SURGERY

## 2020-10-20 PROCEDURE — 99999 PR PBB SHADOW E&M-EST. PATIENT-LVL III: ICD-10-PCS | Mod: PBBFAC,,, | Performed by: OBSTETRICS & GYNECOLOGY

## 2020-10-20 PROCEDURE — 99396 PREV VISIT EST AGE 40-64: CPT | Mod: 25,S$GLB,, | Performed by: INTERNAL MEDICINE

## 2020-10-20 PROCEDURE — 99024 PR POST-OP FOLLOW-UP VISIT: ICD-10-PCS | Mod: S$GLB,,, | Performed by: ORTHOPAEDIC SURGERY

## 2020-10-20 PROCEDURE — 99999 PR PBB SHADOW E&M-EST. PATIENT-LVL III: CPT | Mod: PBBFAC,,, | Performed by: OBSTETRICS & GYNECOLOGY

## 2020-10-20 PROCEDURE — 80061 LIPID PANEL: CPT

## 2020-10-20 RX ORDER — TOPIRAMATE 50 MG/1
50 TABLET, FILM COATED ORAL 2 TIMES DAILY
COMMUNITY
Start: 2020-07-23 | End: 2022-05-31

## 2020-10-20 RX ORDER — METHOCARBAMOL 500 MG/1
500 TABLET, FILM COATED ORAL 3 TIMES DAILY PRN
Qty: 30 TABLET | Refills: 0 | Status: SHIPPED | OUTPATIENT
Start: 2020-10-20 | End: 2020-10-30

## 2020-10-20 RX ORDER — IBUPROFEN 600 MG/1
600 TABLET ORAL EVERY 6 HOURS PRN
Qty: 20 TABLET | Refills: 0 | Status: ON HOLD | OUTPATIENT
Start: 2020-10-20 | End: 2021-02-25 | Stop reason: HOSPADM

## 2020-10-20 NOTE — PROGRESS NOTES
"CC: Left shoulder post op 3 months    Luzmaria Louis returns to clinic for follow up evaluation of left shoulder.  She has been discharged from physical therapy on 10/12.    DATE OF SURGERY:  7/29/2020      PREOPERATIVE DIAGNOSES:   1. Left shoulder rotator cuff tear.   2. Left shoulder labral tear  3. Left shoulder adhesive capsulitis     POSTOPERATIVE DIAGNOSES:   1. Left shoulder rotator cuff tear.   2. Left shoulder labral tear  3. Left shoulder adhesive capsulitis     PROCEDURE:   1. Left shoulder arthroscopic debridement rotator cuff .   2. Left shoulder arthroscopic subacromial decompression.   4. Left shoulder arthroscopic lysis of adhesions     SURGEON: Tejas Prince M.D.     Review of Systems   Constitution: Negative. Negative for chills, fever and night sweats.    Cardiovascular: Negative for chest pain and syncope.   Respiratory: Negative for cough and shortness of breath.   Gastrointestinal: Negative for abdominal pain and bowel incontinence.     PE:  Vitals:    10/20/20 1359   Weight: 114.3 kg (252 lb)   Height: 5' 2" (1.575 m)   PainSc: 0-No pain     Left shoulder  Incision healed  No sign of infection  Swelling resolved  Compartments soft  Neurovascular status intact in extremity    AROM  Forward elevation 165 degrees  External rotation 65 degrees    Assessment:  Appropriate rotator cuff surgery recovery at 3 months    Plan:  1. Follow up as needed          "

## 2020-10-20 NOTE — PROGRESS NOTES
Subjective:       Patient ID: Luzmaria Louis is a 45 y.o. female.    Chief Complaint: Annual Exam    HPI     45 y.o. female here for annual exam.     Cholesterol: needs  Vaccines: Influenza - 2019; Tetanus - 2018  Sexual Screening:   STD screening: no concern  Eye exam: done last year  Mammogram: scheduled  Gyn exam: UTD  Colonoscopy: PGF with cancer  A1c: needs    Exercise: cannot exercise because of surgery on her ankle.  She finished rehab for her shoulder.  Diet:  Trying to lose weight.  Cooking at home.  No working.    She complains of back pain and used ibuprofen 600 mg to help with this.  She felt like she was catching a malika horse.  It felt like it was throbbing.    She had her surgery and went back to work.  She feels very tired and like she is going to pass out.  She thought it was the weight gain causing her to feel really tired all the time.    Past Medical History:   Diagnosis Date    Fibroid     GERD (gastroesophageal reflux disease)     Migraine headache      Past Surgical History:   Procedure Laterality Date    ARTHROSCOPIC DEBRIDEMENT OF ROTATOR CUFF Left 7/29/2020    Procedure: DEBRIDEMENT, ROTATOR CUFF, ARTHROSCOPIC;  Surgeon: Tejas Prince MD;  Location: [facility];  Service: Orthopedics;  Laterality: Left;    ARTHROSCOPY OF SHOULDER WITH DECOMPRESSION OF SUBACROMIAL SPACE Left 7/29/2020    Procedure: ARTHROSCOPY, SHOULDER, WITH SUBACROMIAL SPACE DECOMPRESSION;  Surgeon: Tejas Prince MD;  Location: [facility];  Service: Orthopedics;  Laterality: Left;    CHOLECYSTECTOMY      HYSTERECTOMY      MOUTH SURGERY      THYROID LOBECTOMY Right 7/3/2019    Procedure: LOBECTOMY, THYROID, Possible Total;  Surgeon: Carlee Huff MD;  Location: [address];  Service: General;  Laterality: Right;    TONSILLECTOMY      TUBAL LIGATION       Social History     Socioeconomic History    Marital status:      Spouse name: Not on file    Number of children: Not  on file    Years of education: Not on file    Highest education level: Not on file   Occupational History    Not on file   Social Needs    Financial resource strain: Somewhat hard    Food insecurity     Worry: Sometimes true     Inability: Never true    Transportation needs     Medical: No     Non-medical: No   Tobacco Use    Smoking status: Never Smoker    Smokeless tobacco: Never Used   Substance and Sexual Activity    Alcohol use: Yes     Alcohol/week: 2.0 standard drinks     Types: 2 Glasses of wine per week     Frequency: 2-4 times a month     Drinks per session: 1 or 2     Binge frequency: Never     Comment: weekends, but not every weekend    Drug use: No    Sexual activity: Yes     Partners: Male     Comment: hysterectomy   Lifestyle    Physical activity     Days per week: 0 days     Minutes per session: 0 min    Stress: Not at all   Relationships    Social connections     Talks on phone: More than three times a week     Gets together: More than three times a week     Attends Rastafari service: Not on file     Active member of club or organization: No     Attends meetings of clubs or organizations: Never     Relationship status:    Other Topics Concern    Not on file   Social History Narrative    Not on file     Review of patient's allergies indicates:   Allergen Reactions    Caffeine      She feels like it is hard to breath, dizzy, nauseous.     Luzmaria Louis had no medications administered during this visit.    Review of Systems   Constitutional: Positive for unexpected weight change. Negative for activity change.   HENT: Negative for hearing loss, rhinorrhea and trouble swallowing.    Eyes: Negative for discharge and visual disturbance.   Respiratory: Negative for chest tightness and wheezing.    Cardiovascular: Negative for chest pain and palpitations.   Gastrointestinal: Negative for blood in stool, constipation, diarrhea and vomiting.   Endocrine: Negative for  polydipsia and polyuria.   Genitourinary: Negative for difficulty urinating, dysuria, hematuria and menstrual problem.   Musculoskeletal: Negative for arthralgias, joint swelling and neck pain.   Neurological: Negative for weakness and headaches.   Psychiatric/Behavioral: Negative for confusion and dysphoric mood.         Objective:      Physical Exam  Vitals signs reviewed.   Constitutional:       Appearance: She is well-developed.   HENT:      Head: Normocephalic and atraumatic.      Mouth/Throat:      Pharynx: No oropharyngeal exudate.   Eyes:      General: No scleral icterus.        Right eye: No discharge.         Left eye: No discharge.      Pupils: Pupils are equal, round, and reactive to light.   Neck:      Musculoskeletal: Normal range of motion and neck supple.      Thyroid: No thyromegaly.      Trachea: No tracheal deviation.   Cardiovascular:      Rate and Rhythm: Normal rate and regular rhythm.      Heart sounds: Normal heart sounds. No murmur. No friction rub. No gallop.    Pulmonary:      Effort: Pulmonary effort is normal. No respiratory distress.      Breath sounds: Normal breath sounds. No wheezing or rales.   Chest:      Chest wall: No tenderness.   Abdominal:      General: Bowel sounds are normal. There is no distension.      Palpations: Abdomen is soft. There is no mass.      Tenderness: There is no abdominal tenderness. There is no guarding or rebound.   Musculoskeletal: Normal range of motion.         General: No tenderness.   Skin:     General: Skin is warm and dry.      Coloration: Skin is not pale.      Findings: No erythema or rash.   Neurological:      Mental Status: She is alert and oriented to person, place, and time.   Psychiatric:         Behavior: Behavior normal.         Assessment:       1. Annual physical exam    2. Chronic midline low back pain without sciatica    3. Need for hepatitis C screening test    4. Encounter for screening for HIV        Plan:       1.  Check CBC, CMP,  TSH, lipids, A1c.  Discussed diet and exercise.  Flu vaccine given today.  Otherwise up-to-date on vaccines.  2.  Ibuprofen and Robaxin prescribed.  3.  Check hep C.  4.  Check HIV.

## 2020-10-20 NOTE — PROGRESS NOTES
Chief Complaint   Patient presents with    Well Woman       HISTORY OF PRESENT ILLNESS:   Luzmaria Louis is a 45 y.o. female s/p [facility] who presents for well woman exam.  No LMP recorded. Patient has had a hysterectomy. has h/o abnormal pap smears. She has no complaints.  Declines STD testing.      Past Medical History:   Diagnosis Date    Fibroid     GERD (gastroesophageal reflux disease)     Migraine headache    dure  Past Surgical History:   Procedure Laterality Date    ARTHROSCOPIC DEBRIDEMENT OF ROTATOR CUFF Left 7/29/2020    Procedure: DEBRIDEMENT, ROTATOR CUFF, ARTHROSCOPIC;  Surgeon: Tejas Prince MD;  Location: [facility] OR;  Service: Orthopedics;  Laterality: Left;    ARTHROSCOPY OF SHOULDER WITH DECOMPRESSION OF SUBACROMIAL SPACE Left 7/29/2020    Procedure: ARTHROSCOPY, SHOULDER, WITH SUBACROMIAL SPACE DECOMPRESSION;  Surgeon: Tejas Prince MD;  Location: [facility] OR;  Service: Orthopedics;  Laterality: Left;    CHOLECYSTECTOMY      HYSTERECTOMY      MOUTH SURGERY      THYROID LOBECTOMY Right 7/3/2019    Procedure: LOBECTOMY, THYROID, Possible Total;  Surgeon: Carlee Huff MD;  Location: [facility] OR [address] [place] [address];  Service: General;  Laterality: Right;    TONSILLECTOMY      TUBAL LIGATION     : DEBRIDEMENT, ROTATOR CUFF, ARTHROSCOPIC;  Surgeon: Tejas Prince MD;  Location: [facility] OR;  Service: Orthopedics;  Laterality: Left;    ARTHROSCOPY OF SHOULDER WITH DECOMPRESSION OF SUBACROMIAL SPACE Left 7/29/2020    Procedure: ARTHROSCOPY, SHOULDER, WITH SUBACROMIAL SPACE DECOMPRESSION;  Surgeon: Tejas Prince MD;  Location: [facility] OR;  Service: Orthopedics;  Laterality: Left;    CHOLECYSTECTOMY      HYSTERECTOMY      MOUTH SURGERY      THYROID LOBECTOMY Right 7/3/2019    Procedure: LOBECTOMY, THYROID, Possible Total;  Surgeon: Carlee Huff MD;  [facility];  Service: General;  Laterality: Right;    TONSILLECTOMY      TUBAL LIGATION           Socioeconomic History    Marital status:      Spouse name: Not on file    Number of children: Not on file    Years of education: Not on file    Highest education level: Not on file   Occupational History    Not on file   Social Needs    Financial resource strain: Somewhat hard    Food insecurity     Worry: Sometimes true     Inability: Never true    Transportation needs     Medical: No     Non-medical: No   Tobacco Use    Smoking status: Never Smoker    Smokeless tobacco: Never Used   Substance and Sexual Activity    Alcohol use: Yes     Frequency: 2-4 times a month     Drinks per session: 1 or 2     Binge frequency: Never     Comment: once a week maybe    Drug use: No    Sexual activity: Yes     Partners: Male     Comment: hysterectomy   Lifestyle    Physical activity     Days per week: 0 days     Minutes per session: 0 min    Stress: Not at all   Relationships    Social connections     Talks on phone: More than three times a week     Gets together: More than three times a week     Attends [employer] service: Not on file     Active member of club or organization: No     Attends meetings of clubs or organizations: Never     Relationship status:    Other Topics Concern    Not on file   Social History Narrative    Not on file       Family History   Problem Relation Age of Onset    Prostate cancer Paternal Grandfather     Cancer Paternal Grandmother     Throat cancer Maternal Grandmother     Cancer Maternal Grandmother         throat cancer - smoker    Diabetes Maternal Grandmother     Stroke Maternal Grandmother     Hypertension Maternal Grandmother     Aneurysm Father         stomach aneurysm    Cancer Maternal Aunt         gastric cancer    Prostate cancer Maternal Uncle     Hyperlipidemia Mother     Prostate cancer Brother     Cancer Maternal Grandfather         colon cancer    Heart disease Neg Hx          OB History    Para Term  AB Living   3 2 2   1  2   SAB TAB Ectopic Multiple Live Births   1              # Outcome Date GA Lbr Anuj/2nd Weight Sex Delivery Anes PTL Lv   3 SAB            2 Term      Vag-Spont      1 Term      Vag-Spont          GYN HISTORY:  PAP History: h/o abnormal Paps lasti n 2012 with HPV + and 2013 ASCUS with HPV negative, has had colpo in the past; 2019 NILM/HPV-  Infection History:Denies STDs. Denies PID.  Benign History: Denies uterine fibroids. Denies ovarian cysts. Denies endometriosis Denies other conditions.  Cancer History: Denies cervical cancer. Denies uterine cancer or hyperplasia. Denies ovarian cancer. Denies vulvar cancer or pre-cancer. Denies vaginal cancer or pre-cancer. Denies breast cancer. Denies colon cancer.  Cycle: 12/irregular  Had LAVH around 2009 2/2 fibroids, left ovaries     ROS:  GENERAL: Denies weight gain or weight loss. Feeling well overall.   SKIN: Denies rash or lesions.   HEAD: Denies headache.   NODES: Denies enlarged lymph nodes.   CHEST: Denies shortness of breath.   ABDOMEN: No abdominal pain, constipation, diarrhea, nausea, vomiting or rectal bleeding.   URINARY: No frequency, dysuria, hematuria, or burning on urination.  REPRODUCTIVE: See HPI.   BREASTS: The patient denies pain, lumps, or nipple discharge.       /74   Wt 114.4 kg (252 lb 3.2 oz)   BMI 46.13 kg/m²      APPEARANCE: Well nourished, well developed, in no acute distress.  NECK: Neck symmetric without  thyromegaly.  NODES: No inguinal, cervical lymph node enlargement.  CHEST: Lungs clear to auscultation.  HEART: Regular rate and rhythm, no murmurs, rubs or gallops.  ABDOMEN: Soft. No tenderness or masses. No hernias. No hepatosplenomegaly.   BREASTS: declined  PELVIC:   VULVA: No lesions. Normal female genitalia.  URETHRAL MEATUS: Normal size and location, no lesions, no prolapse.  URETHRA: No masses, tenderness, prolapse or scarring.  VAGINA: Moist and well rugated, no discharge, no significant cystocele or rectocele.  CERVIX:  surgically absent   UTERUS: surgically absent   ADNEXA: No masses or tenderness.        1. Encounter for annual routine gynecological examination    2. Pap smear for cervical cancer screening    3. History of abnormal cells from cervix        Plan:  1. Routine gyn annual  s/p normal breast exam and MMG ordered.   Pap with HPV cotesting up to date and next needed 2022, ok to space out to 3 years and if normal then our usual 5 years but would recommend paps until 2032 to make sure stay normal.   STD testing: GC/CT/trich, syphilis, HBV/HCV and HIV declined       F/u in 1 yr or RPN

## 2020-10-21 LAB
ESTIMATED AVG GLUCOSE: 103 MG/DL (ref 68–131)
HBA1C MFR BLD HPLC: 5.2 % (ref 4–5.6)
HCV AB SERPL QL IA: NEGATIVE
HIV 1+2 AB+HIV1 P24 AG SERPL QL IA: NEGATIVE

## 2020-10-27 ENCOUNTER — PATIENT MESSAGE (OUTPATIENT)
Dept: ADMINISTRATIVE | Facility: OTHER | Age: 45
End: 2020-10-27

## 2020-11-10 ENCOUNTER — TELEPHONE (OUTPATIENT)
Dept: OBSTETRICS AND GYNECOLOGY | Facility: CLINIC | Age: 45
End: 2020-11-10

## 2020-11-10 RX ORDER — FLUCONAZOLE 150 MG/1
150 TABLET ORAL
Qty: 2 TABLET | Refills: 0 | Status: SHIPPED | OUTPATIENT
Start: 2020-11-10 | End: 2020-11-14

## 2020-11-10 NOTE — TELEPHONE ENCOUNTER
----- Message from Aishwarya Frausto sent at 11/9/2020  4:40 PM CST -----    ----- Message -----  From: Ginger Smith  Sent: 11/9/2020   4:06 PM CST  To: [facility] Staff    Type:  Needs Medical Advice    Who Called: Patient   Symptoms (please be specific): Possible yeast   How long has patient had these symptoms:    Pharmacy name and phone #:  [id number] - [address] AT [facility] [phone] (Phone)  [phone] (Fax      Would the patient rather a call back or a response via [facility]?    Best Call Back Number:  [phone]  Additional Information:

## 2020-12-15 ENCOUNTER — OFFICE VISIT (OUTPATIENT)
Dept: ALLERGY | Facility: CLINIC | Age: 45
End: 2020-12-15
Payer: COMMERCIAL

## 2020-12-15 VITALS — BODY MASS INDEX: 48.28 KG/M2 | OXYGEN SATURATION: 98 % | HEIGHT: 62 IN | WEIGHT: 262.38 LBS | HEART RATE: 63 BPM

## 2020-12-15 DIAGNOSIS — K90.49 FOOD INTOLERANCE: Primary | ICD-10-CM

## 2020-12-15 DIAGNOSIS — R11.0 NAUSEA: ICD-10-CM

## 2020-12-15 DIAGNOSIS — L29.9 PRURITUS: ICD-10-CM

## 2020-12-15 DIAGNOSIS — R49.0 HOARSENESS: ICD-10-CM

## 2020-12-15 PROCEDURE — 99999 PR PBB SHADOW E&M-EST. PATIENT-LVL III: CPT | Mod: PBBFAC,,, | Performed by: ALLERGY & IMMUNOLOGY

## 2020-12-15 PROCEDURE — 99204 OFFICE O/P NEW MOD 45 MIN: CPT | Mod: S$GLB,,, | Performed by: ALLERGY & IMMUNOLOGY

## 2020-12-15 PROCEDURE — 99999 PR PBB SHADOW E&M-EST. PATIENT-LVL III: ICD-10-PCS | Mod: PBBFAC,,, | Performed by: ALLERGY & IMMUNOLOGY

## 2020-12-15 PROCEDURE — 3008F PR BODY MASS INDEX (BMI) DOCUMENTED: ICD-10-PCS | Mod: CPTII,S$GLB,, | Performed by: ALLERGY & IMMUNOLOGY

## 2020-12-15 PROCEDURE — 3008F BODY MASS INDEX DOCD: CPT | Mod: CPTII,S$GLB,, | Performed by: ALLERGY & IMMUNOLOGY

## 2020-12-15 PROCEDURE — 99204 PR OFFICE/OUTPT VISIT, NEW, LEVL IV, 45-59 MIN: ICD-10-PCS | Mod: S$GLB,,, | Performed by: ALLERGY & IMMUNOLOGY

## 2020-12-15 NOTE — PROGRESS NOTES
Subjective:       Patient ID: Luzmaria Louis is a 45 y.o. female.    Chief Complaint:  Allergies (possible food allergy - suspects caffiene is causing )      46 yo woman presents for new patient evaluation of reaction to caffeine. She states she has had symptoms for few years and getting worse. First she mary note itching all over with caffeine. If she took benadryl for it she could feel pill going down like esophagus was tight. Then started to get dizziness, hoarse voice, cough and nausea. No rash, no swelling, no SOB. If she eats too much chocolate will feel swelling in lymph nodes in neck. No other food allergy. occ has sinus congestion but not much. No seasonal rhinitis. No asthma or eczema. No other medical issues.       Environmental History: see history section for home environment  Review of Systems   Constitutional: Negative for appetite change, chills, fatigue and fever.   HENT: Positive for congestion, trouble swallowing and voice change. Negative for ear discharge, ear pain, facial swelling, nosebleeds, postnasal drip, rhinorrhea, sinus pressure, sneezing and sore throat.    Eyes: Negative for discharge, redness, itching and visual disturbance.   Respiratory: Positive for cough. Negative for choking, chest tightness, shortness of breath and wheezing.    Cardiovascular: Negative for chest pain, palpitations and leg swelling.   Gastrointestinal: Positive for nausea. Negative for abdominal distention, abdominal pain, constipation, diarrhea and vomiting.   Genitourinary: Negative for difficulty urinating.   Musculoskeletal: Negative for arthralgias, gait problem, joint swelling and myalgias.   Skin: Negative for color change and rash.   Neurological: Positive for dizziness. Negative for syncope, weakness, light-headedness and headaches.   Hematological: Negative for adenopathy. Does not bruise/bleed easily.   Psychiatric/Behavioral: Negative for agitation, behavioral problems, confusion and sleep  disturbance. The patient is not nervous/anxious.         Objective:      Physical Exam  Vitals signs and nursing note reviewed.   Constitutional:       General: She is not in acute distress.     Appearance: She is well-developed.   HENT:      Head: Normocephalic and atraumatic.      Right Ear: Hearing, tympanic membrane, ear canal and external ear normal.      Left Ear: Hearing, tympanic membrane, ear canal and external ear normal.      Nose: No septal deviation, mucosal edema or rhinorrhea.      Right Sinus: No maxillary sinus tenderness or frontal sinus tenderness.      Left Sinus: No maxillary sinus tenderness or frontal sinus tenderness.      Mouth/Throat:      Pharynx: Uvula midline. No uvula swelling.   Eyes:      General:         Right eye: No discharge.         Left eye: No discharge.      Conjunctiva/sclera: Conjunctivae normal.   Neck:      Musculoskeletal: Normal range of motion.      Thyroid: No thyromegaly.   Cardiovascular:      Rate and Rhythm: Normal rate and regular rhythm.      Heart sounds: Normal heart sounds. No murmur.   Pulmonary:      Effort: Pulmonary effort is normal. No respiratory distress.      Breath sounds: Normal breath sounds. No wheezing.   Abdominal:      General: There is no distension.      Palpations: Abdomen is soft.      Tenderness: There is no abdominal tenderness.   Musculoskeletal: Normal range of motion.         General: No tenderness.   Lymphadenopathy:      Cervical: No cervical adenopathy.   Skin:     General: Skin is warm and dry.      Findings: No erythema or rash.   Neurological:      Mental Status: She is alert and oriented to person, place, and time.   Psychiatric:         Behavior: Behavior normal.         Thought Content: Thought content normal.         Judgment: Judgment normal.         Laboratory:   none performed   Assessment:       1. Food intolerance    2. Pruritus    3. Nausea    4. Hoarseness         Plan:       1. Advised pt that IgE mediated allergy to  caffeine is very rare and her symptoms are not c/w IgE mediated reaction. Advised more c/w side effects and intolerance. Advised she should avoid or minimize ingestions but will not cause IgE mediated anaphylaxis. advised some symptoms irais be related to reflux so can trial taking Pepcid and zyrtec if has symptoms

## 2021-01-04 ENCOUNTER — TELEPHONE (OUTPATIENT)
Dept: ORTHOPEDICS | Facility: CLINIC | Age: 46
End: 2021-01-04

## 2021-01-12 ENCOUNTER — TELEPHONE (OUTPATIENT)
Dept: ORTHOPEDICS | Facility: CLINIC | Age: 46
End: 2021-01-12

## 2021-02-09 ENCOUNTER — TELEPHONE (OUTPATIENT)
Dept: ORTHOPEDICS | Facility: CLINIC | Age: 46
End: 2021-02-09

## 2021-02-10 ENCOUNTER — TELEPHONE (OUTPATIENT)
Dept: ORTHOPEDICS | Facility: CLINIC | Age: 46
End: 2021-02-10

## 2021-02-11 DIAGNOSIS — Z01.818 PRE-OP TESTING: ICD-10-CM

## 2021-02-12 DIAGNOSIS — M76.822 POSTERIOR TIBIAL TENDINITIS OF LEFT LEG: Primary | ICD-10-CM

## 2021-02-12 RX ORDER — CEFAZOLIN SODIUM 2 G/50ML
2 SOLUTION INTRAVENOUS
Status: CANCELLED | OUTPATIENT
Start: 2021-02-12

## 2021-02-17 ENCOUNTER — ANESTHESIA EVENT (OUTPATIENT)
Dept: SURGERY | Facility: HOSPITAL | Age: 46
End: 2021-02-17
Payer: COMMERCIAL

## 2021-02-22 ENCOUNTER — HOSPITAL ENCOUNTER (OUTPATIENT)
Dept: RADIOLOGY | Facility: HOSPITAL | Age: 46
Discharge: HOME | End: 2021-02-22
Attending: PHYSICIAN ASSISTANT
Payer: COMMERCIAL

## 2021-02-22 ENCOUNTER — OFFICE VISIT (OUTPATIENT)
Dept: ORTHOPEDICS | Facility: CLINIC | Age: 46
End: 2021-02-22
Payer: COMMERCIAL

## 2021-02-22 ENCOUNTER — LAB VISIT (OUTPATIENT)
Dept: INTERNAL MEDICINE | Facility: CLINIC | Age: 46
End: 2021-02-22
Payer: COMMERCIAL

## 2021-02-22 VITALS — HEIGHT: 62 IN | WEIGHT: 262.38 LBS | BODY MASS INDEX: 48.28 KG/M2

## 2021-02-22 DIAGNOSIS — M76.822 POSTERIOR TIBIAL TENDINITIS OF LEFT LEG: Primary | ICD-10-CM

## 2021-02-22 DIAGNOSIS — Z01.818 PRE-OP TESTING: ICD-10-CM

## 2021-02-22 DIAGNOSIS — Z01.818 PRE-OP EVALUATION: ICD-10-CM

## 2021-02-22 DIAGNOSIS — M76.822 POSTERIOR TIBIAL TENDINITIS OF LEFT LEG: ICD-10-CM

## 2021-02-22 DIAGNOSIS — G89.18 POST-OP PAIN: ICD-10-CM

## 2021-02-22 PROCEDURE — 73630 X-RAY EXAM OF FOOT: CPT | Mod: TC,LT

## 2021-02-22 PROCEDURE — 1126F AMNT PAIN NOTED NONE PRSNT: CPT | Mod: S$GLB,,, | Performed by: PHYSICIAN ASSISTANT

## 2021-02-22 PROCEDURE — 73630 XR FOOT COMPLETE 3 VIEW LEFT: ICD-10-PCS | Mod: 26,LT,, | Performed by: RADIOLOGY

## 2021-02-22 PROCEDURE — 99999 PR PBB SHADOW E&M-EST. PATIENT-LVL III: CPT | Mod: PBBFAC,,, | Performed by: PHYSICIAN ASSISTANT

## 2021-02-22 PROCEDURE — 1126F PR PAIN SEVERITY QUANTIFIED, NO PAIN PRESENT: ICD-10-PCS | Mod: S$GLB,,, | Performed by: PHYSICIAN ASSISTANT

## 2021-02-22 PROCEDURE — 3008F PR BODY MASS INDEX (BMI) DOCUMENTED: ICD-10-PCS | Mod: CPTII,S$GLB,, | Performed by: PHYSICIAN ASSISTANT

## 2021-02-22 PROCEDURE — U0005 INFEC AGEN DETEC AMPLI PROBE: HCPCS

## 2021-02-22 PROCEDURE — U0003 INFECTIOUS AGENT DETECTION BY NUCLEIC ACID (DNA OR RNA); SEVERE ACUTE RESPIRATORY SYNDROME CORONAVIRUS 2 (SARS-COV-2) (CORONAVIRUS DISEASE [COVID-19]), AMPLIFIED PROBE TECHNIQUE, MAKING USE OF HIGH THROUGHPUT TECHNOLOGIES AS DESCRIBED BY CMS-2020-01-R: HCPCS

## 2021-02-22 PROCEDURE — 99999 PR PBB SHADOW E&M-EST. PATIENT-LVL III: ICD-10-PCS | Mod: PBBFAC,,, | Performed by: PHYSICIAN ASSISTANT

## 2021-02-22 PROCEDURE — 99499 NO LOS: ICD-10-PCS | Mod: S$GLB,,, | Performed by: PHYSICIAN ASSISTANT

## 2021-02-22 PROCEDURE — 99499 UNLISTED E&M SERVICE: CPT | Mod: S$GLB,,, | Performed by: PHYSICIAN ASSISTANT

## 2021-02-22 PROCEDURE — 3008F BODY MASS INDEX DOCD: CPT | Mod: CPTII,S$GLB,, | Performed by: PHYSICIAN ASSISTANT

## 2021-02-22 PROCEDURE — 73630 X-RAY EXAM OF FOOT: CPT | Mod: 26,LT,, | Performed by: RADIOLOGY

## 2021-02-23 LAB — SARS-COV-2 RNA RESP QL NAA+PROBE: NOT DETECTED

## 2021-02-23 RX ORDER — IBUPROFEN 800 MG/1
800 TABLET ORAL
Qty: 90 TABLET | Refills: 0 | Status: SHIPPED | OUTPATIENT
Start: 2021-02-23 | End: 2021-03-15

## 2021-02-23 RX ORDER — OXYCODONE AND ACETAMINOPHEN 10; 325 MG/1; MG/1
1 TABLET ORAL EVERY 4 HOURS PRN
Qty: 42 TABLET | Refills: 0 | Status: SHIPPED | OUTPATIENT
Start: 2021-02-23 | End: 2021-03-05 | Stop reason: SDUPTHER

## 2021-02-24 ENCOUNTER — TELEPHONE (OUTPATIENT)
Dept: ORTHOPEDICS | Facility: CLINIC | Age: 46
End: 2021-02-24

## 2021-02-24 DIAGNOSIS — R11.2 POST-OPERATIVE NAUSEA AND VOMITING: Primary | ICD-10-CM

## 2021-02-24 DIAGNOSIS — M76.822 POSTERIOR TIBIAL TENDINITIS OF LEFT LEG: Primary | ICD-10-CM

## 2021-02-24 DIAGNOSIS — Z98.890 POST-OPERATIVE NAUSEA AND VOMITING: Primary | ICD-10-CM

## 2021-02-24 DIAGNOSIS — Z98.890 S/P FOOT SURGERY: ICD-10-CM

## 2021-02-24 RX ORDER — PROMETHAZINE HYDROCHLORIDE 25 MG/1
25 TABLET ORAL EVERY 6 HOURS PRN
Qty: 30 TABLET | Refills: 1 | Status: SHIPPED | OUTPATIENT
Start: 2021-02-24 | End: 2021-03-26

## 2021-02-25 ENCOUNTER — HOSPITAL ENCOUNTER (OUTPATIENT)
Facility: HOSPITAL | Age: 46
Discharge: HOME | End: 2021-02-25
Attending: ORTHOPAEDIC SURGERY | Admitting: ORTHOPAEDIC SURGERY
Payer: COMMERCIAL

## 2021-02-25 ENCOUNTER — TELEPHONE (OUTPATIENT)
Dept: ORTHOPEDICS | Facility: CLINIC | Age: 46
End: 2021-02-25

## 2021-02-25 ENCOUNTER — ANESTHESIA (OUTPATIENT)
Dept: SURGERY | Facility: HOSPITAL | Age: 46
End: 2021-02-25
Payer: COMMERCIAL

## 2021-02-25 VITALS
RESPIRATION RATE: 21 BRPM | HEART RATE: 67 BPM | BODY MASS INDEX: 48.21 KG/M2 | WEIGHT: 262 LBS | OXYGEN SATURATION: 100 % | SYSTOLIC BLOOD PRESSURE: 138 MMHG | HEIGHT: 62 IN | TEMPERATURE: 97 F | DIASTOLIC BLOOD PRESSURE: 78 MMHG

## 2021-02-25 DIAGNOSIS — M76.822 POSTERIOR TIBIAL TENDINITIS OF LEFT LEG: ICD-10-CM

## 2021-02-25 PROCEDURE — 76942 PR U/S GUIDANCE FOR NEEDLE GUIDANCE: ICD-10-PCS | Mod: 26,,, | Performed by: ANESTHESIOLOGY

## 2021-02-25 PROCEDURE — 71000033 HC RECOVERY, INTIAL HOUR: Performed by: ORTHOPAEDIC SURGERY

## 2021-02-25 PROCEDURE — 63600175 PHARM REV CODE 636 W HCPCS: Performed by: NURSE ANESTHETIST, CERTIFIED REGISTERED

## 2021-02-25 PROCEDURE — 25000003 PHARM REV CODE 250: Performed by: NURSE ANESTHETIST, CERTIFIED REGISTERED

## 2021-02-25 PROCEDURE — 64446 PR NERVE BLOCK INJ, ANES/STEROID, SCIATIC, CONT INFUSION, INCL IMAG GUIDANCE: ICD-10-PCS | Mod: 59,LT,, | Performed by: ANESTHESIOLOGY

## 2021-02-25 PROCEDURE — 76942 ECHO GUIDE FOR BIOPSY: CPT | Performed by: STUDENT IN AN ORGANIZED HEALTH CARE EDUCATION/TRAINING PROGRAM

## 2021-02-25 PROCEDURE — 94761 N-INVAS EAR/PLS OXIMETRY MLT: CPT

## 2021-02-25 PROCEDURE — 76942 ECHO GUIDE FOR BIOPSY: CPT | Mod: 26,,, | Performed by: ANESTHESIOLOGY

## 2021-02-25 PROCEDURE — D9220A PRA ANESTHESIA: Mod: CRNA,,, | Performed by: NURSE ANESTHETIST, CERTIFIED REGISTERED

## 2021-02-25 PROCEDURE — 27691 PR XFER SINGLE DEEP LOW LEG TENDON: ICD-10-PCS | Mod: LT,,, | Performed by: ORTHOPAEDIC SURGERY

## 2021-02-25 PROCEDURE — 37000009 HC ANESTHESIA EA ADD 15 MINS: Performed by: ORTHOPAEDIC SURGERY

## 2021-02-25 PROCEDURE — D9220A PRA ANESTHESIA: Mod: ANES,,, | Performed by: ANESTHESIOLOGY

## 2021-02-25 PROCEDURE — 63600175 PHARM REV CODE 636 W HCPCS: Performed by: STUDENT IN AN ORGANIZED HEALTH CARE EDUCATION/TRAINING PROGRAM

## 2021-02-25 PROCEDURE — 28300 INCISION OF HEEL BONE: CPT | Mod: 51,LT,, | Performed by: ORTHOPAEDIC SURGERY

## 2021-02-25 PROCEDURE — 28300 PR OSTEOTOMY HEEL BONE: ICD-10-PCS | Mod: 51,LT,, | Performed by: ORTHOPAEDIC SURGERY

## 2021-02-25 PROCEDURE — 36000709 HC OR TIME LEV III EA ADD 15 MIN: Performed by: ORTHOPAEDIC SURGERY

## 2021-02-25 PROCEDURE — 71000015 HC POSTOP RECOV 1ST HR: Performed by: ORTHOPAEDIC SURGERY

## 2021-02-25 PROCEDURE — C1751 CATH, INF, PER/CENT/MIDLINE: HCPCS | Performed by: STUDENT IN AN ORGANIZED HEALTH CARE EDUCATION/TRAINING PROGRAM

## 2021-02-25 PROCEDURE — 25000003 PHARM REV CODE 250: Performed by: STUDENT IN AN ORGANIZED HEALTH CARE EDUCATION/TRAINING PROGRAM

## 2021-02-25 PROCEDURE — 63600175 PHARM REV CODE 636 W HCPCS: Performed by: ANESTHESIOLOGY

## 2021-02-25 PROCEDURE — 64448 NJX AA&/STRD FEM NRV NFS IMG: CPT | Mod: 59,LT,, | Performed by: ANESTHESIOLOGY

## 2021-02-25 PROCEDURE — D9220A PRA ANESTHESIA: ICD-10-PCS | Mod: ANES,,, | Performed by: ANESTHESIOLOGY

## 2021-02-25 PROCEDURE — 27691 REVISE LOWER LEG TENDON: CPT | Mod: LT,,, | Performed by: ORTHOPAEDIC SURGERY

## 2021-02-25 PROCEDURE — 64446 NJX AA&/STRD SC NRV NFS IMG: CPT | Performed by: STUDENT IN AN ORGANIZED HEALTH CARE EDUCATION/TRAINING PROGRAM

## 2021-02-25 PROCEDURE — 99900035 HC TECH TIME PER 15 MIN (STAT)

## 2021-02-25 PROCEDURE — 64448 PR NERVE BLOCK INJ, ANES/STEROID, FEMORAL, CONT INFUSION, INCL IMAG GUIDANCE: ICD-10-PCS | Mod: 59,LT,, | Performed by: ANESTHESIOLOGY

## 2021-02-25 PROCEDURE — 63600175 PHARM REV CODE 636 W HCPCS: Performed by: ORTHOPAEDIC SURGERY

## 2021-02-25 PROCEDURE — 36000708 HC OR TIME LEV III 1ST 15 MIN: Performed by: ORTHOPAEDIC SURGERY

## 2021-02-25 PROCEDURE — 27800903 OPTIME MED/SURG SUP & DEVICES OTHER IMPLANTS: Performed by: ORTHOPAEDIC SURGERY

## 2021-02-25 PROCEDURE — C1713 ANCHOR/SCREW BN/BN,TIS/BN: HCPCS | Performed by: ORTHOPAEDIC SURGERY

## 2021-02-25 PROCEDURE — 64446 NJX AA&/STRD SC NRV NFS IMG: CPT | Mod: 59,LT,, | Performed by: ANESTHESIOLOGY

## 2021-02-25 PROCEDURE — D9220A PRA ANESTHESIA: ICD-10-PCS | Mod: CRNA,,, | Performed by: NURSE ANESTHETIST, CERTIFIED REGISTERED

## 2021-02-25 PROCEDURE — 27201423 OPTIME MED/SURG SUP & DEVICES STERILE SUPPLY: Performed by: ORTHOPAEDIC SURGERY

## 2021-02-25 PROCEDURE — 37000008 HC ANESTHESIA 1ST 15 MINUTES: Performed by: ORTHOPAEDIC SURGERY

## 2021-02-25 PROCEDURE — 64448 NJX AA&/STRD FEM NRV NFS IMG: CPT | Performed by: STUDENT IN AN ORGANIZED HEALTH CARE EDUCATION/TRAINING PROGRAM

## 2021-02-25 DEVICE — IMPLANTABLE DEVICE: Type: IMPLANTABLE DEVICE | Site: FOOT | Status: FUNCTIONAL

## 2021-02-25 RX ORDER — MIDAZOLAM HYDROCHLORIDE 1 MG/ML
0.5 INJECTION INTRAMUSCULAR; INTRAVENOUS
Status: DISCONTINUED | OUTPATIENT
Start: 2021-02-25 | End: 2021-02-25 | Stop reason: HOSPADM

## 2021-02-25 RX ORDER — ONDANSETRON 2 MG/ML
4 INJECTION INTRAMUSCULAR; INTRAVENOUS DAILY PRN
Status: DISCONTINUED | OUTPATIENT
Start: 2021-02-25 | End: 2021-02-25 | Stop reason: HOSPADM

## 2021-02-25 RX ORDER — KETAMINE HCL IN 0.9 % NACL 50 MG/5 ML
SYRINGE (ML) INTRAVENOUS
Status: DISCONTINUED | OUTPATIENT
Start: 2021-02-25 | End: 2021-02-25

## 2021-02-25 RX ORDER — PROPOFOL 10 MG/ML
VIAL (ML) INTRAVENOUS
Status: DISCONTINUED | OUTPATIENT
Start: 2021-02-25 | End: 2021-02-25

## 2021-02-25 RX ORDER — CELECOXIB 200 MG/1
400 CAPSULE ORAL ONCE
Status: COMPLETED | OUTPATIENT
Start: 2021-02-25 | End: 2021-02-25

## 2021-02-25 RX ORDER — SODIUM CHLORIDE 0.9 % (FLUSH) 0.9 %
10 SYRINGE (ML) INJECTION
Status: DISCONTINUED | OUTPATIENT
Start: 2021-02-25 | End: 2021-02-25 | Stop reason: HOSPADM

## 2021-02-25 RX ORDER — HYDROCODONE BITARTRATE AND ACETAMINOPHEN 5; 325 MG/1; MG/1
1 TABLET ORAL EVERY 4 HOURS PRN
Status: DISCONTINUED | OUTPATIENT
Start: 2021-02-25 | End: 2021-02-25 | Stop reason: HOSPADM

## 2021-02-25 RX ORDER — ACETAMINOPHEN 325 MG/1
650 TABLET ORAL EVERY 4 HOURS PRN
Status: DISCONTINUED | OUTPATIENT
Start: 2021-02-25 | End: 2021-02-25 | Stop reason: HOSPADM

## 2021-02-25 RX ORDER — ROPIVACAINE HYDROCHLORIDE 2 MG/ML
INJECTION, SOLUTION EPIDURAL; INFILTRATION; PERINEURAL CONTINUOUS
Status: DISCONTINUED | OUTPATIENT
Start: 2021-02-25 | End: 2021-02-25 | Stop reason: HOSPADM

## 2021-02-25 RX ORDER — HYDROCODONE BITARTRATE AND ACETAMINOPHEN 10; 325 MG/1; MG/1
1 TABLET ORAL EVERY 4 HOURS PRN
Status: DISCONTINUED | OUTPATIENT
Start: 2021-02-25 | End: 2021-02-25 | Stop reason: HOSPADM

## 2021-02-25 RX ORDER — MUPIROCIN 20 MG/G
OINTMENT TOPICAL 2 TIMES DAILY
Status: DISCONTINUED | OUTPATIENT
Start: 2021-02-25 | End: 2021-02-25 | Stop reason: HOSPADM

## 2021-02-25 RX ORDER — ONDANSETRON 4 MG/1
8 TABLET, ORALLY DISINTEGRATING ORAL EVERY 8 HOURS PRN
Status: DISCONTINUED | OUTPATIENT
Start: 2021-02-25 | End: 2021-02-25 | Stop reason: HOSPADM

## 2021-02-25 RX ORDER — ACETAMINOPHEN 500 MG
1000 TABLET ORAL
Status: COMPLETED | OUTPATIENT
Start: 2021-02-25 | End: 2021-02-25

## 2021-02-25 RX ORDER — PROPOFOL 10 MG/ML
VIAL (ML) INTRAVENOUS CONTINUOUS PRN
Status: DISCONTINUED | OUTPATIENT
Start: 2021-02-25 | End: 2021-02-25

## 2021-02-25 RX ORDER — CEFAZOLIN SODIUM 1 G/3ML
2 INJECTION, POWDER, FOR SOLUTION INTRAMUSCULAR; INTRAVENOUS
Status: COMPLETED | OUTPATIENT
Start: 2021-02-25 | End: 2021-02-25

## 2021-02-25 RX ORDER — DEXAMETHASONE SODIUM PHOSPHATE 4 MG/ML
INJECTION, SOLUTION INTRA-ARTICULAR; INTRALESIONAL; INTRAMUSCULAR; INTRAVENOUS; SOFT TISSUE
Status: DISCONTINUED | OUTPATIENT
Start: 2021-02-25 | End: 2021-02-25

## 2021-02-25 RX ORDER — PHENYLEPHRINE HYDROCHLORIDE 10 MG/ML
INJECTION INTRAVENOUS
Status: DISCONTINUED | OUTPATIENT
Start: 2021-02-25 | End: 2021-02-25

## 2021-02-25 RX ORDER — FENTANYL CITRATE 50 UG/ML
25 INJECTION, SOLUTION INTRAMUSCULAR; INTRAVENOUS EVERY 5 MIN PRN
Status: DISCONTINUED | OUTPATIENT
Start: 2021-02-25 | End: 2021-02-25 | Stop reason: HOSPADM

## 2021-02-25 RX ORDER — FENTANYL CITRATE 50 UG/ML
25 INJECTION, SOLUTION INTRAMUSCULAR; INTRAVENOUS EVERY 5 MIN PRN
Status: COMPLETED | OUTPATIENT
Start: 2021-02-25 | End: 2021-02-25

## 2021-02-25 RX ORDER — ONDANSETRON 2 MG/ML
INJECTION INTRAMUSCULAR; INTRAVENOUS
Status: DISCONTINUED | OUTPATIENT
Start: 2021-02-25 | End: 2021-02-25

## 2021-02-25 RX ORDER — LIDOCAINE HYDROCHLORIDE 20 MG/ML
INJECTION INTRAVENOUS
Status: DISCONTINUED | OUTPATIENT
Start: 2021-02-25 | End: 2021-02-25

## 2021-02-25 RX ORDER — OXYCODONE HYDROCHLORIDE 5 MG/1
5 TABLET ORAL
Status: DISCONTINUED | OUTPATIENT
Start: 2021-02-25 | End: 2021-02-25 | Stop reason: HOSPADM

## 2021-02-25 RX ORDER — FAMOTIDINE 10 MG/ML
INJECTION INTRAVENOUS
Status: DISCONTINUED | OUTPATIENT
Start: 2021-02-25 | End: 2021-02-25

## 2021-02-25 RX ADMIN — FENTANYL CITRATE 50 MCG: 50 INJECTION, SOLUTION INTRAMUSCULAR; INTRAVENOUS at 09:02

## 2021-02-25 RX ADMIN — FAMOTIDINE 20 MG: 10 INJECTION, SOLUTION INTRAVENOUS at 08:02

## 2021-02-25 RX ADMIN — PROPOFOL 50 MCG/KG/MIN: 10 INJECTION, EMULSION INTRAVENOUS at 08:02

## 2021-02-25 RX ADMIN — MIDAZOLAM 2 MG: 1 INJECTION INTRAMUSCULAR; INTRAVENOUS at 08:02

## 2021-02-25 RX ADMIN — SODIUM CHLORIDE: 0.9 INJECTION, SOLUTION INTRAVENOUS at 07:02

## 2021-02-25 RX ADMIN — Medication 10 MG: at 09:02

## 2021-02-25 RX ADMIN — FENTANYL CITRATE 25 MCG: 50 INJECTION, SOLUTION INTRAMUSCULAR; INTRAVENOUS at 08:02

## 2021-02-25 RX ADMIN — FENTANYL CITRATE 50 MCG: 50 INJECTION, SOLUTION INTRAMUSCULAR; INTRAVENOUS at 08:02

## 2021-02-25 RX ADMIN — PHENYLEPHRINE HYDROCHLORIDE 100 MCG: 10 INJECTION INTRAVENOUS at 10:02

## 2021-02-25 RX ADMIN — DEXAMETHASONE SODIUM PHOSPHATE 8 MG: 4 INJECTION, SOLUTION INTRAMUSCULAR; INTRAVENOUS at 08:02

## 2021-02-25 RX ADMIN — FENTANYL CITRATE 50 MCG: 50 INJECTION, SOLUTION INTRAMUSCULAR; INTRAVENOUS at 10:02

## 2021-02-25 RX ADMIN — FENTANYL CITRATE 100 MCG: 50 INJECTION, SOLUTION INTRAMUSCULAR; INTRAVENOUS at 07:02

## 2021-02-25 RX ADMIN — ONDANSETRON 4 MG: 2 INJECTION, SOLUTION INTRAMUSCULAR; INTRAVENOUS at 08:02

## 2021-02-25 RX ADMIN — ACETAMINOPHEN 1000 MG: 500 TABLET ORAL at 07:02

## 2021-02-25 RX ADMIN — MIDAZOLAM 0.5 MG: 1 INJECTION INTRAMUSCULAR; INTRAVENOUS at 07:02

## 2021-02-25 RX ADMIN — PROPOFOL 30 MG: 10 INJECTION, EMULSION INTRAVENOUS at 08:02

## 2021-02-25 RX ADMIN — PHENYLEPHRINE HYDROCHLORIDE 100 MCG: 10 INJECTION INTRAVENOUS at 09:02

## 2021-02-25 RX ADMIN — LIDOCAINE HYDROCHLORIDE 80 MG: 20 INJECTION, SOLUTION INTRAVENOUS at 08:02

## 2021-02-25 RX ADMIN — CELECOXIB 400 MG: 200 CAPSULE ORAL at 07:02

## 2021-02-25 RX ADMIN — Medication 10 MG: at 10:02

## 2021-02-25 RX ADMIN — Medication 30 MG: at 08:02

## 2021-02-25 RX ADMIN — Medication: at 10:02

## 2021-02-25 RX ADMIN — CEFAZOLIN 2 G: 330 INJECTION, POWDER, FOR SOLUTION INTRAMUSCULAR; INTRAVENOUS at 08:02

## 2021-02-25 RX ADMIN — PHENYLEPHRINE HYDROCHLORIDE 200 MCG: 10 INJECTION INTRAVENOUS at 10:02

## 2021-03-04 ENCOUNTER — TELEPHONE (OUTPATIENT)
Dept: ORTHOPEDICS | Facility: CLINIC | Age: 46
End: 2021-03-04

## 2021-03-05 ENCOUNTER — OFFICE VISIT (OUTPATIENT)
Dept: ORTHOPEDICS | Facility: CLINIC | Age: 46
End: 2021-03-05
Payer: COMMERCIAL

## 2021-03-05 DIAGNOSIS — G89.18 POST-OP PAIN: ICD-10-CM

## 2021-03-05 PROCEDURE — 99999 PR PBB SHADOW E&M-EST. PATIENT-LVL I: CPT | Mod: PBBFAC,,, | Performed by: ORTHOPAEDIC SURGERY

## 2021-03-05 PROCEDURE — 99024 POSTOP FOLLOW-UP VISIT: CPT | Mod: S$GLB,,, | Performed by: ORTHOPAEDIC SURGERY

## 2021-03-05 PROCEDURE — 99024 PR POST-OP FOLLOW-UP VISIT: ICD-10-PCS | Mod: S$GLB,,, | Performed by: ORTHOPAEDIC SURGERY

## 2021-03-05 PROCEDURE — 99999 PR PBB SHADOW E&M-EST. PATIENT-LVL I: ICD-10-PCS | Mod: PBBFAC,,, | Performed by: ORTHOPAEDIC SURGERY

## 2021-03-05 RX ORDER — OXYCODONE AND ACETAMINOPHEN 10; 325 MG/1; MG/1
1 TABLET ORAL EVERY 4 HOURS PRN
Qty: 42 TABLET | Refills: 0 | Status: SHIPPED | OUTPATIENT
Start: 2021-03-05 | End: 2021-03-11 | Stop reason: SDUPTHER

## 2021-03-10 ENCOUNTER — TELEPHONE (OUTPATIENT)
Dept: ORTHOPEDICS | Facility: CLINIC | Age: 46
End: 2021-03-10

## 2021-03-10 ENCOUNTER — PATIENT MESSAGE (OUTPATIENT)
Dept: ADMINISTRATIVE | Facility: OTHER | Age: 46
End: 2021-03-10

## 2021-03-12 ENCOUNTER — PATIENT MESSAGE (OUTPATIENT)
Dept: ORTHOPEDICS | Facility: CLINIC | Age: 46
End: 2021-03-12

## 2021-03-15 ENCOUNTER — OFFICE VISIT (OUTPATIENT)
Dept: ORTHOPEDICS | Facility: CLINIC | Age: 46
End: 2021-03-15
Payer: COMMERCIAL

## 2021-03-15 DIAGNOSIS — M76.822 POSTERIOR TIBIAL TENDINITIS OF LEFT LEG: Primary | ICD-10-CM

## 2021-03-15 PROCEDURE — 99024 PR POST-OP FOLLOW-UP VISIT: ICD-10-PCS | Mod: S$GLB,,, | Performed by: PHYSICIAN ASSISTANT

## 2021-03-15 PROCEDURE — 99024 POSTOP FOLLOW-UP VISIT: CPT | Mod: S$GLB,,, | Performed by: PHYSICIAN ASSISTANT

## 2021-03-15 PROCEDURE — 99999 PR PBB SHADOW E&M-EST. PATIENT-LVL II: CPT | Mod: PBBFAC,,, | Performed by: PHYSICIAN ASSISTANT

## 2021-03-15 PROCEDURE — 99999 PR PBB SHADOW E&M-EST. PATIENT-LVL II: ICD-10-PCS | Mod: PBBFAC,,, | Performed by: PHYSICIAN ASSISTANT

## 2021-03-15 RX ORDER — KETOROLAC TROMETHAMINE 10 MG/1
10 TABLET, FILM COATED ORAL EVERY 6 HOURS
Qty: 20 TABLET | Refills: 0 | Status: SHIPPED | OUTPATIENT
Start: 2021-03-15 | End: 2021-03-20

## 2021-03-19 ENCOUNTER — PATIENT MESSAGE (OUTPATIENT)
Dept: ORTHOPEDICS | Facility: CLINIC | Age: 46
End: 2021-03-19

## 2021-03-22 ENCOUNTER — TELEPHONE (OUTPATIENT)
Dept: ORTHOPEDICS | Facility: CLINIC | Age: 46
End: 2021-03-22

## 2021-03-22 ENCOUNTER — PATIENT MESSAGE (OUTPATIENT)
Dept: ORTHOPEDICS | Facility: CLINIC | Age: 46
End: 2021-03-22

## 2021-03-22 RX ORDER — OXYCODONE AND ACETAMINOPHEN 10; 325 MG/1; MG/1
1 TABLET ORAL EVERY 4 HOURS PRN
Qty: 40 TABLET | Refills: 0 | Status: SHIPPED | OUTPATIENT
Start: 2021-03-22 | End: 2021-03-26 | Stop reason: SDUPTHER

## 2021-03-26 ENCOUNTER — PATIENT MESSAGE (OUTPATIENT)
Dept: ORTHOPEDICS | Facility: CLINIC | Age: 46
End: 2021-03-26

## 2021-03-29 ENCOUNTER — NURSE TRIAGE (OUTPATIENT)
Dept: ADMINISTRATIVE | Facility: CLINIC | Age: 46
End: 2021-03-29

## 2021-03-29 ENCOUNTER — HOSPITAL ENCOUNTER (OUTPATIENT)
Dept: RADIOLOGY | Facility: HOSPITAL | Age: 46
Discharge: HOME | End: 2021-03-29
Attending: ORTHOPAEDIC SURGERY
Payer: COMMERCIAL

## 2021-03-29 ENCOUNTER — OFFICE VISIT (OUTPATIENT)
Dept: ORTHOPEDICS | Facility: CLINIC | Age: 46
End: 2021-03-29
Payer: COMMERCIAL

## 2021-03-29 DIAGNOSIS — Z09 FOLLOW-UP EXAMINATION AFTER ORTHOPEDIC SURGERY: Primary | ICD-10-CM

## 2021-03-29 DIAGNOSIS — M76.822 POSTERIOR TIBIAL TENDINITIS OF LEFT LEG: ICD-10-CM

## 2021-03-29 DIAGNOSIS — Z09 FOLLOW-UP EXAMINATION AFTER ORTHOPEDIC SURGERY: ICD-10-CM

## 2021-03-29 PROCEDURE — 99024 PR POST-OP FOLLOW-UP VISIT: ICD-10-PCS | Mod: S$GLB,,, | Performed by: ORTHOPAEDIC SURGERY

## 2021-03-29 PROCEDURE — 99024 POSTOP FOLLOW-UP VISIT: CPT | Mod: S$GLB,,, | Performed by: ORTHOPAEDIC SURGERY

## 2021-03-29 PROCEDURE — 99999 PR PBB SHADOW E&M-EST. PATIENT-LVL II: ICD-10-PCS | Mod: PBBFAC,,, | Performed by: ORTHOPAEDIC SURGERY

## 2021-03-29 PROCEDURE — 73630 X-RAY EXAM OF FOOT: CPT | Mod: TC,LT

## 2021-03-29 PROCEDURE — 73630 XR FOOT COMPLETE 3 VIEW LEFT: ICD-10-PCS | Mod: 26,LT,, | Performed by: RADIOLOGY

## 2021-03-29 PROCEDURE — 99999 PR PBB SHADOW E&M-EST. PATIENT-LVL II: CPT | Mod: PBBFAC,,, | Performed by: ORTHOPAEDIC SURGERY

## 2021-03-29 PROCEDURE — 73630 X-RAY EXAM OF FOOT: CPT | Mod: 26,LT,, | Performed by: RADIOLOGY

## 2021-03-30 ENCOUNTER — TELEPHONE (OUTPATIENT)
Dept: ORTHOPEDICS | Facility: CLINIC | Age: 46
End: 2021-03-30

## 2021-04-01 ENCOUNTER — CLINICAL SUPPORT (OUTPATIENT)
Dept: REHABILITATION | Facility: HOSPITAL | Age: 46
End: 2021-04-01
Payer: COMMERCIAL

## 2021-04-01 DIAGNOSIS — M76.822 POSTERIOR TIBIAL TENDINITIS OF LEFT LEG: ICD-10-CM

## 2021-04-01 DIAGNOSIS — R29.898 WEAKNESS OF LEFT LOWER EXTREMITY: ICD-10-CM

## 2021-04-01 DIAGNOSIS — M25.672 DECREASED RANGE OF MOTION OF LEFT ANKLE: ICD-10-CM

## 2021-04-01 DIAGNOSIS — R26.9 ABNORMALITY OF GAIT: ICD-10-CM

## 2021-04-01 DIAGNOSIS — Z09 FOLLOW-UP EXAMINATION AFTER ORTHOPEDIC SURGERY: ICD-10-CM

## 2021-04-01 PROCEDURE — 97162 PT EVAL MOD COMPLEX 30 MIN: CPT | Mod: PO

## 2021-04-03 ENCOUNTER — PATIENT MESSAGE (OUTPATIENT)
Dept: ORTHOPEDICS | Facility: CLINIC | Age: 46
End: 2021-04-03

## 2021-04-05 ENCOUNTER — CLINICAL SUPPORT (OUTPATIENT)
Dept: REHABILITATION | Facility: HOSPITAL | Age: 46
End: 2021-04-05
Payer: COMMERCIAL

## 2021-04-05 DIAGNOSIS — R29.898 WEAKNESS OF LEFT LOWER EXTREMITY: ICD-10-CM

## 2021-04-05 DIAGNOSIS — R26.9 ABNORMALITY OF GAIT: ICD-10-CM

## 2021-04-05 DIAGNOSIS — M25.672 DECREASED RANGE OF MOTION OF LEFT ANKLE: ICD-10-CM

## 2021-04-05 PROCEDURE — 97110 THERAPEUTIC EXERCISES: CPT | Mod: PO

## 2021-04-07 ENCOUNTER — TELEPHONE (OUTPATIENT)
Dept: ORTHOPEDICS | Facility: CLINIC | Age: 46
End: 2021-04-07

## 2021-04-07 ENCOUNTER — OFFICE VISIT (OUTPATIENT)
Dept: ORTHOPEDICS | Facility: CLINIC | Age: 46
End: 2021-04-07
Payer: COMMERCIAL

## 2021-04-07 ENCOUNTER — PATIENT MESSAGE (OUTPATIENT)
Dept: ORTHOPEDICS | Facility: CLINIC | Age: 46
End: 2021-04-07

## 2021-04-07 ENCOUNTER — CLINICAL SUPPORT (OUTPATIENT)
Dept: REHABILITATION | Facility: HOSPITAL | Age: 46
End: 2021-04-07
Payer: COMMERCIAL

## 2021-04-07 ENCOUNTER — HOSPITAL ENCOUNTER (OUTPATIENT)
Dept: RADIOLOGY | Facility: HOSPITAL | Age: 46
Discharge: HOME | End: 2021-04-07
Attending: ORTHOPAEDIC SURGERY
Payer: COMMERCIAL

## 2021-04-07 DIAGNOSIS — M76.822 POSTERIOR TIBIAL TENDINITIS OF LEFT LEG: Primary | ICD-10-CM

## 2021-04-07 DIAGNOSIS — M25.672 DECREASED RANGE OF MOTION OF LEFT ANKLE: ICD-10-CM

## 2021-04-07 DIAGNOSIS — Z09 FOLLOW-UP EXAMINATION AFTER ORTHOPEDIC SURGERY: ICD-10-CM

## 2021-04-07 DIAGNOSIS — R29.898 WEAKNESS OF LEFT LOWER EXTREMITY: ICD-10-CM

## 2021-04-07 DIAGNOSIS — R26.9 ABNORMALITY OF GAIT: ICD-10-CM

## 2021-04-07 DIAGNOSIS — M76.822 POSTERIOR TIBIAL TENDINITIS OF LEFT LEG: ICD-10-CM

## 2021-04-07 PROCEDURE — 73630 XR FOOT COMPLETE 3 VIEW LEFT: ICD-10-PCS | Mod: 26,LT,, | Performed by: RADIOLOGY

## 2021-04-07 PROCEDURE — 73630 X-RAY EXAM OF FOOT: CPT | Mod: TC,LT

## 2021-04-07 PROCEDURE — 99024 PR POST-OP FOLLOW-UP VISIT: ICD-10-PCS | Mod: S$GLB,,, | Performed by: ORTHOPAEDIC SURGERY

## 2021-04-07 PROCEDURE — 97110 THERAPEUTIC EXERCISES: CPT | Mod: PO

## 2021-04-07 PROCEDURE — 73630 X-RAY EXAM OF FOOT: CPT | Mod: 26,LT,, | Performed by: RADIOLOGY

## 2021-04-07 PROCEDURE — 99024 POSTOP FOLLOW-UP VISIT: CPT | Mod: S$GLB,,, | Performed by: ORTHOPAEDIC SURGERY

## 2021-04-07 PROCEDURE — 99999 PR PBB SHADOW E&M-EST. PATIENT-LVL I: ICD-10-PCS | Mod: PBBFAC,,, | Performed by: ORTHOPAEDIC SURGERY

## 2021-04-07 PROCEDURE — 99999 PR PBB SHADOW E&M-EST. PATIENT-LVL I: CPT | Mod: PBBFAC,,, | Performed by: ORTHOPAEDIC SURGERY

## 2021-04-08 ENCOUNTER — PATIENT MESSAGE (OUTPATIENT)
Dept: ORTHOPEDICS | Facility: CLINIC | Age: 46
End: 2021-04-08

## 2021-04-08 DIAGNOSIS — Z09 FOLLOW-UP EXAMINATION AFTER ORTHOPEDIC SURGERY: Primary | ICD-10-CM

## 2021-04-08 DIAGNOSIS — G89.18 POST-OP PAIN: ICD-10-CM

## 2021-04-08 RX ORDER — IBUPROFEN 800 MG/1
800 TABLET ORAL 3 TIMES DAILY PRN
Qty: 90 TABLET | Refills: 1 | Status: SHIPPED | OUTPATIENT
Start: 2021-04-08 | End: 2021-05-31

## 2021-04-08 RX ORDER — OXYCODONE AND ACETAMINOPHEN 10; 325 MG/1; MG/1
1 TABLET ORAL EVERY 4 HOURS PRN
Qty: 40 TABLET | Refills: 0 | Status: SHIPPED | OUTPATIENT
Start: 2021-04-08 | End: 2021-04-16 | Stop reason: SDUPTHER

## 2021-04-13 ENCOUNTER — CLINICAL SUPPORT (OUTPATIENT)
Dept: REHABILITATION | Facility: HOSPITAL | Age: 46
End: 2021-04-13
Payer: COMMERCIAL

## 2021-04-13 DIAGNOSIS — M25.672 DECREASED RANGE OF MOTION OF LEFT ANKLE: ICD-10-CM

## 2021-04-13 DIAGNOSIS — R26.9 ABNORMALITY OF GAIT: ICD-10-CM

## 2021-04-13 DIAGNOSIS — R29.898 WEAKNESS OF LEFT LOWER EXTREMITY: ICD-10-CM

## 2021-04-13 PROCEDURE — 97110 THERAPEUTIC EXERCISES: CPT | Mod: PO

## 2021-04-15 ENCOUNTER — CLINICAL SUPPORT (OUTPATIENT)
Dept: REHABILITATION | Facility: HOSPITAL | Age: 46
End: 2021-04-15
Payer: COMMERCIAL

## 2021-04-15 DIAGNOSIS — R26.9 ABNORMALITY OF GAIT: ICD-10-CM

## 2021-04-15 DIAGNOSIS — R29.898 WEAKNESS OF LEFT LOWER EXTREMITY: ICD-10-CM

## 2021-04-15 DIAGNOSIS — M25.672 DECREASED RANGE OF MOTION OF LEFT ANKLE: ICD-10-CM

## 2021-04-15 PROCEDURE — 97110 THERAPEUTIC EXERCISES: CPT | Mod: PO

## 2021-04-19 ENCOUNTER — PATIENT MESSAGE (OUTPATIENT)
Dept: ORTHOPEDICS | Facility: CLINIC | Age: 46
End: 2021-04-19

## 2021-04-19 ENCOUNTER — CLINICAL SUPPORT (OUTPATIENT)
Dept: REHABILITATION | Facility: HOSPITAL | Age: 46
End: 2021-04-19
Payer: COMMERCIAL

## 2021-04-19 DIAGNOSIS — R29.898 WEAKNESS OF LEFT LOWER EXTREMITY: ICD-10-CM

## 2021-04-19 DIAGNOSIS — R26.9 ABNORMALITY OF GAIT: ICD-10-CM

## 2021-04-19 DIAGNOSIS — M25.672 DECREASED RANGE OF MOTION OF LEFT ANKLE: ICD-10-CM

## 2021-04-19 PROCEDURE — 97110 THERAPEUTIC EXERCISES: CPT | Mod: PO

## 2021-04-21 ENCOUNTER — CLINICAL SUPPORT (OUTPATIENT)
Dept: REHABILITATION | Facility: HOSPITAL | Age: 46
End: 2021-04-21
Payer: COMMERCIAL

## 2021-04-21 DIAGNOSIS — R26.9 ABNORMALITY OF GAIT: ICD-10-CM

## 2021-04-21 DIAGNOSIS — M25.672 DECREASED RANGE OF MOTION OF LEFT ANKLE: ICD-10-CM

## 2021-04-21 DIAGNOSIS — R29.898 WEAKNESS OF LEFT LOWER EXTREMITY: ICD-10-CM

## 2021-04-21 PROCEDURE — 97110 THERAPEUTIC EXERCISES: CPT | Mod: PO

## 2021-04-26 ENCOUNTER — OFFICE VISIT (OUTPATIENT)
Dept: ORTHOPEDICS | Facility: CLINIC | Age: 46
End: 2021-04-26
Payer: COMMERCIAL

## 2021-04-26 DIAGNOSIS — M76.822 POSTERIOR TIBIAL TENDINITIS OF LEFT LEG: ICD-10-CM

## 2021-04-26 DIAGNOSIS — R11.2 POST-OPERATIVE NAUSEA AND VOMITING: ICD-10-CM

## 2021-04-26 DIAGNOSIS — Z98.890 POST-OPERATIVE NAUSEA AND VOMITING: ICD-10-CM

## 2021-04-26 DIAGNOSIS — Z09 FOLLOW-UP EXAMINATION AFTER ORTHOPEDIC SURGERY: Primary | ICD-10-CM

## 2021-04-26 DIAGNOSIS — G89.18 POST-OP PAIN: ICD-10-CM

## 2021-04-26 PROCEDURE — 99024 PR POST-OP FOLLOW-UP VISIT: ICD-10-PCS | Mod: S$GLB,,, | Performed by: ORTHOPAEDIC SURGERY

## 2021-04-26 PROCEDURE — 99024 POSTOP FOLLOW-UP VISIT: CPT | Mod: S$GLB,,, | Performed by: ORTHOPAEDIC SURGERY

## 2021-04-26 PROCEDURE — 99999 PR PBB SHADOW E&M-EST. PATIENT-LVL I: CPT | Mod: PBBFAC,,, | Performed by: ORTHOPAEDIC SURGERY

## 2021-04-26 PROCEDURE — 99999 PR PBB SHADOW E&M-EST. PATIENT-LVL I: ICD-10-PCS | Mod: PBBFAC,,, | Performed by: ORTHOPAEDIC SURGERY

## 2021-04-26 RX ORDER — GABAPENTIN 300 MG/1
600 CAPSULE ORAL NIGHTLY
Qty: 60 CAPSULE | Refills: 11 | Status: SHIPPED | OUTPATIENT
Start: 2021-04-26 | End: 2022-05-31

## 2021-04-26 RX ORDER — PROMETHAZINE HYDROCHLORIDE 25 MG/1
25 TABLET ORAL EVERY 6 HOURS PRN
Qty: 30 TABLET | Refills: 1 | Status: SHIPPED | OUTPATIENT
Start: 2021-04-26 | End: 2021-06-02 | Stop reason: SDUPTHER

## 2021-04-28 ENCOUNTER — CLINICAL SUPPORT (OUTPATIENT)
Dept: REHABILITATION | Facility: HOSPITAL | Age: 46
End: 2021-04-28
Payer: COMMERCIAL

## 2021-04-28 ENCOUNTER — PATIENT MESSAGE (OUTPATIENT)
Dept: ORTHOPEDICS | Facility: CLINIC | Age: 46
End: 2021-04-28

## 2021-04-28 DIAGNOSIS — R26.9 ABNORMALITY OF GAIT: ICD-10-CM

## 2021-04-28 DIAGNOSIS — M25.672 DECREASED RANGE OF MOTION OF LEFT ANKLE: ICD-10-CM

## 2021-04-28 DIAGNOSIS — R29.898 WEAKNESS OF LEFT LOWER EXTREMITY: ICD-10-CM

## 2021-04-28 PROCEDURE — 97110 THERAPEUTIC EXERCISES: CPT | Mod: PO

## 2021-04-30 ENCOUNTER — CLINICAL SUPPORT (OUTPATIENT)
Dept: REHABILITATION | Facility: HOSPITAL | Age: 46
End: 2021-04-30
Payer: COMMERCIAL

## 2021-04-30 DIAGNOSIS — R29.898 WEAKNESS OF LEFT LOWER EXTREMITY: ICD-10-CM

## 2021-04-30 DIAGNOSIS — M25.672 DECREASED RANGE OF MOTION OF LEFT ANKLE: ICD-10-CM

## 2021-04-30 DIAGNOSIS — R26.9 ABNORMALITY OF GAIT: ICD-10-CM

## 2021-04-30 PROCEDURE — 97110 THERAPEUTIC EXERCISES: CPT | Mod: PO

## 2021-05-02 ENCOUNTER — PATIENT MESSAGE (OUTPATIENT)
Dept: ORTHOPEDICS | Facility: CLINIC | Age: 46
End: 2021-05-02

## 2021-05-02 ENCOUNTER — PATIENT MESSAGE (OUTPATIENT)
Dept: REHABILITATION | Facility: HOSPITAL | Age: 46
End: 2021-05-02

## 2021-05-04 ENCOUNTER — TELEPHONE (OUTPATIENT)
Dept: ORTHOPEDICS | Facility: CLINIC | Age: 46
End: 2021-05-04

## 2021-05-05 ENCOUNTER — PATIENT MESSAGE (OUTPATIENT)
Dept: ORTHOPEDICS | Facility: CLINIC | Age: 46
End: 2021-05-05

## 2021-05-06 DIAGNOSIS — G89.18 POST-OP PAIN: ICD-10-CM

## 2021-05-06 DIAGNOSIS — Z09 FOLLOW-UP EXAMINATION AFTER ORTHOPEDIC SURGERY: ICD-10-CM

## 2021-05-06 RX ORDER — OXYCODONE AND ACETAMINOPHEN 10; 325 MG/1; MG/1
1 TABLET ORAL EVERY 6 HOURS PRN
Qty: 40 TABLET | Refills: 0 | Status: SHIPPED | OUTPATIENT
Start: 2021-05-06 | End: 2021-05-14 | Stop reason: SDUPTHER

## 2021-05-07 ENCOUNTER — OFFICE VISIT (OUTPATIENT)
Dept: ORTHOPEDICS | Facility: CLINIC | Age: 46
End: 2021-05-07
Payer: COMMERCIAL

## 2021-05-07 ENCOUNTER — HOSPITAL ENCOUNTER (OUTPATIENT)
Dept: RADIOLOGY | Facility: HOSPITAL | Age: 46
Discharge: HOME | End: 2021-05-07
Attending: ORTHOPAEDIC SURGERY
Payer: COMMERCIAL

## 2021-05-07 DIAGNOSIS — R52 PAIN: ICD-10-CM

## 2021-05-07 DIAGNOSIS — R52 PAIN: Primary | ICD-10-CM

## 2021-05-07 PROCEDURE — 73630 X-RAY EXAM OF FOOT: CPT | Mod: 26,LT,, | Performed by: RADIOLOGY

## 2021-05-07 PROCEDURE — 99999 PR PBB SHADOW E&M-EST. PATIENT-LVL I: CPT | Mod: PBBFAC,,, | Performed by: ORTHOPAEDIC SURGERY

## 2021-05-07 PROCEDURE — 73630 X-RAY EXAM OF FOOT: CPT | Mod: TC,LT

## 2021-05-07 PROCEDURE — 99024 PR POST-OP FOLLOW-UP VISIT: ICD-10-PCS | Mod: S$GLB,,, | Performed by: ORTHOPAEDIC SURGERY

## 2021-05-07 PROCEDURE — 99024 POSTOP FOLLOW-UP VISIT: CPT | Mod: S$GLB,,, | Performed by: ORTHOPAEDIC SURGERY

## 2021-05-07 PROCEDURE — 99999 PR PBB SHADOW E&M-EST. PATIENT-LVL I: ICD-10-PCS | Mod: PBBFAC,,, | Performed by: ORTHOPAEDIC SURGERY

## 2021-05-07 PROCEDURE — 73630 XR FOOT COMPLETE 3 VIEW LEFT: ICD-10-PCS | Mod: 26,LT,, | Performed by: RADIOLOGY

## 2021-05-10 ENCOUNTER — CLINICAL SUPPORT (OUTPATIENT)
Dept: REHABILITATION | Facility: HOSPITAL | Age: 46
End: 2021-05-10
Payer: COMMERCIAL

## 2021-05-10 DIAGNOSIS — M25.672 DECREASED RANGE OF MOTION OF LEFT ANKLE: ICD-10-CM

## 2021-05-10 DIAGNOSIS — R29.898 WEAKNESS OF LEFT LOWER EXTREMITY: ICD-10-CM

## 2021-05-10 DIAGNOSIS — R26.9 ABNORMALITY OF GAIT: ICD-10-CM

## 2021-05-10 PROCEDURE — 97110 THERAPEUTIC EXERCISES: CPT | Mod: PO

## 2021-05-12 ENCOUNTER — CLINICAL SUPPORT (OUTPATIENT)
Dept: REHABILITATION | Facility: HOSPITAL | Age: 46
End: 2021-05-12
Payer: COMMERCIAL

## 2021-05-12 DIAGNOSIS — R29.898 WEAKNESS OF LEFT LOWER EXTREMITY: ICD-10-CM

## 2021-05-12 DIAGNOSIS — R26.9 ABNORMALITY OF GAIT: ICD-10-CM

## 2021-05-12 DIAGNOSIS — M25.672 DECREASED RANGE OF MOTION OF LEFT ANKLE: ICD-10-CM

## 2021-05-12 PROCEDURE — 97110 THERAPEUTIC EXERCISES: CPT | Mod: PO

## 2021-05-14 ENCOUNTER — PATIENT MESSAGE (OUTPATIENT)
Dept: ORTHOPEDICS | Facility: CLINIC | Age: 46
End: 2021-05-14

## 2021-05-17 ENCOUNTER — TELEPHONE (OUTPATIENT)
Dept: ORTHOPEDICS | Facility: CLINIC | Age: 46
End: 2021-05-17

## 2021-05-17 ENCOUNTER — CLINICAL SUPPORT (OUTPATIENT)
Dept: REHABILITATION | Facility: HOSPITAL | Age: 46
End: 2021-05-17
Payer: COMMERCIAL

## 2021-05-17 DIAGNOSIS — R29.898 WEAKNESS OF LEFT LOWER EXTREMITY: ICD-10-CM

## 2021-05-17 DIAGNOSIS — M25.672 DECREASED RANGE OF MOTION OF LEFT ANKLE: ICD-10-CM

## 2021-05-17 DIAGNOSIS — R26.9 ABNORMALITY OF GAIT: ICD-10-CM

## 2021-05-17 PROCEDURE — 97110 THERAPEUTIC EXERCISES: CPT | Mod: PO

## 2021-05-18 ENCOUNTER — OFFICE VISIT (OUTPATIENT)
Dept: ORTHOPEDICS | Facility: CLINIC | Age: 46
End: 2021-05-18
Payer: COMMERCIAL

## 2021-05-18 ENCOUNTER — HOSPITAL ENCOUNTER (OUTPATIENT)
Dept: RADIOLOGY | Facility: HOSPITAL | Age: 46
Discharge: HOME | End: 2021-05-18
Attending: ORTHOPAEDIC SURGERY
Payer: COMMERCIAL

## 2021-05-18 DIAGNOSIS — Z09 FOLLOW-UP EXAMINATION AFTER ORTHOPEDIC SURGERY: ICD-10-CM

## 2021-05-18 DIAGNOSIS — Z09 FOLLOW-UP EXAMINATION AFTER ORTHOPEDIC SURGERY: Primary | ICD-10-CM

## 2021-05-18 PROCEDURE — 99024 PR POST-OP FOLLOW-UP VISIT: ICD-10-PCS | Mod: S$GLB,,, | Performed by: ORTHOPAEDIC SURGERY

## 2021-05-18 PROCEDURE — 99999 PR PBB SHADOW E&M-EST. PATIENT-LVL I: CPT | Mod: PBBFAC,,, | Performed by: ORTHOPAEDIC SURGERY

## 2021-05-18 PROCEDURE — 99999 PR PBB SHADOW E&M-EST. PATIENT-LVL I: ICD-10-PCS | Mod: PBBFAC,,, | Performed by: ORTHOPAEDIC SURGERY

## 2021-05-18 PROCEDURE — 73630 X-RAY EXAM OF FOOT: CPT | Mod: TC,LT

## 2021-05-18 PROCEDURE — 73630 XR FOOT COMPLETE 3 VIEW LEFT: ICD-10-PCS | Mod: 26,LT,, | Performed by: RADIOLOGY

## 2021-05-18 PROCEDURE — 73630 X-RAY EXAM OF FOOT: CPT | Mod: 26,LT,, | Performed by: RADIOLOGY

## 2021-05-18 PROCEDURE — 99024 POSTOP FOLLOW-UP VISIT: CPT | Mod: S$GLB,,, | Performed by: ORTHOPAEDIC SURGERY

## 2021-05-19 ENCOUNTER — CLINICAL SUPPORT (OUTPATIENT)
Dept: REHABILITATION | Facility: HOSPITAL | Age: 46
End: 2021-05-19
Payer: COMMERCIAL

## 2021-05-19 DIAGNOSIS — M25.672 DECREASED RANGE OF MOTION OF LEFT ANKLE: ICD-10-CM

## 2021-05-19 DIAGNOSIS — R29.898 WEAKNESS OF LEFT LOWER EXTREMITY: ICD-10-CM

## 2021-05-19 DIAGNOSIS — R26.9 ABNORMALITY OF GAIT: ICD-10-CM

## 2021-05-19 PROCEDURE — 97110 THERAPEUTIC EXERCISES: CPT | Mod: PO

## 2021-05-25 ENCOUNTER — PATIENT MESSAGE (OUTPATIENT)
Dept: ORTHOPEDICS | Facility: CLINIC | Age: 46
End: 2021-05-25

## 2021-05-25 ENCOUNTER — CLINICAL SUPPORT (OUTPATIENT)
Dept: REHABILITATION | Facility: HOSPITAL | Age: 46
End: 2021-05-25
Payer: COMMERCIAL

## 2021-05-25 DIAGNOSIS — M25.672 DECREASED RANGE OF MOTION OF LEFT ANKLE: ICD-10-CM

## 2021-05-25 DIAGNOSIS — R26.9 ABNORMALITY OF GAIT: ICD-10-CM

## 2021-05-25 DIAGNOSIS — R29.898 WEAKNESS OF LEFT LOWER EXTREMITY: ICD-10-CM

## 2021-05-25 PROCEDURE — 97110 THERAPEUTIC EXERCISES: CPT | Mod: PO

## 2021-05-27 ENCOUNTER — CLINICAL SUPPORT (OUTPATIENT)
Dept: REHABILITATION | Facility: HOSPITAL | Age: 46
End: 2021-05-27
Payer: COMMERCIAL

## 2021-05-27 DIAGNOSIS — R26.9 ABNORMALITY OF GAIT: ICD-10-CM

## 2021-05-27 DIAGNOSIS — R29.898 WEAKNESS OF LEFT LOWER EXTREMITY: ICD-10-CM

## 2021-05-27 DIAGNOSIS — M25.672 DECREASED RANGE OF MOTION OF LEFT ANKLE: ICD-10-CM

## 2021-05-27 PROCEDURE — 97110 THERAPEUTIC EXERCISES: CPT | Mod: PO

## 2021-05-31 ENCOUNTER — CLINICAL SUPPORT (OUTPATIENT)
Dept: REHABILITATION | Facility: HOSPITAL | Age: 46
End: 2021-05-31
Payer: COMMERCIAL

## 2021-05-31 DIAGNOSIS — R26.9 ABNORMALITY OF GAIT: ICD-10-CM

## 2021-05-31 DIAGNOSIS — M25.672 DECREASED RANGE OF MOTION OF LEFT ANKLE: ICD-10-CM

## 2021-05-31 DIAGNOSIS — R29.898 WEAKNESS OF LEFT LOWER EXTREMITY: ICD-10-CM

## 2021-05-31 PROCEDURE — 97110 THERAPEUTIC EXERCISES: CPT | Mod: PO

## 2021-06-02 ENCOUNTER — PATIENT MESSAGE (OUTPATIENT)
Dept: ORTHOPEDICS | Facility: CLINIC | Age: 46
End: 2021-06-02

## 2021-06-02 ENCOUNTER — CLINICAL SUPPORT (OUTPATIENT)
Dept: REHABILITATION | Facility: HOSPITAL | Age: 46
End: 2021-06-02
Payer: COMMERCIAL

## 2021-06-02 DIAGNOSIS — M25.672 DECREASED RANGE OF MOTION OF LEFT ANKLE: Primary | ICD-10-CM

## 2021-06-02 DIAGNOSIS — R26.9 ABNORMALITY OF GAIT: ICD-10-CM

## 2021-06-02 DIAGNOSIS — R29.898 WEAKNESS OF LEFT LOWER EXTREMITY: ICD-10-CM

## 2021-06-02 PROCEDURE — 97110 THERAPEUTIC EXERCISES: CPT | Mod: PO

## 2021-06-07 ENCOUNTER — CLINICAL SUPPORT (OUTPATIENT)
Dept: REHABILITATION | Facility: HOSPITAL | Age: 46
End: 2021-06-07
Payer: COMMERCIAL

## 2021-06-07 DIAGNOSIS — R26.9 ABNORMALITY OF GAIT: ICD-10-CM

## 2021-06-07 DIAGNOSIS — M25.672 DECREASED RANGE OF MOTION OF LEFT ANKLE: Primary | ICD-10-CM

## 2021-06-07 DIAGNOSIS — R29.898 WEAKNESS OF LEFT LOWER EXTREMITY: ICD-10-CM

## 2021-06-07 PROCEDURE — 97110 THERAPEUTIC EXERCISES: CPT | Mod: PO,CQ

## 2021-06-07 NOTE — PROGRESS NOTES
[facility] Hematology/Oncology Inpatient progress note  Patient: Debra Mckeon  MRN: [id number]  Date of Service: 2020       Chief complaint      1. Malignant neoplasm of lower-inner quadrant of left breast in female, estrogen receptor positive (H)     2. Osteopenia          Assessment     1. CA breasts left-sided stage II, T2 N1 M0 ER positive OK positive HER-2/khari negative by IHC. Negative margins. One sentinel lymph node positive out of 2. This is a grade 1 tumor. Oncotype score of 6. Finished radiation and started Anastrozole in 2017. Tolerating it well.  2. Osteopenia on DEXA. She is on vit D and calcium.  3. Her mother was diagnosed with breast cancer at the same age and  from metastatic breast cancer a few years later. This has been her worry.  4. Use of some alternative medications for health promotion in preservation.  5. History fracture of right 8th rib after a fall.  6. History of  kidney stone in 2018  7. Slightly elevated Alk Phos.    Plan     1. Continue Anastrozole.  Follow-up with me in 6 months.  2. Continue with good diet and exercise.  3. High but not too high calcium but mostly in diet and Vit d in her diet.  4. Arm sleeve when travelling.  5. Mammogram in December this year.  6. Bone density to be rechecked in 2021.    HPI    Debra Mckeon is a 74 y.o. old female breast cancer located in her left breast measuring 2.2 cm in size presenting on the CT scan that was done to look for pulmonary embolism back in 2016 when she presented with pleuritic chest pain. She was then seen by Dr. Larios who ordered an Onco blot test which apparently was positive. She then had another mammogram in 2016 which was also suggestive of malignancy in the left breast. She underwent biopsy on 2016 which confirmed breast cancer ER positive OK positive HER-2/khari negative. Subsequent to that she was seen by Dr. Powers who performed lumpectomy with  Face to Face PTA Conference performed with More Zimmer PTA regarding patient's current status, overall progress, and plan of care. Pt will be seen by a physical therapist minimally every 6th visit or every 30 days.    Lila Choudhury, PT  10/12/2020    Face to Face PTA Conference performed with Lila Choudhury PT regarding patient's current status, overall progress, and plan of care. Pt will be seen by a physical therapist minimally every 6th visit or every 30 days.    More Zimmer PTA  10/12/2020         sentinel lymph node biopsy on December 8, 2016 which showed invasive ductal carcinoma 2.2 cm in size negative margins with one of the 2 sentinel lymph nodes positive for metastatic cancer the size in the lymph node was 9 mm. She recovered well from surgery.     We did Oncotype and it came back low at 6. So she has proceeded with radiation that was finished in Feb 17, 2017. Started on Anastrozole in February 2017.  She is handling it well.  She did have a bone density done which showed low bone mass/osteopenia.    She has been taking her medications without any significant problem.  Overall has been doing fine.  Had a mammogram in January 2020 which was normal.    Review of system      Detailed pertinent 8 system review of systems was done.  Findings noted.      Past Medical, family and social history     Medical History  Active Ambulatory (Non-Hospital) Problems    Diagnosis     Herpes zoster without complication     Malignant neoplasm of lower-inner quadrant of left breast in female, estrogen receptor positive (H)     Osteopenia     Menopause Has Occurred     Past Medical History:   Diagnosis Date     Breast cancer (H) 2016     Celiac disease 2008     Disease of thyroid gland      Hx of radiation therapy 01/2017     Hypothyroid      Melanoma (H) 2012     Shingles      Squamous cell skin cancer, wrist, left 2015       Physical exam      There were no vitals filed for this visit.    No exam done.      Labs and radiology      Reviewed her most recent mammogram report which looks fine.    Total time spent 25 minutes.  5 minutes to review her records, 15 minutes on the phone with the patient and another 5 minutes for  and documentation.      Caden King MD

## 2021-06-09 ENCOUNTER — CLINICAL SUPPORT (OUTPATIENT)
Dept: REHABILITATION | Facility: HOSPITAL | Age: 46
End: 2021-06-09
Payer: COMMERCIAL

## 2021-06-09 DIAGNOSIS — M25.672 DECREASED RANGE OF MOTION OF LEFT ANKLE: ICD-10-CM

## 2021-06-09 DIAGNOSIS — R26.9 ABNORMALITY OF GAIT: ICD-10-CM

## 2021-06-09 DIAGNOSIS — R29.898 WEAKNESS OF LEFT LOWER EXTREMITY: ICD-10-CM

## 2021-06-09 PROCEDURE — 97110 THERAPEUTIC EXERCISES: CPT | Mod: PO

## 2021-06-14 ENCOUNTER — CLINICAL SUPPORT (OUTPATIENT)
Dept: REHABILITATION | Facility: HOSPITAL | Age: 46
End: 2021-06-14
Payer: COMMERCIAL

## 2021-06-14 DIAGNOSIS — Z09 FOLLOW-UP EXAMINATION AFTER ORTHOPEDIC SURGERY: ICD-10-CM

## 2021-06-14 DIAGNOSIS — R29.898 WEAKNESS OF LEFT LOWER EXTREMITY: ICD-10-CM

## 2021-06-14 DIAGNOSIS — R26.9 ABNORMALITY OF GAIT: ICD-10-CM

## 2021-06-14 DIAGNOSIS — M25.672 DECREASED RANGE OF MOTION OF LEFT ANKLE: Primary | ICD-10-CM

## 2021-06-14 DIAGNOSIS — G89.18 POST-OP PAIN: ICD-10-CM

## 2021-06-14 PROCEDURE — 97110 THERAPEUTIC EXERCISES: CPT | Mod: PO,CQ

## 2021-06-15 ENCOUNTER — OFFICE VISIT (OUTPATIENT)
Dept: ORTHOPEDICS | Facility: CLINIC | Age: 46
End: 2021-06-15
Payer: COMMERCIAL

## 2021-06-15 VITALS — BODY MASS INDEX: 48.2 KG/M2 | HEIGHT: 62 IN | WEIGHT: 261.94 LBS

## 2021-06-15 DIAGNOSIS — Z09 FOLLOW-UP EXAMINATION AFTER ORTHOPEDIC SURGERY: Primary | ICD-10-CM

## 2021-06-15 DIAGNOSIS — M76.822 POSTERIOR TIBIAL TENDINITIS OF LEFT LEG: ICD-10-CM

## 2021-06-15 PROCEDURE — 99024 PR POST-OP FOLLOW-UP VISIT: ICD-10-PCS | Mod: S$GLB,,, | Performed by: ORTHOPAEDIC SURGERY

## 2021-06-15 PROCEDURE — 99024 POSTOP FOLLOW-UP VISIT: CPT | Mod: S$GLB,,, | Performed by: ORTHOPAEDIC SURGERY

## 2021-06-15 PROCEDURE — 1125F AMNT PAIN NOTED PAIN PRSNT: CPT | Mod: S$GLB,,, | Performed by: ORTHOPAEDIC SURGERY

## 2021-06-15 PROCEDURE — 99999 PR PBB SHADOW E&M-EST. PATIENT-LVL III: CPT | Mod: PBBFAC,,, | Performed by: ORTHOPAEDIC SURGERY

## 2021-06-15 PROCEDURE — 99999 PR PBB SHADOW E&M-EST. PATIENT-LVL III: ICD-10-PCS | Mod: PBBFAC,,, | Performed by: ORTHOPAEDIC SURGERY

## 2021-06-15 PROCEDURE — 3008F PR BODY MASS INDEX (BMI) DOCUMENTED: ICD-10-PCS | Mod: CPTII,S$GLB,, | Performed by: ORTHOPAEDIC SURGERY

## 2021-06-15 PROCEDURE — 1125F PR PAIN SEVERITY QUANTIFIED, PAIN PRESENT: ICD-10-PCS | Mod: S$GLB,,, | Performed by: ORTHOPAEDIC SURGERY

## 2021-06-15 PROCEDURE — 3008F BODY MASS INDEX DOCD: CPT | Mod: CPTII,S$GLB,, | Performed by: ORTHOPAEDIC SURGERY

## 2021-06-15 RX ORDER — OXYCODONE AND ACETAMINOPHEN 10; 325 MG/1; MG/1
1 TABLET ORAL EVERY 6 HOURS PRN
Qty: 28 TABLET | Refills: 0 | Status: SHIPPED | OUTPATIENT
Start: 2021-06-15 | End: 2021-06-28 | Stop reason: SDUPTHER

## 2021-06-15 RX ORDER — OXYCODONE AND ACETAMINOPHEN 10; 325 MG/1; MG/1
1 TABLET ORAL EVERY 6 HOURS PRN
Qty: 40 TABLET | Refills: 0 | Status: CANCELLED | OUTPATIENT
Start: 2021-06-15

## 2021-06-16 ENCOUNTER — CLINICAL SUPPORT (OUTPATIENT)
Dept: REHABILITATION | Facility: HOSPITAL | Age: 46
End: 2021-06-16
Payer: COMMERCIAL

## 2021-06-16 DIAGNOSIS — Z09 FOLLOW-UP EXAMINATION AFTER ORTHOPEDIC SURGERY: ICD-10-CM

## 2021-06-16 DIAGNOSIS — M25.672 DECREASED RANGE OF MOTION OF LEFT ANKLE: Primary | ICD-10-CM

## 2021-06-16 DIAGNOSIS — M76.822 POSTERIOR TIBIAL TENDINITIS OF LEFT LEG: ICD-10-CM

## 2021-06-16 DIAGNOSIS — R29.898 WEAKNESS OF LEFT LOWER EXTREMITY: ICD-10-CM

## 2021-06-16 DIAGNOSIS — R26.9 ABNORMALITY OF GAIT: ICD-10-CM

## 2021-06-16 PROCEDURE — 97110 THERAPEUTIC EXERCISES: CPT | Mod: PO

## 2021-06-21 ENCOUNTER — CLINICAL SUPPORT (OUTPATIENT)
Dept: REHABILITATION | Facility: HOSPITAL | Age: 46
End: 2021-06-21
Payer: COMMERCIAL

## 2021-06-21 DIAGNOSIS — R26.9 ABNORMALITY OF GAIT: ICD-10-CM

## 2021-06-21 DIAGNOSIS — M25.672 DECREASED RANGE OF MOTION OF LEFT ANKLE: Primary | ICD-10-CM

## 2021-06-21 DIAGNOSIS — R29.898 WEAKNESS OF LEFT LOWER EXTREMITY: ICD-10-CM

## 2021-06-21 PROCEDURE — 97110 THERAPEUTIC EXERCISES: CPT | Mod: PO,CQ

## 2021-06-23 ENCOUNTER — CLINICAL SUPPORT (OUTPATIENT)
Dept: REHABILITATION | Facility: HOSPITAL | Age: 46
End: 2021-06-23
Payer: COMMERCIAL

## 2021-06-23 DIAGNOSIS — R26.9 ABNORMALITY OF GAIT: ICD-10-CM

## 2021-06-23 DIAGNOSIS — R29.898 WEAKNESS OF LEFT LOWER EXTREMITY: ICD-10-CM

## 2021-06-23 DIAGNOSIS — M25.672 DECREASED RANGE OF MOTION OF LEFT ANKLE: Primary | ICD-10-CM

## 2021-06-23 PROCEDURE — 97110 THERAPEUTIC EXERCISES: CPT | Mod: PO,CQ

## 2021-06-24 ENCOUNTER — PATIENT MESSAGE (OUTPATIENT)
Dept: ORTHOPEDICS | Facility: CLINIC | Age: 46
End: 2021-06-24

## 2021-06-28 ENCOUNTER — PATIENT MESSAGE (OUTPATIENT)
Dept: ORTHOPEDICS | Facility: CLINIC | Age: 46
End: 2021-06-28

## 2021-06-28 ENCOUNTER — CLINICAL SUPPORT (OUTPATIENT)
Dept: REHABILITATION | Facility: HOSPITAL | Age: 46
End: 2021-06-28
Payer: COMMERCIAL

## 2021-06-28 DIAGNOSIS — Z09 FOLLOW-UP EXAMINATION AFTER ORTHOPEDIC SURGERY: ICD-10-CM

## 2021-06-28 DIAGNOSIS — R29.898 WEAKNESS OF LEFT LOWER EXTREMITY: ICD-10-CM

## 2021-06-28 DIAGNOSIS — M25.672 DECREASED RANGE OF MOTION OF LEFT ANKLE: Primary | ICD-10-CM

## 2021-06-28 DIAGNOSIS — R26.9 ABNORMALITY OF GAIT: ICD-10-CM

## 2021-06-28 DIAGNOSIS — G89.18 POST-OP PAIN: ICD-10-CM

## 2021-06-28 PROCEDURE — 97110 THERAPEUTIC EXERCISES: CPT | Mod: PO,CQ

## 2021-06-28 RX ORDER — OXYCODONE AND ACETAMINOPHEN 10; 325 MG/1; MG/1
1 TABLET ORAL EVERY 6 HOURS PRN
Qty: 28 TABLET | Refills: 0 | Status: SHIPPED | OUTPATIENT
Start: 2021-06-28 | End: 2021-07-08 | Stop reason: SDUPTHER

## 2021-06-30 ENCOUNTER — CLINICAL SUPPORT (OUTPATIENT)
Dept: REHABILITATION | Facility: HOSPITAL | Age: 46
End: 2021-06-30
Payer: COMMERCIAL

## 2021-06-30 DIAGNOSIS — M25.672 DECREASED RANGE OF MOTION OF LEFT ANKLE: Primary | ICD-10-CM

## 2021-06-30 DIAGNOSIS — R29.898 WEAKNESS OF LEFT LOWER EXTREMITY: ICD-10-CM

## 2021-06-30 DIAGNOSIS — R26.9 ABNORMALITY OF GAIT: ICD-10-CM

## 2021-06-30 PROCEDURE — 97140 MANUAL THERAPY 1/> REGIONS: CPT | Mod: PO,CQ

## 2021-06-30 PROCEDURE — 97110 THERAPEUTIC EXERCISES: CPT | Mod: PO,CQ

## 2021-07-07 ENCOUNTER — CLINICAL SUPPORT (OUTPATIENT)
Dept: REHABILITATION | Facility: HOSPITAL | Age: 46
End: 2021-07-07
Payer: COMMERCIAL

## 2021-07-07 DIAGNOSIS — R29.898 WEAKNESS OF LEFT LOWER EXTREMITY: ICD-10-CM

## 2021-07-07 DIAGNOSIS — R26.9 ABNORMALITY OF GAIT: ICD-10-CM

## 2021-07-07 DIAGNOSIS — M25.672 DECREASED RANGE OF MOTION OF LEFT ANKLE: ICD-10-CM

## 2021-07-07 PROCEDURE — 97140 MANUAL THERAPY 1/> REGIONS: CPT | Mod: PO

## 2021-07-07 PROCEDURE — 97110 THERAPEUTIC EXERCISES: CPT | Mod: PO

## 2021-07-08 DIAGNOSIS — G89.18 POST-OP PAIN: ICD-10-CM

## 2021-07-08 DIAGNOSIS — Z09 FOLLOW-UP EXAMINATION AFTER ORTHOPEDIC SURGERY: ICD-10-CM

## 2021-07-08 RX ORDER — OXYCODONE AND ACETAMINOPHEN 10; 325 MG/1; MG/1
1 TABLET ORAL EVERY 6 HOURS PRN
Qty: 28 TABLET | Refills: 0 | Status: SHIPPED | OUTPATIENT
Start: 2021-07-08 | End: 2021-07-20 | Stop reason: SDUPTHER

## 2021-07-12 ENCOUNTER — CLINICAL SUPPORT (OUTPATIENT)
Dept: REHABILITATION | Facility: HOSPITAL | Age: 46
End: 2021-07-12
Payer: COMMERCIAL

## 2021-07-12 DIAGNOSIS — R26.9 ABNORMALITY OF GAIT: ICD-10-CM

## 2021-07-12 DIAGNOSIS — R29.898 WEAKNESS OF LEFT LOWER EXTREMITY: ICD-10-CM

## 2021-07-12 DIAGNOSIS — M25.672 DECREASED RANGE OF MOTION OF LEFT ANKLE: ICD-10-CM

## 2021-07-12 PROCEDURE — 97110 THERAPEUTIC EXERCISES: CPT | Mod: PO

## 2021-07-20 ENCOUNTER — PATIENT MESSAGE (OUTPATIENT)
Dept: ORTHOPEDICS | Facility: CLINIC | Age: 46
End: 2021-07-20

## 2021-07-20 DIAGNOSIS — G89.18 POST-OP PAIN: ICD-10-CM

## 2021-07-20 DIAGNOSIS — Z09 FOLLOW-UP EXAMINATION AFTER ORTHOPEDIC SURGERY: ICD-10-CM

## 2021-07-20 RX ORDER — OXYCODONE AND ACETAMINOPHEN 10; 325 MG/1; MG/1
1 TABLET ORAL EVERY 6 HOURS PRN
Qty: 28 TABLET | Refills: 0 | Status: SHIPPED | OUTPATIENT
Start: 2021-07-20 | End: 2021-08-02 | Stop reason: SDUPTHER

## 2021-07-26 ENCOUNTER — CLINICAL SUPPORT (OUTPATIENT)
Dept: REHABILITATION | Facility: HOSPITAL | Age: 46
End: 2021-07-26
Payer: COMMERCIAL

## 2021-07-26 DIAGNOSIS — R29.898 WEAKNESS OF LEFT LOWER EXTREMITY: ICD-10-CM

## 2021-07-26 DIAGNOSIS — M25.672 DECREASED RANGE OF MOTION OF LEFT ANKLE: ICD-10-CM

## 2021-07-26 DIAGNOSIS — R26.9 ABNORMALITY OF GAIT: ICD-10-CM

## 2021-07-26 PROCEDURE — 97110 THERAPEUTIC EXERCISES: CPT | Mod: PO

## 2021-07-28 ENCOUNTER — CLINICAL SUPPORT (OUTPATIENT)
Dept: REHABILITATION | Facility: HOSPITAL | Age: 46
End: 2021-07-28
Payer: COMMERCIAL

## 2021-07-28 DIAGNOSIS — R29.898 WEAKNESS OF LEFT LOWER EXTREMITY: ICD-10-CM

## 2021-07-28 DIAGNOSIS — R26.9 ABNORMALITY OF GAIT: ICD-10-CM

## 2021-07-28 DIAGNOSIS — M25.672 DECREASED RANGE OF MOTION OF LEFT ANKLE: ICD-10-CM

## 2021-07-28 PROCEDURE — 97110 THERAPEUTIC EXERCISES: CPT | Mod: PO

## 2021-08-02 ENCOUNTER — OFFICE VISIT (OUTPATIENT)
Dept: ORTHOPEDICS | Facility: CLINIC | Age: 46
End: 2021-08-02
Payer: COMMERCIAL

## 2021-08-02 VITALS — HEIGHT: 62 IN | BODY MASS INDEX: 48.2 KG/M2 | WEIGHT: 261.94 LBS

## 2021-08-02 DIAGNOSIS — G89.18 POST-OP PAIN: ICD-10-CM

## 2021-08-02 DIAGNOSIS — Z09 FOLLOW-UP EXAMINATION AFTER ORTHOPEDIC SURGERY: ICD-10-CM

## 2021-08-02 DIAGNOSIS — M76.822 POSTERIOR TIBIAL TENDINITIS OF LEFT LEG: Primary | ICD-10-CM

## 2021-08-02 PROCEDURE — 3008F BODY MASS INDEX DOCD: CPT | Mod: CPTII,S$GLB,, | Performed by: ORTHOPAEDIC SURGERY

## 2021-08-02 PROCEDURE — 99212 PR OFFICE/OUTPT VISIT, EST, LEVL II, 10-19 MIN: ICD-10-PCS | Mod: S$GLB,,, | Performed by: ORTHOPAEDIC SURGERY

## 2021-08-02 PROCEDURE — 1159F MED LIST DOCD IN RCRD: CPT | Mod: CPTII,S$GLB,, | Performed by: ORTHOPAEDIC SURGERY

## 2021-08-02 PROCEDURE — 99999 PR PBB SHADOW E&M-EST. PATIENT-LVL III: CPT | Mod: PBBFAC,,, | Performed by: ORTHOPAEDIC SURGERY

## 2021-08-02 PROCEDURE — 1160F RVW MEDS BY RX/DR IN RCRD: CPT | Mod: CPTII,S$GLB,, | Performed by: ORTHOPAEDIC SURGERY

## 2021-08-02 PROCEDURE — 99999 PR PBB SHADOW E&M-EST. PATIENT-LVL III: ICD-10-PCS | Mod: PBBFAC,,, | Performed by: ORTHOPAEDIC SURGERY

## 2021-08-02 PROCEDURE — 1125F PR PAIN SEVERITY QUANTIFIED, PAIN PRESENT: ICD-10-PCS | Mod: CPTII,S$GLB,, | Performed by: ORTHOPAEDIC SURGERY

## 2021-08-02 PROCEDURE — 99212 OFFICE O/P EST SF 10 MIN: CPT | Mod: S$GLB,,, | Performed by: ORTHOPAEDIC SURGERY

## 2021-08-02 PROCEDURE — 1125F AMNT PAIN NOTED PAIN PRSNT: CPT | Mod: CPTII,S$GLB,, | Performed by: ORTHOPAEDIC SURGERY

## 2021-08-02 PROCEDURE — 3008F PR BODY MASS INDEX (BMI) DOCUMENTED: ICD-10-PCS | Mod: CPTII,S$GLB,, | Performed by: ORTHOPAEDIC SURGERY

## 2021-08-02 PROCEDURE — 1159F PR MEDICATION LIST DOCUMENTED IN MEDICAL RECORD: ICD-10-PCS | Mod: CPTII,S$GLB,, | Performed by: ORTHOPAEDIC SURGERY

## 2021-08-02 PROCEDURE — 1160F PR REVIEW ALL MEDS BY PRESCRIBER/CLIN PHARMACIST DOCUMENTED: ICD-10-PCS | Mod: CPTII,S$GLB,, | Performed by: ORTHOPAEDIC SURGERY

## 2021-08-02 RX ORDER — OXYCODONE AND ACETAMINOPHEN 10; 325 MG/1; MG/1
1 TABLET ORAL EVERY 6 HOURS PRN
Qty: 28 TABLET | Refills: 0 | Status: SHIPPED | OUTPATIENT
Start: 2021-08-02 | End: 2021-08-23 | Stop reason: SDUPTHER

## 2021-08-04 ENCOUNTER — NURSE TRIAGE (OUTPATIENT)
Dept: ADMINISTRATIVE | Facility: CLINIC | Age: 46
End: 2021-08-04

## 2021-08-04 ENCOUNTER — DOCUMENTATION ONLY (OUTPATIENT)
Dept: REHABILITATION | Facility: HOSPITAL | Age: 46
End: 2021-08-04

## 2021-08-06 ENCOUNTER — PATIENT MESSAGE (OUTPATIENT)
Dept: SURGERY | Facility: CLINIC | Age: 46
End: 2021-08-06

## 2021-08-06 ENCOUNTER — OFFICE VISIT (OUTPATIENT)
Dept: SURGERY | Facility: CLINIC | Age: 46
End: 2021-08-06
Attending: COLON & RECTAL SURGERY
Payer: COMMERCIAL

## 2021-08-06 VITALS
DIASTOLIC BLOOD PRESSURE: 79 MMHG | SYSTOLIC BLOOD PRESSURE: 164 MMHG | HEART RATE: 76 BPM | HEIGHT: 62 IN | WEIGHT: 251.13 LBS | BODY MASS INDEX: 46.21 KG/M2

## 2021-08-06 DIAGNOSIS — K64.5 EXTERNAL HEMORRHOID, THROMBOSED: Primary | ICD-10-CM

## 2021-08-06 PROCEDURE — 3078F DIAST BP <80 MM HG: CPT | Mod: CPTII,S$GLB,, | Performed by: COLON & RECTAL SURGERY

## 2021-08-06 PROCEDURE — 3008F PR BODY MASS INDEX (BMI) DOCUMENTED: ICD-10-PCS | Mod: CPTII,S$GLB,, | Performed by: COLON & RECTAL SURGERY

## 2021-08-06 PROCEDURE — 1160F PR REVIEW ALL MEDS BY PRESCRIBER/CLIN PHARMACIST DOCUMENTED: ICD-10-PCS | Mod: CPTII,S$GLB,, | Performed by: COLON & RECTAL SURGERY

## 2021-08-06 PROCEDURE — 99999 PR PBB SHADOW E&M-EST. PATIENT-LVL III: ICD-10-PCS | Mod: PBBFAC,,, | Performed by: COLON & RECTAL SURGERY

## 2021-08-06 PROCEDURE — 99203 PR OFFICE/OUTPT VISIT, NEW, LEVL III, 30-44 MIN: ICD-10-PCS | Mod: S$GLB,,, | Performed by: COLON & RECTAL SURGERY

## 2021-08-06 PROCEDURE — 3008F BODY MASS INDEX DOCD: CPT | Mod: CPTII,S$GLB,, | Performed by: COLON & RECTAL SURGERY

## 2021-08-06 PROCEDURE — 1160F RVW MEDS BY RX/DR IN RCRD: CPT | Mod: CPTII,S$GLB,, | Performed by: COLON & RECTAL SURGERY

## 2021-08-06 PROCEDURE — 3077F SYST BP >= 140 MM HG: CPT | Mod: CPTII,S$GLB,, | Performed by: COLON & RECTAL SURGERY

## 2021-08-06 PROCEDURE — 1126F AMNT PAIN NOTED NONE PRSNT: CPT | Mod: CPTII,S$GLB,, | Performed by: COLON & RECTAL SURGERY

## 2021-08-06 PROCEDURE — 99203 OFFICE O/P NEW LOW 30 MIN: CPT | Mod: S$GLB,,, | Performed by: COLON & RECTAL SURGERY

## 2021-08-06 PROCEDURE — 1159F PR MEDICATION LIST DOCUMENTED IN MEDICAL RECORD: ICD-10-PCS | Mod: CPTII,S$GLB,, | Performed by: COLON & RECTAL SURGERY

## 2021-08-06 PROCEDURE — 99999 PR PBB SHADOW E&M-EST. PATIENT-LVL III: CPT | Mod: PBBFAC,,, | Performed by: COLON & RECTAL SURGERY

## 2021-08-06 PROCEDURE — 1126F PR PAIN SEVERITY QUANTIFIED, NO PAIN PRESENT: ICD-10-PCS | Mod: CPTII,S$GLB,, | Performed by: COLON & RECTAL SURGERY

## 2021-08-06 PROCEDURE — 3078F PR MOST RECENT DIASTOLIC BLOOD PRESSURE < 80 MM HG: ICD-10-PCS | Mod: CPTII,S$GLB,, | Performed by: COLON & RECTAL SURGERY

## 2021-08-06 PROCEDURE — 1159F MED LIST DOCD IN RCRD: CPT | Mod: CPTII,S$GLB,, | Performed by: COLON & RECTAL SURGERY

## 2021-08-06 PROCEDURE — 3077F PR MOST RECENT SYSTOLIC BLOOD PRESSURE >= 140 MM HG: ICD-10-PCS | Mod: CPTII,S$GLB,, | Performed by: COLON & RECTAL SURGERY

## 2021-08-08 NOTE — PROGRESS NOTES
"  Physical Therapy Daily Treatment Note     Name: Luzmaria SIMON [facility] Number: [id number]    Therapy Diagnosis:   Encounter Diagnoses   Name Primary?    Decreased range of motion of left shoulder     Weakness of both upper extremities     Poor posture     Pain aggravated by activities of daily living      Physician: Fredy Lujan MD    Visit Date: 1/22/2020     Physician Orders: PT Eval and Treat   Medical Diagnosis from Referral: M75.102 (ICD-10-CM) - Tear of left rotator cuff, unspecified tear extent, unspecified whether traumatic  Evaluation Date: 1/14/2020  Authorization Period Expiration: 12/31/2020  Plan of Care Expiration: 03/14/2020  Visit # / Visits authorized: 2/ 20    Time In: 8:00 am  Time Out: 8:45 am  Total Billable Time: 39 minutes    Precautions: Standard    Subjective     Pt reports: her left shoulder pain is not too bad this morning, 4/10.  She was compliant with home exercise program.  Response to previous treatment: sore.  Functional change: not at this time.    Pain: 4/10  Location: left shoulder      Objective     Luzmaria received therapeutic exercises to develop strength, endurance, flexibility and posture for 39 minutes 1:1 with PTA including:      Date  01/22/2020   VISIT 2/20   POC EXP. DATE 03/14/2020   FACE-TO-FACE 02/14/2020       Pulleys - flex, scaption  3' ea   TABLE:    Supine pec. Str w/ towel 2'   Snow angels Next?   Butterflies  Next?   Wand flexion Next?   Wand abduction Next?   Wand ER Next?   Side lying flexion 1 x 10   Side lying abduction  1 x 10   Side lying ER 1 x 10       PRONE:    Shoulder flexion 1 x 10   Shoulder scaption  1 x 10   Shoulder abduction 1 x 10   Shoulder extension 1 x 10   SEATED:    Scapula squeezes  1 x 10   Rolls  1 x 10       STANDING:    Door way stretch 5 x 5"   Theraband  - W's  - T's  - I's  - Rows  - ER - L UE   ---  ---  1 x 10 RTB  1 x 10 RTB  1 x 10 YTB   Posture exercises   - horizontal abduction  - bilateral ER  - diagonals ---     "   Initials GWA 1/6       Home Exercises Provided and Patient Education Provided     Education provided:   - keep all exercises in a pain free range.    Written Home Exercises Provided: yes.  Exercises were reviewed and Luzmaria was able to demonstrate them prior to the end of the session.  Luzmaria demonstrated good  understanding of the education provided.     See EMR under Patient Instructions for exercises provided 1/22/2020.    Assessment     Patient performed above exercise program with verbal cueing for proper technique and cues to keep exercises in a pain free range.    Luzmaria is progressing well towards her goals.   Pt prognosis is Good.     Pt will continue to benefit from skilled outpatient physical therapy to address the deficits listed in the problem list box on initial evaluation, provide pt/family education and to maximize pt's level of independence in the home and community environment.     Pt's spiritual, cultural and educational needs considered and pt agreeable to plan of care and goals.    Anticipated barriers to physical therapy: none.    Goals:  Short Term Goals: 4 weeks   1. This patient will be independent with a basic HEP.  IN PROGRESS, NOT MET  2. This patient will increase L shoulder flexion AROM  to 175 degrees in order to fix her hair with no complaints of pain.   IN PROGRESS, NOT MET  3. This patient will increase B UE strength by 1 grade in order to be able to perform her usual work duties with no complaints of pain.  IN PROGRESS, NOT MET  4. This patient will have a pain rating of 5/10 at worst with ADLs.   IN PROGRESS, NOT MET  5. Patient will be able to achieve greater than or equal to 41 on the FOTO Shoulder Survey indicating patient is 59% impaired, limited, or restricted demonstrating overall improved functional ability with upper extremity.  IN PROGRESS, NOT MET    Long Term Goals: 8 weeks   1. This patient will be independent with an updated HEP.  IN PROGRESS, NOT MET  2. This  patient will increase L shoulder abduction AROM  to 170 degrees in order to dres with no complaints of pain.   IN PROGRESS, NOT MET  3. This patient will increase B UE strength to 5/5 in order to be able to perform her usual household duties with no complaints of pain.  IN PROGRESS, NOT MET  4. This patient will have a pain rating of 2/10 at worst with ADLs.   IN PROGRESS, NOT MET  5. Patient will be able to achieve greater than or equal to 51 on the FOTO Shoulder Survey indicating patient is 49% impaired, limited, or restricted demonstrating overall improved functional ability with upper extremity.  IN PROGRESS, NOT MET    Plan     Continue with Plan Of Care and progress toward PT goals.    Alex Kaminski, PTA          He is afebrile and hemodynamically stable. Lungs CTAB. Suspect viral illness. Sent RVP w/ COVID. Discussed supportive care with mom. He has pediatric follow up. Return precautions given.

## 2021-08-11 ENCOUNTER — CLINICAL SUPPORT (OUTPATIENT)
Dept: REHABILITATION | Facility: HOSPITAL | Age: 46
End: 2021-08-11
Payer: COMMERCIAL

## 2021-08-11 DIAGNOSIS — R29.898 WEAKNESS OF LEFT LOWER EXTREMITY: ICD-10-CM

## 2021-08-11 DIAGNOSIS — R26.9 ABNORMALITY OF GAIT: ICD-10-CM

## 2021-08-11 DIAGNOSIS — M25.672 DECREASED RANGE OF MOTION OF LEFT ANKLE: ICD-10-CM

## 2021-08-11 PROCEDURE — 97140 MANUAL THERAPY 1/> REGIONS: CPT | Mod: PO

## 2021-08-11 PROCEDURE — 97110 THERAPEUTIC EXERCISES: CPT | Mod: PO

## 2021-08-18 ENCOUNTER — CLINICAL SUPPORT (OUTPATIENT)
Dept: REHABILITATION | Facility: HOSPITAL | Age: 46
End: 2021-08-18
Payer: COMMERCIAL

## 2021-08-18 DIAGNOSIS — R26.9 ABNORMALITY OF GAIT: ICD-10-CM

## 2021-08-18 DIAGNOSIS — R29.898 WEAKNESS OF LEFT LOWER EXTREMITY: ICD-10-CM

## 2021-08-18 DIAGNOSIS — M25.672 DECREASED RANGE OF MOTION OF LEFT ANKLE: ICD-10-CM

## 2021-08-18 PROCEDURE — 97110 THERAPEUTIC EXERCISES: CPT | Mod: PO

## 2021-08-19 ENCOUNTER — PATIENT MESSAGE (OUTPATIENT)
Dept: ORTHOPEDICS | Facility: CLINIC | Age: 46
End: 2021-08-19

## 2021-08-19 DIAGNOSIS — Z09 FOLLOW-UP EXAMINATION AFTER ORTHOPEDIC SURGERY: ICD-10-CM

## 2021-08-19 DIAGNOSIS — G89.18 POST-OP PAIN: ICD-10-CM

## 2021-08-19 RX ORDER — OXYCODONE AND ACETAMINOPHEN 10; 325 MG/1; MG/1
1 TABLET ORAL EVERY 6 HOURS PRN
Qty: 28 TABLET | Refills: 0 | Status: CANCELLED | OUTPATIENT
Start: 2021-08-19

## 2021-08-23 ENCOUNTER — PATIENT MESSAGE (OUTPATIENT)
Dept: ORTHOPEDICS | Facility: CLINIC | Age: 46
End: 2021-08-23

## 2021-08-23 DIAGNOSIS — Z09 FOLLOW-UP EXAMINATION AFTER ORTHOPEDIC SURGERY: ICD-10-CM

## 2021-08-23 DIAGNOSIS — G89.18 POST-OP PAIN: ICD-10-CM

## 2021-08-23 RX ORDER — OXYCODONE AND ACETAMINOPHEN 10; 325 MG/1; MG/1
1 TABLET ORAL EVERY 6 HOURS PRN
Qty: 28 TABLET | Refills: 0 | Status: SHIPPED | OUTPATIENT
Start: 2021-08-23 | End: 2021-09-03 | Stop reason: SDUPTHER

## 2021-08-25 ENCOUNTER — CLINICAL SUPPORT (OUTPATIENT)
Dept: REHABILITATION | Facility: HOSPITAL | Age: 46
End: 2021-08-25
Payer: COMMERCIAL

## 2021-08-25 DIAGNOSIS — R29.898 WEAKNESS OF LEFT LOWER EXTREMITY: ICD-10-CM

## 2021-08-25 DIAGNOSIS — R26.9 ABNORMALITY OF GAIT: ICD-10-CM

## 2021-08-25 DIAGNOSIS — M25.672 DECREASED RANGE OF MOTION OF LEFT ANKLE: ICD-10-CM

## 2021-08-25 PROCEDURE — 97140 MANUAL THERAPY 1/> REGIONS: CPT | Mod: PO

## 2021-08-25 PROCEDURE — 97110 THERAPEUTIC EXERCISES: CPT | Mod: PO

## 2021-09-03 DIAGNOSIS — G89.18 POST-OP PAIN: ICD-10-CM

## 2021-09-03 DIAGNOSIS — Z09 FOLLOW-UP EXAMINATION AFTER ORTHOPEDIC SURGERY: ICD-10-CM

## 2021-09-03 RX ORDER — OXYCODONE AND ACETAMINOPHEN 10; 325 MG/1; MG/1
1 TABLET ORAL EVERY 6 HOURS PRN
Qty: 28 TABLET | Refills: 0 | Status: SHIPPED | OUTPATIENT
Start: 2021-09-03 | End: 2021-09-28 | Stop reason: SDUPTHER

## 2021-09-07 ENCOUNTER — TELEPHONE (OUTPATIENT)
Dept: SURGERY | Facility: CLINIC | Age: 46
End: 2021-09-07

## 2021-09-20 ENCOUNTER — TELEPHONE (OUTPATIENT)
Dept: SURGERY | Facility: CLINIC | Age: 46
End: 2021-09-20

## 2021-09-20 DIAGNOSIS — Z01.818 PRE-OP TESTING: ICD-10-CM

## 2021-09-20 DIAGNOSIS — Z86.010 HISTORY OF COLON POLYPS: Primary | ICD-10-CM

## 2021-09-22 ENCOUNTER — CLINICAL SUPPORT (OUTPATIENT)
Dept: REHABILITATION | Facility: HOSPITAL | Age: 46
End: 2021-09-22
Payer: COMMERCIAL

## 2021-09-22 ENCOUNTER — DOCUMENTATION ONLY (OUTPATIENT)
Dept: REHABILITATION | Facility: HOSPITAL | Age: 46
End: 2021-09-22

## 2021-09-22 DIAGNOSIS — M25.672 DECREASED RANGE OF MOTION OF LEFT ANKLE: Primary | ICD-10-CM

## 2021-09-22 DIAGNOSIS — R29.898 WEAKNESS OF LEFT LOWER EXTREMITY: ICD-10-CM

## 2021-09-22 DIAGNOSIS — R26.9 ABNORMALITY OF GAIT: ICD-10-CM

## 2021-09-22 DIAGNOSIS — M25.672 DECREASED RANGE OF MOTION OF LEFT ANKLE: ICD-10-CM

## 2021-09-22 PROCEDURE — 97164 PT RE-EVAL EST PLAN CARE: CPT | Mod: PO

## 2021-09-27 ENCOUNTER — PATIENT MESSAGE (OUTPATIENT)
Dept: ORTHOPEDICS | Facility: CLINIC | Age: 46
End: 2021-09-27

## 2021-09-28 ENCOUNTER — PATIENT MESSAGE (OUTPATIENT)
Dept: ORTHOPEDICS | Facility: CLINIC | Age: 46
End: 2021-09-28

## 2021-09-28 DIAGNOSIS — G89.18 POST-OP PAIN: ICD-10-CM

## 2021-09-28 DIAGNOSIS — Z09 FOLLOW-UP EXAMINATION AFTER ORTHOPEDIC SURGERY: ICD-10-CM

## 2021-09-28 RX ORDER — OXYCODONE AND ACETAMINOPHEN 10; 325 MG/1; MG/1
1 TABLET ORAL EVERY 6 HOURS PRN
Qty: 28 TABLET | Refills: 0 | Status: SHIPPED | OUTPATIENT
Start: 2021-09-28 | End: 2021-10-13 | Stop reason: SDUPTHER

## 2021-10-01 ENCOUNTER — LAB VISIT (OUTPATIENT)
Dept: FAMILY MEDICINE | Facility: CLINIC | Age: 46
End: 2021-10-01
Payer: COMMERCIAL

## 2021-10-01 DIAGNOSIS — Z01.818 PRE-OP TESTING: ICD-10-CM

## 2021-10-01 PROCEDURE — U0005 INFEC AGEN DETEC AMPLI PROBE: HCPCS | Performed by: CLINICAL NURSE SPECIALIST

## 2021-10-01 PROCEDURE — U0003 INFECTIOUS AGENT DETECTION BY NUCLEIC ACID (DNA OR RNA); SEVERE ACUTE RESPIRATORY SYNDROME CORONAVIRUS 2 (SARS-COV-2) (CORONAVIRUS DISEASE [COVID-19]), AMPLIFIED PROBE TECHNIQUE, MAKING USE OF HIGH THROUGHPUT TECHNOLOGIES AS DESCRIBED BY CMS-2020-01-R: HCPCS | Performed by: CLINICAL NURSE SPECIALIST

## 2021-10-02 LAB
SARS-COV-2 RNA RESP QL NAA+PROBE: NOT DETECTED
SARS-COV-2- CYCLE NUMBER: NORMAL

## 2021-10-03 ENCOUNTER — NURSE TRIAGE (OUTPATIENT)
Dept: ADMINISTRATIVE | Facility: CLINIC | Age: 46
End: 2021-10-03

## 2021-10-04 ENCOUNTER — HOSPITAL ENCOUNTER (OUTPATIENT)
Facility: HOSPITAL | Age: 46
Discharge: HOME | End: 2021-10-04
Attending: COLON & RECTAL SURGERY | Admitting: COLON & RECTAL SURGERY
Payer: COMMERCIAL

## 2021-10-04 ENCOUNTER — PATIENT MESSAGE (OUTPATIENT)
Dept: SURGERY | Facility: CLINIC | Age: 46
End: 2021-10-04

## 2021-10-04 ENCOUNTER — ANESTHESIA EVENT (OUTPATIENT)
Dept: ENDOSCOPY | Facility: HOSPITAL | Age: 46
End: 2021-10-04
Payer: COMMERCIAL

## 2021-10-04 ENCOUNTER — ANESTHESIA (OUTPATIENT)
Dept: ENDOSCOPY | Facility: HOSPITAL | Age: 46
End: 2021-10-04
Payer: COMMERCIAL

## 2021-10-04 VITALS
HEART RATE: 62 BPM | WEIGHT: 250 LBS | RESPIRATION RATE: 14 BRPM | HEIGHT: 62 IN | SYSTOLIC BLOOD PRESSURE: 115 MMHG | TEMPERATURE: 97 F | OXYGEN SATURATION: 100 % | BODY MASS INDEX: 46.01 KG/M2 | DIASTOLIC BLOOD PRESSURE: 69 MMHG

## 2021-10-04 DIAGNOSIS — Z86.010 HISTORY OF ADENOMATOUS POLYP OF COLON: Primary | ICD-10-CM

## 2021-10-04 PROBLEM — Z12.11 SCREENING FOR MALIGNANT NEOPLASM OF COLON: Status: ACTIVE | Noted: 2021-10-04

## 2021-10-04 PROCEDURE — E9220 PRA ENDO ANESTHESIA: ICD-10-PCS | Mod: ,,, | Performed by: NURSE ANESTHETIST, CERTIFIED REGISTERED

## 2021-10-04 PROCEDURE — 63600175 PHARM REV CODE 636 W HCPCS: Performed by: NURSE ANESTHETIST, CERTIFIED REGISTERED

## 2021-10-04 PROCEDURE — 25000003 PHARM REV CODE 250: Performed by: NURSE ANESTHETIST, CERTIFIED REGISTERED

## 2021-10-04 PROCEDURE — 37000009 HC ANESTHESIA EA ADD 15 MINS: Performed by: COLON & RECTAL SURGERY

## 2021-10-04 PROCEDURE — 37000008 HC ANESTHESIA 1ST 15 MINUTES: Performed by: COLON & RECTAL SURGERY

## 2021-10-04 PROCEDURE — G0105 COLORECTAL SCRN; HI RISK IND: HCPCS | Performed by: COLON & RECTAL SURGERY

## 2021-10-04 PROCEDURE — G0105 COLORECTAL SCRN; HI RISK IND: HCPCS | Mod: ,,, | Performed by: COLON & RECTAL SURGERY

## 2021-10-04 PROCEDURE — G0105 COLORECTAL SCRN; HI RISK IND: ICD-10-PCS | Mod: ,,, | Performed by: COLON & RECTAL SURGERY

## 2021-10-04 PROCEDURE — 25000003 PHARM REV CODE 250: Performed by: COLON & RECTAL SURGERY

## 2021-10-04 PROCEDURE — E9220 PRA ENDO ANESTHESIA: HCPCS | Mod: ,,, | Performed by: NURSE ANESTHETIST, CERTIFIED REGISTERED

## 2021-10-04 RX ORDER — SODIUM CHLORIDE 9 MG/ML
INJECTION, SOLUTION INTRAVENOUS CONTINUOUS
Status: DISCONTINUED | OUTPATIENT
Start: 2021-10-04 | End: 2021-10-04 | Stop reason: HOSPADM

## 2021-10-04 RX ORDER — PROPOFOL 10 MG/ML
VIAL (ML) INTRAVENOUS CONTINUOUS PRN
Status: DISCONTINUED | OUTPATIENT
Start: 2021-10-04 | End: 2021-10-04

## 2021-10-04 RX ORDER — PROPOFOL 10 MG/ML
VIAL (ML) INTRAVENOUS
Status: DISCONTINUED | OUTPATIENT
Start: 2021-10-04 | End: 2021-10-04

## 2021-10-04 RX ORDER — LIDOCAINE HYDROCHLORIDE 20 MG/ML
INJECTION INTRAVENOUS
Status: DISCONTINUED | OUTPATIENT
Start: 2021-10-04 | End: 2021-10-04

## 2021-10-04 RX ADMIN — PROPOFOL 50 MG: 10 INJECTION, EMULSION INTRAVENOUS at 11:10

## 2021-10-04 RX ADMIN — GLYCOPYRROLATE 0.1 MG: 0.2 INJECTION, SOLUTION INTRAMUSCULAR; INTRAVITREAL at 11:10

## 2021-10-04 RX ADMIN — Medication 150 MCG/KG/MIN: at 11:10

## 2021-10-04 RX ADMIN — PROPOFOL 10 MG: 10 INJECTION, EMULSION INTRAVENOUS at 11:10

## 2021-10-04 RX ADMIN — SODIUM CHLORIDE: 0.9 INJECTION, SOLUTION INTRAVENOUS at 10:10

## 2021-10-04 RX ADMIN — LIDOCAINE HYDROCHLORIDE 50 MG: 20 INJECTION, SOLUTION INTRAVENOUS at 11:10

## 2021-10-04 RX ADMIN — PROPOFOL 20 MG: 10 INJECTION, EMULSION INTRAVENOUS at 11:10

## 2021-10-07 ENCOUNTER — OFFICE VISIT (OUTPATIENT)
Dept: URGENT CARE | Facility: CLINIC | Age: 46
End: 2021-10-07
Payer: COMMERCIAL

## 2021-10-07 VITALS
BODY MASS INDEX: 46.01 KG/M2 | WEIGHT: 250 LBS | OXYGEN SATURATION: 97 % | TEMPERATURE: 98 F | RESPIRATION RATE: 20 BRPM | SYSTOLIC BLOOD PRESSURE: 127 MMHG | DIASTOLIC BLOOD PRESSURE: 79 MMHG | HEIGHT: 62 IN | HEART RATE: 67 BPM

## 2021-10-07 DIAGNOSIS — J02.9 SORE THROAT: Primary | ICD-10-CM

## 2021-10-07 DIAGNOSIS — R09.81 NASAL CONGESTION: ICD-10-CM

## 2021-10-07 DIAGNOSIS — J02.9 VIRAL PHARYNGITIS: ICD-10-CM

## 2021-10-07 LAB
CTP QC/QA: YES
MOLECULAR STREP A: NEGATIVE

## 2021-10-07 PROCEDURE — 96372 THER/PROPH/DIAG INJ SC/IM: CPT | Mod: S$GLB,,,

## 2021-10-07 PROCEDURE — 87651 STREP A DNA AMP PROBE: CPT | Mod: QW,S$GLB,,

## 2021-10-07 PROCEDURE — 99213 PR OFFICE/OUTPT VISIT, EST, LEVL III, 20-29 MIN: ICD-10-PCS | Mod: 25,S$GLB,,

## 2021-10-07 PROCEDURE — 3078F DIAST BP <80 MM HG: CPT | Mod: CPTII,S$GLB,,

## 2021-10-07 PROCEDURE — 1160F RVW MEDS BY RX/DR IN RCRD: CPT | Mod: CPTII,S$GLB,,

## 2021-10-07 PROCEDURE — 3008F BODY MASS INDEX DOCD: CPT | Mod: CPTII,S$GLB,,

## 2021-10-07 PROCEDURE — 3008F PR BODY MASS INDEX (BMI) DOCUMENTED: ICD-10-PCS | Mod: CPTII,S$GLB,,

## 2021-10-07 PROCEDURE — 3074F SYST BP LT 130 MM HG: CPT | Mod: CPTII,S$GLB,,

## 2021-10-07 PROCEDURE — 1160F PR REVIEW ALL MEDS BY PRESCRIBER/CLIN PHARMACIST DOCUMENTED: ICD-10-PCS | Mod: CPTII,S$GLB,,

## 2021-10-07 PROCEDURE — 99213 OFFICE O/P EST LOW 20 MIN: CPT | Mod: 25,S$GLB,,

## 2021-10-07 PROCEDURE — 87651 POCT STREP A MOLECULAR: ICD-10-PCS | Mod: QW,S$GLB,,

## 2021-10-07 PROCEDURE — 96372 PR INJECTION,THERAP/PROPH/DIAG2ST, IM OR SUBCUT: ICD-10-PCS | Mod: S$GLB,,,

## 2021-10-07 PROCEDURE — 3074F PR MOST RECENT SYSTOLIC BLOOD PRESSURE < 130 MM HG: ICD-10-PCS | Mod: CPTII,S$GLB,,

## 2021-10-07 PROCEDURE — 1159F MED LIST DOCD IN RCRD: CPT | Mod: CPTII,S$GLB,,

## 2021-10-07 PROCEDURE — 1159F PR MEDICATION LIST DOCUMENTED IN MEDICAL RECORD: ICD-10-PCS | Mod: CPTII,S$GLB,,

## 2021-10-07 PROCEDURE — 3078F PR MOST RECENT DIASTOLIC BLOOD PRESSURE < 80 MM HG: ICD-10-PCS | Mod: CPTII,S$GLB,,

## 2021-10-07 RX ORDER — FLUTICASONE PROPIONATE 50 MCG
1 SPRAY, SUSPENSION (ML) NASAL DAILY
Qty: 16 G | Refills: 0 | Status: SHIPPED | OUTPATIENT
Start: 2021-10-07 | End: 2021-11-11

## 2021-10-07 RX ORDER — DEXAMETHASONE SODIUM PHOSPHATE 100 MG/10ML
10 INJECTION INTRAMUSCULAR; INTRAVENOUS
Status: COMPLETED | OUTPATIENT
Start: 2021-10-07 | End: 2021-10-07

## 2021-10-07 RX ORDER — DEXAMETHASONE SODIUM PHOSPHATE 100 MG/10ML
10 INJECTION INTRAMUSCULAR; INTRAVENOUS
Status: DISCONTINUED | OUTPATIENT
Start: 2021-10-07 | End: 2021-10-07

## 2021-10-07 RX ADMIN — DEXAMETHASONE SODIUM PHOSPHATE 10 MG: 100 INJECTION INTRAMUSCULAR; INTRAVENOUS at 05:10

## 2021-10-12 ENCOUNTER — OFFICE VISIT (OUTPATIENT)
Dept: OTOLARYNGOLOGY | Facility: CLINIC | Age: 46
End: 2021-10-12
Payer: COMMERCIAL

## 2021-10-12 ENCOUNTER — PATIENT OUTREACH (OUTPATIENT)
Dept: ADMINISTRATIVE | Facility: OTHER | Age: 46
End: 2021-10-12

## 2021-10-12 DIAGNOSIS — R09.82 POST-NASAL DRIP: ICD-10-CM

## 2021-10-12 DIAGNOSIS — J02.9 ACUTE PHARYNGITIS, UNSPECIFIED ETIOLOGY: Primary | ICD-10-CM

## 2021-10-12 LAB
CTP QC/QA: YES
S PYO RRNA THROAT QL PROBE: NEGATIVE

## 2021-10-12 PROCEDURE — 87880 STREP A ASSAY W/OPTIC: CPT | Mod: QW,S$GLB,, | Performed by: NURSE PRACTITIONER

## 2021-10-12 PROCEDURE — 99999 PR PBB SHADOW E&M-EST. PATIENT-LVL III: ICD-10-PCS | Mod: PBBFAC,,, | Performed by: NURSE PRACTITIONER

## 2021-10-12 PROCEDURE — 1160F PR REVIEW ALL MEDS BY PRESCRIBER/CLIN PHARMACIST DOCUMENTED: ICD-10-PCS | Mod: CPTII,S$GLB,, | Performed by: NURSE PRACTITIONER

## 2021-10-12 PROCEDURE — 99204 OFFICE O/P NEW MOD 45 MIN: CPT | Mod: 25,S$GLB,, | Performed by: NURSE PRACTITIONER

## 2021-10-12 PROCEDURE — 1160F RVW MEDS BY RX/DR IN RCRD: CPT | Mod: CPTII,S$GLB,, | Performed by: NURSE PRACTITIONER

## 2021-10-12 PROCEDURE — 99204 PR OFFICE/OUTPT VISIT, NEW, LEVL IV, 45-59 MIN: ICD-10-PCS | Mod: 25,S$GLB,, | Performed by: NURSE PRACTITIONER

## 2021-10-12 PROCEDURE — 87081 CULTURE SCREEN ONLY: CPT | Performed by: NURSE PRACTITIONER

## 2021-10-12 PROCEDURE — 87880 POCT RAPID STREP A: ICD-10-PCS | Mod: QW,S$GLB,, | Performed by: NURSE PRACTITIONER

## 2021-10-12 PROCEDURE — 1159F PR MEDICATION LIST DOCUMENTED IN MEDICAL RECORD: ICD-10-PCS | Mod: CPTII,S$GLB,, | Performed by: NURSE PRACTITIONER

## 2021-10-12 PROCEDURE — 1159F MED LIST DOCD IN RCRD: CPT | Mod: CPTII,S$GLB,, | Performed by: NURSE PRACTITIONER

## 2021-10-12 PROCEDURE — 99999 PR PBB SHADOW E&M-EST. PATIENT-LVL III: CPT | Mod: PBBFAC,,, | Performed by: NURSE PRACTITIONER

## 2021-10-12 RX ORDER — FLUCONAZOLE 150 MG/1
TABLET ORAL
Qty: 2 TABLET | Refills: 0 | Status: SHIPPED | OUTPATIENT
Start: 2021-10-12 | End: 2022-05-31

## 2021-10-12 RX ORDER — AMOXICILLIN AND CLAVULANATE POTASSIUM 875; 125 MG/1; MG/1
1 TABLET, FILM COATED ORAL EVERY 12 HOURS
Qty: 20 TABLET | Refills: 0 | Status: SHIPPED | OUTPATIENT
Start: 2021-10-12 | End: 2021-10-22

## 2021-10-14 ENCOUNTER — PATIENT MESSAGE (OUTPATIENT)
Dept: ORTHOPEDICS | Facility: CLINIC | Age: 46
End: 2021-10-14
Payer: COMMERCIAL

## 2021-10-14 LAB — BACTERIA THROAT CULT: NORMAL

## 2021-10-18 ENCOUNTER — OFFICE VISIT (OUTPATIENT)
Dept: INTERNAL MEDICINE | Facility: CLINIC | Age: 46
End: 2021-10-18
Payer: COMMERCIAL

## 2021-10-18 ENCOUNTER — LAB VISIT (OUTPATIENT)
Dept: LAB | Facility: HOSPITAL | Age: 46
End: 2021-10-18
Attending: INTERNAL MEDICINE
Payer: COMMERCIAL

## 2021-10-18 VITALS
DIASTOLIC BLOOD PRESSURE: 72 MMHG | WEIGHT: 251.56 LBS | HEART RATE: 64 BPM | BODY MASS INDEX: 46.29 KG/M2 | OXYGEN SATURATION: 99 % | RESPIRATION RATE: 18 BRPM | SYSTOLIC BLOOD PRESSURE: 118 MMHG | TEMPERATURE: 98 F | HEIGHT: 62 IN

## 2021-10-18 DIAGNOSIS — Z00.00 ANNUAL PHYSICAL EXAM: Primary | ICD-10-CM

## 2021-10-18 DIAGNOSIS — Z12.31 VISIT FOR SCREENING MAMMOGRAM: ICD-10-CM

## 2021-10-18 DIAGNOSIS — Z00.00 ANNUAL PHYSICAL EXAM: ICD-10-CM

## 2021-10-18 LAB
ALBUMIN SERPL BCP-MCNC: 3.5 G/DL (ref 3.5–5.2)
ALP SERPL-CCNC: 55 U/L (ref 55–135)
ALT SERPL W/O P-5'-P-CCNC: 17 U/L (ref 10–44)
ANION GAP SERPL CALC-SCNC: 11 MMOL/L (ref 8–16)
AST SERPL-CCNC: 15 U/L (ref 10–40)
BASOPHILS # BLD AUTO: 0.02 K/UL (ref 0–0.2)
BASOPHILS NFR BLD: 0.2 % (ref 0–1.9)
BILIRUB SERPL-MCNC: 0.4 MG/DL (ref 0.1–1)
BUN SERPL-MCNC: 9 MG/DL (ref 6–20)
CALCIUM SERPL-MCNC: 9.2 MG/DL (ref 8.7–10.5)
CHLORIDE SERPL-SCNC: 102 MMOL/L (ref 95–110)
CHOLEST SERPL-MCNC: 204 MG/DL (ref 120–199)
CHOLEST/HDLC SERPL: 5.5 {RATIO} (ref 2–5)
CO2 SERPL-SCNC: 23 MMOL/L (ref 23–29)
CREAT SERPL-MCNC: 0.7 MG/DL (ref 0.5–1.4)
DIFFERENTIAL METHOD: NORMAL
EOSINOPHIL # BLD AUTO: 0.1 K/UL (ref 0–0.5)
EOSINOPHIL NFR BLD: 0.6 % (ref 0–8)
ERYTHROCYTE [DISTWIDTH] IN BLOOD BY AUTOMATED COUNT: 13.6 % (ref 11.5–14.5)
EST. GFR  (AFRICAN AMERICAN): >60 ML/MIN/1.73 M^2
EST. GFR  (NON AFRICAN AMERICAN): >60 ML/MIN/1.73 M^2
ESTIMATED AVG GLUCOSE: 108 MG/DL (ref 68–131)
GLUCOSE SERPL-MCNC: 92 MG/DL (ref 70–110)
HBA1C MFR BLD: 5.4 % (ref 4–5.6)
HCT VFR BLD AUTO: 38.8 % (ref 37–48.5)
HDLC SERPL-MCNC: 37 MG/DL (ref 40–75)
HDLC SERPL: 18.1 % (ref 20–50)
HGB BLD-MCNC: 12.6 G/DL (ref 12–16)
IMM GRANULOCYTES # BLD AUTO: 0.03 K/UL (ref 0–0.04)
IMM GRANULOCYTES NFR BLD AUTO: 0.4 % (ref 0–0.5)
LDLC SERPL CALC-MCNC: 117.2 MG/DL (ref 63–159)
LYMPHOCYTES # BLD AUTO: 3.5 K/UL (ref 1–4.8)
LYMPHOCYTES NFR BLD: 42.5 % (ref 18–48)
MCH RBC QN AUTO: 29.4 PG (ref 27–31)
MCHC RBC AUTO-ENTMCNC: 32.5 G/DL (ref 32–36)
MCV RBC AUTO: 91 FL (ref 82–98)
MONOCYTES # BLD AUTO: 0.5 K/UL (ref 0.3–1)
MONOCYTES NFR BLD: 6.1 % (ref 4–15)
NEUTROPHILS # BLD AUTO: 4.2 K/UL (ref 1.8–7.7)
NEUTROPHILS NFR BLD: 50.2 % (ref 38–73)
NONHDLC SERPL-MCNC: 167 MG/DL
NRBC BLD-RTO: 0 /100 WBC
PLATELET # BLD AUTO: 272 K/UL (ref 150–450)
PMV BLD AUTO: 10.1 FL (ref 9.2–12.9)
POTASSIUM SERPL-SCNC: 4 MMOL/L (ref 3.5–5.1)
PROT SERPL-MCNC: 7.2 G/DL (ref 6–8.4)
RBC # BLD AUTO: 4.28 M/UL (ref 4–5.4)
SODIUM SERPL-SCNC: 136 MMOL/L (ref 136–145)
TRIGL SERPL-MCNC: 249 MG/DL (ref 30–150)
TSH SERPL DL<=0.005 MIU/L-ACNC: 3.56 UIU/ML (ref 0.4–4)
WBC # BLD AUTO: 8.33 K/UL (ref 3.9–12.7)

## 2021-10-18 PROCEDURE — 1159F PR MEDICATION LIST DOCUMENTED IN MEDICAL RECORD: ICD-10-PCS | Mod: CPTII,S$GLB,, | Performed by: INTERNAL MEDICINE

## 2021-10-18 PROCEDURE — 1160F PR REVIEW ALL MEDS BY PRESCRIBER/CLIN PHARMACIST DOCUMENTED: ICD-10-PCS | Mod: CPTII,S$GLB,, | Performed by: INTERNAL MEDICINE

## 2021-10-18 PROCEDURE — 3074F SYST BP LT 130 MM HG: CPT | Mod: CPTII,S$GLB,, | Performed by: INTERNAL MEDICINE

## 2021-10-18 PROCEDURE — 99396 PREV VISIT EST AGE 40-64: CPT | Mod: 25,S$GLB,, | Performed by: INTERNAL MEDICINE

## 2021-10-18 PROCEDURE — 90471 IMMUNIZATION ADMIN: CPT | Mod: S$GLB,,, | Performed by: INTERNAL MEDICINE

## 2021-10-18 PROCEDURE — 99999 PR PBB SHADOW E&M-EST. PATIENT-LVL IV: CPT | Mod: PBBFAC,,, | Performed by: INTERNAL MEDICINE

## 2021-10-18 PROCEDURE — 83036 HEMOGLOBIN GLYCOSYLATED A1C: CPT | Performed by: INTERNAL MEDICINE

## 2021-10-18 PROCEDURE — 85025 COMPLETE CBC W/AUTO DIFF WBC: CPT | Performed by: INTERNAL MEDICINE

## 2021-10-18 PROCEDURE — 90471 FLU VACCINE (QUAD) GREATER THAN OR EQUAL TO 3YO PRESERVATIVE FREE IM: ICD-10-PCS | Mod: S$GLB,,, | Performed by: INTERNAL MEDICINE

## 2021-10-18 PROCEDURE — 99396 PR PREVENTIVE VISIT,EST,40-64: ICD-10-PCS | Mod: 25,S$GLB,, | Performed by: INTERNAL MEDICINE

## 2021-10-18 PROCEDURE — 84443 ASSAY THYROID STIM HORMONE: CPT | Performed by: INTERNAL MEDICINE

## 2021-10-18 PROCEDURE — 1160F RVW MEDS BY RX/DR IN RCRD: CPT | Mod: CPTII,S$GLB,, | Performed by: INTERNAL MEDICINE

## 2021-10-18 PROCEDURE — 3078F PR MOST RECENT DIASTOLIC BLOOD PRESSURE < 80 MM HG: ICD-10-PCS | Mod: CPTII,S$GLB,, | Performed by: INTERNAL MEDICINE

## 2021-10-18 PROCEDURE — 3074F PR MOST RECENT SYSTOLIC BLOOD PRESSURE < 130 MM HG: ICD-10-PCS | Mod: CPTII,S$GLB,, | Performed by: INTERNAL MEDICINE

## 2021-10-18 PROCEDURE — 80061 LIPID PANEL: CPT | Performed by: INTERNAL MEDICINE

## 2021-10-18 PROCEDURE — 36415 COLL VENOUS BLD VENIPUNCTURE: CPT | Mod: PO | Performed by: INTERNAL MEDICINE

## 2021-10-18 PROCEDURE — 90686 FLU VACCINE (QUAD) GREATER THAN OR EQUAL TO 3YO PRESERVATIVE FREE IM: ICD-10-PCS | Mod: S$GLB,,, | Performed by: INTERNAL MEDICINE

## 2021-10-18 PROCEDURE — 99999 PR PBB SHADOW E&M-EST. PATIENT-LVL IV: ICD-10-PCS | Mod: PBBFAC,,, | Performed by: INTERNAL MEDICINE

## 2021-10-18 PROCEDURE — 3078F DIAST BP <80 MM HG: CPT | Mod: CPTII,S$GLB,, | Performed by: INTERNAL MEDICINE

## 2021-10-18 PROCEDURE — 80053 COMPREHEN METABOLIC PANEL: CPT | Performed by: INTERNAL MEDICINE

## 2021-10-18 PROCEDURE — 90686 IIV4 VACC NO PRSV 0.5 ML IM: CPT | Mod: S$GLB,,, | Performed by: INTERNAL MEDICINE

## 2021-10-18 PROCEDURE — 3008F PR BODY MASS INDEX (BMI) DOCUMENTED: ICD-10-PCS | Mod: CPTII,S$GLB,, | Performed by: INTERNAL MEDICINE

## 2021-10-18 PROCEDURE — 1159F MED LIST DOCD IN RCRD: CPT | Mod: CPTII,S$GLB,, | Performed by: INTERNAL MEDICINE

## 2021-10-18 PROCEDURE — 3008F BODY MASS INDEX DOCD: CPT | Mod: CPTII,S$GLB,, | Performed by: INTERNAL MEDICINE

## 2021-10-28 ENCOUNTER — HOSPITAL ENCOUNTER (OUTPATIENT)
Dept: RADIOLOGY | Facility: HOSPITAL | Age: 46
Discharge: HOME | End: 2021-10-28
Attending: INTERNAL MEDICINE
Payer: COMMERCIAL

## 2021-10-28 DIAGNOSIS — Z12.31 VISIT FOR SCREENING MAMMOGRAM: ICD-10-CM

## 2021-10-28 PROCEDURE — 77067 SCR MAMMO BI INCL CAD: CPT | Mod: TC,PO

## 2021-11-02 ENCOUNTER — OFFICE VISIT (OUTPATIENT)
Dept: ORTHOPEDICS | Facility: CLINIC | Age: 46
End: 2021-11-02
Payer: COMMERCIAL

## 2021-11-02 DIAGNOSIS — M76.822 POSTERIOR TIBIAL TENDINITIS OF LEFT LEG: ICD-10-CM

## 2021-11-02 DIAGNOSIS — Z09 FOLLOW-UP EXAMINATION AFTER ORTHOPEDIC SURGERY: Primary | ICD-10-CM

## 2021-11-02 PROCEDURE — 99999 PR PBB SHADOW E&M-EST. PATIENT-LVL III: CPT | Mod: PBBFAC,,, | Performed by: ORTHOPAEDIC SURGERY

## 2021-11-02 PROCEDURE — 1159F PR MEDICATION LIST DOCUMENTED IN MEDICAL RECORD: ICD-10-PCS | Mod: CPTII,S$GLB,, | Performed by: ORTHOPAEDIC SURGERY

## 2021-11-02 PROCEDURE — 99213 PR OFFICE/OUTPT VISIT, EST, LEVL III, 20-29 MIN: ICD-10-PCS | Mod: S$GLB,,, | Performed by: ORTHOPAEDIC SURGERY

## 2021-11-02 PROCEDURE — 99999 PR PBB SHADOW E&M-EST. PATIENT-LVL III: ICD-10-PCS | Mod: PBBFAC,,, | Performed by: ORTHOPAEDIC SURGERY

## 2021-11-02 PROCEDURE — 1159F MED LIST DOCD IN RCRD: CPT | Mod: CPTII,S$GLB,, | Performed by: ORTHOPAEDIC SURGERY

## 2021-11-02 PROCEDURE — 99213 OFFICE O/P EST LOW 20 MIN: CPT | Mod: S$GLB,,, | Performed by: ORTHOPAEDIC SURGERY

## 2021-11-02 PROCEDURE — 3044F HG A1C LEVEL LT 7.0%: CPT | Mod: CPTII,S$GLB,, | Performed by: ORTHOPAEDIC SURGERY

## 2021-11-02 PROCEDURE — 3044F PR MOST RECENT HEMOGLOBIN A1C LEVEL <7.0%: ICD-10-PCS | Mod: CPTII,S$GLB,, | Performed by: ORTHOPAEDIC SURGERY

## 2021-11-04 ENCOUNTER — CLINICAL SUPPORT (OUTPATIENT)
Dept: REHABILITATION | Facility: HOSPITAL | Age: 46
End: 2021-11-04
Payer: COMMERCIAL

## 2021-11-04 DIAGNOSIS — M25.672 DECREASED RANGE OF MOTION OF LEFT ANKLE: Primary | ICD-10-CM

## 2021-11-04 DIAGNOSIS — R29.898 ANKLE WEAKNESS: ICD-10-CM

## 2021-11-04 DIAGNOSIS — M76.822 POSTERIOR TIBIAL TENDINITIS OF LEFT LEG: ICD-10-CM

## 2021-11-04 PROCEDURE — 97161 PT EVAL LOW COMPLEX 20 MIN: CPT | Mod: PO | Performed by: PHYSICAL THERAPIST

## 2021-11-05 ENCOUNTER — PATIENT MESSAGE (OUTPATIENT)
Dept: ORTHOPEDICS | Facility: CLINIC | Age: 46
End: 2021-11-05
Payer: COMMERCIAL

## 2021-11-05 PROBLEM — R29.898 ANKLE WEAKNESS: Status: ACTIVE | Noted: 2021-11-05

## 2021-11-05 PROBLEM — M25.673 DECREASED RANGE OF MOTION OF ANKLE: Status: ACTIVE | Noted: 2021-04-05

## 2021-11-09 ENCOUNTER — CLINICAL SUPPORT (OUTPATIENT)
Dept: REHABILITATION | Facility: HOSPITAL | Age: 46
End: 2021-11-09
Payer: COMMERCIAL

## 2021-11-09 DIAGNOSIS — M25.672 DECREASED RANGE OF MOTION OF LEFT ANKLE: ICD-10-CM

## 2021-11-09 DIAGNOSIS — R29.898 ANKLE WEAKNESS: ICD-10-CM

## 2021-11-09 PROCEDURE — 97110 THERAPEUTIC EXERCISES: CPT | Mod: PO | Performed by: PHYSICAL THERAPIST

## 2021-11-17 ENCOUNTER — CLINICAL SUPPORT (OUTPATIENT)
Dept: URGENT CARE | Facility: CLINIC | Age: 46
End: 2021-11-17
Payer: COMMERCIAL

## 2021-11-17 DIAGNOSIS — R05.9 COUGH: Primary | ICD-10-CM

## 2021-11-17 LAB
CTP QC/QA: YES
SARS-COV-2 RDRP RESP QL NAA+PROBE: POSITIVE

## 2021-11-17 PROCEDURE — U0002 COVID-19 LAB TEST NON-CDC: HCPCS | Mod: QW,S$GLB,, | Performed by: NURSE PRACTITIONER

## 2021-11-17 PROCEDURE — U0002: ICD-10-PCS | Mod: QW,S$GLB,, | Performed by: NURSE PRACTITIONER

## 2021-11-30 ENCOUNTER — CLINICAL SUPPORT (OUTPATIENT)
Dept: REHABILITATION | Facility: HOSPITAL | Age: 46
End: 2021-11-30
Payer: COMMERCIAL

## 2021-11-30 DIAGNOSIS — M25.672 DECREASED RANGE OF MOTION OF LEFT ANKLE: ICD-10-CM

## 2021-11-30 DIAGNOSIS — R29.898 ANKLE WEAKNESS: ICD-10-CM

## 2021-11-30 PROCEDURE — 97110 THERAPEUTIC EXERCISES: CPT | Mod: PO,CQ

## 2021-11-30 PROCEDURE — 97140 MANUAL THERAPY 1/> REGIONS: CPT | Mod: PO,CQ

## 2021-12-06 ENCOUNTER — CLINICAL SUPPORT (OUTPATIENT)
Dept: REHABILITATION | Facility: HOSPITAL | Age: 46
End: 2021-12-06
Payer: COMMERCIAL

## 2021-12-06 DIAGNOSIS — R29.898 ANKLE WEAKNESS: ICD-10-CM

## 2021-12-06 DIAGNOSIS — M25.672 DECREASED RANGE OF MOTION OF LEFT ANKLE: ICD-10-CM

## 2021-12-06 PROCEDURE — 97110 THERAPEUTIC EXERCISES: CPT | Mod: PO | Performed by: PHYSICAL THERAPIST

## 2021-12-06 PROCEDURE — 97164 PT RE-EVAL EST PLAN CARE: CPT | Mod: PO | Performed by: PHYSICAL THERAPIST

## 2021-12-14 ENCOUNTER — CLINICAL SUPPORT (OUTPATIENT)
Dept: REHABILITATION | Facility: HOSPITAL | Age: 46
End: 2021-12-14
Payer: COMMERCIAL

## 2021-12-14 DIAGNOSIS — R29.898 ANKLE WEAKNESS: ICD-10-CM

## 2021-12-14 DIAGNOSIS — M25.672 DECREASED RANGE OF MOTION OF LEFT ANKLE: ICD-10-CM

## 2021-12-14 PROCEDURE — 97140 MANUAL THERAPY 1/> REGIONS: CPT | Mod: PO,CQ

## 2021-12-14 PROCEDURE — 97110 THERAPEUTIC EXERCISES: CPT | Mod: PO,CQ

## 2021-12-16 ENCOUNTER — CLINICAL SUPPORT (OUTPATIENT)
Dept: REHABILITATION | Facility: HOSPITAL | Age: 46
End: 2021-12-16
Payer: COMMERCIAL

## 2021-12-16 DIAGNOSIS — R29.898 ANKLE WEAKNESS: ICD-10-CM

## 2021-12-16 DIAGNOSIS — M25.672 DECREASED RANGE OF MOTION OF LEFT ANKLE: ICD-10-CM

## 2021-12-16 PROCEDURE — 97140 MANUAL THERAPY 1/> REGIONS: CPT | Mod: PO

## 2021-12-16 PROCEDURE — 97110 THERAPEUTIC EXERCISES: CPT | Mod: PO

## 2021-12-19 DIAGNOSIS — G89.18 POST-OP PAIN: ICD-10-CM

## 2021-12-19 DIAGNOSIS — Z09 FOLLOW-UP EXAMINATION AFTER ORTHOPEDIC SURGERY: ICD-10-CM

## 2021-12-20 ENCOUNTER — PATIENT MESSAGE (OUTPATIENT)
Dept: ORTHOPEDICS | Facility: CLINIC | Age: 46
End: 2021-12-20
Payer: COMMERCIAL

## 2021-12-20 RX ORDER — OXYCODONE AND ACETAMINOPHEN 10; 325 MG/1; MG/1
1 TABLET ORAL EVERY 6 HOURS PRN
Qty: 28 TABLET | Refills: 0 | Status: SHIPPED | OUTPATIENT
Start: 2021-12-20 | End: 2022-01-13 | Stop reason: SDUPTHER

## 2021-12-21 ENCOUNTER — CLINICAL SUPPORT (OUTPATIENT)
Dept: REHABILITATION | Facility: HOSPITAL | Age: 46
End: 2021-12-21
Payer: COMMERCIAL

## 2021-12-21 DIAGNOSIS — R29.898 ANKLE WEAKNESS: ICD-10-CM

## 2021-12-21 DIAGNOSIS — M25.672 DECREASED RANGE OF MOTION OF LEFT ANKLE: ICD-10-CM

## 2021-12-21 PROCEDURE — 97140 MANUAL THERAPY 1/> REGIONS: CPT | Mod: PO,CQ

## 2021-12-21 PROCEDURE — 97110 THERAPEUTIC EXERCISES: CPT | Mod: PO,CQ

## 2021-12-28 ENCOUNTER — CLINICAL SUPPORT (OUTPATIENT)
Dept: REHABILITATION | Facility: HOSPITAL | Age: 46
End: 2021-12-28
Payer: COMMERCIAL

## 2021-12-28 DIAGNOSIS — M25.672 DECREASED RANGE OF MOTION OF LEFT ANKLE: ICD-10-CM

## 2021-12-28 DIAGNOSIS — R29.898 ANKLE WEAKNESS: ICD-10-CM

## 2021-12-28 PROCEDURE — 97110 THERAPEUTIC EXERCISES: CPT | Mod: PO

## 2021-12-28 PROCEDURE — 97140 MANUAL THERAPY 1/> REGIONS: CPT | Mod: PO

## 2021-12-30 ENCOUNTER — CLINICAL SUPPORT (OUTPATIENT)
Dept: REHABILITATION | Facility: HOSPITAL | Age: 46
End: 2021-12-30
Payer: COMMERCIAL

## 2021-12-30 DIAGNOSIS — R29.898 ANKLE WEAKNESS: ICD-10-CM

## 2021-12-30 DIAGNOSIS — M25.672 DECREASED RANGE OF MOTION OF LEFT ANKLE: ICD-10-CM

## 2021-12-30 PROCEDURE — 97140 MANUAL THERAPY 1/> REGIONS: CPT | Mod: PO

## 2021-12-30 PROCEDURE — 97110 THERAPEUTIC EXERCISES: CPT | Mod: PO

## 2022-01-03 ENCOUNTER — CLINICAL SUPPORT (OUTPATIENT)
Dept: REHABILITATION | Facility: HOSPITAL | Age: 47
End: 2022-01-03
Payer: COMMERCIAL

## 2022-01-03 DIAGNOSIS — M25.672 DECREASED RANGE OF MOTION OF LEFT ANKLE: ICD-10-CM

## 2022-01-03 DIAGNOSIS — R29.898 ANKLE WEAKNESS: ICD-10-CM

## 2022-01-03 PROCEDURE — 97110 THERAPEUTIC EXERCISES: CPT | Mod: PO | Performed by: PHYSICAL THERAPIST

## 2022-01-03 PROCEDURE — 97164 PT RE-EVAL EST PLAN CARE: CPT | Mod: PO | Performed by: PHYSICAL THERAPIST

## 2022-01-03 NOTE — PROGRESS NOTES
Physical Therapy Daily Treatment Note     Name: Luzmaria SIMON [facility] Number: [id number]    Therapy Diagnosis:   Encounter Diagnoses   Name Primary?    Ankle weakness     Decreased range of motion of left ankle      Physician: Jamison Mercado MD    Visit Date: 1/3/2022  Physician Orders: PT Eval and Treat   Medical Diagnosis from Referral: Posterior tibial tendinitis of left leg [M76.822]  Evaluation Date: 11/4/2021  Authorization Period Expiration: 12/31/2021  Plan of Care Expiration: 1/4/2022  Visit # / Visits authorized: 8/ 20    Time In: 5:10 pm  Time Out: 5:45 pm  Total Billable Time: 35 minutes    Precautions: Standard    Subjective     Pt reports: she feels like therapy is working. She still gets tight from time to time when she is on her feet for too long. She hasn't been getting cramps as much, but she does feel like her feet could cramp up if she is in a certain position.  She was compliant with home exercise program.  Response to previous treatment: increased pain  Functional change: No change    Pain: 0/10  Location: left ankles     Objective     Primary impairments: foot strength/motor control, ankle strength/stability, hip strength/kinetic chain control, calf strength, ankle DF range of motion.    Gait: More exaggerated toe out on R. L ankle wobble during swing update: less L ankle wobble during swing     ROM: AROM (PROM)     Knee to wall: R- 10 cm, L - 8 cm     Ankle Left L update Right   Dorsiflexion 8 (13) degrees 14 10 degrees   Plantarflexion 50 (60) degrees  64 degrees   Inversion 40  degrees 35(40) 45  degrees   Eversion 15  degrees 0(15) 30  degrees      MMT:  Right LE   R Update Left LE   L update   Hip extension:  4-/5 4- Hip extension: 4-/5 4-   Hip external rotation 4/5 4 Hip external rotation 4/5 4   Hip abduction: 4-/5 4- Hip abduction: 4-/5 4-            Ankle Left L update Right L update   Dorsiflexion 4/5 4+ 4+/5 5   Plantarflexion Unable to perform single leg calf raise  "Unable to perform single leg calf raise Able to perform single leg calf raises Unable to perform single leg calf raise (pain)   Inversion 4+/5 4+ 4+/5 4+   Eversion 4/5 4 4+/5 4+         Joint Mobility: Some stiffness noted at subtalar joint, especially with eversion     Palpation: Tenderness diffusely around calcaneus, tender at navicular tubercle        Balance:              SLS EO: R- 18s, L- 21s       Luzmaria received therapeutic exercises to develop strength, endurance, ROM and flexibility for 20 minutes including:  Date 1/3/2022 12/30/2021 12/28/2021 12/21/2021   Visit 9 8/20 7/20 6/20   POC exp Updated today  2/4/2022 1/4/2022 1/4/2022 1/4/2022   Progress Due Done today 1/4/2022 1/4/2022 12/4/2021   Face to Face Done today  Due 2/3/2022 1/4/2022 1/4/2022 12/4/2021   FOTO              PB ankle mob       Gastroc stretch Next 3 x 30"  30" x 3 30" x 3   Soleus stretch next 3 x 30" 30" x 3 30"  x 3          SLS  Next EO 3 x 15" EO 10" x 10    Tib Posterior calf raise Next 2 x 15 w/ball 2 x 15 w ball           Plantar fascia stretch       Short foot 2 min  NMES next visit  3 x 10    Toe Yoga 2 min 2 min     Big toe press from plantarflexed position 20x 20x     Foot roller Next 2' 2' 2'           TB hip abduction RTB  2 x 10      Squat with TB Next             Seen by  KB LT EG EM     manual therapy techniques: Myofacial release were applied to the: L ankle/foot for 0 minutes, including:  - IASTM to plantar fascia (flexors), dorsum of foot (extensors)   - scar tissue massage   -P/A talcrural joint mobilizations  - MT AP and PA mobilization  - L ankle PROM all directions  - plantar fascia stretch sitting       Home Exercises Provided and Patient Education Provided     Education provided:   - Importance of intrinsic foot strength and ankle stability.   - Kinetic chain and importance of proximal strength.    Written Home Exercises Provided: yes.  Exercises were reviewed and Luzmaria was able to demonstrate them prior to " the end of the session.  Luzmaria demonstrated good  understanding of the education provided.     See EMR under Patient Instructions for exercises provided 11/9/2021.    Assessment     Patient demonstrates improved ankle flexibility and single leg stability, but she continues to have significant foot, ankle and hip weakness. I also believe that her cramping is a result of foot weakness, so I think she would benefit from continued work on foot intrinsics to help with foot stability and improve symptoms. We will trial NMES for foot intrinsics next visit to help improve motor control. Patient would benefit from continued therapy to emphasize kinetic chain strength and control.    Luzmaria is progressing well towards her goals.   Pt prognosis is Good.     Pt will continue to benefit from skilled outpatient physical therapy to address the deficits listed in the problem list box on initial evaluation, provide pt/family education and to maximize pt's level of independence in the home and community environment.     Pt's spiritual, cultural and educational needs considered and pt agreeable to plan of care and goals.    Anticipated barriers to physical therapy: None  Goals:  Short-Term Goals: 4 weeks  1) The patient will be independent and compliant with initial home exercise program as prescribed by physical therapist. Met  2) The patient will increase active range of motion in the left ankle by 5-10 degrees to regain normal function with ADLs. In progress, not met  3) The patient will increase strength in MMT of the L ankle by at least 1/3 grade in order to demonstrate improvements in functional strength  for functional tasks. In progress, not met        Long-Term Goals: 8 weeks  1) Pt to achieve <8% limitation as measured by the FOTO to demonstrate decreased disability. In progress, not met  2) The patient will increase active range of motion in ankle L by 10 degrees in order to restore normal mobility for gait. In progress,  not met  3) The patient will increase strength in MMT of the L ankle to 5/5 in order to demonstrate improvements in functional strength for weight-bearing and gait. In progress, not met      Plan       Plan of care Certification:  Updated: 1/3/2022- 2/3/2022    Try NMES for short foot next visit    Delbert Reyes, PT

## 2022-01-04 ENCOUNTER — DOCUMENTATION ONLY (OUTPATIENT)
Dept: REHABILITATION | Facility: HOSPITAL | Age: 47
End: 2022-01-04
Payer: COMMERCIAL

## 2022-01-04 NOTE — PROGRESS NOTES
PT/PTA met face to face to discuss pt's treatment plan and progress towards established goals. Pt will be seen by a physical therapist minimally every 6th visit or every 30 days.    Please see Updated Plan of Care dated 1/3/2022 for changes and updated goals.    Delbert Reyes PT    Face to Face PTA Conference performed with Delbert Reyes PT regarding patient's current status, overall progress, and plan of care. Pt will be seen by a physical therapist minimally every 6th visit or every 30 days.    Marv Whiteside, PTA   1/4/2022

## 2022-01-04 NOTE — PLAN OF CARE
Outpatient Therapy Updated Plan of Care     Visit Date: 1/3/2022  [id number]    Therapy Diagnosis:   Encounter Diagnoses   Name Primary?    Ankle weakness     Decreased range of motion of left ankle      Physician: Jamison Mercado MD    Physician Orders: PT Eval and Treat   Medical Diagnosis from Referral: Posterior tibial tendinitis of left leg [M76.822]  Evaluation Date: 11/4/2021    Total Visits Received: 10  Cancelled Visits: 4  No Show Visits: 0    Current Certification Period:  11/4/2021 to 1/4/2022  Precautions:  Standard  Visits from Evaluation Date:  9  Functional Level Prior to Evaluation:  Trouble with prolonged standing    Subjective     Update: Pt reports: she feels like therapy is working. She still gets tight from time to time when she is on her feet for too long. She hasn't been getting cramps as much, but she does feel like her feet could cramp up if she is in a certain position.    Objective     Update:     Gait: More exaggerated toe out on R. L ankle wobble during swing update: less L ankle wobble during swing     ROM: AROM (PROM)     Knee to wall: R- 10 cm, L - 8 cm     Ankle Left L update Right   Dorsiflexion 8 (13) degrees 14 10 degrees   Plantarflexion 50 (60) degrees   64 degrees   Inversion 40  degrees 35(40) 45  degrees   Eversion 15  degrees 0(15) 30  degrees      MMT:  Right LE   R Update Left LE   L update   Hip extension:  4-/5 4- Hip extension: 4-/5 4-   Hip external rotation 4/5 4 Hip external rotation 4/5 4   Hip abduction: 4-/5 4- Hip abduction: 4-/5 4-            Ankle Left L update Right L update   Dorsiflexion 4/5 4+ 4+/5 5   Plantarflexion Unable to perform single leg calf raise Unable to perform single leg calf raise Able to perform single leg calf raises Unable to perform single leg calf raise (pain)   Inversion 4+/5 4+ 4+/5 4+   Eversion 4/5 4 4+/5 4+         Joint Mobility: Some stiffness noted at subtalar joint, especially  with eversion     Palpation: Tenderness diffusely around calcaneus, tender at navicular tubercle        Balance:              SLS EO: R- 18s, L- 21s              Assessment     Update: Patient demonstrates improved ankle flexibility and single leg stability, but she continues to have significant foot, ankle and hip weakness. I also believe that her cramping is a result of foot weakness, so I think she would benefit from continued work on foot intrinsics to help with foot stability and improve symptoms. We will trial NMES for foot intrinsics next visit to help improve motor control. Patient would benefit from continued therapy to emphasize kinetic chain strength and control.    Previous Short Term Goals Status:   Not met  New Short Term Goals Status:     1) The patient will be independent and compliant with initial home exercise program as prescribed by physical therapist. Met  2) The patient will increase active range of motion in the left ankle by 5-10 degrees to regain normal function with ADLs. In progress, not met  3) The patient will increase strength in MMT of the L ankle by at least 1/3 grade in order to demonstrate improvements in functional strength  for functional tasks. In progress, not met  Long Term Goal Status:   continue per initial plan of care.  Reasons for Recertification of Therapy:   Goals not met    Plan     Updated Certification Period: 1/3/2022 to 2/3/2022  Recommended Treatment Plan: 2 times per week for 4 weeks: Electrical Stimulation NMES, Gait Training, Manual Therapy, Moist Heat/ Ice, Neuromuscular Re-ed, Patient Education, Therapeutic Activities and Therapeutic Exercise  Other Recommendations: None    Delbert Reyes, PT  1/3/2022      I CERTIFY THE NEED FOR THESE SERVICES FURNISHED UNDER THIS PLAN OF TREATMENT AND WHILE UNDER MY CARE    Physician's comments:        Physician's Signature: ___________________________________________________

## 2022-01-05 ENCOUNTER — CLINICAL SUPPORT (OUTPATIENT)
Dept: REHABILITATION | Facility: HOSPITAL | Age: 47
End: 2022-01-05
Payer: COMMERCIAL

## 2022-01-05 DIAGNOSIS — R29.898 ANKLE WEAKNESS: ICD-10-CM

## 2022-01-05 DIAGNOSIS — M25.672 DECREASED RANGE OF MOTION OF LEFT ANKLE: ICD-10-CM

## 2022-01-05 PROCEDURE — 97110 THERAPEUTIC EXERCISES: CPT | Mod: PO | Performed by: PHYSICAL THERAPIST

## 2022-01-05 PROCEDURE — 97112 NEUROMUSCULAR REEDUCATION: CPT | Mod: PO | Performed by: PHYSICAL THERAPIST

## 2022-01-05 NOTE — PROGRESS NOTES
"  Physical Therapy Daily Treatment Note     Name: Luzmaria SIMON [facility] Number: [id number]    Therapy Diagnosis:   No diagnosis found.  Physician: Jamison Mercado MD    Visit Date: 1/5/2022  Physician Orders: PT Eval and Treat   Medical Diagnosis from Referral: Posterior tibial tendinitis of left leg [M76.822]  Evaluation Date: 11/4/2021  Authorization Period Expiration: 12/31/2021  Plan of Care Expiration: 1/4/2022  Visit # / Visits authorized: 8/ 20    Time In: 5:00 pm  Time Out: 5:45 pm  Total Billable Time: 45 minutes    Precautions: Standard    Subjective     Pt reports: she is feeling pretty good today, no pain.  She was compliant with home exercise program.  Response to previous treatment: increased pain  Functional change: No change    Pain: 0/10  Location: left ankles     Objective     Primary impairments: foot strength/motor control, ankle strength/stability, hip strength/kinetic chain control, calf strength, ankle DF range of motion.      Luzmaria received therapeutic exercises to develop strength, endurance, ROM and flexibility for 8 minutes including:  Date 1/5/2022 1/3/2022 12/30/2021 12/28/2021 12/21/2021   Visit 10 9 8/20 7/20 6/20   POC exp 2/4/2022 Updated today  2/4/2022 1/4/2022 1/4/2022 1/4/2022   Progress Due  Done today 1/4/2022 1/4/2022 12/4/2021   Face to Face  Done today  Due 2/3/2022 1/4/2022 1/4/2022 12/4/2021   FOTO                PB ankle mob        Gastroc stretch  Next 3 x 30"  30" x 3 30" x 3   Soleus stretch  next 3 x 30" 30" x 3 30"  x 3           SLS   Next EO 3 x 15" EO 10" x 10    Tib Posterior calf raise  Next 2 x 15 w/ball 2 x 15 w ball            Plantar fascia stretch        Short foot Neuro re-ed 2 min  NMES next visit  3 x 10    Toe Yoga Neuro re-ed 2 min 2 min     Big toe press from plantarflexed position Neuro re-ed 20x 20x     Foot roller  Next 2' 2' 2'            TB hip abduction RTB  2 x 10 RTB  2 x 10      Squat with TB 3 x 10 RTB Next              Seen by  " KB KB LT EG EM     manual therapy techniques: Myofacial release were applied to the: L ankle/foot for 0 minutes, including:  - IASTM to plantar fascia (flexors), dorsum of foot (extensors)   - scar tissue massage   -P/A talcrural joint mobilizations  - MT AP and PA mobilization  - L ankle PROM all directions  - plantar fascia stretch sitting      Luzmaria participated in neuromuscular re-education activities to improve: Coordination, Kinesthetic, Sense and Proprioception for 30 minutes. The following activities were included:  - Foot intrinsic activation with NMES for 10 min  - Short foot with verbal and tactile cuing  - Toe spreading with verbal and tactile cuing  - Hallux flexion with verbal and tactile cuing      Home Exercises Provided and Patient Education Provided     Education provided:   - Importance of intrinsic foot strength and ankle stability.   - Kinetic chain and importance of proximal strength.    Written Home Exercises Provided: yes.  Exercises were reviewed and Luzmaria was able to demonstrate them prior to the end of the session.  Luzmaria demonstrated good  understanding of the education provided.     See EMR under Patient Instructions for exercises provided 11/9/2021.    Assessment     Patient demonstrates improved ankle flexibility and single leg stability, but she continues to have significant foot, ankle and hip weakness. She had a hard time with activation of foot intrinsic musculature, even with NMES. I encouraged patient to continue work on foot exercises at home to help with neuromuscular control. Continue to address impairments.    Luzmaria is progressing well towards her goals.   Pt prognosis is Good.     Pt will continue to benefit from skilled outpatient physical therapy to address the deficits listed in the problem list box on initial evaluation, provide pt/family education and to maximize pt's level of independence in the home and community environment.     Pt's spiritual, cultural and  educational needs considered and pt agreeable to plan of care and goals.    Anticipated barriers to physical therapy: None  Goals:  Short-Term Goals: 4 weeks  1) The patient will be independent and compliant with initial home exercise program as prescribed by physical therapist. Met  2) The patient will increase active range of motion in the left ankle by 5-10 degrees to regain normal function with ADLs. In progress, not met  3) The patient will increase strength in MMT of the L ankle by at least 1/3 grade in order to demonstrate improvements in functional strength  for functional tasks. In progress, not met        Long-Term Goals: 8 weeks  1) Pt to achieve <8% limitation as measured by the FOTO to demonstrate decreased disability. In progress, not met  2) The patient will increase active range of motion in ankle L by 10 degrees in order to restore normal mobility for gait. In progress, not met  3) The patient will increase strength in MMT of the L ankle to 5/5 in order to demonstrate improvements in functional strength for weight-bearing and gait. In progress, not met      Plan       Plan of care Certification:  Updated: 1/3/2022- 2/3/2022    Continue to work hard on foot intrinsic control and hip strengthening.    Delbert Reyes, PT

## 2022-01-07 ENCOUNTER — IMMUNIZATION (OUTPATIENT)
Dept: PRIMARY CARE CLINIC | Facility: CLINIC | Age: 47
End: 2022-01-07
Payer: COMMERCIAL

## 2022-01-07 DIAGNOSIS — Z23 NEED FOR VACCINATION: Primary | ICD-10-CM

## 2022-01-07 PROCEDURE — 0064A COVID-19, MRNA, LNP-S, PF, 100 MCG/0.25 ML DOSE VACCINE (MODERNA BOOSTER): CPT | Mod: PBBFAC,CV19 | Performed by: INTERNAL MEDICINE

## 2022-01-07 PROCEDURE — 91306 COVID-19, MRNA, LNP-S, PF, 100 MCG/0.25 ML DOSE VACCINE (MODERNA BOOSTER): CPT | Mod: PBBFAC | Performed by: INTERNAL MEDICINE

## 2022-01-12 ENCOUNTER — CLINICAL SUPPORT (OUTPATIENT)
Dept: REHABILITATION | Facility: HOSPITAL | Age: 47
End: 2022-01-12
Payer: COMMERCIAL

## 2022-01-12 DIAGNOSIS — M25.672 DECREASED RANGE OF MOTION OF LEFT ANKLE: ICD-10-CM

## 2022-01-12 DIAGNOSIS — R29.898 ANKLE WEAKNESS: ICD-10-CM

## 2022-01-12 PROCEDURE — 97112 NEUROMUSCULAR REEDUCATION: CPT | Mod: PO | Performed by: PHYSICAL THERAPIST

## 2022-01-12 PROCEDURE — 97110 THERAPEUTIC EXERCISES: CPT | Mod: PO | Performed by: PHYSICAL THERAPIST

## 2022-01-12 NOTE — PROGRESS NOTES
"  Physical Therapy Daily Treatment Note     Name: Luzmaria SIMON [facility] Number: [id number]    Therapy Diagnosis:   Encounter Diagnoses   Name Primary?    Ankle weakness     Decreased range of motion of left ankle      Physician: Jamison Mercado MD    Visit Date: 1/12/2022  Physician Orders: PT Eval and Treat   Medical Diagnosis from Referral: Posterior tibial tendinitis of left leg [M76.822]  Evaluation Date: 11/4/2021  Authorization Period Expiration: 12/31/2021  Plan of Care Expiration: 1/4/2022  Visit # / Visits authorized: 8/ 20    Time In: 5:00 pm  Time Out: 5:45 pm  Total Billable Time: 45 minutes    Precautions: Standard    Subjective     Pt reports: she is feeling pretty good today, no pain.  She was compliant with home exercise program.  Response to previous treatment: increased pain  Functional change: No change    Pain: 0/10  Location: left ankles     Objective     Primary impairments: foot strength/motor control, ankle strength/stability, hip strength/kinetic chain control, calf strength, ankle DF range of motion.      Luzmaria received therapeutic exercises to develop strength, endurance, ROM and flexibility for 25 minutes including:  Date 1/12/2022 1/5/2022 1/3/2022 12/30/2021 12/28/2021 12/21/2021   Visit 11 10 9 8/20 7/20 6/20   POC exp 2/4/2022 2/4/2022 Updated today  2/4/2022 1/4/2022 1/4/2022 1/4/2022   Progress Due   Done today 1/4/2022 1/4/2022 12/4/2021   Face to Face   Done today  Due 2/3/2022 1/4/2022 1/4/2022 12/4/2021   FOTO                  PB ankle mob         Gastroc stretch   Next 3 x 30"  30" x 3 30" x 3   Soleus stretch   next 3 x 30" 30" x 3 30"  x 3            SLS    Next EO 3 x 15" EO 10" x 10    Tib Posterior calf raise   Next 2 x 15 w/ball 2 x 15 w ball    Lateral Band walks 2 x 12ft        Plantar fascia stretch         Short foot Neuro re-ed Neuro re-ed 2 min  NMES next visit  3 x 10    Toe Yoga Neuro re-ed Neuro re-ed 2 min 2 min     Big toe press from plantarflexed " position Neuro re-ed Neuro re-ed 20x 20x     Foot roller   Next 2' 2' 2'    Bridges 10s Holds for 3'        TB hip abduction RTB  2 x 10 RTB  2 x 10 RTB  2 x 10      Squat with TB 3 x 10 RTB 3 x 10 RTB Next               Seen by  MADDY CASTAÑEDA LT EG EM     manual therapy techniques: Myofacial release were applied to the: L ankle/foot for 0 minutes, including:  - IASTM to plantar fascia (flexors), dorsum of foot (extensors)   - scar tissue massage   -P/A talcrural joint mobilizations  - MT AP and PA mobilization  - L ankle PROM all directions  - plantar fascia stretch sitting      Luzmaria participated in neuromuscular re-education activities to improve: Coordination, Kinesthetic, Sense and Proprioception for 15 minutes. The following activities were included:    - Short foot with verbal and tactile cuing  - Toe spreading with verbal and tactile cuing  - Hallux flexion with verbal and tactile cuing  - SLS stance barefoot      Home Exercises Provided and Patient Education Provided     Education provided:   - Importance of intrinsic foot strength and ankle stability.   - Kinetic chain and importance of proximal strength.    Written Home Exercises Provided: yes.  Exercises were reviewed and Luzmaria was able to demonstrate them prior to the end of the session.  Luzmaria demonstrated good  understanding of the education provided.     See EMR under Patient Instructions for exercises provided 11/9/2021.    Assessment     Patient demonstrates improved ankle flexibility and single leg stability, but she continues to have significant foot, ankle and hip weakness. She also has trouble balancing on the involved leg barefoot. She had a hard time with activation of foot intrinsic musculature, even with NMES. I encouraged patient to continue work on foot exercises at home to help with neuromuscular control. Good tolerance to exercises today, no increase in pain.     Luzmaria is progressing well towards her goals.   Pt prognosis is Good.     Pt  will continue to benefit from skilled outpatient physical therapy to address the deficits listed in the problem list box on initial evaluation, provide pt/family education and to maximize pt's level of independence in the home and community environment.     Pt's spiritual, cultural and educational needs considered and pt agreeable to plan of care and goals.    Anticipated barriers to physical therapy: None  Goals:  Short-Term Goals: 4 weeks  1) The patient will be independent and compliant with initial home exercise program as prescribed by physical therapist. Met  2) The patient will increase active range of motion in the left ankle by 5-10 degrees to regain normal function with ADLs. In progress, not met  3) The patient will increase strength in MMT of the L ankle by at least 1/3 grade in order to demonstrate improvements in functional strength  for functional tasks. In progress, not met        Long-Term Goals: 8 weeks  1) Pt to achieve <8% limitation as measured by the FOTO to demonstrate decreased disability. In progress, not met  2) The patient will increase active range of motion in ankle L by 10 degrees in order to restore normal mobility for gait. In progress, not met  3) The patient will increase strength in MMT of the L ankle to 5/5 in order to demonstrate improvements in functional strength for weight-bearing and gait. In progress, not met      Plan       Plan of care Certification:  Updated: 1/3/2022- 2/3/2022    Continue to work hard on foot intrinsic control and hip strengthening.    Delbert Reyes, PT

## 2022-01-14 ENCOUNTER — PATIENT MESSAGE (OUTPATIENT)
Dept: ORTHOPEDICS | Facility: CLINIC | Age: 47
End: 2022-01-14
Payer: COMMERCIAL

## 2022-02-02 ENCOUNTER — PATIENT MESSAGE (OUTPATIENT)
Dept: ORTHOPEDICS | Facility: CLINIC | Age: 47
End: 2022-02-02
Payer: COMMERCIAL

## 2022-02-11 RX ORDER — CHOLESTYRAMINE 4 G/9G
4 POWDER, FOR SUSPENSION ORAL
Qty: 270 PACKET | Refills: 2 | Status: SHIPPED | OUTPATIENT
Start: 2022-02-11 | End: 2023-01-09

## 2022-02-11 NOTE — TELEPHONE ENCOUNTER
----- Message from Kaye Cat sent at 2/11/2022  8:34 AM CST -----  Luzmaria Louis calling regarding Refills  (message) for #cholestyramine (QUESTRAN) 4 gram packet, call back [phone]      [facility]/pharmacy #[id number] - [place], [place] - [address] [place] [address] AT CORNER OF [address] [address]  Phone: [phone] Fax: [phone]

## 2022-02-24 ENCOUNTER — PATIENT MESSAGE (OUTPATIENT)
Dept: ORTHOPEDICS | Facility: CLINIC | Age: 47
End: 2022-02-24
Payer: COMMERCIAL

## 2022-02-25 ENCOUNTER — OFFICE VISIT (OUTPATIENT)
Dept: BARIATRICS | Facility: CLINIC | Age: 47
End: 2022-02-25
Payer: COMMERCIAL

## 2022-02-25 VITALS
DIASTOLIC BLOOD PRESSURE: 64 MMHG | WEIGHT: 255.5 LBS | SYSTOLIC BLOOD PRESSURE: 108 MMHG | HEART RATE: 70 BPM | BODY MASS INDEX: 46.73 KG/M2 | OXYGEN SATURATION: 97 %

## 2022-02-25 DIAGNOSIS — E66.01 CLASS 3 SEVERE OBESITY DUE TO EXCESS CALORIES WITHOUT SERIOUS COMORBIDITY WITH BODY MASS INDEX (BMI) OF 45.0 TO 49.9 IN ADULT: Primary | ICD-10-CM

## 2022-02-25 PROCEDURE — 3078F PR MOST RECENT DIASTOLIC BLOOD PRESSURE < 80 MM HG: ICD-10-PCS | Mod: CPTII,S$GLB,, | Performed by: INTERNAL MEDICINE

## 2022-02-25 PROCEDURE — 99999 PR PBB SHADOW E&M-EST. PATIENT-LVL IV: ICD-10-PCS | Mod: PBBFAC,,, | Performed by: INTERNAL MEDICINE

## 2022-02-25 PROCEDURE — 1160F RVW MEDS BY RX/DR IN RCRD: CPT | Mod: CPTII,S$GLB,, | Performed by: INTERNAL MEDICINE

## 2022-02-25 PROCEDURE — 3074F SYST BP LT 130 MM HG: CPT | Mod: CPTII,S$GLB,, | Performed by: INTERNAL MEDICINE

## 2022-02-25 PROCEDURE — 1159F PR MEDICATION LIST DOCUMENTED IN MEDICAL RECORD: ICD-10-PCS | Mod: CPTII,S$GLB,, | Performed by: INTERNAL MEDICINE

## 2022-02-25 PROCEDURE — 1159F MED LIST DOCD IN RCRD: CPT | Mod: CPTII,S$GLB,, | Performed by: INTERNAL MEDICINE

## 2022-02-25 PROCEDURE — 3078F DIAST BP <80 MM HG: CPT | Mod: CPTII,S$GLB,, | Performed by: INTERNAL MEDICINE

## 2022-02-25 PROCEDURE — 3008F PR BODY MASS INDEX (BMI) DOCUMENTED: ICD-10-PCS | Mod: CPTII,S$GLB,, | Performed by: INTERNAL MEDICINE

## 2022-02-25 PROCEDURE — 99214 PR OFFICE/OUTPT VISIT, EST, LEVL IV, 30-39 MIN: ICD-10-PCS | Mod: S$GLB,,, | Performed by: INTERNAL MEDICINE

## 2022-02-25 PROCEDURE — 3008F BODY MASS INDEX DOCD: CPT | Mod: CPTII,S$GLB,, | Performed by: INTERNAL MEDICINE

## 2022-02-25 PROCEDURE — 99214 OFFICE O/P EST MOD 30 MIN: CPT | Mod: S$GLB,,, | Performed by: INTERNAL MEDICINE

## 2022-02-25 PROCEDURE — 3074F PR MOST RECENT SYSTOLIC BLOOD PRESSURE < 130 MM HG: ICD-10-PCS | Mod: CPTII,S$GLB,, | Performed by: INTERNAL MEDICINE

## 2022-02-25 PROCEDURE — 99999 PR PBB SHADOW E&M-EST. PATIENT-LVL IV: CPT | Mod: PBBFAC,,, | Performed by: INTERNAL MEDICINE

## 2022-02-25 PROCEDURE — 1160F PR REVIEW ALL MEDS BY PRESCRIBER/CLIN PHARMACIST DOCUMENTED: ICD-10-PCS | Mod: CPTII,S$GLB,, | Performed by: INTERNAL MEDICINE

## 2022-02-25 RX ORDER — DIETHYLPROPION HYDROCHLORIDE 75 MG/1
75 TABLET, EXTENDED RELEASE ORAL DAILY
Qty: 30 TABLET | Refills: 2 | Status: SHIPPED | OUTPATIENT
Start: 2022-02-25 | End: 2022-05-31 | Stop reason: SDUPTHER

## 2022-02-25 NOTE — PROGRESS NOTES
Subjective:       Patient ID: Luzmaria Louis is a 47 y.o. female.    Chief Complaint: Follow-up    Pt here today for follow-up. Has gained 9 lbs since last seen 2 years ago, net neg 0 lbs. Started LCHF diet and diethylpropion. Last filled 7/23/20.   checked today. Denies SE. She does feel it has her appetite down when she was on the medication.  No exercise.   C/o of fatigue. No exercise. Does go to PT for her ankle.    Should be on topiramate for migraines, has not been taking it 2/2 to paresthesia. Took phentermine throughout 2018 and did not lose weight        Lab Results       Component                Value               Date                       HGBA1C                   5.4                 10/18/2021                 HGBA1C                   5.2                 10/20/2020                 HGBA1C                   5.2                 10/11/2019            Lab Results       Component                Value               Date                       LDLCALC                  117.2               10/18/2021                 CREATININE               0.7                 10/18/2021                  Follow-up  Associated symptoms include arthralgias and myalgias. Pertinent negatives include no chest pain, chills or fever.     Review of Systems   Constitutional: Negative for chills and fever.   Respiratory: Negative for shortness of breath.         + Snores   Cardiovascular: Negative for chest pain and leg swelling.   Gastrointestinal: Negative for constipation and diarrhea.        + GERD   Genitourinary: Negative for difficulty urinating and dysuria.   Musculoskeletal: Positive for arthralgias and myalgias. Negative for back pain.   Neurological: Positive for dizziness. Negative for light-headedness.   Psychiatric/Behavioral: Negative for dysphoric mood. The patient is not nervous/anxious.        Objective:     /64   Pulse 70   Wt 115.9 kg (255 lb 8.2 oz)   SpO2 97%   BMI 46.73 kg/m²     Physical  Exam  Vitals reviewed.   Constitutional:       General: She is not in acute distress.     Appearance: She is well-developed.      Comments: Morbidly obese     HENT:      Head: Normocephalic and atraumatic.      Mouth/Throat:      Pharynx: No oropharyngeal exudate.   Eyes:      General: No scleral icterus.     Pupils: Pupils are equal, round, and reactive to light.   Cardiovascular:      Rate and Rhythm: Normal rate.   Pulmonary:      Effort: Pulmonary effort is normal.   Musculoskeletal:         General: Normal range of motion.      Cervical back: Normal range of motion and neck supple.   Skin:     General: Skin is warm and dry.      Findings: No erythema.   Neurological:      Mental Status: She is alert and oriented to person, place, and time.      Cranial Nerves: No cranial nerve deficit.   Psychiatric:         Behavior: Behavior normal.         Judgment: Judgment normal.         Assessment:       1. Class 3 severe obesity due to excess calories without serious comorbidity with body mass index (BMI) of 45.0 to 49.9 in adult        Plan:         Luzmaria was seen today for follow-up.    Diagnoses and all orders for this visit:    Class 3 severe obesity due to excess calories without serious comorbidity with body mass index (BMI) of 45.0 to 49.9 in adult    Other orders  -     diethylpropion (TENUATE) 75 mg SR tablet; Take 1 tablet (75 mg total) by mouth once daily.         Patient warned of common side effects of diethylpropion including anxiety, insomnia, palpitations and increased blood pressure. It was also explained that it is for short-term usage along with diet and exercise, and that stopping the medication without making lifestyle changes will result in regain of weight. Patient states understanding.    Weight loss medications are controlled substances.  They require routine follow up. Prescription or pills that are lost or destroyed will not be replaced.           Exercise 30 min 3 days week. Gradually  increase.         3 meals a day made up of the following:  Unlimited green vegetables, tomatoes, mushrooms, spaghetti squash, cauliflower, meat, poultry, seafood, eggs and hard cheeses.   Milk and plain yogurt  Dressings, seasonings, condiments, etc should have less than 2 g sugars.   Beans (1-1.5 cups) or nuts (1/4 cup) can have 1 x a day.   1-2 servings of citrus fruits, berries, pineapple or melon a day (1/2 cup)  Avoid fried foods    No grains, rice, pasta, potatoes, bread, corn, peas, oatmeal, grits, tortillas, crackers, chips    No soda, sweet tea, juices or lemonade    Www.dietdoctor.[employer] for recipes. Moderate carb intake.    Discussed strategies for developing new coping mechanisms and becoming more self aware of eating behavior.        Have a plan in place for coping with emotions if you get the urge to make poor food choices- Set a timer for 10-15 minutes and find a way to distract yourself. Take a walk, drink water, write down how you feel.     Eliminate temptation where possible. Limit foods available that you know you tend to overeat. Eat proteins first.       Consider keeping a food log.     Exercise handout given.

## 2022-02-25 NOTE — PATIENT INSTRUCTIONS
Patient warned of common side effects of diethylpropion including anxiety, insomnia, palpitations and increased blood pressure. It was also explained that it is for short-term usage along with diet and exercise, and that stopping the medication without making lifestyle changes will result in regain of weight. Patient states understanding.    Weight loss medications are controlled substances.  They require routine follow up. Prescription or pills that are lost or destroyed will not be replaced.     Check the website www.[employer] for price comparisons and discounts on medications.       Exercise 30 min 3 days week. Gradually increase.         3 meals a day made up of the following:  Unlimited green vegetables, tomatoes, mushrooms, spaghetti squash, cauliflower, meat, poultry, seafood, eggs and hard cheeses.   Milk and plain yogurt  Dressings, seasonings, condiments, etc should have less than 2 g sugars.   Beans (1-1.5 cups) or nuts (1/4 cup) can have 1 x a day.   1-2 servings of citrus fruits, berries, pineapple or melon a day (1/2 cup)  Avoid fried foods    No grains, rice, pasta, potatoes, bread, corn, peas, oatmeal, grits, tortillas, crackers, chips    No soda, sweet tea, juices or lemonade    Www.dietCin[employer]Nowor.[employer] for recipes. Moderate carb intake.    Discussed strategies for developing new coping mechanisms and becoming more self aware of eating behavior.        Have a plan in place for coping with emotions if you get the urge to make poor food choices- Set a timer for 10-15 minutes and find a way to distract yourself. Take a walk, drink water, write down how you feel.     Eliminate temptation where possible. Limit foods available that you know you tend to overeat. Eat proteins first.       Consider keeping a food log.       Exercise should be some cardiovascular and some resistance training(see below).      STRENGTHENING  An adequate resistance training program could include the following types of exercise,  focusing on the major muscle groups. One can use 5 to 10 pound dumbbells for those exercises that require the use of weight. At home, one can use a household item that provides a comfortable weight, such as a milk carton or beverage container. These exercises can also be performed without weights. Add some type of resistance training 2-3 days a week. These can be body weight exercises, light weight or elastic bands. [employer] and [employer] are great sources for free work out plans and videos.       Chest (Bench Press): Hold the weights with your hands. Lying on a flat surface, with knees bent and feet flat, slowly bring the weights to the chest area with palms facing upward. Begin to exhale and press the weights up, fully extending the arms, and keeping them above your eyes. Inhale as you lower them to the starting position and repeat the movement.  Back (Bent-Over Row): Start by placing your feet shoulder-width apart.  a weight with each hand just outside the knees. Keeping the back straight and the knees flexed, slightly bend forward at the waist. Let the arms hang naturally while holding the weights. From this starting position, pull the weights to the lower abdomen, keeping the elbows close to the body. Exhale as you pull. Return to the starting position, inhaling as you go.  Arms (Bicep Curls): Hold a weight in each hand, with elbows at your sides, palms facing forward. The back should be straight, the chest flared outward. Begin to bend your right arm up first while exhaling, keeping the elbow totally stationary. Wait until the right arm goes completely down to the fully extended position, and then begin to curl the left arm. Each arm curled completes one full repetition.  Shoulders (Lateral Raises): Place the feet a few inches apart with the knees bent slightly. Keep the back erect as you lean forward slightly. With the weights in front of your thighs and palms facing together, begin to slowly raise them up  "to the side until parallel with the floor. Lower the weights to your thighs, and repeat.  Legs (Stiff Leg Dead Lift): Start by standing straight, holding the weights close to your sides, nearly touching your thighs. Keep the weights close to the body--this protects the back. The back must stay straight. Bend at the waist as far forward as you comfortably can while keeping your legs straight, and begin to feel the pull in your hamstrings as you lower the weights toward the ground. Slowly return to the starting position, keeping the back straight.  Legs (Dumbbell Lunge): Hold a weight in each hand, arms hanging at your sides. Step out with one leg, keeping the back straight. It is important to step out far enough so that the knee does not extend over the toe. This puts too much stress on the knee. Go down far enough so that the opposite knee nearly touches the ground. Keep this stance and repeat the lunging motion for several repetitions.  Abdominals: Do not perform standard sit-ups as these could hurt the lower back. Rather, focus on the following 2 exercises: (1) Obliques--Lie on your back with the knees flexed in the bent sit-up position. From this position, bring both knees down to the ground. With the back remaining flat, begin to flex your body toward your toes ("crunch"). Bring your shoulders up off the ground, but go slowly, controlling your momentum. Repeat this for 10 to 15 times. (2) Seated bench kicks/tamara knives--Sit on the end of a bench or chair with the hands placed behind the buttocks. Begin to kick the legs outward with the knees bent slightly--at the same time, lean back to extend the torso. Come back to the beginning position and repeat this motion 10 to 15 times.      "

## 2022-03-01 NOTE — PROGRESS NOTES
Subjective:       Patient ID: Luzmaria Louis is a 47 y.o. female.    Chief Complaint: No chief complaint on file.    HPI  ROS     Objective:      Physical Exam    Assessment:       1. Cough        Plan:                   No follow-ups on file.

## 2022-03-21 ENCOUNTER — PATIENT MESSAGE (OUTPATIENT)
Dept: ORTHOPEDICS | Facility: CLINIC | Age: 47
End: 2022-03-21
Payer: COMMERCIAL

## 2022-03-25 ENCOUNTER — OFFICE VISIT (OUTPATIENT)
Dept: ORTHOPEDICS | Facility: CLINIC | Age: 47
End: 2022-03-25
Payer: COMMERCIAL

## 2022-03-25 ENCOUNTER — HOSPITAL ENCOUNTER (OUTPATIENT)
Dept: RADIOLOGY | Facility: HOSPITAL | Age: 47
Discharge: HOME | End: 2022-03-25
Attending: ORTHOPAEDIC SURGERY
Payer: COMMERCIAL

## 2022-03-25 VITALS — WEIGHT: 255.5 LBS | HEIGHT: 62 IN | BODY MASS INDEX: 47.02 KG/M2

## 2022-03-25 DIAGNOSIS — Z09 FOLLOW-UP EXAMINATION AFTER ORTHOPEDIC SURGERY: ICD-10-CM

## 2022-03-25 DIAGNOSIS — Z09 FOLLOW-UP EXAMINATION AFTER ORTHOPEDIC SURGERY: Primary | ICD-10-CM

## 2022-03-25 PROCEDURE — 99212 OFFICE O/P EST SF 10 MIN: CPT | Mod: S$GLB,,, | Performed by: ORTHOPAEDIC SURGERY

## 2022-03-25 PROCEDURE — 1159F MED LIST DOCD IN RCRD: CPT | Mod: CPTII,S$GLB,, | Performed by: ORTHOPAEDIC SURGERY

## 2022-03-25 PROCEDURE — 3008F PR BODY MASS INDEX (BMI) DOCUMENTED: ICD-10-PCS | Mod: CPTII,S$GLB,, | Performed by: ORTHOPAEDIC SURGERY

## 2022-03-25 PROCEDURE — 1159F PR MEDICATION LIST DOCUMENTED IN MEDICAL RECORD: ICD-10-PCS | Mod: CPTII,S$GLB,, | Performed by: ORTHOPAEDIC SURGERY

## 2022-03-25 PROCEDURE — 73630 X-RAY EXAM OF FOOT: CPT | Mod: 26,LT,, | Performed by: RADIOLOGY

## 2022-03-25 PROCEDURE — 3008F BODY MASS INDEX DOCD: CPT | Mod: CPTII,S$GLB,, | Performed by: ORTHOPAEDIC SURGERY

## 2022-03-25 PROCEDURE — 99999 PR PBB SHADOW E&M-EST. PATIENT-LVL III: CPT | Mod: PBBFAC,,, | Performed by: ORTHOPAEDIC SURGERY

## 2022-03-25 PROCEDURE — 73630 XR FOOT COMPLETE 3 VIEW LEFT: ICD-10-PCS | Mod: 26,LT,, | Performed by: RADIOLOGY

## 2022-03-25 PROCEDURE — 99212 PR OFFICE/OUTPT VISIT, EST, LEVL II, 10-19 MIN: ICD-10-PCS | Mod: S$GLB,,, | Performed by: ORTHOPAEDIC SURGERY

## 2022-03-25 PROCEDURE — 99999 PR PBB SHADOW E&M-EST. PATIENT-LVL III: ICD-10-PCS | Mod: PBBFAC,,, | Performed by: ORTHOPAEDIC SURGERY

## 2022-03-25 PROCEDURE — 73630 X-RAY EXAM OF FOOT: CPT | Mod: TC,LT

## 2022-03-25 NOTE — PROGRESS NOTES
Gabriellamartir SIMON DanyelleFarhan  Returns today for follow-up.  This is a 47-year-old female who is now over 1 year postop from a left medial displacement calcaneal osteotomy and FDL tendon transfer for posterior tibial tendon dysfunction.  Her last visit was about 4 months ago.  She returns today and reports she has made significant improvement since her last visit.  She has completed her physical therapy.  She has returned to work and is doing a bit more walking.  She does get some occasional soreness and does report some continued stiffness.  She states she still has some numbness in her toes but overall hypersensitivity of her foot is improved.  She is no longer taking gabapentin.    Examination:  The left foot reveals minimal swelling today.  There are no focal areas of tenderness.  She is able to do a single limb heel rise without much difficulty.  She has good motion of her ankle and subtalar joint.    Imaging:  I ordered and reviewed standing x-ray of her left foot today.  The arch is maintaining good alignment.    Impression:  1 year postop left posterior tibial tendon repair and calcaneal osteotomy    Recommendation:  She has made significant improvement since her last visit.  We had discussion about continued home exercises as well as wearing shoes and orthotics.    Follow-up as needed

## 2022-04-18 ENCOUNTER — PATIENT MESSAGE (OUTPATIENT)
Dept: ORTHOPEDICS | Facility: CLINIC | Age: 47
End: 2022-04-18
Payer: COMMERCIAL

## 2022-05-23 ENCOUNTER — PATIENT MESSAGE (OUTPATIENT)
Dept: ORTHOPEDICS | Facility: CLINIC | Age: 47
End: 2022-05-23
Payer: COMMERCIAL

## 2022-05-31 ENCOUNTER — OFFICE VISIT (OUTPATIENT)
Dept: BARIATRICS | Facility: CLINIC | Age: 47
End: 2022-05-31
Payer: COMMERCIAL

## 2022-05-31 VITALS
OXYGEN SATURATION: 99 % | DIASTOLIC BLOOD PRESSURE: 80 MMHG | HEART RATE: 84 BPM | WEIGHT: 244.5 LBS | BODY MASS INDEX: 44.72 KG/M2 | SYSTOLIC BLOOD PRESSURE: 122 MMHG

## 2022-05-31 DIAGNOSIS — E66.01 OBESITY, CLASS III, BMI 40-49.9 (MORBID OBESITY): Primary | ICD-10-CM

## 2022-05-31 PROCEDURE — 1159F MED LIST DOCD IN RCRD: CPT | Mod: CPTII,S$GLB,, | Performed by: INTERNAL MEDICINE

## 2022-05-31 PROCEDURE — 3079F DIAST BP 80-89 MM HG: CPT | Mod: CPTII,S$GLB,, | Performed by: INTERNAL MEDICINE

## 2022-05-31 PROCEDURE — 1160F PR REVIEW ALL MEDS BY PRESCRIBER/CLIN PHARMACIST DOCUMENTED: ICD-10-PCS | Mod: CPTII,S$GLB,, | Performed by: INTERNAL MEDICINE

## 2022-05-31 PROCEDURE — 1160F RVW MEDS BY RX/DR IN RCRD: CPT | Mod: CPTII,S$GLB,, | Performed by: INTERNAL MEDICINE

## 2022-05-31 PROCEDURE — 3008F BODY MASS INDEX DOCD: CPT | Mod: CPTII,S$GLB,, | Performed by: INTERNAL MEDICINE

## 2022-05-31 PROCEDURE — 3079F PR MOST RECENT DIASTOLIC BLOOD PRESSURE 80-89 MM HG: ICD-10-PCS | Mod: CPTII,S$GLB,, | Performed by: INTERNAL MEDICINE

## 2022-05-31 PROCEDURE — 99213 OFFICE O/P EST LOW 20 MIN: CPT | Mod: S$GLB,,, | Performed by: INTERNAL MEDICINE

## 2022-05-31 PROCEDURE — 3008F PR BODY MASS INDEX (BMI) DOCUMENTED: ICD-10-PCS | Mod: CPTII,S$GLB,, | Performed by: INTERNAL MEDICINE

## 2022-05-31 PROCEDURE — 3074F SYST BP LT 130 MM HG: CPT | Mod: CPTII,S$GLB,, | Performed by: INTERNAL MEDICINE

## 2022-05-31 PROCEDURE — 99999 PR PBB SHADOW E&M-EST. PATIENT-LVL IV: CPT | Mod: PBBFAC,,, | Performed by: INTERNAL MEDICINE

## 2022-05-31 PROCEDURE — 3074F PR MOST RECENT SYSTOLIC BLOOD PRESSURE < 130 MM HG: ICD-10-PCS | Mod: CPTII,S$GLB,, | Performed by: INTERNAL MEDICINE

## 2022-05-31 PROCEDURE — 1159F PR MEDICATION LIST DOCUMENTED IN MEDICAL RECORD: ICD-10-PCS | Mod: CPTII,S$GLB,, | Performed by: INTERNAL MEDICINE

## 2022-05-31 PROCEDURE — 99999 PR PBB SHADOW E&M-EST. PATIENT-LVL IV: ICD-10-PCS | Mod: PBBFAC,,, | Performed by: INTERNAL MEDICINE

## 2022-05-31 PROCEDURE — 99213 PR OFFICE/OUTPT VISIT, EST, LEVL III, 20-29 MIN: ICD-10-PCS | Mod: S$GLB,,, | Performed by: INTERNAL MEDICINE

## 2022-05-31 RX ORDER — PHENTERMINE HYDROCHLORIDE 37.5 MG/1
TABLET ORAL
Qty: 30 TABLET | Refills: 0 | Status: SHIPPED | OUTPATIENT
Start: 2022-05-31 | End: 2022-08-30 | Stop reason: SDUPTHER

## 2022-05-31 RX ORDER — DIETHYLPROPION HYDROCHLORIDE 75 MG/1
75 TABLET, EXTENDED RELEASE ORAL DAILY
Qty: 30 TABLET | Refills: 1 | Status: SHIPPED | OUTPATIENT
Start: 2022-06-29 | End: 2022-08-30 | Stop reason: SDUPTHER

## 2022-05-31 NOTE — PROGRESS NOTES
Subjective:       Patient ID: Luzmaria Louis is a 47 y.o. female.    Chief Complaint: Follow-up    Pt here today for follow-up. Has lost 11 lbs since last seen, net neg 11 lbs. Started LCHF diet and diethylpropion. Last filled 5/6/22.   checked today. Denies SE. She does feel it has her appetite down when she was on the medication. Has decreased fast food and is stopping eating when full. Has stopped keeping snacks in her room.  Exercise- no structured exercise.  Walks a lot at work.   Was on topiramate for migraines, has not been taking it 2/2 to paresthesia. Took phentermine throughout 2018 and did not lose weight        Lab Results       Component                Value               Date                       HGBA1C                   5.4                 10/18/2021                 HGBA1C                   5.2                 10/20/2020                 HGBA1C                   5.2                 10/11/2019            Lab Results       Component                Value               Date                       LDLCALC                  117.2               10/18/2021                 CREATININE               0.7                 10/18/2021                Follow-up  Associated symptoms include arthralgias and myalgias. Pertinent negatives include no chest pain, chills or fever.     Review of Systems   Constitutional: Negative for chills and fever.   Respiratory: Negative for shortness of breath.         + Snores   Cardiovascular: Negative for chest pain and leg swelling.   Gastrointestinal: Negative for constipation and diarrhea.        + GERD   Genitourinary: Negative for difficulty urinating and dysuria.   Musculoskeletal: Positive for arthralgias and myalgias. Negative for back pain.   Neurological: Positive for dizziness. Negative for light-headedness.   Psychiatric/Behavioral: Negative for dysphoric mood. The patient is not nervous/anxious.        Objective:     /80   Pulse 84   Wt 110.9 kg (244 lb  7.8 oz)   SpO2 99%   BMI 44.72 kg/m²     Physical Exam  Vitals reviewed.   Constitutional:       General: She is not in acute distress.     Appearance: She is well-developed.      Comments: Morbidly obese     HENT:      Head: Normocephalic and atraumatic.      Mouth/Throat:      Pharynx: No oropharyngeal exudate.   Eyes:      General: No scleral icterus.     Pupils: Pupils are equal, round, and reactive to light.   Cardiovascular:      Rate and Rhythm: Normal rate.   Pulmonary:      Effort: Pulmonary effort is normal.   Musculoskeletal:         General: Normal range of motion.      Cervical back: Normal range of motion and neck supple.   Skin:     General: Skin is warm and dry.      Findings: No erythema.   Neurological:      Mental Status: She is alert and oriented to person, place, and time.      Cranial Nerves: No cranial nerve deficit.   Psychiatric:         Behavior: Behavior normal.         Judgment: Judgment normal.         Assessment:       1. Obesity, Class III, BMI 40-49.9 (morbid obesity)        Plan:         Luzmaria was seen today for follow-up.    Diagnoses and all orders for this visit:    Obesity, Class III, BMI 40-49.9 (morbid obesity)  -     diethylpropion (TENUATE) 75 mg SR tablet; Take 1 tablet (75 mg total) by mouth once daily.  -     phentermine (ADIPEX-P) 37.5 mg tablet; 1/2-1 tab po daily       Now: Patient warned of common side effects of phentermine including anxiety, insomnia, palpitations and increased blood pressure. It was also explained that it is for short-term usage along with diet and exercise, and that stopping the medication without making lifestyle changes will result in regain of weight. Patient states understanding.     Weight loss medications are controlled substances.  They require routine follow up. Prescription or pills that are lost or destroyed will not be replaced.       Next month and until follow-up: Patient warned of common side effects of diethylpropion including  anxiety, insomnia, palpitations and increased blood pressure. It was also explained that it is for short-term usage along with diet and exercise, and that stopping the medication without making lifestyle changes will result in regain of weight. Patient states understanding.    Weight loss medications are controlled substances.  They require routine follow up. Prescription or pills that are lost or destroyed will not be replaced.           Exercise 30 min 3 days week. Gradually increase.         3 meals a day made up of the following:  Unlimited green vegetables, tomatoes, mushrooms, spaghetti squash, cauliflower, meat, poultry, seafood, eggs and hard cheeses.   Milk and plain yogurt  Dressings, seasonings, condiments, etc should have less than 2 g sugars.   Beans (1-1.5 cups) or nuts (1/4 cup) can have 1 x a day.   1-2 servings of citrus fruits, berries, pineapple or melon a day (1/2 cup)  Avoid fried foods    No grains, rice, pasta, potatoes, bread, corn, peas, oatmeal, grits, tortillas, crackers, chips    No soda, sweet tea, juices or lemonade    Www.dietdoctor.[employer] for recipes. Moderate carb intake.         Add some type of resistance training 2-3 days a week. These can be body weight exercises, light weight or elastic bands. [employer] and [employer] are great sources for free work out plans and videos.       Hurricane tips given.

## 2022-05-31 NOTE — PATIENT INSTRUCTIONS
Now: Patient warned of common side effects of phentermine including anxiety, insomnia, palpitations and increased blood pressure. It was also explained that it is for short-term usage along with diet and exercise, and that stopping the medication without making lifestyle changes will result in regain of weight. Patient states understanding.     Weight loss medications are controlled substances.  They require routine follow up. Prescription or pills that are lost or destroyed will not be replaced.       Next month and until follow-up: Patient warned of common side effects of diethylpropion including anxiety, insomnia, palpitations and increased blood pressure. It was also explained that it is for short-term usage along with diet and exercise, and that stopping the medication without making lifestyle changes will result in regain of weight. Patient states understanding.    Weight loss medications are controlled substances.  They require routine follow up. Prescription or pills that are lost or destroyed will not be replaced.           Exercise 30 min 3 days week.     Add some type of resistance training 2-3 days a week. These can be body weight exercises, light weight or elastic bands. [employer] and [employer] are great sources for free work out plans and videos.   .         3 meals a day made up of the following:  Unlimited green vegetables, tomatoes, mushrooms, spaghetti squash, cauliflower, meat, poultry, seafood, eggs and hard cheeses.   Milk and plain yogurt  Dressings, seasonings, condiments, etc should have less than 2 g sugars.   Beans (1-1.5 cups) or nuts (1/4 cup) can have 1 x a day.   1-2 servings of citrus fruits, berries, pineapple or melon a day (1/2 cup)  Avoid fried foods    No grains, rice, pasta, potatoes, bread, corn, peas, oatmeal, grits, tortillas, crackers, chips    No soda, sweet tea, juices or lemonade    Www.dietdoctor.com for recipes. Moderate carb intake.           Tips for preparing for  hurricane season:    If you are on weight loss medications, please take your medication with you in case of evacuation. Injectable medications should be transported with an ice pack if unopened or room temperature if you have started to use them.   Hurricane supplies do not necessarily need to be junk food or high in carbs or sugar. Shelf stable and healthy choices to include in your supplies could include:  Canned/packets of tuna or chicken  Apples, oranges, banana, pears  Beef or turkey jerky  Sugar free pudding  Pickle, olives, dill relish (mix with the tuna or chicken!)  Low sodium or no salt added canned vegetables  If you get bread, get lite bread (40 calories a slice)  0 sugar sports drinks  Water  String cheese will be okay for a few days without refrigeration or in an ice chest.   Peanut or other nut butter.   Parmesan crisps  Pork skins  Protein shakes (20-30g protein, less than 5 g sugar)  Protein bars (15 or more g protein, less than 5 g sugar)  Don't forget disposable forks, spoons, plates in your supplies  If evacuated, manage stress by taking walks, reading or meditating.   If eating out make better choices when possible.   Salads with a lean protein and limited dressing, cheese or other toppings  Grilled chicken sandwich or burger without the bun.   Skip the fries!  Order water or unsweetened tea instead of soda  Grocery stores with a deli, salad/food bar can be a good quick and affordable option to replace fast food

## 2022-06-09 ENCOUNTER — PATIENT MESSAGE (OUTPATIENT)
Dept: ORTHOPEDICS | Facility: CLINIC | Age: 47
End: 2022-06-09
Payer: COMMERCIAL

## 2022-06-14 DIAGNOSIS — G89.18 POST-OP PAIN: ICD-10-CM

## 2022-06-14 DIAGNOSIS — Z09 FOLLOW-UP EXAMINATION AFTER ORTHOPEDIC SURGERY: ICD-10-CM

## 2022-06-14 RX ORDER — OXYCODONE AND ACETAMINOPHEN 10; 325 MG/1; MG/1
1 TABLET ORAL EVERY 6 HOURS PRN
Qty: 28 TABLET | Refills: 0 | Status: SHIPPED | OUTPATIENT
Start: 2022-06-14 | End: 2022-07-12 | Stop reason: SDUPTHER

## 2022-06-14 NOTE — TELEPHONE ENCOUNTER
Spoke with pt, notified her that her paperwork is at the .    ----- Message from Taisha Rosenberg sent at 6/14/2022  3:03 PM CDT -----  Contact: Patient  Patient calling in regards to seeing if paperwork was left at the  for her to . Requesting call back.       Patient @[phone] (home)

## 2022-06-16 ENCOUNTER — OFFICE VISIT (OUTPATIENT)
Dept: SPORTS MEDICINE | Facility: CLINIC | Age: 47
End: 2022-06-16
Payer: COMMERCIAL

## 2022-06-16 ENCOUNTER — HOSPITAL ENCOUNTER (OUTPATIENT)
Dept: RADIOLOGY | Facility: HOSPITAL | Age: 47
Discharge: HOME | End: 2022-06-16
Attending: ORTHOPAEDIC SURGERY
Payer: COMMERCIAL

## 2022-06-16 VITALS
SYSTOLIC BLOOD PRESSURE: 139 MMHG | BODY MASS INDEX: 44.99 KG/M2 | WEIGHT: 244.5 LBS | HEIGHT: 62 IN | DIASTOLIC BLOOD PRESSURE: 88 MMHG | HEART RATE: 87 BPM

## 2022-06-16 DIAGNOSIS — M25.511 RIGHT SHOULDER PAIN, UNSPECIFIED CHRONICITY: ICD-10-CM

## 2022-06-16 DIAGNOSIS — M25.512 LEFT SHOULDER PAIN, UNSPECIFIED CHRONICITY: ICD-10-CM

## 2022-06-16 DIAGNOSIS — M25.511 RIGHT SHOULDER PAIN, UNSPECIFIED CHRONICITY: Primary | ICD-10-CM

## 2022-06-16 PROCEDURE — 1159F PR MEDICATION LIST DOCUMENTED IN MEDICAL RECORD: ICD-10-PCS | Mod: CPTII,S$GLB,, | Performed by: ORTHOPAEDIC SURGERY

## 2022-06-16 PROCEDURE — 99999 PR PBB SHADOW E&M-EST. PATIENT-LVL IV: CPT | Mod: PBBFAC,,, | Performed by: ORTHOPAEDIC SURGERY

## 2022-06-16 PROCEDURE — 3008F PR BODY MASS INDEX (BMI) DOCUMENTED: ICD-10-PCS | Mod: CPTII,S$GLB,, | Performed by: ORTHOPAEDIC SURGERY

## 2022-06-16 PROCEDURE — 3075F PR MOST RECENT SYSTOLIC BLOOD PRESS GE 130-139MM HG: ICD-10-PCS | Mod: CPTII,S$GLB,, | Performed by: ORTHOPAEDIC SURGERY

## 2022-06-16 PROCEDURE — 99999 PR PBB SHADOW E&M-EST. PATIENT-LVL IV: ICD-10-PCS | Mod: PBBFAC,,, | Performed by: ORTHOPAEDIC SURGERY

## 2022-06-16 PROCEDURE — 3075F SYST BP GE 130 - 139MM HG: CPT | Mod: CPTII,S$GLB,, | Performed by: ORTHOPAEDIC SURGERY

## 2022-06-16 PROCEDURE — 73030 X-RAY EXAM OF SHOULDER: CPT | Mod: 26,LT,, | Performed by: RADIOLOGY

## 2022-06-16 PROCEDURE — 99214 OFFICE O/P EST MOD 30 MIN: CPT | Mod: S$GLB,,, | Performed by: ORTHOPAEDIC SURGERY

## 2022-06-16 PROCEDURE — 73030 XR SHOULDER COMPLETE 2 OR MORE VIEWS LEFT: ICD-10-PCS | Mod: 26,LT,, | Performed by: RADIOLOGY

## 2022-06-16 PROCEDURE — 1160F RVW MEDS BY RX/DR IN RCRD: CPT | Mod: CPTII,S$GLB,, | Performed by: ORTHOPAEDIC SURGERY

## 2022-06-16 PROCEDURE — 1160F PR REVIEW ALL MEDS BY PRESCRIBER/CLIN PHARMACIST DOCUMENTED: ICD-10-PCS | Mod: CPTII,S$GLB,, | Performed by: ORTHOPAEDIC SURGERY

## 2022-06-16 PROCEDURE — 3008F BODY MASS INDEX DOCD: CPT | Mod: CPTII,S$GLB,, | Performed by: ORTHOPAEDIC SURGERY

## 2022-06-16 PROCEDURE — 73030 X-RAY EXAM OF SHOULDER: CPT | Mod: TC,LT

## 2022-06-16 PROCEDURE — 3079F DIAST BP 80-89 MM HG: CPT | Mod: CPTII,S$GLB,, | Performed by: ORTHOPAEDIC SURGERY

## 2022-06-16 PROCEDURE — 99214 PR OFFICE/OUTPT VISIT, EST, LEVL IV, 30-39 MIN: ICD-10-PCS | Mod: S$GLB,,, | Performed by: ORTHOPAEDIC SURGERY

## 2022-06-16 PROCEDURE — 1159F MED LIST DOCD IN RCRD: CPT | Mod: CPTII,S$GLB,, | Performed by: ORTHOPAEDIC SURGERY

## 2022-06-16 PROCEDURE — 3079F PR MOST RECENT DIASTOLIC BLOOD PRESSURE 80-89 MM HG: ICD-10-PCS | Mod: CPTII,S$GLB,, | Performed by: ORTHOPAEDIC SURGERY
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
## 2022-08-03 ENCOUNTER — PATIENT MESSAGE (OUTPATIENT)
Dept: BARIATRICS | Facility: CLINIC | Age: 47
End: 2022-08-03
Payer: COMMERCIAL

## 2022-08-30 ENCOUNTER — OFFICE VISIT (OUTPATIENT)
Dept: BARIATRICS | Facility: CLINIC | Age: 47
End: 2022-08-30
Payer: COMMERCIAL

## 2022-08-30 VITALS
WEIGHT: 231.69 LBS | SYSTOLIC BLOOD PRESSURE: 124 MMHG | HEART RATE: 104 BPM | BODY MASS INDEX: 42.38 KG/M2 | DIASTOLIC BLOOD PRESSURE: 90 MMHG | OXYGEN SATURATION: 98 %

## 2022-08-30 DIAGNOSIS — E66.01 OBESITY, CLASS III, BMI 40-49.9 (MORBID OBESITY): ICD-10-CM

## 2022-08-30 PROCEDURE — 1159F MED LIST DOCD IN RCRD: CPT | Mod: CPTII,S$GLB,, | Performed by: INTERNAL MEDICINE

## 2022-08-30 PROCEDURE — 3080F DIAST BP >= 90 MM HG: CPT | Mod: CPTII,S$GLB,, | Performed by: INTERNAL MEDICINE

## 2022-08-30 PROCEDURE — 3080F PR MOST RECENT DIASTOLIC BLOOD PRESSURE >= 90 MM HG: ICD-10-PCS | Mod: CPTII,S$GLB,, | Performed by: INTERNAL MEDICINE

## 2022-08-30 PROCEDURE — 99213 PR OFFICE/OUTPT VISIT, EST, LEVL III, 20-29 MIN: ICD-10-PCS | Mod: S$GLB,,, | Performed by: INTERNAL MEDICINE

## 2022-08-30 PROCEDURE — 99999 PR PBB SHADOW E&M-EST. PATIENT-LVL III: CPT | Mod: PBBFAC,,, | Performed by: INTERNAL MEDICINE

## 2022-08-30 PROCEDURE — 99213 OFFICE O/P EST LOW 20 MIN: CPT | Mod: S$GLB,,, | Performed by: INTERNAL MEDICINE

## 2022-08-30 PROCEDURE — 3074F SYST BP LT 130 MM HG: CPT | Mod: CPTII,S$GLB,, | Performed by: INTERNAL MEDICINE

## 2022-08-30 PROCEDURE — 3074F PR MOST RECENT SYSTOLIC BLOOD PRESSURE < 130 MM HG: ICD-10-PCS | Mod: CPTII,S$GLB,, | Performed by: INTERNAL MEDICINE

## 2022-08-30 PROCEDURE — 99999 PR PBB SHADOW E&M-EST. PATIENT-LVL III: ICD-10-PCS | Mod: PBBFAC,,, | Performed by: INTERNAL MEDICINE

## 2022-08-30 PROCEDURE — 1159F PR MEDICATION LIST DOCUMENTED IN MEDICAL RECORD: ICD-10-PCS | Mod: CPTII,S$GLB,, | Performed by: INTERNAL MEDICINE

## 2022-08-30 PROCEDURE — 3008F PR BODY MASS INDEX (BMI) DOCUMENTED: ICD-10-PCS | Mod: CPTII,S$GLB,, | Performed by: INTERNAL MEDICINE

## 2022-08-30 PROCEDURE — 3008F BODY MASS INDEX DOCD: CPT | Mod: CPTII,S$GLB,, | Performed by: INTERNAL MEDICINE

## 2022-08-30 RX ORDER — DIETHYLPROPION HYDROCHLORIDE 75 MG/1
75 TABLET, EXTENDED RELEASE ORAL DAILY
Qty: 30 TABLET | Refills: 1 | Status: SHIPPED | OUTPATIENT
Start: 2022-10-21 | End: 2022-11-30 | Stop reason: SDUPTHER

## 2022-08-30 RX ORDER — PHENTERMINE HYDROCHLORIDE 37.5 MG/1
TABLET ORAL
Qty: 30 TABLET | Refills: 0 | Status: SHIPPED | OUTPATIENT
Start: 2022-09-21 | End: 2022-11-30 | Stop reason: SDUPTHER

## 2022-08-30 NOTE — PROGRESS NOTES
Subjective:       Patient ID: Luzmaria Louis is a 47 y.o. female.    Chief Complaint: Follow-up    Pt here today for follow-up. Has lost 13 lbs since last seen, net neg 24 lbs. Started LCHF diet and diethylpropion. Last filled 8/22/22.   checked today. Denies SE. She does feel it has her appetite down when she was on the medication. Has decreased fast food and is stopping eating when full. Has stopped keeping snacks in her room.  Exercise- [place] airport. Increasing activity during the day as well.   Walks a lot at work.   Was on topiramate for migraines, has not been taking it 2/2 to paresthesia. Took phentermine throughout 2018 and did not lose weight. States did ok with it in June.     Lab Results       Component                Value               Date                       HGBA1C                   5.4                 10/18/2021                 HGBA1C                   5.2                 10/20/2020                 HGBA1C                   5.2                 10/11/2019            Lab Results       Component                Value               Date                       LDLCALC                  117.2               10/18/2021                 CREATININE               0.7                 10/18/2021                 Follow-up  Associated symptoms include arthralgias and myalgias. Pertinent negatives include no chest pain, chills or fever.   Review of Systems   Constitutional:  Negative for chills and fever.   Respiratory:  Negative for shortness of breath.         + Snores   Cardiovascular:  Negative for chest pain and leg swelling.   Gastrointestinal:  Negative for constipation and diarrhea.        + GERD   Genitourinary:  Negative for difficulty urinating and dysuria.   Musculoskeletal:  Positive for arthralgias and myalgias. Negative for back pain.   Neurological:  Positive for dizziness. Negative for light-headedness.   Psychiatric/Behavioral:  Negative for dysphoric mood. The patient is  not nervous/anxious.      Objective:     BP (!) 124/90   Pulse 104   Wt 105.1 kg (231 lb 11.3 oz)   SpO2 98%   BMI 42.38 kg/m²     Physical Exam  Vitals reviewed.   Constitutional:       General: She is not in acute distress.     Appearance: She is well-developed.      Comments: Morbidly obese     HENT:      Head: Normocephalic and atraumatic.      Mouth/Throat:      Pharynx: No oropharyngeal exudate.   Eyes:      General: No scleral icterus.     Pupils: Pupils are equal, round, and reactive to light.   Cardiovascular:      Rate and Rhythm: Normal rate.   Pulmonary:      Effort: Pulmonary effort is normal.   Musculoskeletal:         General: Normal range of motion.      Cervical back: Normal range of motion and neck supple.   Skin:     General: Skin is warm and dry.      Findings: No erythema.   Neurological:      Mental Status: She is alert and oriented to person, place, and time.      Cranial Nerves: No cranial nerve deficit.   Psychiatric:         Behavior: Behavior normal.         Judgment: Judgment normal.       Assessment:       1. Obesity, Class III, BMI 40-49.9 (morbid obesity)          Plan:         Luzmaria was seen today for follow-up.    Diagnoses and all orders for this visit:    Obesity, Class III, BMI 40-49.9 (morbid obesity)  -     phentermine (ADIPEX-P) 37.5 mg tablet; 1/2-1 tab po daily  -     diethylpropion (TENUATE) 75 mg SR tablet; Take 1 tablet (75 mg total) by mouth once daily.       Dated 9/21/22: Patient warned of common side effects of phentermine including anxiety, insomnia, palpitations and increased blood pressure. It was also explained that it is for short-term usage along with diet and exercise, and that stopping the medication without making lifestyle changes will result in regain of weight. Patient states understanding.     Weight loss medications are controlled substances.  They require routine follow up. Prescription or pills that are lost or destroyed will not be replaced.        Dated Oct 21 and until follow up:  Patient warned of common side effects of diethylpropion including anxiety, insomnia, palpitations and increased blood pressure. It was also explained that it is for short-term usage along with diet and exercise, and that stopping the medication without making lifestyle changes will result in regain of weight. Patient states understanding.    Weight loss medications are controlled substances.  They require routine follow up. Prescription or pills that are lost or destroyed will not be replaced.           Exercise 30 min 3 days week. Gradually increase.         3 meals a day made up of the following:  Unlimited green vegetables, tomatoes, mushrooms, spaghetti squash, cauliflower, meat, poultry, seafood, eggs and hard cheeses.   Milk and plain yogurt  Dressings, seasonings, condiments, etc should have less than 2 g sugars.   Beans (1-1.5 cups) or nuts (1/4 cup) can have 1 x a day.   1-2 servings of citrus fruits, berries, pineapple or melon a day (1/2 cup)  Avoid fried foods    No grains, rice, pasta, potatoes, bread, corn, peas, oatmeal, grits, tortillas, crackers, chips    No soda, sweet tea, juices or lemonade    [email or web address] for recipes. Moderate carb intake.         Add some type of resistance training 2-3 days a week. These can be body weight exercises, light weight or elastic bands. [employer] and [employer] are great sources for free work out plans and videos.       Hurricane tips and exercise handouts given.
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
## 2022-10-06 ENCOUNTER — PATIENT MESSAGE (OUTPATIENT)
Dept: BARIATRICS | Facility: CLINIC | Age: 47
End: 2022-10-06
Payer: COMMERCIAL

## 2022-10-07 ENCOUNTER — LAB VISIT (OUTPATIENT)
Dept: LAB | Facility: HOSPITAL | Age: 47
End: 2022-10-07
Attending: INTERNAL MEDICINE
Payer: COMMERCIAL

## 2022-10-07 ENCOUNTER — HOSPITAL ENCOUNTER (OUTPATIENT)
Dept: RADIOLOGY | Facility: HOSPITAL | Age: 47
Discharge: HOME | End: 2022-10-07
Attending: INTERNAL MEDICINE
Payer: COMMERCIAL

## 2022-10-07 ENCOUNTER — OFFICE VISIT (OUTPATIENT)
Dept: INTERNAL MEDICINE | Facility: CLINIC | Age: 47
End: 2022-10-07
Payer: COMMERCIAL

## 2022-10-07 VITALS
SYSTOLIC BLOOD PRESSURE: 124 MMHG | TEMPERATURE: 97 F | BODY MASS INDEX: 42.8 KG/M2 | RESPIRATION RATE: 18 BRPM | OXYGEN SATURATION: 97 % | DIASTOLIC BLOOD PRESSURE: 82 MMHG | HEART RATE: 80 BPM | HEIGHT: 62 IN | WEIGHT: 232.56 LBS

## 2022-10-07 DIAGNOSIS — E04.1 THYROID NODULE: ICD-10-CM

## 2022-10-07 DIAGNOSIS — Z12.31 VISIT FOR SCREENING MAMMOGRAM: ICD-10-CM

## 2022-10-07 DIAGNOSIS — Z00.00 ANNUAL PHYSICAL EXAM: Primary | ICD-10-CM

## 2022-10-07 DIAGNOSIS — Z00.00 ANNUAL PHYSICAL EXAM: ICD-10-CM

## 2022-10-07 DIAGNOSIS — M79.672 LEFT FOOT PAIN: ICD-10-CM

## 2022-10-07 LAB
ALBUMIN SERPL BCP-MCNC: 3.6 G/DL (ref 3.5–5.2)
ALP SERPL-CCNC: 55 U/L (ref 55–135)
ALT SERPL W/O P-5'-P-CCNC: 17 U/L (ref 10–44)
ANION GAP SERPL CALC-SCNC: 11 MMOL/L (ref 8–16)
AST SERPL-CCNC: 17 U/L (ref 10–40)
BASOPHILS # BLD AUTO: 0.02 K/UL (ref 0–0.2)
BASOPHILS NFR BLD: 0.3 % (ref 0–1.9)
BILIRUB SERPL-MCNC: 0.4 MG/DL (ref 0.1–1)
BUN SERPL-MCNC: 8 MG/DL (ref 6–20)
CALCIUM SERPL-MCNC: 8.8 MG/DL (ref 8.7–10.5)
CHLORIDE SERPL-SCNC: 103 MMOL/L (ref 95–110)
CHOLEST SERPL-MCNC: 201 MG/DL (ref 120–199)
CHOLEST/HDLC SERPL: 5.3 {RATIO} (ref 2–5)
CO2 SERPL-SCNC: 25 MMOL/L (ref 23–29)
CREAT SERPL-MCNC: 0.6 MG/DL (ref 0.5–1.4)
DIFFERENTIAL METHOD: ABNORMAL
EOSINOPHIL # BLD AUTO: 0 K/UL (ref 0–0.5)
EOSINOPHIL NFR BLD: 0.6 % (ref 0–8)
ERYTHROCYTE [DISTWIDTH] IN BLOOD BY AUTOMATED COUNT: 13.1 % (ref 11.5–14.5)
EST. GFR  (NO RACE VARIABLE): >60 ML/MIN/1.73 M^2
ESTIMATED AVG GLUCOSE: 105 MG/DL (ref 68–131)
GLUCOSE SERPL-MCNC: 93 MG/DL (ref 70–110)
HBA1C MFR BLD: 5.3 % (ref 4–5.6)
HCT VFR BLD AUTO: 40.2 % (ref 37–48.5)
HDLC SERPL-MCNC: 38 MG/DL (ref 40–75)
HDLC SERPL: 18.9 % (ref 20–50)
HGB BLD-MCNC: 12.7 G/DL (ref 12–16)
IMM GRANULOCYTES # BLD AUTO: 0.03 K/UL (ref 0–0.04)
IMM GRANULOCYTES NFR BLD AUTO: 0.5 % (ref 0–0.5)
LDLC SERPL CALC-MCNC: 132.8 MG/DL (ref 63–159)
LYMPHOCYTES # BLD AUTO: 3.2 K/UL (ref 1–4.8)
LYMPHOCYTES NFR BLD: 48.6 % (ref 18–48)
MCH RBC QN AUTO: 29.4 PG (ref 27–31)
MCHC RBC AUTO-ENTMCNC: 31.6 G/DL (ref 32–36)
MCV RBC AUTO: 93 FL (ref 82–98)
MONOCYTES # BLD AUTO: 0.4 K/UL (ref 0.3–1)
MONOCYTES NFR BLD: 5.7 % (ref 4–15)
NEUTROPHILS # BLD AUTO: 3 K/UL (ref 1.8–7.7)
NEUTROPHILS NFR BLD: 44.3 % (ref 38–73)
NONHDLC SERPL-MCNC: 163 MG/DL
NRBC BLD-RTO: 0 /100 WBC
PLATELET # BLD AUTO: 297 K/UL (ref 150–450)
PMV BLD AUTO: 10.2 FL (ref 9.2–12.9)
POTASSIUM SERPL-SCNC: 3.8 MMOL/L (ref 3.5–5.1)
PROT SERPL-MCNC: 7.1 G/DL (ref 6–8.4)
RBC # BLD AUTO: 4.32 M/UL (ref 4–5.4)
SODIUM SERPL-SCNC: 139 MMOL/L (ref 136–145)
TRIGL SERPL-MCNC: 151 MG/DL (ref 30–150)
TSH SERPL DL<=0.005 MIU/L-ACNC: 2.33 UIU/ML (ref 0.4–4)
WBC # BLD AUTO: 6.66 K/UL (ref 3.9–12.7)

## 2022-10-07 PROCEDURE — 3074F SYST BP LT 130 MM HG: CPT | Mod: CPTII,S$GLB,, | Performed by: INTERNAL MEDICINE

## 2022-10-07 PROCEDURE — 99396 PREV VISIT EST AGE 40-64: CPT | Mod: S$GLB,,, | Performed by: INTERNAL MEDICINE

## 2022-10-07 PROCEDURE — 99999 PR PBB SHADOW E&M-EST. PATIENT-LVL V: ICD-10-PCS | Mod: PBBFAC,,, | Performed by: INTERNAL MEDICINE

## 2022-10-07 PROCEDURE — 36415 COLL VENOUS BLD VENIPUNCTURE: CPT | Mod: PO | Performed by: INTERNAL MEDICINE

## 2022-10-07 PROCEDURE — 3008F BODY MASS INDEX DOCD: CPT | Mod: CPTII,S$GLB,, | Performed by: INTERNAL MEDICINE

## 2022-10-07 PROCEDURE — 3074F PR MOST RECENT SYSTOLIC BLOOD PRESSURE < 130 MM HG: ICD-10-PCS | Mod: CPTII,S$GLB,, | Performed by: INTERNAL MEDICINE

## 2022-10-07 PROCEDURE — 99396 PR PREVENTIVE VISIT,EST,40-64: ICD-10-PCS | Mod: S$GLB,,, | Performed by: INTERNAL MEDICINE

## 2022-10-07 PROCEDURE — 3079F DIAST BP 80-89 MM HG: CPT | Mod: CPTII,S$GLB,, | Performed by: INTERNAL MEDICINE

## 2022-10-07 PROCEDURE — 3008F PR BODY MASS INDEX (BMI) DOCUMENTED: ICD-10-PCS | Mod: CPTII,S$GLB,, | Performed by: INTERNAL MEDICINE

## 2022-10-07 PROCEDURE — 85025 COMPLETE CBC W/AUTO DIFF WBC: CPT | Performed by: INTERNAL MEDICINE

## 2022-10-07 PROCEDURE — 1160F PR REVIEW ALL MEDS BY PRESCRIBER/CLIN PHARMACIST DOCUMENTED: ICD-10-PCS | Mod: CPTII,S$GLB,, | Performed by: INTERNAL MEDICINE

## 2022-10-07 PROCEDURE — 80053 COMPREHEN METABOLIC PANEL: CPT | Performed by: INTERNAL MEDICINE

## 2022-10-07 PROCEDURE — 84443 ASSAY THYROID STIM HORMONE: CPT | Performed by: INTERNAL MEDICINE

## 2022-10-07 PROCEDURE — 3079F PR MOST RECENT DIASTOLIC BLOOD PRESSURE 80-89 MM HG: ICD-10-PCS | Mod: CPTII,S$GLB,, | Performed by: INTERNAL MEDICINE

## 2022-10-07 PROCEDURE — 80061 LIPID PANEL: CPT | Performed by: INTERNAL MEDICINE

## 2022-10-07 PROCEDURE — 76536 US EXAM OF HEAD AND NECK: CPT | Mod: TC,PO

## 2022-10-07 PROCEDURE — 1160F RVW MEDS BY RX/DR IN RCRD: CPT | Mod: CPTII,S$GLB,, | Performed by: INTERNAL MEDICINE

## 2022-10-07 PROCEDURE — 83036 HEMOGLOBIN GLYCOSYLATED A1C: CPT | Performed by: INTERNAL MEDICINE

## 2022-10-07 PROCEDURE — 1159F PR MEDICATION LIST DOCUMENTED IN MEDICAL RECORD: ICD-10-PCS | Mod: CPTII,S$GLB,, | Performed by: INTERNAL MEDICINE

## 2022-10-07 PROCEDURE — 99999 PR PBB SHADOW E&M-EST. PATIENT-LVL V: CPT | Mod: PBBFAC,,, | Performed by: INTERNAL MEDICINE

## 2022-10-07 PROCEDURE — 1159F MED LIST DOCD IN RCRD: CPT | Mod: CPTII,S$GLB,, | Performed by: INTERNAL MEDICINE

## 2022-10-07 NOTE — PROGRESS NOTES
Subjective:       Patient ID: Luzmaria Louis is a 47 y.o. female.    Chief Complaint: Annual Exam    HPI    47 y.o. female here for annual exam.     Cholesterol: needs  Vaccines: Influenza - 2021; Tetanus - 2018; COVID - 3 done  Sexual Screening: active  STD screening: no concern  Eye exam: done last at the beginning of the year.  Mammogram: due Oct 29th  Gyn exam: UTD  Colonoscopy: 2021, due 2026  A1c: needs    Exercise: no regular exercise.  She has trouble walking since her foot surgery 2 yrs ago in February.  She had a tendon stretched and the foot reformed.  It cramps up a lot.  She went through PT after the surgery twice.  She reports that it still feels tight.  She has not gotten a second opinion about the pain as of yet.  She has been on oxycodone since the day of the surgery.  She is prescribed a week, but it lasts for about a month.  She is in pain from walking on this all day.  Diet: more or less home cooked.  A little fast food.    Past Medical History:   Diagnosis Date    Fibroid     GERD (gastroesophageal reflux disease)     Migraine headache      Past Surgical History:   Procedure Laterality Date    ARTHROSCOPIC DEBRIDEMENT OF ROTATOR CUFF Left 7/29/2020    Procedure: DEBRIDEMENT, ROTATOR CUFF, ARTHROSCOPIC;  Surgeon: Tejas Prince MD;  Location: [facility] OR;  Service: Orthopedics;  Laterality: Left;    ARTHROSCOPY OF SHOULDER WITH DECOMPRESSION OF SUBACROMIAL SPACE Left 7/29/2020    Procedure: ARTHROSCOPY, SHOULDER, WITH SUBACROMIAL SPACE DECOMPRESSION;  Surgeon: Tejas Prince MD;  Location: [facility] OR;  Service: Orthopedics;  Laterality: Left;    CALCANEAL OSTEOTOMY Left 2/25/2021    Procedure: OSTEOTOMY, CALCANEUS Medial displacement;  Surgeon: Jamison Mercado MD;  Location: [facility] OR;  Service: Orthopedics;  Laterality: Left;  Arthrex screws  FluoroScan imaging    CHOLECYSTECTOMY      COLONOSCOPY N/A 10/4/2021    Procedure: COLONOSCOPY;  Surgeon: Cristina Cannon MD;   Location: [facility] (4TH FLR);  Service: Endoscopy;  Laterality: N/A;  covid test madyson 10/1  pt requests Miralax prep-MS    HYSTERECTOMY      MOUTH SURGERY      THYROID LOBECTOMY Right 7/3/2019    Procedure: LOBECTOMY, THYROID, Possible Total;  Surgeon: Carlee Huff MD;  Location: [facility] OR 2ND FLR;  Service: General;  Laterality: Right;    TONSILLECTOMY      TRANSFER OF TENDON OF LOWER EXTREMITY Left 2/25/2021    Procedure: TRANSFER, TENDON, LOWER EXTREMITY FDL to posterior tibial tendon;  Surgeon: Jamison Mercado MD;  Location: [facility];  Service: Orthopedics;  Laterality: Left;  Arthrex bio tenodesis screws    TUBAL LIGATION       Social History     Socioeconomic History    Marital status: Legally    Tobacco Use    Smoking status: Never    Smokeless tobacco: Never   Substance and Sexual Activity    Alcohol use: Yes     Alcohol/week: 2.0 standard drinks     Types: 2 Glasses of wine per week     Comment: weekends, but not every weekend - 1 or less    Drug use: Never    Sexual activity: Yes     Partners: Male     Comment: hysterectomy     Review of patient's allergies indicates:   Allergen Reactions    Caffeine      She feels like it is hard to breath, dizzy, nauseous.     Luzmaria Louis had no medications administered during this visit.      Review of Systems      Objective:      Physical Exam  Vitals reviewed.   Constitutional:       Appearance: She is well-developed.   HENT:      Head: Normocephalic and atraumatic.      Mouth/Throat:      Pharynx: No oropharyngeal exudate.   Eyes:      General: No scleral icterus.        Right eye: No discharge.         Left eye: No discharge.      Pupils: Pupils are equal, round, and reactive to light.   Neck:      Thyroid: No thyromegaly.      Trachea: No tracheal deviation.   Cardiovascular:      Rate and Rhythm: Normal rate and regular rhythm.      Heart sounds: Normal heart sounds. No murmur heard.    No friction rub. No gallop.   Pulmonary:       Effort: Pulmonary effort is normal. No respiratory distress.      Breath sounds: Normal breath sounds. No wheezing or rales.   Chest:      Chest wall: No tenderness.   Abdominal:      General: Bowel sounds are normal. There is no distension.      Palpations: Abdomen is soft. There is no mass.      Tenderness: There is no abdominal tenderness. There is no guarding or rebound.   Musculoskeletal:         General: No tenderness. Normal range of motion.      Cervical back: Normal range of motion and neck supple.   Skin:     General: Skin is warm and dry.      Coloration: Skin is not pale.      Findings: No erythema or rash.   Neurological:      Mental Status: She is alert and oriented to person, place, and time.   Psychiatric:         Behavior: Behavior normal.       Assessment:       1. Annual physical exam  - CBC Auto Differential; Future  - Comprehensive Metabolic Panel; Future  - TSH; Future  - Lipid Panel; Future  - Hemoglobin A1C; Future    2. BMI 45.0-49.9, adult    3. Visit for screening mammogram  - Mammo Digital Screening Bilat; Future    4. Left foot pain  - Ambulatory referral/consult to Orthopedics; Future    5. Thyroid nodule  - US Soft Tissue Head Neck Thyroid; Future      Plan:       1. Check CBC, CMP, TSH, lipids, A1c.  Discussed diet and exercise.  Up-to-date on vaccines.  2.  Continue fall prevention medicine.    3.  Check noted   4. Refer to orthopedic  5. Check ultrasound.

## 2022-11-01 ENCOUNTER — HOSPITAL ENCOUNTER (OUTPATIENT)
Dept: RADIOLOGY | Facility: HOSPITAL | Age: 47
Discharge: HOME | End: 2022-11-01
Attending: INTERNAL MEDICINE
Payer: COMMERCIAL

## 2022-11-01 DIAGNOSIS — Z12.31 VISIT FOR SCREENING MAMMOGRAM: ICD-10-CM

## 2022-11-01 PROCEDURE — 77067 SCR MAMMO BI INCL CAD: CPT | Mod: TC,PO

## 2022-11-01 PROCEDURE — 77063 BREAST TOMOSYNTHESIS BI: CPT | Mod: TC,PO

## 2022-11-30 ENCOUNTER — OFFICE VISIT (OUTPATIENT)
Dept: BARIATRICS | Facility: CLINIC | Age: 47
End: 2022-11-30
Payer: COMMERCIAL

## 2022-11-30 VITALS
SYSTOLIC BLOOD PRESSURE: 131 MMHG | WEIGHT: 231.06 LBS | DIASTOLIC BLOOD PRESSURE: 65 MMHG | BODY MASS INDEX: 42.26 KG/M2 | OXYGEN SATURATION: 100 % | HEART RATE: 64 BPM

## 2022-11-30 DIAGNOSIS — E66.01 OBESITY, CLASS III, BMI 40-49.9 (MORBID OBESITY): ICD-10-CM

## 2022-11-30 PROCEDURE — 3008F BODY MASS INDEX DOCD: CPT | Mod: CPTII,S$GLB,, | Performed by: INTERNAL MEDICINE

## 2022-11-30 PROCEDURE — 99213 OFFICE O/P EST LOW 20 MIN: CPT | Mod: S$GLB,,, | Performed by: INTERNAL MEDICINE

## 2022-11-30 PROCEDURE — 3078F DIAST BP <80 MM HG: CPT | Mod: CPTII,S$GLB,, | Performed by: INTERNAL MEDICINE

## 2022-11-30 PROCEDURE — 3075F SYST BP GE 130 - 139MM HG: CPT | Mod: CPTII,S$GLB,, | Performed by: INTERNAL MEDICINE

## 2022-11-30 PROCEDURE — 1160F RVW MEDS BY RX/DR IN RCRD: CPT | Mod: CPTII,S$GLB,, | Performed by: INTERNAL MEDICINE

## 2022-11-30 PROCEDURE — 3044F HG A1C LEVEL LT 7.0%: CPT | Mod: CPTII,S$GLB,, | Performed by: INTERNAL MEDICINE

## 2022-11-30 PROCEDURE — 1159F PR MEDICATION LIST DOCUMENTED IN MEDICAL RECORD: ICD-10-PCS | Mod: CPTII,S$GLB,, | Performed by: INTERNAL MEDICINE

## 2022-11-30 PROCEDURE — 99213 PR OFFICE/OUTPT VISIT, EST, LEVL III, 20-29 MIN: ICD-10-PCS | Mod: S$GLB,,, | Performed by: INTERNAL MEDICINE

## 2022-11-30 PROCEDURE — 99999 PR PBB SHADOW E&M-EST. PATIENT-LVL III: CPT | Mod: PBBFAC,,, | Performed by: INTERNAL MEDICINE

## 2022-11-30 PROCEDURE — 1160F PR REVIEW ALL MEDS BY PRESCRIBER/CLIN PHARMACIST DOCUMENTED: ICD-10-PCS | Mod: CPTII,S$GLB,, | Performed by: INTERNAL MEDICINE

## 2022-11-30 PROCEDURE — 3078F PR MOST RECENT DIASTOLIC BLOOD PRESSURE < 80 MM HG: ICD-10-PCS | Mod: CPTII,S$GLB,, | Performed by: INTERNAL MEDICINE

## 2022-11-30 PROCEDURE — 3044F PR MOST RECENT HEMOGLOBIN A1C LEVEL <7.0%: ICD-10-PCS | Mod: CPTII,S$GLB,, | Performed by: INTERNAL MEDICINE

## 2022-11-30 PROCEDURE — 99999 PR PBB SHADOW E&M-EST. PATIENT-LVL III: ICD-10-PCS | Mod: PBBFAC,,, | Performed by: INTERNAL MEDICINE

## 2022-11-30 PROCEDURE — 3008F PR BODY MASS INDEX (BMI) DOCUMENTED: ICD-10-PCS | Mod: CPTII,S$GLB,, | Performed by: INTERNAL MEDICINE

## 2022-11-30 PROCEDURE — 3075F PR MOST RECENT SYSTOLIC BLOOD PRESS GE 130-139MM HG: ICD-10-PCS | Mod: CPTII,S$GLB,, | Performed by: INTERNAL MEDICINE

## 2022-11-30 PROCEDURE — 1159F MED LIST DOCD IN RCRD: CPT | Mod: CPTII,S$GLB,, | Performed by: INTERNAL MEDICINE

## 2022-11-30 RX ORDER — PHENTERMINE HYDROCHLORIDE 37.5 MG/1
TABLET ORAL
Qty: 30 TABLET | Refills: 0 | Status: SHIPPED | OUTPATIENT
Start: 2023-01-30 | End: 2023-03-07

## 2022-11-30 RX ORDER — DIETHYLPROPION HYDROCHLORIDE 75 MG/1
75 TABLET, EXTENDED RELEASE ORAL DAILY
Qty: 30 TABLET | Refills: 1 | Status: SHIPPED | OUTPATIENT
Start: 2022-11-30 | End: 2023-03-07

## 2022-11-30 NOTE — PATIENT INSTRUCTIONS
Now and next month: Patient warned of common side effects of phentermine including anxiety, insomnia, palpitations and increased blood pressure. It was also explained that it is for short-term usage along with diet and exercise, and that stopping the medication without making lifestyle changes will result in regain of weight. Patient states understanding.     Weight loss medications are controlled substances.  They require routine follow up. Prescription or pills that are lost or destroyed will not be replaced.     February:  Patient warned of common side effects of diethylpropion including anxiety, insomnia, palpitations and increased blood pressure. It was also explained that it is for short-term usage along with diet and exercise, and that stopping the medication without making lifestyle changes will result in regain of weight. Patient states understanding.    Weight loss medications are controlled substances.  They require routine follow up. Prescription or pills that are lost or destroyed will not be replaced.           Exercise 30 min 3 days week. Gradually increase.         3 meals a day made up of the following:  Unlimited green vegetables, tomatoes, mushrooms, spaghetti squash, cauliflower, meat, poultry, seafood, eggs and hard cheeses.   Milk and plain yogurt  Dressings, seasonings, condiments, etc should have less than 2 g sugars.   Beans (1-1.5 cups) or nuts (1/4 cup) can have 1 x a day.   1-2 servings of citrus fruits, berries, pineapple or melon a day (1/2 cup)  Avoid fried foods    No grains, rice, pasta, potatoes, bread, corn, peas, oatmeal, grits, tortillas, crackers, chips    No soda, sweet tea, juices or lemonade    Www.dietdoctor.com for recipes. Moderate carb intake.         Add some type of resistance training 2-3 days a week. These can be body weight exercises, light weight or elastic bands. [employer] and [employer] are great sources for free work out plans and videos.       Helpful tips to  survive the holidays:  Dont save yourself for the meal: Make sure you continue to eat high protein small meals every 3-4 hours to ensure to do not become over-hungry. Avoid temptation by not showing up to a holiday party or gathering hungry.   Plan ahead. Bring a dish to a party if you know there may not be an appropriate option.   Choose sugar-free drinks: Stick to water or other sugar-free beverages and make sure you are getting 6-8 cups of fluid each day (but not with meals!). Avoid alcohol, carbonated beverages, and high-fat/high-sugar beverages like hot chocolate and eggnog. Try sugar-free hot cocoa made with skim milk or water, or sugar-free spiced tea to add some holiday flair to your beverage (see sugar-free mulled cider recipe below)  Take your time: Eat mindfully. Dont graze on food throughout the day. Sit down to enjoy your small meals. Chew slowly and thoroughly. Cut your food into small pieces before eating.  Listen to your body: Stop eating as soon as you feel full. Do not feel pressured to try certain (or all) foods or to eat all of the food on your plate. Listen to your hunger cues.   REMEMBER: Make your holidays about spending time with family and friends instead of focusing gatherings around food.  Keep up your exercise routine: Make sure you continue to get regular exercise throughout the holiday season. Encourage friends and family to be active by taking a walk together after a meal, to look at holiday lights, or to window-shop.    Good Holiday Meal Options:  Roasted Turkey, NO skin. Use low sodium broth instead of gravy.   Stuffed Bell Peppers made WITHOUT bread crumbs or Rice. Try using parmesan cheese instead  Gumbo, NO rice. Try picking out mostly the meat/seafood and vegetables with little broth.   Green Bean Casserole made with 98% fat free cream of mushroom soup and crushed almonds/pecans instead of fried onions  Side salad w/ low fat dressing. Try a different kind of salad maybe use  Kale or spinach.   Roasted non-starchy vegetables like brussel sprouts, broccoli, green beans, zucchini, butternut squash, cauliflower  Cauliflower Mash (steam or roast cauliflower, puree w/ low fat cheese, dash of fat free milk and 2-3 sprays of I cant believe its not butter spray. Add garlic powder and black pepper to season). Use Low sodium broth instead of gravy.   Try Loaded Cauliflower Mash (Make cauliflower like above cauliflower mash. Top with diced turkey norman, ¼ cup low fat cheddar cheese and bake @ 350* F for 5-10 minutes, until cheese is melted. Top with minced chives, black pepper and garlic to taste).   Homemade cranberry sauce using Splenda or another alternative sweetener. Boil fresh cranberries and add splenda to taste. Boil until cranberries break open and then simmer until it reaches the consistency you want (less time for more watery sauce and simmer for longer to create a thicker sauce).   Deviled eggs: make using low fat harding, mustard, DILL relish (not sweet relish).   Vegetable tray w/ Greek yogurt Ranch Dip. Mix 1 packet of [place] ranch dip mix w/ 16 oz low fat plain greek yogurt.     Good Holiday Dessert Options:  High protein Pumpkin Cheesecake (see recipe below)  Pumpkin Whip (see recipe below)  Quest Apple Pie or Cinnamon Roll flavored protein bar (warm in microwave for 10-15 seconds)  Eggnog Protein shake (see recipe below)  Fresh fruit w/ low fat cheese  Sugar-free Jello Parfaits. Layer Red and Green sugar-free jello in cups and top w/ 2 tbsp Sugar-free cool-whip    Pumpkin Cheesecake    8 ounces fat free cream cheese, softened   2 scoops of vanilla protein powder (<4 g sugar per serving)   ¼ tsp Fine salt   2 eggs, at room temperature   1/3 cup fat free sour cream  1/3 cup fat free half and half  1 15 -ounce can pure pumpkin puree   1 tablespoon pumpkin pie spice, plus more for dusting   Unsalted nuts, crushed  *Add splenda to taste    Directions     1. Preheat the oven to  300 degrees F. Line 18 muffin cups with paper liners. Sprinkle 1 tsp crushed unsalted nuts at the bottom of each of muffin cup liner.     2. In a large bowl, beat the cream cheese, vanilla protein powder and 1/4 teaspoon fine salt on medium-high speed until smooth and creamy, 2 to 3 minutes. Scrape down the sides, reduce speed to low and beat in the eggs, 1 at a time, until combined. Beat in 1/3 cup fat free sour cream and fat free half and half. Stir in the pumpkin puree and pumpkin pie spice until smooth. Divide evenly among cookie-lined paper cups, filling almost all the way to the top.     3. Bake until the filling is just set, 40 to 45 minutes. A sharp knife inserted into the center will come out moist, but clean. Cool completely in tins on a wire rack. Refrigerate until cold, 4 hours, or overnight. Top with a dusting of pumpkin pie spice.    Recipe altered from the following recipe: http://www.[employer].Apperian/recipes/food-network-crystal/mini-pumpkin-cheesecakes-recipe.print.html?oc=linkback    Pumpkin Whip    Box of sugar-free vanilla pudding  Can of pumpkin puree  Pumpkin Pie spice (sprinkle to taste)  ½ cup of sugar-free Cool Whip    Directions:  Make sugar-free pudding according to package directions using fat free or 1% milk. Stir in pumpkin and cool whip. Add pumpkin pie spice to taste.     Egg Nog Protein shake    8 oz skim or 1% milk  1 scoop vanilla protein powder  1 tbsp sugar-free vanilla pudding mix  ½ tsp butter flavor extract  ½ tsp rum extract  ½ tsp cinnamon     Shake together or blend with ice and serve.     Sugar-Free Mulled Cider    3 oz diet cran-apple juice  6 oz water  1 packet sugar-free apple cider mix  ½ tsp apple pie spice  ½ tsp butter flavor extract  1 tbsp Sugar-free Syrup    Mix together. Warm if needed and serve w/ orange wedge and cinnamon stick.

## 2022-11-30 NOTE — PROGRESS NOTES
Subjective:       Patient ID: Luzmaria Louis is a 47 y.o. female.    Chief Complaint: Follow-up    Pt here today for follow-up. Has lost 0 lbs since last seen, net neg 24 lbs. Started LCHF diet and diethylpropion. Last filled 11/3/22.   checked today. Denies SE. She does feel it has her appetite down when she was on the medication. She has been eating chocolate covered cherries.  Exercise- [place] airport. Increasing activity during the day as well.   Working in garden as well.   Was on topiramate for migraines, has not been taking it 2/2 to paresthesia. Took phentermine throughout 2018 and did not lose weight. States did ok with it in June.     Lab Results       Component                Value               Date                       HGBA1C                   5.4                 10/18/2021                 HGBA1C                   5.2                 10/20/2020                 HGBA1C                   5.2                 10/11/2019            Lab Results       Component                Value               Date                       LDLCALC                  117.2               10/18/2021                 CREATININE               0.7                 10/18/2021                 Follow-up  Associated symptoms include arthralgias and myalgias. Pertinent negatives include no chest pain, chills or fever.   Review of Systems   Constitutional:  Negative for chills and fever.   Respiratory:  Negative for shortness of breath.         + Snores   Cardiovascular:  Negative for chest pain and leg swelling.   Gastrointestinal:  Negative for constipation and diarrhea.        + GERD   Genitourinary:  Negative for difficulty urinating and dysuria.   Musculoskeletal:  Positive for arthralgias and myalgias. Negative for back pain.   Neurological:  Positive for dizziness. Negative for light-headedness.   Psychiatric/Behavioral:  Negative for dysphoric mood. The patient is not nervous/anxious.      Objective:     BP  131/65 (BP Location: Right arm, Patient Position: Sitting)   Pulse 64   Wt 104.8 kg (231 lb 0.7 oz)   SpO2 100%   BMI 42.26 kg/m²     Physical Exam  Vitals reviewed.   Constitutional:       General: She is not in acute distress.     Appearance: She is well-developed.      Comments: Morbidly obese     HENT:      Head: Normocephalic and atraumatic.      Mouth/Throat:      Pharynx: No oropharyngeal exudate.   Eyes:      General: No scleral icterus.     Pupils: Pupils are equal, round, and reactive to light.   Cardiovascular:      Rate and Rhythm: Normal rate.   Pulmonary:      Effort: Pulmonary effort is normal.   Musculoskeletal:         General: Normal range of motion.      Cervical back: Normal range of motion and neck supple.   Skin:     General: Skin is warm and dry.      Findings: No erythema.   Neurological:      Mental Status: She is alert and oriented to person, place, and time.      Cranial Nerves: No cranial nerve deficit.   Psychiatric:         Behavior: Behavior normal.         Judgment: Judgment normal.       Assessment:       1. Obesity, Class III, BMI 40-49.9 (morbid obesity)            Plan:         Luzmaria was seen today for follow-up.    Diagnoses and all orders for this visit:    Obesity, Class III, BMI 40-49.9 (morbid obesity)  -     diethylpropion (TENUATE) 75 mg SR tablet; Take 1 tablet (75 mg total) by mouth once daily.  -     phentermine (ADIPEX-P) 37.5 mg tablet; 1/2-1 tab po daily         Now and next month: Patient warned of common side effects of phentermine including anxiety, insomnia, palpitations and increased blood pressure. It was also explained that it is for short-term usage along with diet and exercise, and that stopping the medication without making lifestyle changes will result in regain of weight. Patient states understanding.     Weight loss medications are controlled substances.  They require routine follow up. Prescription or pills that are lost or destroyed will not be  replaced.     February:  Patient warned of common side effects of diethylpropion including anxiety, insomnia, palpitations and increased blood pressure. It was also explained that it is for short-term usage along with diet and exercise, and that stopping the medication without making lifestyle changes will result in regain of weight. Patient states understanding.    Weight loss medications are controlled substances.  They require routine follow up. Prescription or pills that are lost or destroyed will not be replaced.           Exercise 30 min 3 days week. Gradually increase.         3 meals a day made up of the following:  Unlimited green vegetables, tomatoes, mushrooms, spaghetti squash, cauliflower, meat, poultry, seafood, eggs and hard cheeses.   Milk and plain yogurt  Dressings, seasonings, condiments, etc should have less than 2 g sugars.   Beans (1-1.5 cups) or nuts (1/4 cup) can have 1 x a day.   1-2 servings of citrus fruits, berries, pineapple or melon a day (1/2 cup)  Avoid fried foods    No grains, rice, pasta, potatoes, bread, corn, peas, oatmeal, grits, tortillas, crackers, chips    No soda, sweet tea, juices or lemonade    [email or web address].[email or web address] for recipes. Moderate carb intake.         Add some type of resistance training 2-3 days a week. These can be body weight exercises, light weight or elastic bands. [employer] and [employer] are great sources for free work out plans and videos.       Holiday tips given.

## 2023-01-04 ENCOUNTER — OFFICE VISIT (OUTPATIENT)
Dept: INTERNAL MEDICINE | Facility: CLINIC | Age: 48
End: 2023-01-04
Payer: COMMERCIAL

## 2023-01-04 ENCOUNTER — HOSPITAL ENCOUNTER (OUTPATIENT)
Dept: RADIOLOGY | Facility: HOSPITAL | Age: 48
Discharge: HOME | End: 2023-01-04
Attending: FAMILY MEDICINE
Payer: COMMERCIAL

## 2023-01-04 VITALS
RESPIRATION RATE: 16 BRPM | DIASTOLIC BLOOD PRESSURE: 88 MMHG | TEMPERATURE: 97 F | SYSTOLIC BLOOD PRESSURE: 128 MMHG | HEART RATE: 72 BPM | WEIGHT: 233 LBS | BODY MASS INDEX: 42.88 KG/M2 | HEIGHT: 62 IN

## 2023-01-04 DIAGNOSIS — M79.645 PAIN OF LEFT THUMB: ICD-10-CM

## 2023-01-04 DIAGNOSIS — E66.01 OBESITY, CLASS III, BMI 40-49.9 (MORBID OBESITY): ICD-10-CM

## 2023-01-04 DIAGNOSIS — M79.645 PAIN OF LEFT THUMB: Primary | ICD-10-CM

## 2023-01-04 PROCEDURE — 99999 PR PBB SHADOW E&M-EST. PATIENT-LVL V: CPT | Mod: PBBFAC,,, | Performed by: FAMILY MEDICINE

## 2023-01-04 PROCEDURE — 3079F DIAST BP 80-89 MM HG: CPT | Mod: CPTII,S$GLB,, | Performed by: FAMILY MEDICINE

## 2023-01-04 PROCEDURE — 73130 X-RAY EXAM OF HAND: CPT | Mod: TC,PO,LT

## 2023-01-04 PROCEDURE — 3074F SYST BP LT 130 MM HG: CPT | Mod: CPTII,S$GLB,, | Performed by: FAMILY MEDICINE

## 2023-01-04 PROCEDURE — 73130 X-RAY EXAM OF HAND: CPT | Mod: 26,LT,, | Performed by: RADIOLOGY

## 2023-01-04 PROCEDURE — 1160F RVW MEDS BY RX/DR IN RCRD: CPT | Mod: CPTII,S$GLB,, | Performed by: FAMILY MEDICINE

## 2023-01-04 PROCEDURE — 99999 PR PBB SHADOW E&M-EST. PATIENT-LVL V: ICD-10-PCS | Mod: PBBFAC,,, | Performed by: FAMILY MEDICINE

## 2023-01-04 PROCEDURE — 3008F PR BODY MASS INDEX (BMI) DOCUMENTED: ICD-10-PCS | Mod: CPTII,S$GLB,, | Performed by: FAMILY MEDICINE

## 2023-01-04 PROCEDURE — 3079F PR MOST RECENT DIASTOLIC BLOOD PRESSURE 80-89 MM HG: ICD-10-PCS | Mod: CPTII,S$GLB,, | Performed by: FAMILY MEDICINE

## 2023-01-04 PROCEDURE — 1159F MED LIST DOCD IN RCRD: CPT | Mod: CPTII,S$GLB,, | Performed by: FAMILY MEDICINE

## 2023-01-04 PROCEDURE — 99214 PR OFFICE/OUTPT VISIT, EST, LEVL IV, 30-39 MIN: ICD-10-PCS | Mod: S$GLB,,, | Performed by: FAMILY MEDICINE

## 2023-01-04 PROCEDURE — 3008F BODY MASS INDEX DOCD: CPT | Mod: CPTII,S$GLB,, | Performed by: FAMILY MEDICINE

## 2023-01-04 PROCEDURE — 99214 OFFICE O/P EST MOD 30 MIN: CPT | Mod: S$GLB,,, | Performed by: FAMILY MEDICINE

## 2023-01-04 PROCEDURE — 73130 XR HAND COMPLETE 3 VIEW LEFT: ICD-10-PCS | Mod: 26,LT,, | Performed by: RADIOLOGY

## 2023-01-04 PROCEDURE — 1159F PR MEDICATION LIST DOCUMENTED IN MEDICAL RECORD: ICD-10-PCS | Mod: CPTII,S$GLB,, | Performed by: FAMILY MEDICINE

## 2023-01-04 PROCEDURE — 1160F PR REVIEW ALL MEDS BY PRESCRIBER/CLIN PHARMACIST DOCUMENTED: ICD-10-PCS | Mod: CPTII,S$GLB,, | Performed by: FAMILY MEDICINE

## 2023-01-04 PROCEDURE — 3074F PR MOST RECENT SYSTOLIC BLOOD PRESSURE < 130 MM HG: ICD-10-PCS | Mod: CPTII,S$GLB,, | Performed by: FAMILY MEDICINE

## 2023-01-04 NOTE — PROGRESS NOTES
Subjective:       Patient ID: Luzmaria Louis is a 47 y.o. female.    Chief Complaint: thumb pain (Hit it a couple of months ago. Hurts off and on.  Types all day at work.  Pain at 4 when bends. )    HPI 47-year-old  female patient of Dr. Reza with morbid obesity followed by bariatric medicine presents to clinic today secondary to a complaint of left thumb pain which has been ongoing for a few months.  She does note bumping her hand a few months ago but states that at the time she did not feel any pain.  A few weeks later she began noticing the pain with activity.  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  She has not attempted any treatment for this pain.  She rates her pain at a 4/10.  Review of Systems   Constitutional:  Negative for appetite change, chills, fatigue and fever.   HENT:  Negative for nasal congestion, ear pain, hearing loss, postnasal drip, rhinorrhea, sinus pressure/congestion, sore throat and tinnitus.    Eyes:  Negative for redness, itching and visual disturbance.   Respiratory:  Negative for cough, chest tightness and shortness of breath.    Cardiovascular:  Negative for chest pain and palpitations.   Gastrointestinal:  Negative for abdominal pain, constipation, diarrhea, nausea and vomiting.   Genitourinary:  Negative for decreased urine volume, difficulty urinating, dysuria, frequency, hematuria and urgency.   Musculoskeletal:  Positive for arthralgias (Left thumb). Negative for back pain, myalgias, neck pain and neck stiffness.   Integumentary:  Negative for rash.   Neurological:  Negative for dizziness, light-headedness and headaches.   Psychiatric/Behavioral: Negative.         Objective:      Physical Exam  Vitals and nursing note reviewed.   Constitutional:       General: She is not in acute distress.     Appearance: She is well-developed. She is not diaphoretic.   HENT:      Head: Normocephalic and atraumatic.      Right Ear: External ear normal.       Left Ear: External ear normal.      Nose: Nose normal.      Mouth/Throat:      Pharynx: No oropharyngeal exudate.   Eyes:      General: No scleral icterus.        Right eye: No discharge.         Left eye: No discharge.      Conjunctiva/sclera: Conjunctivae normal.      Pupils: Pupils are equal, round, and reactive to light.   Neck:      Thyroid: No thyromegaly.      Vascular: No JVD.      Trachea: No tracheal deviation.   Pulmonary:      Effort: Pulmonary effort is normal.   Musculoskeletal:         General: No tenderness. Normal range of motion.        Hands:    Lymphadenopathy:      Cervical: No cervical adenopathy.   Skin:     General: Skin is warm and dry.      Coloration: Skin is not pale.      Findings: No erythema or rash.   Neurological:      Mental Status: She is alert and oriented to person, place, and time.   Psychiatric:         Behavior: Behavior normal.         Thought Content: Thought content normal.         Judgment: Judgment normal.       Assessment:       Problem List Items Addressed This Visit    None  Visit Diagnoses       Pain of left thumb    -  Primary    Relevant Orders    X-Ray Hand 2 View Left    Ambulatory referral/consult to Orthopedics    Obesity, Class III, BMI 40-49.9 (morbid obesity)                  Plan:         1. Left hand x-ray.    2. Refer to orthopedics for further evaluation and treatment of left thumb pain.  3. Continue follow-up with Bariatric Medicine for treatment of morbid obesity.  4. Return to clinic as needed if symptoms persist or worsen.

## 2023-02-01 ENCOUNTER — TELEPHONE (OUTPATIENT)
Dept: ORTHOPEDICS | Facility: CLINIC | Age: 48
End: 2023-02-01
Payer: COMMERCIAL

## 2023-02-02 ENCOUNTER — OFFICE VISIT (OUTPATIENT)
Dept: ORTHOPEDICS | Facility: CLINIC | Age: 48
End: 2023-02-02
Payer: COMMERCIAL

## 2023-02-02 VITALS
BODY MASS INDEX: 42.88 KG/M2 | HEIGHT: 62 IN | SYSTOLIC BLOOD PRESSURE: 118 MMHG | DIASTOLIC BLOOD PRESSURE: 82 MMHG | HEART RATE: 76 BPM | WEIGHT: 233 LBS

## 2023-02-02 DIAGNOSIS — M79.645 PAIN OF LEFT THUMB: ICD-10-CM

## 2023-02-02 PROCEDURE — 3008F BODY MASS INDEX DOCD: CPT | Mod: CPTII,S$GLB,, | Performed by: PHYSICIAN ASSISTANT

## 2023-02-02 PROCEDURE — 99213 PR OFFICE/OUTPT VISIT, EST, LEVL III, 20-29 MIN: ICD-10-PCS | Mod: S$GLB,,, | Performed by: PHYSICIAN ASSISTANT

## 2023-02-02 PROCEDURE — 1159F MED LIST DOCD IN RCRD: CPT | Mod: CPTII,S$GLB,, | Performed by: PHYSICIAN ASSISTANT

## 2023-02-02 PROCEDURE — 3079F PR MOST RECENT DIASTOLIC BLOOD PRESSURE 80-89 MM HG: ICD-10-PCS | Mod: CPTII,S$GLB,, | Performed by: PHYSICIAN ASSISTANT

## 2023-02-02 PROCEDURE — 3074F SYST BP LT 130 MM HG: CPT | Mod: CPTII,S$GLB,, | Performed by: PHYSICIAN ASSISTANT

## 2023-02-02 PROCEDURE — 1160F PR REVIEW ALL MEDS BY PRESCRIBER/CLIN PHARMACIST DOCUMENTED: ICD-10-PCS | Mod: CPTII,S$GLB,, | Performed by: PHYSICIAN ASSISTANT

## 2023-02-02 PROCEDURE — 3079F DIAST BP 80-89 MM HG: CPT | Mod: CPTII,S$GLB,, | Performed by: PHYSICIAN ASSISTANT

## 2023-02-02 PROCEDURE — 99999 PR PBB SHADOW E&M-EST. PATIENT-LVL IV: ICD-10-PCS | Mod: PBBFAC,,, | Performed by: PHYSICIAN ASSISTANT

## 2023-02-02 PROCEDURE — 3008F PR BODY MASS INDEX (BMI) DOCUMENTED: ICD-10-PCS | Mod: CPTII,S$GLB,, | Performed by: PHYSICIAN ASSISTANT

## 2023-02-02 PROCEDURE — 99999 PR PBB SHADOW E&M-EST. PATIENT-LVL IV: CPT | Mod: PBBFAC,,, | Performed by: PHYSICIAN ASSISTANT

## 2023-02-02 PROCEDURE — 1160F RVW MEDS BY RX/DR IN RCRD: CPT | Mod: CPTII,S$GLB,, | Performed by: PHYSICIAN ASSISTANT

## 2023-02-02 PROCEDURE — 99213 OFFICE O/P EST LOW 20 MIN: CPT | Mod: S$GLB,,, | Performed by: PHYSICIAN ASSISTANT

## 2023-02-02 PROCEDURE — 1159F PR MEDICATION LIST DOCUMENTED IN MEDICAL RECORD: ICD-10-PCS | Mod: CPTII,S$GLB,, | Performed by: PHYSICIAN ASSISTANT

## 2023-02-02 PROCEDURE — 3074F PR MOST RECENT SYSTOLIC BLOOD PRESSURE < 130 MM HG: ICD-10-PCS | Mod: CPTII,S$GLB,, | Performed by: PHYSICIAN ASSISTANT

## 2023-02-02 NOTE — PROGRESS NOTES
Subjective:      Patient ID: Luzmaria Louis is a 47 y.o. female.    Chief Complaint: Pain of the Left Hand      HPI  Luzmarai Louis is a right hand dominant 47 y.o. female presenting today for left thumb pain.  There was a history of trauma - she reports accidentally hit the thumb, had brief pain and then was not bothersome. A few weeks later she noticed sharp pain in the left thumb, this was approximately 3-4 months ago. She was seen by primary care 1/4/23 for thumb pain, referred to Orthopedics for further evaluation.    She works on a computer throughout the day.      Review of patient's allergies indicates:   Allergen Reactions    Caffeine      She feels like it is hard to breath, dizzy, nauseous.         Current Outpatient Medications   Medication Sig Dispense Refill    cholestyramine (QUESTRAN) 4 gram packet MIX 1 PACKET AND DRINK 3 TIMES DAILY WITH FOOD 90 packet 7    diethylpropion (TENUATE) 75 mg SR tablet Take 1 tablet (75 mg total) by mouth once daily. 30 tablet 1    diphenhydramine HCl (ALLERGY, DIPHENHYDRAMINE, ORAL) Take by mouth.      ibuprofen (ADVIL,MOTRIN) 800 MG tablet TAKE 1 TABLET (800 MG TOTAL) BY MOUTH 3 (THREE) TIMES DAILY AS NEEDED FOR PAIN. 90 tablet 1    oxyCODONE-acetaminophen (PERCOCET)  mg per tablet Take 1 tablet by mouth every 6 (six) hours as needed for Pain. 28 tablet 0    phentermine (ADIPEX-P) 37.5 mg tablet 1/2-1 tab po daily 30 tablet 0    promethazine (PHENERGAN) 25 MG tablet Take 1 tablet (25 mg total) by mouth every 6 (six) hours as needed for Nausea. 30 tablet 1    rizatriptan (MAXALT-MLT) 5 MG disintegrating tablet Take at onset of migraine, can repeat in 2 hrs if needed.  No more than 2 tabs per day or 3 days/wk. 12 tablet 3     No current facility-administered medications for this visit.       Past Medical History:   Diagnosis Date    Fibroid     GERD (gastroesophageal reflux disease)     Migraine headache        Past Surgical History:   Procedure  "Laterality Date    ARTHROSCOPIC DEBRIDEMENT OF ROTATOR CUFF Left 7/29/2020    Procedure: DEBRIDEMENT, ROTATOR CUFF, ARTHROSCOPIC;  Surgeon: Tejas Prince MD;  Location: [facility] OR;  Service: Orthopedics;  Laterality: Left;    ARTHROSCOPY OF SHOULDER WITH DECOMPRESSION OF SUBACROMIAL SPACE Left 7/29/2020    Procedure: ARTHROSCOPY, SHOULDER, WITH SUBACROMIAL SPACE DECOMPRESSION;  Surgeon: Tejas Prince MD;  Location: [facility] OR;  Service: Orthopedics;  Laterality: Left;    CALCANEAL OSTEOTOMY Left 2/25/2021    Procedure: OSTEOTOMY, CALCANEUS Medial displacement;  Surgeon: Jamison Mercado MD;  Location: [facility] OR;  Service: Orthopedics;  Laterality: Left;  Arthrex screws  FluoroScan imaging    CHOLECYSTECTOMY      COLONOSCOPY N/A 10/4/2021    Procedure: COLONOSCOPY;  Surgeon: Cristina Cannon MD;  Location: [facility] ENDO (4TH FLR);  Service: Endoscopy;  Laterality: N/A;  covid test madyson 10/1  pt requests Miralax prep-MS    HYSTERECTOMY      MOUTH SURGERY      THYROID LOBECTOMY Right 7/3/2019    Procedure: LOBECTOMY, THYROID, Possible Total;  Surgeon: Carlee Huff MD;  Location: [facility] OR 2ND FLR;  Service: General;  Laterality: Right;    TONSILLECTOMY      TRANSFER OF TENDON OF LOWER EXTREMITY Left 2/25/2021    Procedure: TRANSFER, TENDON, LOWER EXTREMITY FDL to posterior tibial tendon;  Surgeon: Jamison Mercado MD;  Location: [facility] OR;  Service: Orthopedics;  Laterality: Left;  Arthrex bio tenodesis screws    TUBAL LIGATION           Review of Systems:  ROS:  Constitutional: no fever or chills  Skin: no rash or suspicious lesions  Musculoskeletal: See HPI.   Neurological: no headaches, lightheadedness, or dizziness. No numbness or tingling  Psychological/behavioral: no anxiety or depression      OBJECTIVE:     PHYSICAL EXAM:  Height: 5' 2" (157.5 cm) Weight: 105.7 kg (233 lb)  Vitals:    02/02/23 1429   BP: 118/82   Pulse: 76   Weight: 105.7 kg (233 lb)   Height: 5' 2" (1.575 m)   PainSc:   4 "     Vitals reviewed.  Constitutional: NAD. Patient appears well-developed and well-nourished.   HENT:   Head: Normocephalic and atraumatic.   Neck: Normal range of motion.   Cardiovascular: Normal rate.    Pulmonary/Chest: Effort normal. No respiratory distress.   Neurological: Patient is awake, alert, oriented.   Psychiatric: Patient has a normal mood and affect. Behavior is normal. Judgment and thought content normal.  Musculoskeletal:  No scars noted.  No lacerations or abrasions.  No edema.  She is tender to palpation over the radial aspect of the left thumb IP joint, otherwise nontender.  No pain over the A1 pulley of the left thumb, no thickening.  Good finger and wrist range of motion bilaterally.  Neurovascularly intact-good sensation and motor function, good capillary refill, 2+ radial pulses.    RADIOGRAPHS:  Left hand XRay, 1/4/2023  FINDINGS:  Phalanges metacarpals and carpal bones are intact.  No bony abnormalities can be seen.  Interphalangeal joints of the thumb are normal.     Impression:  See above    Comments: I have personally reviewed the imaging and I agree with the above radiologist's report.    ASSESSMENT/PLAN:   Luzmaria was seen today for pain.    Diagnoses and all orders for this visit:    Pain of left thumb  -     Ambulatory referral/consult to Orthopedics           - We talked at length about the anatomy and pathophysiology of   Encounter Diagnosis   Name Primary?    Pain of left thumb        - discussed patient's symptoms and physical exam findings, x-rays reviewed with the patient.  No x-ray abnormalities in the patient's area of pain.  We discussed possible nerve pain affecting the left thumb from repetitive trauma.  - discussed small compression wrap for padding and pain relief  - discussed possibility compound cream patient is not interested  - discussed desensitization exercises  - follow-up if symptoms do not improve    Disclaimer: This note has been generated using voice-recognition  software. There may be typographical errors that have been missed during proof-reading.

## 2023-02-22 ENCOUNTER — PATIENT MESSAGE (OUTPATIENT)
Dept: BARIATRICS | Facility: CLINIC | Age: 48
End: 2023-02-22
Payer: COMMERCIAL

## 2023-03-07 ENCOUNTER — OFFICE VISIT (OUTPATIENT)
Dept: BARIATRICS | Facility: CLINIC | Age: 48
End: 2023-03-07
Payer: COMMERCIAL

## 2023-03-07 VITALS
WEIGHT: 233 LBS | OXYGEN SATURATION: 100 % | DIASTOLIC BLOOD PRESSURE: 58 MMHG | SYSTOLIC BLOOD PRESSURE: 125 MMHG | BODY MASS INDEX: 42.62 KG/M2 | HEART RATE: 64 BPM

## 2023-03-07 DIAGNOSIS — E66.01 OBESITY, CLASS III, BMI 40-49.9 (MORBID OBESITY): Primary | ICD-10-CM

## 2023-03-07 PROCEDURE — 1159F MED LIST DOCD IN RCRD: CPT | Mod: CPTII,S$GLB,, | Performed by: INTERNAL MEDICINE

## 2023-03-07 PROCEDURE — 99999 PR PBB SHADOW E&M-EST. PATIENT-LVL IV: ICD-10-PCS | Mod: PBBFAC,,, | Performed by: INTERNAL MEDICINE

## 2023-03-07 PROCEDURE — 3008F BODY MASS INDEX DOCD: CPT | Mod: CPTII,S$GLB,, | Performed by: INTERNAL MEDICINE

## 2023-03-07 PROCEDURE — 3074F SYST BP LT 130 MM HG: CPT | Mod: CPTII,S$GLB,, | Performed by: INTERNAL MEDICINE

## 2023-03-07 PROCEDURE — 3008F PR BODY MASS INDEX (BMI) DOCUMENTED: ICD-10-PCS | Mod: CPTII,S$GLB,, | Performed by: INTERNAL MEDICINE

## 2023-03-07 PROCEDURE — 3078F DIAST BP <80 MM HG: CPT | Mod: CPTII,S$GLB,, | Performed by: INTERNAL MEDICINE

## 2023-03-07 PROCEDURE — 1160F RVW MEDS BY RX/DR IN RCRD: CPT | Mod: CPTII,S$GLB,, | Performed by: INTERNAL MEDICINE

## 2023-03-07 PROCEDURE — 1160F PR REVIEW ALL MEDS BY PRESCRIBER/CLIN PHARMACIST DOCUMENTED: ICD-10-PCS | Mod: CPTII,S$GLB,, | Performed by: INTERNAL MEDICINE

## 2023-03-07 PROCEDURE — 99214 OFFICE O/P EST MOD 30 MIN: CPT | Mod: S$GLB,,, | Performed by: INTERNAL MEDICINE

## 2023-03-07 PROCEDURE — 1159F PR MEDICATION LIST DOCUMENTED IN MEDICAL RECORD: ICD-10-PCS | Mod: CPTII,S$GLB,, | Performed by: INTERNAL MEDICINE

## 2023-03-07 PROCEDURE — 99214 PR OFFICE/OUTPT VISIT, EST, LEVL IV, 30-39 MIN: ICD-10-PCS | Mod: S$GLB,,, | Performed by: INTERNAL MEDICINE

## 2023-03-07 PROCEDURE — 3078F PR MOST RECENT DIASTOLIC BLOOD PRESSURE < 80 MM HG: ICD-10-PCS | Mod: CPTII,S$GLB,, | Performed by: INTERNAL MEDICINE

## 2023-03-07 PROCEDURE — 99999 PR PBB SHADOW E&M-EST. PATIENT-LVL IV: CPT | Mod: PBBFAC,,, | Performed by: INTERNAL MEDICINE

## 2023-03-07 PROCEDURE — 3074F PR MOST RECENT SYSTOLIC BLOOD PRESSURE < 130 MM HG: ICD-10-PCS | Mod: CPTII,S$GLB,, | Performed by: INTERNAL MEDICINE

## 2023-03-07 RX ORDER — DIETHYLPROPION HYDROCHLORIDE 75 MG/1
75 TABLET, EXTENDED RELEASE ORAL DAILY
Qty: 30 TABLET | Refills: 2 | Status: SHIPPED | OUTPATIENT
Start: 2023-03-07 | End: 2023-06-14

## 2023-03-07 NOTE — PATIENT INSTRUCTIONS
"Patient warned of common side effects of diethylpropion including anxiety, insomnia, palpitations and increased blood pressure. It was also explained that it is for short-term usage along with diet and exercise, and that stopping the medication without making lifestyle changes will result in regain of weight. Patient states understanding.    Weight loss medications are controlled substances.  They require routine follow up. Prescription or pills that are lost or destroyed will not be replaced.       Add some type of resistance training 2-3 days a week. These can be body weight exercises, light weight or elastic bands. [employer] and [employer] are great sources for free work out plans and videos.                     1000 calorie  Meal Plan  STARCHES 80 CALORIES PER SERVING 15g CARB, 3g PROTEIN, 1g FAT  Servings per day   bread   tortilla   crackers   cooked cereals   dry cereals   pasta   rice   corn   popcorn   potato (small)   potato, mashed   sweet potato   squash, winter   cooked beans, peas, lentils (add 1 meat exchange)   1 slice   1 (6")   4-6 (3/4 oz)   1/2 cup   3/4 cup   1/2 cup   1/3 cup  1/2 cup   1 small light bag  1 (3 oz)   1/2 cup   1/3 cup   1 cup   1/2 cup Most starches are a good source of B vitamins   Choose whole grain foods such as 100% whole wheat bread and flour, brown rice, tortillas, etc. for nutrients and fiber.   Combine beans (starch & meat) with grains (starch) for their complimentary proteins and fiber   Combine grains (starch) with milk (milk) or cheese (meat) to compliment proteins     2   FRUIT 60 CALORIES PER SERVING 15g CARB    fresh fruit   banana  melon (cubes)   berries  canned fruit   dried fruit    1 small   1/2  ½ cup   ¾ cup  ½ cup   ¼ cup    Choose whole fruits for fiber   No fruit juices   2   DAIRY  CALORIES PER SERVING 12g CARB, 8g PROTEIN, 0-8g FAT    milk   yogurt  Protein soy or almond milk 1 cup   1 cup   1 cup Use unsweetened almond or soy milk with added " protein  Avoid chocolate or flavored milk  Avoid yogurt with more than 8 gms of sugar. Gms of protein should be higher than grams of sugar               1   MEAT AND SUBSTITUES  CALORIES PER SERVING 7g Protein, 0-13g FAT    Meat,Seafood and fish   cheese   cottage cheese   egg   peanut butter   tofu or tempeh  cooked beans, peas, lentils (add 1 starch)  Quinoa (add 1 starch)   Nuts and seeds (½ serving protein + 1 fat)  Nutritional yeast  [employer] grillers 1 oz     1 oz  1/4 cup     1   1.5 Tbsp   4 oz (1/2 cup)   1/2 cup              ¼ cup            2 tablespoons    1 brady or ½ cup      Choose fish, seafood and lower fat cheeses  Limit frying or adding fat.      8   FATS 45 CALORIES PER SERVING     oil   mayonnaise   cream cheese   salad dressing   peanuts   avocado   butter or margarine    1 tsp   1 tsp   1 Tbsp   1 Tbsp   10   1/8   1 tsp Eat less fat.   Eat less saturated fat such as animal fat found in fatter meat, cheese, and butter. Also eat less hydrogenated fat.      2-3   VEGETABLES 25 CALORIES PER SERVING 5 g CARB, 2g PROTEIN    raw vegetables   cooked vegetables   tomato or vegetable juice 1 cup   1/2 cup     1/2 cup Choose dark green leafy and deep yellow vegetables such as spinach, zuchinni, squash, mushrooms, cauliflower ,broccoli, carrots, and peppers.   Unlimited         1000 CALORIE MEAL PLAN  Eat 3 small meals per day with 1-2 small snacks to keep you full throughout the day  Aim for at least 15 gms of protein at breakfast, at least 25 grams at lunch and at least 25 grams of protein at dinner.  Snacks should contain at least 5 grams of protein  Limit starches to 1 serving per meal or snack.  Keep your carbs whole grain or whole wheat  Limit your intake of refined sugar including sugary beverages ie sweet tea, lemonade, fruit punch             Fruit Protein Dairy Starch Fats Calories   Breakfast 1 2    370   Lunch  3  1 1 290   Dinner 1 3  1 1 315   Snack   1   120   Total 2 8 1 2 2  1085     Sample breakfasts:  2 scrambled eggs (use spray), 1 cup Protein Silk soy milk, 1 slice whole wheat toast, 1 small orange  Omelet made with 1 egg, 1-ounce low fat cheese and non-starchy veggies, 1 low fat plain yogurt with ½ banana  Plain yogurt with ¾ cup unsweetened cold cereal and ½ cup fresh fruit salad, 2 hardboiled eggs  Sample lunches:  ½ whole wheat bagel topped with 3 ounces of low fat cheese melted in oven with a wild green salad with 1 TBSP dressing  ¾ cup cooked beans with 6 whole grain crackers, 10 roasted peanuts  ½ medium baked potato with 3 ounces of low fat cheddar cheese and 1 TBSP  sour cream  1 small wheat tortilla brushed with 1 tsp olive oil then brushed with pizza sauce and 4 ounces low fat cheese, sliced mushrooms and green peppers broiled until cheese is melted.  ½ cup chopped melon    Sample Dinners:  4 ounces of tuna pan seared in 1 tsp olive oil, ½ baked small sweet potato and 2 small plums  ½ cup chick peas, 1 cup cauliflower sauteed with 1 tbsp chopped onion, 1 tsp oil, and 2 tsp rosales powder. Add low sodium vegetable stock to desired consistency. Serve with ½ cup brown rice  Red beans seasoned with onions and bell peppers served over cauliflower rice  1 cup cooked green or brown lentils served with ½ cup cooked quinoa and chopped tomatoes and cucumbers and 1 tbsp feta cheese  Sample Snacks:  1 cup of Protein Silk Soy milk with 3 squares don crackers  3/4 ounce Triscuits with 1 ounce cubed cheese  Chobani Triple Zero yogurt with ¾ cup unsweetened cereal  ½ cup edamame (shelled)

## 2023-03-07 NOTE — PROGRESS NOTES
Subjective:       Patient ID: Luzmaria Louis is a 48 y.o. female.    Chief Complaint: Follow-up      Pt here today for follow-up. Has gained 2 lbs since last seen, net neg 22 lbs. Started LCHF diet and phentermine x 1 month. Last filled 1/29/23.   checked today. Denies SE. She does feel it has her appetite down when she was on the medication. She has Stopped eating candy. Has been eating chicken, rice, veg..Occasional lemonade. Eat, egg, sausage, eggs, and cracker, red beans, spaghetti, dirty rice, green beans, peas pork chops. .   Exercise- [place] airport. Increasing activity during the day as well.   Working in garden as well.   Was on topiramate for migraines, has not been taking it 2/2 to paresthesia. Took phentermine throughout 2018 and did not lose weight. States did ok with it in June.     Lab Results       Component                Value               Date                       HGBA1C                   5.4                 10/18/2021                 HGBA1C                   5.2                 10/20/2020                 HGBA1C                   5.2                 10/11/2019            Lab Results       Component                Value               Date                       LDLCALC                  117.2               10/18/2021                 CREATININE               0.7                 10/18/2021                 Follow-up  Associated symptoms include arthralgias and myalgias. Pertinent negatives include no chest pain, chills or fever.   Review of Systems   Constitutional:  Negative for chills and fever.   Respiratory:  Negative for shortness of breath.         + Snores   Cardiovascular:  Negative for chest pain and leg swelling.   Gastrointestinal:  Negative for constipation and diarrhea.        + GERD   Genitourinary:  Negative for difficulty urinating and dysuria.   Musculoskeletal:  Positive for arthralgias and myalgias. Negative for back pain.   Neurological:  Positive for  dizziness. Negative for light-headedness.   Psychiatric/Behavioral:  Negative for dysphoric mood. The patient is not nervous/anxious.      Objective:     BP (!) 125/58   Pulse 64   Wt 105.7 kg (233 lb 0.4 oz)   SpO2 100%   BMI 42.62 kg/m²     Physical Exam  Vitals reviewed.   Constitutional:       General: She is not in acute distress.     Appearance: She is well-developed.      Comments: Morbidly obese     HENT:      Head: Normocephalic and atraumatic.      Mouth/Throat:      Pharynx: No oropharyngeal exudate.   Eyes:      General: No scleral icterus.     Pupils: Pupils are equal, round, and reactive to light.   Cardiovascular:      Rate and Rhythm: Normal rate.   Pulmonary:      Effort: Pulmonary effort is normal.   Musculoskeletal:         General: Normal range of motion.      Cervical back: Normal range of motion and neck supple.   Skin:     General: Skin is warm and dry.      Findings: No erythema.   Neurological:      Mental Status: She is alert and oriented to person, place, and time.      Cranial Nerves: No cranial nerve deficit.   Psychiatric:         Behavior: Behavior normal.         Judgment: Judgment normal.       Assessment:       1. Obesity, Class III, BMI 40-49.9 (morbid obesity)              Plan:         Luzmaria was seen today for follow-up.    Diagnoses and all orders for this visit:    Obesity, Class III, BMI 40-49.9 (morbid obesity)  -     diethylpropion (TENUATE) 75 mg SR tablet; Take 1 tablet (75 mg total) by mouth once daily.           Patient warned of common side effects of diethylpropion including anxiety, insomnia, palpitations and increased blood pressure. It was also explained that it is for short-term usage along with diet and exercise, and that stopping the medication without making lifestyle changes will result in regain of weight. Patient states understanding.    Weight loss medications are controlled substances.  They require routine follow up. Prescription or pills that are  lost or destroyed will not be replaced.           Exercise 30 min 3 days week. Gradually increase.         3 meals a day made up of the following:  Unlimited green vegetables, tomatoes, mushrooms, spaghetti squash, cauliflower, meat, poultry, seafood, eggs and hard cheeses.   Milk and plain yogurt  Dressings, seasonings, condiments, etc should have less than 2 g sugars.   Beans (1-1.5 cups) or nuts (1/4 cup) can have 1 x a day.   1-2 servings of citrus fruits, berries, pineapple or melon a day (1/2 cup)  Avoid fried foods    No grains, rice, pasta, potatoes, bread, corn, peas, oatmeal, grits, tortillas, crackers, chips    No soda, sweet tea, juices or lemonade    [email or web address].[email or web address][employer] for recipes. Moderate carb intake.         Add some type of resistance training 2-3 days a week. These can be body weight exercises, light weight or elastic bands. [employer] and [employer] are great sources for free work out plans and videos.       1000 delvis pb meal planner given.

## 2023-05-18 DIAGNOSIS — Z09 FOLLOW-UP EXAMINATION AFTER ORTHOPEDIC SURGERY: ICD-10-CM

## 2023-05-18 DIAGNOSIS — G89.18 POST-OP PAIN: ICD-10-CM

## 2023-05-22 RX ORDER — OXYCODONE AND ACETAMINOPHEN 10; 325 MG/1; MG/1
1 TABLET ORAL EVERY 6 HOURS PRN
Qty: 28 TABLET | Refills: 0 | Status: SHIPPED | OUTPATIENT
Start: 2023-05-22 | End: 2023-06-21 | Stop reason: SDUPTHER

## 2023-06-06 ENCOUNTER — OFFICE VISIT (OUTPATIENT)
Dept: INTERNAL MEDICINE | Facility: CLINIC | Age: 48
End: 2023-06-06
Payer: COMMERCIAL

## 2023-06-06 ENCOUNTER — HOSPITAL ENCOUNTER (OUTPATIENT)
Dept: RADIOLOGY | Facility: HOSPITAL | Age: 48
Discharge: HOME | End: 2023-06-06
Attending: INTERNAL MEDICINE
Payer: COMMERCIAL

## 2023-06-06 VITALS
WEIGHT: 242.5 LBS | RESPIRATION RATE: 10 BRPM | HEIGHT: 62 IN | DIASTOLIC BLOOD PRESSURE: 80 MMHG | SYSTOLIC BLOOD PRESSURE: 122 MMHG | BODY MASS INDEX: 44.63 KG/M2 | HEART RATE: 65 BPM

## 2023-06-06 DIAGNOSIS — M25.551 RIGHT HIP PAIN: Primary | ICD-10-CM

## 2023-06-06 DIAGNOSIS — M25.551 RIGHT HIP PAIN: ICD-10-CM

## 2023-06-06 PROCEDURE — 1160F PR REVIEW ALL MEDS BY PRESCRIBER/CLIN PHARMACIST DOCUMENTED: ICD-10-PCS | Mod: CPTII,S$GLB,, | Performed by: INTERNAL MEDICINE

## 2023-06-06 PROCEDURE — 73502 X-RAY EXAM HIP UNI 2-3 VIEWS: CPT | Mod: 26,RT,, | Performed by: STUDENT IN AN ORGANIZED HEALTH CARE EDUCATION/TRAINING PROGRAM

## 2023-06-06 PROCEDURE — 73502 XR HIP WITH PELVIS WHEN PERFORMED, 2 OR 3  VIEWS RIGHT: ICD-10-PCS | Mod: 26,RT,, | Performed by: STUDENT IN AN ORGANIZED HEALTH CARE EDUCATION/TRAINING PROGRAM

## 2023-06-06 PROCEDURE — 99214 OFFICE O/P EST MOD 30 MIN: CPT | Mod: S$GLB,,, | Performed by: INTERNAL MEDICINE

## 2023-06-06 PROCEDURE — 99214 PR OFFICE/OUTPT VISIT, EST, LEVL IV, 30-39 MIN: ICD-10-PCS | Mod: S$GLB,,, | Performed by: INTERNAL MEDICINE

## 2023-06-06 PROCEDURE — 3074F PR MOST RECENT SYSTOLIC BLOOD PRESSURE < 130 MM HG: ICD-10-PCS | Mod: CPTII,S$GLB,, | Performed by: INTERNAL MEDICINE

## 2023-06-06 PROCEDURE — 1160F RVW MEDS BY RX/DR IN RCRD: CPT | Mod: CPTII,S$GLB,, | Performed by: INTERNAL MEDICINE

## 2023-06-06 PROCEDURE — 73502 X-RAY EXAM HIP UNI 2-3 VIEWS: CPT | Mod: TC,PO,RT

## 2023-06-06 PROCEDURE — 99999 PR PBB SHADOW E&M-EST. PATIENT-LVL III: CPT | Mod: PBBFAC,,, | Performed by: INTERNAL MEDICINE

## 2023-06-06 PROCEDURE — 3008F PR BODY MASS INDEX (BMI) DOCUMENTED: ICD-10-PCS | Mod: CPTII,S$GLB,, | Performed by: INTERNAL MEDICINE

## 2023-06-06 PROCEDURE — 3079F DIAST BP 80-89 MM HG: CPT | Mod: CPTII,S$GLB,, | Performed by: INTERNAL MEDICINE

## 2023-06-06 PROCEDURE — 99999 PR PBB SHADOW E&M-EST. PATIENT-LVL III: ICD-10-PCS | Mod: PBBFAC,,, | Performed by: INTERNAL MEDICINE

## 2023-06-06 PROCEDURE — 3008F BODY MASS INDEX DOCD: CPT | Mod: CPTII,S$GLB,, | Performed by: INTERNAL MEDICINE

## 2023-06-06 PROCEDURE — 1159F PR MEDICATION LIST DOCUMENTED IN MEDICAL RECORD: ICD-10-PCS | Mod: CPTII,S$GLB,, | Performed by: INTERNAL MEDICINE

## 2023-06-06 PROCEDURE — 1159F MED LIST DOCD IN RCRD: CPT | Mod: CPTII,S$GLB,, | Performed by: INTERNAL MEDICINE

## 2023-06-06 PROCEDURE — 3079F PR MOST RECENT DIASTOLIC BLOOD PRESSURE 80-89 MM HG: ICD-10-PCS | Mod: CPTII,S$GLB,, | Performed by: INTERNAL MEDICINE

## 2023-06-06 PROCEDURE — 3074F SYST BP LT 130 MM HG: CPT | Mod: CPTII,S$GLB,, | Performed by: INTERNAL MEDICINE

## 2023-06-06 RX ORDER — MELOXICAM 15 MG/1
15 TABLET ORAL DAILY PRN
Qty: 30 TABLET | Refills: 11 | Status: SHIPPED | OUTPATIENT
Start: 2023-06-06 | End: 2023-10-11

## 2023-06-06 NOTE — PROGRESS NOTES
Subjective:       Patient ID: Luzmaria Louis is a 48 y.o. female.    Chief Complaint: Leg Pain    HPI    48-year-old female here for evaluation of pain in the top right of her leg.  She has been for the last 4 months having pain at the top of her leg.  She can lift and pull her left leg over.  She tries to do the same thing with the right leg, it will crack and be very painful.  She describes the pain as a sharp pain when it pops.  This pain comes and goes.  If she is sitting for too long and gets up to walk, she has pain.  If she is putting her socks on, she has pain.  The pain can be constant for up to twenty minutes. She has to take something to relieve the pain.  This is positional.  She has taken ibuprofen 800 mg, which helped.  She has oxycodone.    Review of Systems      Objective:      Physical Exam  Vitals reviewed.   Constitutional:       Appearance: She is well-developed.   HENT:      Head: Normocephalic and atraumatic.      Mouth/Throat:      Pharynx: No oropharyngeal exudate.   Eyes:      General: No scleral icterus.        Right eye: No discharge.         Left eye: No discharge.      Pupils: Pupils are equal, round, and reactive to light.   Neck:      Thyroid: No thyromegaly.      Trachea: No tracheal deviation.   Cardiovascular:      Rate and Rhythm: Normal rate and regular rhythm.      Heart sounds: Normal heart sounds. No murmur heard.    No friction rub. No gallop.   Pulmonary:      Effort: Pulmonary effort is normal. No respiratory distress.      Breath sounds: Normal breath sounds. No wheezing or rales.   Chest:      Chest wall: No tenderness.   Abdominal:      General: Bowel sounds are normal. There is no distension.      Palpations: Abdomen is soft. There is no mass.      Tenderness: There is no abdominal tenderness. There is no guarding or rebound.   Musculoskeletal:      Cervical back: Normal range of motion and neck supple.      Right hip: Tenderness present. Decreased range of  motion.      Left hip: Tenderness present.        Legs:    Skin:     General: Skin is warm and dry.      Coloration: Skin is not pale.      Findings: No erythema or rash.   Neurological:      Mental Status: She is alert and oriented to person, place, and time.   Psychiatric:         Behavior: Behavior normal.       Assessment:       1. Right hip pain  - X-Ray Hip 2 or 3 views Right (with Pelvis when performed); Future  - Ambulatory referral/consult to Physical/Occupational Therapy; Future      Plan:       1. Check x-ray of hip and refer to physical therapy.  Mobic 15 mg daily.

## 2023-06-14 ENCOUNTER — OFFICE VISIT (OUTPATIENT)
Dept: BARIATRICS | Facility: CLINIC | Age: 48
End: 2023-06-14
Payer: COMMERCIAL

## 2023-06-14 VITALS
BODY MASS INDEX: 44.15 KG/M2 | SYSTOLIC BLOOD PRESSURE: 120 MMHG | OXYGEN SATURATION: 96 % | HEART RATE: 62 BPM | DIASTOLIC BLOOD PRESSURE: 70 MMHG | WEIGHT: 241.38 LBS

## 2023-06-14 DIAGNOSIS — E66.01 CLASS 3 SEVERE OBESITY DUE TO EXCESS CALORIES WITH SERIOUS COMORBIDITY AND BODY MASS INDEX (BMI) OF 45.0 TO 49.9 IN ADULT: Primary | ICD-10-CM

## 2023-06-14 PROCEDURE — 3078F DIAST BP <80 MM HG: CPT | Mod: CPTII,S$GLB,, | Performed by: INTERNAL MEDICINE

## 2023-06-14 PROCEDURE — 3074F SYST BP LT 130 MM HG: CPT | Mod: CPTII,S$GLB,, | Performed by: INTERNAL MEDICINE

## 2023-06-14 PROCEDURE — 1159F PR MEDICATION LIST DOCUMENTED IN MEDICAL RECORD: ICD-10-PCS | Mod: CPTII,S$GLB,, | Performed by: INTERNAL MEDICINE

## 2023-06-14 PROCEDURE — 3008F BODY MASS INDEX DOCD: CPT | Mod: CPTII,S$GLB,, | Performed by: INTERNAL MEDICINE

## 2023-06-14 PROCEDURE — 99214 PR OFFICE/OUTPT VISIT, EST, LEVL IV, 30-39 MIN: ICD-10-PCS | Mod: S$GLB,,, | Performed by: INTERNAL MEDICINE

## 2023-06-14 PROCEDURE — 99999 PR PBB SHADOW E&M-EST. PATIENT-LVL IV: ICD-10-PCS | Mod: PBBFAC,,, | Performed by: INTERNAL MEDICINE

## 2023-06-14 PROCEDURE — 3074F PR MOST RECENT SYSTOLIC BLOOD PRESSURE < 130 MM HG: ICD-10-PCS | Mod: CPTII,S$GLB,, | Performed by: INTERNAL MEDICINE

## 2023-06-14 PROCEDURE — 1159F MED LIST DOCD IN RCRD: CPT | Mod: CPTII,S$GLB,, | Performed by: INTERNAL MEDICINE

## 2023-06-14 PROCEDURE — 3078F PR MOST RECENT DIASTOLIC BLOOD PRESSURE < 80 MM HG: ICD-10-PCS | Mod: CPTII,S$GLB,, | Performed by: INTERNAL MEDICINE

## 2023-06-14 PROCEDURE — 99214 OFFICE O/P EST MOD 30 MIN: CPT | Mod: S$GLB,,, | Performed by: INTERNAL MEDICINE

## 2023-06-14 PROCEDURE — 3008F PR BODY MASS INDEX (BMI) DOCUMENTED: ICD-10-PCS | Mod: CPTII,S$GLB,, | Performed by: INTERNAL MEDICINE

## 2023-06-14 PROCEDURE — 99999 PR PBB SHADOW E&M-EST. PATIENT-LVL IV: CPT | Mod: PBBFAC,,, | Performed by: INTERNAL MEDICINE

## 2023-06-14 RX ORDER — PHENTERMINE HYDROCHLORIDE 37.5 MG/1
TABLET ORAL
Qty: 30 TABLET | Refills: 2 | Status: SHIPPED | OUTPATIENT
Start: 2023-06-14 | End: 2023-10-11

## 2023-06-14 NOTE — PATIENT INSTRUCTIONS
If you are interested in bariatric surgery we will need you to  attend an  online seminar. If you choose to view the online seminar please go to: [email or web address] The seminar is best viewed on a desktop or laptop computer. Upon completion of the seminar on the last slide you will see the code word comprised of letters and numbers in red and you should jot them down as youll need them to enter into the required form. On that same page youll see the link which will take you to the form. Please enter all the information required, including the code word. You will receive an email confirmation and so will we. Upon receiving this letter you will be called so that your appointments can be arranged. There is also two links for you to print out two packets of information. One is the patient information packet and the other is the nutrition worksheet. Please complete them and bring to your next appointment in our department.       Patient warned of common side effects of phentermine including anxiety, insomnia, palpitations and increased blood pressure. It was also explained that it is for short-term usage along with diet and exercise, and that stopping the medication without making lifestyle changes will result in regain of weight. Patient states understanding.     Weight loss medications are controlled substances.  They require routine follow up. Prescription or pills that are lost or destroyed will not be replaced.         Start phentermine with 1/2 pill a day for at least 1-2 weeks to see if that will control your appetite.  Go up to a full pill when needed.     Increase low impact activity as tolerated.  Avoid high impact activity, very heavy lifting or other exercise regimens that may cause discomfort.         3 meals a day made up of the following:  Unlimited green vegetables, tomatoes, mushrooms, spaghetti squash, cauliflower, meat, poultry, seafood, eggs and hard cheeses.   Milk and plain  yogurt  Dressings, seasonings, condiments, etc should have less than 2 g sugars.   Beans (1-1.5 cups) or nuts (1/4 cup) can have 1 x a day.   1-2 servings of citrus fruits, berries, pineapple or melon a day (1/2 cup)  Avoid fried foods    No grains, rice, pasta, potatoes, bread, corn, peas, oatmeal, grits, tortillas, crackers, chips    No soda, sweet tea, juices or lemonade    [email or web address] for recipes. Moderate carb intake.         Add some type of resistance training 2-3 days a week. These can be body weight exercises, light weight or elastic bands. [employer] and [employer] are great sources for free work out plans and videos.       1000 delvis pb meal planner given previously.        Tips for preparing for hurricane season:    If you are on weight loss medications, please take your medication with you in case of evacuation. Injectable medications should be transported with an ice pack if unopened or room temperature if you have started to use them.   Hurricane supplies do not necessarily need to be junk food or high in carbs or sugar. Shelf stable and healthy choices to include in your supplies could include:  Canned/packets of tuna or chicken  Apples, oranges, banana, pears  Beef or turkey jerky  Sugar free pudding  Pickle, olives, dill relish (mix with the tuna or chicken!)  Low sodium or no salt added canned vegetables  If you get bread, get lite bread (40 calories a slice)  0 sugar sports drinks  Water  String cheese will be okay for a few days without refrigeration or in an ice chest.   Peanut or other nut butter.   Parmesan crisps  Pork skins  Protein shakes (20-30g protein, less than 5 g sugar)  Protein bars (15 or more g protein, less than 5 g sugar)  Don't forget disposable forks, spoons, plates in your supplies  If evacuated, manage stress by taking walks, reading or meditating.   If eating out make better choices when possible.   Salads with a lean protein and limited dressing, cheese or other  toppings  Grilled chicken sandwich or burger without the bun.   Skip the fries!  Order water or unsweetened tea instead of soda  Grocery stores with a deli, salad/food bar can be a good quick and affordable option to replace fast food

## 2023-06-14 NOTE — PROGRESS NOTES
Subjective:       Patient ID: Luzmaria Louis is a 48 y.o. female.    Chief Complaint: Follow-up      Pt here today for follow-up. Has gained 8 lbs since last seen, net neg 14 lbs. Started LCHF diet and diethylpropion  x 2 month. Last filled 4/23/23.   checked today. Denies SE. She does feel it has her appetite down when she was on the medication. She has Stopped eating candy. Has been eating chicken, rice, veg..Occasional lemonade. Eat, egg, sausage, eggs, and cracker, red beans, spaghetti, dirty rice, green beans, peas pork chops. .   Exercise- not much as of late.    Has been having hip pain.   Prev med hx:  Was on topiramate for migraines, has not been taking it 2/2 to paresthesia. Took phentermine throughout 2018 and did not lose weight then. States did ok with it last she took it.     Lab Results       Component                Value               Date                       HGBA1C                   5.3                 10/07/2022                 HGBA1C                   5.4                 10/18/2021                 HGBA1C                   5.2                 10/20/2020            Lab Results       Component                Value               Date                       LDLCALC                  132.8               10/07/2022                 CREATININE               0.6                 10/07/2022                Follow-up  Associated symptoms include arthralgias and myalgias. Pertinent negatives include no chest pain, chills or fever.   Review of Systems   Constitutional:  Negative for chills and fever.   Respiratory:  Negative for shortness of breath.         + Snores   Cardiovascular:  Negative for chest pain and leg swelling.   Gastrointestinal:  Negative for constipation and diarrhea.        + GERD   Genitourinary:  Negative for difficulty urinating and dysuria.   Musculoskeletal:  Positive for arthralgias and myalgias. Negative for back pain.   Neurological:  Positive for dizziness. Negative for  light-headedness.   Psychiatric/Behavioral:  Negative for dysphoric mood. The patient is not nervous/anxious.      Objective:     /70 (BP Location: Right arm, Patient Position: Sitting)   Pulse 62   Wt 109.5 kg (241 lb 6.5 oz)   SpO2 96%   BMI 44.15 kg/m²     Physical Exam  Vitals reviewed.   Constitutional:       General: She is not in acute distress.     Appearance: She is well-developed.      Comments: Morbidly obese     HENT:      Head: Normocephalic and atraumatic.   Eyes:      General: No scleral icterus.     Pupils: Pupils are equal, round, and reactive to light.   Cardiovascular:      Rate and Rhythm: Normal rate.   Pulmonary:      Effort: Pulmonary effort is normal.   Musculoskeletal:         General: Normal range of motion.      Cervical back: Normal range of motion and neck supple.   Skin:     General: Skin is warm and dry.      Findings: No erythema.   Neurological:      Mental Status: She is alert and oriented to person, place, and time.      Cranial Nerves: No cranial nerve deficit.   Psychiatric:         Behavior: Behavior normal.         Judgment: Judgment normal.       Assessment:       1. Class 3 severe obesity due to excess calories with serious comorbidity and body mass index (BMI) of 45.0 to 49.9 in adult                Plan:         Luzmaria was seen today for follow-up.    Diagnoses and all orders for this visit:    Class 3 severe obesity due to excess calories with serious comorbidity and body mass index (BMI) of 45.0 to 49.9 in adult  -     phentermine (ADIPEX-P) 37.5 mg tablet; 1/2 -1 tab po q am               If you are interested in bariatric surgery we will need you to  attend an  online seminar. If you choose to view the online seminar please go to: [email or web address] . The seminar is best viewed on a desktop or laptop computer. Upon completion of the seminar on the last slide you will see the code word comprised of letters and numbers in red and you should jot them down  as youll need them to enter into the required form. On that same page youll see the link which will take you to the form. Please enter all the information required, including the code word. You will receive an email confirmation and so will we. Upon receiving this letter you will be called so that your appointments can be arranged. There is also two links for you to print out two packets of information. One is the patient information packet and the other is the nutrition worksheet. Please complete them and bring to your next appointment in our department.       Patient warned of common side effects of phentermine including anxiety, insomnia, palpitations and increased blood pressure. It was also explained that it is for short-term usage along with diet and exercise, and that stopping the medication without making lifestyle changes will result in regain of weight. Patient states understanding.     Weight loss medications are controlled substances.  They require routine follow up. Prescription or pills that are lost or destroyed will not be replaced.         Start phentermine with 1/2 pill a day for at least 1-2 weeks to see if that will control your appetite.  Go up to a full pill when needed.     Increase low impact activity as tolerated.  Avoid high impact activity, very heavy lifting or other exercise regimens that may cause discomfort.         3 meals a day made up of the following:  Unlimited green vegetables, tomatoes, mushrooms, spaghetti squash, cauliflower, meat, poultry, seafood, eggs and hard cheeses.   Milk and plain yogurt  Dressings, seasonings, condiments, etc should have less than 2 g sugars.   Beans (1-1.5 cups) or nuts (1/4 cup) can have 1 x a day.   1-2 servings of citrus fruits, berries, pineapple or melon a day (1/2 cup)  Avoid fried foods    No grains, rice, pasta, potatoes, bread, corn, peas, oatmeal, grits, tortillas, crackers, chips    No soda, sweet tea, juices or  lemonade    [email or web address] for recipes. Moderate carb intake.         Add some type of resistance training 2-3 days a week. These can be body weight exercises, light weight or elastic bands. [employer] and [employer] are great sources for free work out plans and videos.       1000 delvis pb meal planner given previously.            Hurricane tips given

## 2023-06-15 ENCOUNTER — TELEPHONE (OUTPATIENT)
Dept: SURGERY | Facility: CLINIC | Age: 48
End: 2023-06-15
Payer: COMMERCIAL

## 2023-06-21 DIAGNOSIS — Z09 FOLLOW-UP EXAMINATION AFTER ORTHOPEDIC SURGERY: ICD-10-CM

## 2023-06-21 DIAGNOSIS — G89.18 POST-OP PAIN: ICD-10-CM

## 2023-06-22 RX ORDER — OXYCODONE AND ACETAMINOPHEN 10; 325 MG/1; MG/1
1 TABLET ORAL EVERY 6 HOURS PRN
Qty: 28 TABLET | Refills: 0 | Status: SHIPPED | OUTPATIENT
Start: 2023-06-22 | End: 2023-07-21 | Stop reason: SDUPTHER

## 2023-06-28 ENCOUNTER — CLINICAL SUPPORT (OUTPATIENT)
Dept: REHABILITATION | Facility: HOSPITAL | Age: 48
End: 2023-06-28
Payer: COMMERCIAL

## 2023-06-28 DIAGNOSIS — M25.551 RIGHT HIP PAIN: ICD-10-CM

## 2023-06-28 DIAGNOSIS — R29.898 DECREASED STRENGTH OF LOWER EXTREMITY: ICD-10-CM

## 2023-06-28 DIAGNOSIS — M53.3 SACROILIAC JOINT DYSFUNCTION: ICD-10-CM

## 2023-06-28 PROCEDURE — 97530 THERAPEUTIC ACTIVITIES: CPT | Mod: PO

## 2023-06-28 PROCEDURE — 97161 PT EVAL LOW COMPLEX 20 MIN: CPT | Mod: PO

## 2023-06-28 NOTE — PLAN OF CARE
[facility]   Physical Therapy Initial Evaluation     Date: 6/28/2023   Name: Luzmaria SIMON [facility] Number: [id number]    Therapy Diagnosis:   Encounter Diagnoses   Name Primary?    Right hip pain     Decreased strength of lower extremity     Sacroiliac joint dysfunction      Physician: Bharat Reza MD    Physician Orders: PT Eval and Treat   Medical Diagnosis from Referral: M25.551 (ICD-10-CM) - Right hip pain  Evaluation Date: 6/28/2023  Authorization Period Expiration: 06/05/2024  Plan of Care Expiration: 8/23/2023  Progress Note Due: 7/28/2023  Visit # / Visits authorized: 1/ 1   FOTO: 0/5    Precautions: Standard     Time In: 2:10 pm  Time Out: 3:00 pm  Total Appointment Time (timed & untimed codes): 50 minutes      SUBJECTIVE     Date of onset: 4-5 months prior to evaluation    History of current condition - Luzmaria reports: I started having pain in my R hip 4-5 months ago. It felt like it started hurting all of the sudden. I noticed that I cannot cross my R hip over L. Sometimes I feel like I can hear the bone crack and popping sound in the hip and it feels very painful.    Falls: None reported    Imaging,     X-ray R hip:  FINDINGS:  No acute fracture, dislocation, or osseous destruction.  Mild joint space narrowing of the right hip.    Prior Therapy: Yes  Social History: lives with their family, no stairs, however has carpeting in her bedroom  Occupation: [employer] Supervisor  Prior Level of Function: independent  Current Level of Function: Trouble with prolonged standing    Pain:  Current 0/10, worst 8-9/10, best 0/10   Location: R hip (groin)  Description: [employer]  Aggravating Factors: R hip ER  Easing Factors: nothing and takes oxycodone occassionally for L foot pain    Patients goals: pain free and for the hip to stop popping     Medical History:   Past Medical History:   Diagnosis Date    Fibroid     GERD (gastroesophageal reflux disease)     Migraine  headache        Surgical History:   Luzmaria Louis  has a past surgical history that includes Tubal ligation; Cholecystectomy; Hysterectomy; Mouth surgery; Tonsillectomy; Thyroid lobectomy (Right, 7/3/2019); Arthroscopic debridement of rotator cuff (Left, 7/29/2020); Arthroscopy of shoulder with decompression of subacromial space (Left, 7/29/2020); Calcaneal osteotomy (Left, 2/25/2021); Transfer of tendon of lower extremity (Left, 2/25/2021); and Colonoscopy (N/A, 10/4/2021).    Medications:   Luzmaria has a current medication list which includes the following prescription(s): cholestyramine, diphenhydramine hcl, ibuprofen, meloxicam, oxycodone-acetaminophen, phentermine, promethazine, and rizatriptan.    Allergies:   Review of patient's allergies indicates:   Allergen Reactions    Caffeine      She feels like it is hard to breath, dizzy, nauseous.          OBJECTIVE     Observation: Pt is a 49 yo F presenting to therapy in no apparent distress displaying decreased stance time on the LLE, with pt explaining that she continues to have challenges following ankle surgery with gait abnormalities.    Gait: B feet everted    Hip Range of Motion:   Left active Left Passive Right active  Right Passive   Flexion 93 95 105 114   Abduction 25 30 20 25   Extension NT NT NT NT   Ext. Rotation NT NT NT NT   Int. Rotation NT NT NT NT       Lower Extremity Strength  Right LE  Left LE    Knee extension: 4+/5 Knee extension: 4-/5   Knee flexion: 4/5 Knee flexion: 4-/5   Hip flexion: 3+/5 Hip flexion: 4-/5   Hip Internal Rotation:  4/5    Hip Internal Rotation: 4-/5      Hip External Rotation: 4/5    Hip External Rotation: 4/5      Hip extension:  3-/5 Hip extension: 3-/5   Hip abduction: 2/5 Hip abduction: 2/5   Hip adduction: 3+/5* groin pain Hip adduction 3+/5* groin pain   Ankle dorsiflexion: 4+/5 Ankle dorsiflexion: 5/5   Ankle plantarflexion: 3/5 Ankle plantarflexion: 3/5       Special Tests:    SIJ dysfunction  cluster:  Thigh thrust - neg  Sacral thrust - pos  ASIS Compression - pos  ASIS Distraction - pos    Long sitting test - R posterior innominate rotation    MAAME: L - neg; R- pos  Hip Scour: L - neg; R- pos    SLR (R) - 90 deg (pos); (L) - 90 deg (neg)        Limitation/Restriction for FOTO Hip Survey    Therapist reviewed FOTO scores for Luzmaria Louis on 6/28/2023.   FOTO documents entered into [employer] - see Media section.    Limitation Score: 63%         TREATMENT     Total Treatment time (time-based codes) separate from Evaluation: 10 minutes      Luzmaria received the treatments listed below:      therapeutic exercises to develop strength and endurance for 0 minutes including:  NuStep  SKTC  Glute sets  Bridges  TA w PPT  Iso hip ABD and hip ADD  Hip 3 way    manual therapy techniques: Joint mobilizations, Myofacial release, and Soft tissue Mobilization were applied to the: R and L hip for 0 minutes, including:  Hip distraction      neuromuscular re-education activities to improve: Balance, Kinesthetic, and Proprioception for 0 minutes. The following activities were included:  Mini squats  Pallof press  TB lat pulldowns    therapeutic activities to improve functional performance for 10  minutes, including:  Instructions for HEP    PATIENT EDUCATION AND HOME EXERCISES     Education provided:   - importance of consistent performance of HEP  - role of PT/PTA    Written Home Exercises Provided: yes. Exercises were reviewed and Luzmaria was able to demonstrate them prior to the end of the session.  Luzmaria demonstrated good  understanding of the education provided. See EMR under Patient Instructions for exercises provided during therapy sessions.    ASSESSMENT     Luzmaria is a 48 y.o. female referred to outpatient Physical Therapy with a medical diagnosis of M25.551 (ICD-10-CM) - Right hip pain. 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    Patient prognosis is Fair.   Patient will benefit from skilled outpatient Physical Therapy to address the deficits stated above and in the chart below, provide patient /family education, and to maximize patient's level of independence.     Plan of care discussed with patient: Yes  Patient's spiritual, cultural and educational needs considered and patient is agreeable to the plan of care and goals as stated below:     Anticipated Barriers for therapy: None    Medical Necessity is demonstrated by the following  History  Co-morbidities and personal factors that may impact the plan of care Co-morbidities:   Past Medical History:   Diagnosis Date    Fibroid     GERD (gastroesophageal reflux disease)     Migraine headache        Personal Factors:   no deficits     low   Examination  Body Structures and Functions, activity limitations and participation restrictions that may impact the plan of care Body Regions:   back  lower extremities    Body Systems:    ROM  Strength  Gait  Transfers  Transtions    Participation Restrictions:   None    Activity limitations:   Learning and applying knowledge  no deficits    General Tasks and Commands  no deficits    Communication  no deficits    Mobility  lifting and carrying objects  moving around using equipment (WC)  using transportation (bus, train, plane, car)  driving (bike, car, motorcycle)    Self care  no deficits    Domestic Life  shopping  cooking  doing house work (cleaning house, washing dishes, laundry)    Interactions/Relationships  no deficits    Life Areas  no deficits    Community and Social Life  community life  recreation and leisure         low   Clinical Presentation stable and uncomplicated low   Decision Making/ Complexity Score: low     Goals:  Short Term Goals: (4 weeks)  Patient will be compliant with HEP to promote the independent management of current diagnosis. In progress, not met  Patient  will increase strength of B hip abduction to 4/5. In progress, not met  Patient will increase active B hip flexion to > or = 110 degrees to improve functional mobility. In progress, not met  Patient will report a decrease in R hip pain to 5/10 at the worst during ADL's. In progress, not met    Long Term Goals: (8 weeks)  Patient will increase strength of B hip musculature to 5/5 to improve stability while ambulating over uneven surfaces. In progress, not met  Patient will report a decrease in complaints of R hip pain to 3/10 during ADL's.  In progress, not met  Patient will improve FOTO limitation status from 63% to 75% placing the patient in the 25% impaired, limited, or restricted category indicating increased functional mobility. In progress, not met     PLAN   Plan of care Certification: 6/28/2023 to 8/23/2023.    Outpatient Physical Therapy 2 times weekly for 8 weeks to include the following interventions: Gait Training, Manual Therapy, Moist Heat/ Ice, Neuromuscular Re-ed, Patient Education, Therapeutic Activities, and Therapeutic Exercise.     Yasemin Cerda, PT      I CERTIFY THE NEED FOR THESE SERVICES FURNISHED UNDER THIS PLAN OF TREATMENT AND WHILE UNDER MY CARE   Physician's comments:     Physician's Signature: ___________________________________________________

## 2023-07-21 DIAGNOSIS — Z09 FOLLOW-UP EXAMINATION AFTER ORTHOPEDIC SURGERY: ICD-10-CM

## 2023-07-21 DIAGNOSIS — G89.18 POST-OP PAIN: ICD-10-CM

## 2023-07-24 RX ORDER — OXYCODONE AND ACETAMINOPHEN 10; 325 MG/1; MG/1
1 TABLET ORAL EVERY 6 HOURS PRN
Qty: 28 TABLET | Refills: 0 | Status: SHIPPED | OUTPATIENT
Start: 2023-07-24 | End: 2023-07-28

## 2023-07-28 DIAGNOSIS — G89.18 POST-OP PAIN: Primary | ICD-10-CM

## 2023-07-28 RX ORDER — HYDROCODONE BITARTRATE AND ACETAMINOPHEN 10; 325 MG/1; MG/1
1 TABLET ORAL EVERY 6 HOURS PRN
Qty: 28 TABLET | Refills: 0 | Status: SHIPPED | OUTPATIENT
Start: 2023-07-28 | End: 2023-08-22 | Stop reason: SDUPTHER

## 2023-08-22 DIAGNOSIS — Z98.890 POST-OPERATIVE NAUSEA AND VOMITING: ICD-10-CM

## 2023-08-22 DIAGNOSIS — G89.18 POST-OP PAIN: ICD-10-CM

## 2023-08-22 DIAGNOSIS — R11.2 POST-OPERATIVE NAUSEA AND VOMITING: ICD-10-CM

## 2023-08-23 RX ORDER — PROMETHAZINE HYDROCHLORIDE 25 MG/1
25 TABLET ORAL EVERY 6 HOURS PRN
Qty: 30 TABLET | Refills: 1 | Status: SHIPPED | OUTPATIENT
Start: 2023-08-23 | End: 2023-09-24 | Stop reason: SDUPTHER

## 2023-08-24 RX ORDER — HYDROCODONE BITARTRATE AND ACETAMINOPHEN 10; 325 MG/1; MG/1
1 TABLET ORAL EVERY 6 HOURS PRN
Qty: 28 TABLET | Refills: 0 | Status: SHIPPED | OUTPATIENT
Start: 2023-08-24 | End: 2023-09-24 | Stop reason: SDUPTHER

## 2023-09-19 ENCOUNTER — TELEPHONE (OUTPATIENT)
Dept: SURGERY | Facility: CLINIC | Age: 48
End: 2023-09-19
Payer: COMMERCIAL

## 2023-09-19 NOTE — TELEPHONE ENCOUNTER
Reminded the pt of  Financial telephone appointment.     Pt AOx4, ambulatory with cane, c/o headache and dizziness x 3 days. Pt denies n/v/f, fall/trauma, CP, SOB. No distress noted.

## 2023-09-24 ENCOUNTER — HOSPITAL ENCOUNTER (EMERGENCY)
Facility: HOSPITAL | Age: 48
Discharge: HOME | End: 2023-09-24
Attending: FAMILY MEDICINE
Payer: COMMERCIAL

## 2023-09-24 VITALS
HEART RATE: 78 BPM | RESPIRATION RATE: 20 BRPM | WEIGHT: 250 LBS | HEIGHT: 62 IN | SYSTOLIC BLOOD PRESSURE: 123 MMHG | OXYGEN SATURATION: 97 % | BODY MASS INDEX: 46.01 KG/M2 | TEMPERATURE: 98 F | DIASTOLIC BLOOD PRESSURE: 62 MMHG

## 2023-09-24 DIAGNOSIS — G89.18 POST-OP PAIN: ICD-10-CM

## 2023-09-24 DIAGNOSIS — Z98.890 POST-OPERATIVE NAUSEA AND VOMITING: ICD-10-CM

## 2023-09-24 DIAGNOSIS — R07.89 CHEST WALL PAIN: ICD-10-CM

## 2023-09-24 DIAGNOSIS — R11.2 POST-OPERATIVE NAUSEA AND VOMITING: ICD-10-CM

## 2023-09-24 DIAGNOSIS — K21.9 GASTROESOPHAGEAL REFLUX DISEASE, UNSPECIFIED WHETHER ESOPHAGITIS PRESENT: Primary | ICD-10-CM

## 2023-09-24 DIAGNOSIS — R07.9 CHEST PAIN: ICD-10-CM

## 2023-09-24 LAB
ALBUMIN SERPL BCP-MCNC: 4.1 G/DL (ref 3.5–5.2)
ALP SERPL-CCNC: 61 U/L (ref 38–126)
ALT SERPL W/O P-5'-P-CCNC: 19 U/L (ref 10–44)
ANION GAP SERPL CALC-SCNC: 9 MMOL/L (ref 8–16)
AST SERPL-CCNC: 25 U/L (ref 15–46)
BASOPHILS # BLD AUTO: 0.03 K/UL (ref 0–0.2)
BASOPHILS NFR BLD: 0.4 % (ref 0–1.9)
BILIRUB SERPL-MCNC: 0.4 MG/DL (ref 0.1–1)
CALCIUM SERPL-MCNC: 8.9 MG/DL (ref 8.7–10.5)
CHLORIDE SERPL-SCNC: 102 MMOL/L (ref 95–110)
CO2 SERPL-SCNC: 25 MMOL/L (ref 23–29)
CREAT SERPL-MCNC: 0.6 MG/DL (ref 0.5–1.4)
D DIMER PPP IA.FEU-MCNC: 0.29 MG/L FEU
DIFFERENTIAL METHOD: ABNORMAL
EOSINOPHIL # BLD AUTO: 0.1 K/UL (ref 0–0.5)
EOSINOPHIL NFR BLD: 1.3 % (ref 0–8)
ERYTHROCYTE [DISTWIDTH] IN BLOOD BY AUTOMATED COUNT: 13 % (ref 11.5–14.5)
EST. GFR  (NO RACE VARIABLE): >60 ML/MIN/1.73 M^2
GLUCOSE SERPL-MCNC: 89 MG/DL (ref 70–110)
HCT VFR BLD AUTO: 39.7 % (ref 37–48.5)
HGB BLD-MCNC: 12.7 G/DL (ref 12–16)
IMM GRANULOCYTES # BLD AUTO: 0.02 K/UL (ref 0–0.04)
IMM GRANULOCYTES NFR BLD AUTO: 0.3 % (ref 0–0.5)
LYMPHOCYTES # BLD AUTO: 4.1 K/UL (ref 1–4.8)
LYMPHOCYTES NFR BLD: 52.8 % (ref 18–48)
MCH RBC QN AUTO: 29.4 PG (ref 27–31)
MCHC RBC AUTO-ENTMCNC: 32 G/DL (ref 32–36)
MCV RBC AUTO: 92 FL (ref 82–98)
MONOCYTES # BLD AUTO: 0.5 K/UL (ref 0.3–1)
MONOCYTES NFR BLD: 5.8 % (ref 4–15)
NEUTROPHILS # BLD AUTO: 3.1 K/UL (ref 1.8–7.7)
NEUTROPHILS NFR BLD: 39.4 % (ref 38–73)
NRBC BLD-RTO: 0 /100 WBC
PLATELET # BLD AUTO: 275 K/UL (ref 150–450)
PMV BLD AUTO: 9.5 FL (ref 9.2–12.9)
POTASSIUM SERPL-SCNC: 3.9 MMOL/L (ref 3.5–5.1)
PROT SERPL-MCNC: 7.7 G/DL (ref 6–8.4)
RBC # BLD AUTO: 4.32 M/UL (ref 4–5.4)
SODIUM SERPL-SCNC: 136 MMOL/L (ref 136–145)
TROPONIN I SERPL-MCNC: <0.012 NG/ML (ref 0.01–0.03)
TROPONIN I SERPL-MCNC: <0.012 NG/ML (ref 0.01–0.03)
UUN UR-MCNC: 9 MG/DL (ref 7–17)
WBC # BLD AUTO: 7.77 K/UL (ref 3.9–12.7)

## 2023-09-24 PROCEDURE — 99900035 HC TECH TIME PER 15 MIN (STAT): Mod: ER

## 2023-09-24 PROCEDURE — 25000003 PHARM REV CODE 250: Mod: ER | Performed by: FAMILY MEDICINE

## 2023-09-24 PROCEDURE — 93010 EKG 12-LEAD: ICD-10-PCS | Mod: ,,, | Performed by: INTERNAL MEDICINE

## 2023-09-24 PROCEDURE — 99285 EMERGENCY DEPT VISIT HI MDM: CPT | Mod: 25,ER

## 2023-09-24 PROCEDURE — 63600175 PHARM REV CODE 636 W HCPCS: Mod: ER | Performed by: FAMILY MEDICINE

## 2023-09-24 PROCEDURE — 85379 FIBRIN DEGRADATION QUANT: CPT | Mod: ER | Performed by: FAMILY MEDICINE

## 2023-09-24 PROCEDURE — 85025 COMPLETE CBC W/AUTO DIFF WBC: CPT | Mod: ER | Performed by: FAMILY MEDICINE

## 2023-09-24 PROCEDURE — 84484 ASSAY OF TROPONIN QUANT: CPT | Mod: 91,ER | Performed by: FAMILY MEDICINE

## 2023-09-24 PROCEDURE — 96375 TX/PRO/DX INJ NEW DRUG ADDON: CPT | Mod: ER

## 2023-09-24 PROCEDURE — 93005 ELECTROCARDIOGRAM TRACING: CPT | Mod: ER

## 2023-09-24 PROCEDURE — 80053 COMPREHEN METABOLIC PANEL: CPT | Mod: ER | Performed by: FAMILY MEDICINE

## 2023-09-24 PROCEDURE — 94761 N-INVAS EAR/PLS OXIMETRY MLT: CPT | Mod: ER

## 2023-09-24 PROCEDURE — 93010 ELECTROCARDIOGRAM REPORT: CPT | Mod: ,,, | Performed by: INTERNAL MEDICINE

## 2023-09-24 PROCEDURE — 96374 THER/PROPH/DIAG INJ IV PUSH: CPT | Mod: ER

## 2023-09-24 RX ORDER — METHOCARBAMOL 500 MG/1
500 TABLET, FILM COATED ORAL 3 TIMES DAILY
Qty: 30 TABLET | Refills: 0 | Status: SHIPPED | OUTPATIENT
Start: 2023-09-24 | End: 2023-10-04

## 2023-09-24 RX ORDER — METHOCARBAMOL 500 MG/1
500 TABLET, FILM COATED ORAL
Status: COMPLETED | OUTPATIENT
Start: 2023-09-24 | End: 2023-09-24

## 2023-09-24 RX ORDER — MAG HYDROX/ALUMINUM HYD/SIMETH 200-200-20
30 SUSPENSION, ORAL (FINAL DOSE FORM) ORAL ONCE
Status: DISCONTINUED | OUTPATIENT
Start: 2023-09-24 | End: 2023-09-24 | Stop reason: HOSPADM

## 2023-09-24 RX ORDER — FAMOTIDINE 10 MG/ML
20 INJECTION INTRAVENOUS
Status: COMPLETED | OUTPATIENT
Start: 2023-09-24 | End: 2023-09-24

## 2023-09-24 RX ORDER — LIDOCAINE HYDROCHLORIDE 20 MG/ML
15 SOLUTION OROPHARYNGEAL ONCE
Status: DISCONTINUED | OUTPATIENT
Start: 2023-09-24 | End: 2023-09-24 | Stop reason: HOSPADM

## 2023-09-24 RX ORDER — ONDANSETRON 2 MG/ML
4 INJECTION INTRAMUSCULAR; INTRAVENOUS
Status: COMPLETED | OUTPATIENT
Start: 2023-09-24 | End: 2023-09-24

## 2023-09-24 RX ORDER — FAMOTIDINE 20 MG/1
20 TABLET, FILM COATED ORAL 2 TIMES DAILY
Qty: 60 TABLET | Refills: 0 | Status: SHIPPED | OUTPATIENT
Start: 2023-09-24

## 2023-09-24 RX ORDER — KETOROLAC TROMETHAMINE 30 MG/ML
30 INJECTION, SOLUTION INTRAMUSCULAR; INTRAVENOUS
Status: COMPLETED | OUTPATIENT
Start: 2023-09-24 | End: 2023-09-24

## 2023-09-24 RX ADMIN — METHOCARBAMOL 500 MG: 500 TABLET ORAL at 01:09

## 2023-09-24 RX ADMIN — ONDANSETRON 4 MG: 2 INJECTION INTRAMUSCULAR; INTRAVENOUS at 12:09

## 2023-09-24 RX ADMIN — FAMOTIDINE 20 MG: 10 INJECTION, SOLUTION INTRAVENOUS at 12:09

## 2023-09-24 RX ADMIN — KETOROLAC TROMETHAMINE 30 MG: 30 INJECTION, SOLUTION INTRAMUSCULAR; INTRAVENOUS at 10:09

## 2023-09-24 NOTE — ED PROVIDER NOTES
Encounter Date: 9/24/2023       History     Chief Complaint   Patient presents with    chest wall pain      Patient states that the pain started 20 minutes pta. She states that the pain is worse with movement and breathing      48-year-old female had sudden onset in right chest pain about 30 minutes ago.  It was sharp shooting pain.  Improved before coming to ED. increases with movement or inspiration.  No fever or cough.  No shortness of breath.  Patient got scared and comes to ED. previous history of hysterectomy and cholecystectomy.    The history is provided by the patient.     Review of patient's allergies indicates:   Allergen Reactions    Caffeine      She feels like it is hard to breath, dizzy, nauseous.     Past Medical History:   Diagnosis Date    Fibroid     GERD (gastroesophageal reflux disease)     Migraine headache      Past Surgical History:   Procedure Laterality Date    ARTHROSCOPIC DEBRIDEMENT OF ROTATOR CUFF Left 7/29/2020    Procedure: DEBRIDEMENT, ROTATOR CUFF, ARTHROSCOPIC;  Surgeon: Tejas Prince MD;  Location: [facility] OR;  Service: Orthopedics;  Laterality: Left;    ARTHROSCOPY OF SHOULDER WITH DECOMPRESSION OF SUBACROMIAL SPACE Left 7/29/2020    Procedure: ARTHROSCOPY, SHOULDER, WITH SUBACROMIAL SPACE DECOMPRESSION;  Surgeon: Tejas Prince MD;  Location: [facility] OR;  Service: Orthopedics;  Laterality: Left;    CALCANEAL OSTEOTOMY Left 2/25/2021    Procedure: OSTEOTOMY, CALCANEUS Medial displacement;  Surgeon: Jamison Mercado MD;  Location: [facility] OR;  Service: Orthopedics;  Laterality: Left;  Arthrex screws  FluoroScan imaging    CHOLECYSTECTOMY      COLONOSCOPY N/A 10/4/2021    Procedure: COLONOSCOPY;  Surgeon: Cristina Cannon MD;  Location: [address]);  Service: Endoscopy;  Laterality: N/A;  covid test madyson 10/1  pt requests Miralax prep-MS    HYSTERECTOMY      MOUTH SURGERY      THYROID LOBECTOMY Right 7/3/2019    Procedure: LOBECTOMY, THYROID, Possible Total;   Surgeon: Carlee Huff MD;  [facility];  Service: General;  Laterality: Right;    TONSILLECTOMY      TRANSFER OF TENDON OF LOWER EXTREMITY Left 2/25/2021    Procedure: TRANSFER, TENDON, LOWER EXTREMITY FDL to posterior tibial tendon;  Surgeon: Jamison Mercado MD;  Location: [facility] OR;  Service: Orthopedics;  Laterality: Left;  Arthrex bio tenodesis screws    TUBAL LIGATION       Family History   Problem Relation Age of Onset    Prostate cancer Paternal Grandfather     Cancer Paternal Grandmother     Throat cancer Maternal Grandmother     Cancer Maternal Grandmother         throat cancer - smoker    Diabetes Maternal Grandmother     Stroke Maternal Grandmother     Hypertension Maternal Grandmother     Aneurysm Father         stomach aneurysm    Cancer Maternal Aunt         gastric cancer    Prostate cancer Maternal Uncle     Hyperlipidemia Mother     Prostate cancer Brother     Cancer Maternal Grandfather         colon cancer    Heart disease Neg Hx     Allergic rhinitis Neg Hx     Allergies Neg Hx     Angioedema Neg Hx     Asthma Neg Hx     Atopy Neg Hx     Eczema Neg Hx     Immunodeficiency Neg Hx     Rhinitis Neg Hx     Urticaria Neg Hx      Social History     Tobacco Use    Smoking status: Never    Smokeless tobacco: Never   Substance Use Topics    Alcohol use: Yes     Alcohol/week: 2.0 standard drinks of alcohol     Types: 2 Glasses of wine per week     Comment: weekends, but not every weekend - 1 or less    Drug use: Never     Review of Systems   Constitutional:  Negative for fever.   HENT:  Negative for sore throat.    Respiratory:  Negative for shortness of breath.    Cardiovascular:  Negative for chest pain.   Gastrointestinal:  Negative for nausea.   Genitourinary:  Negative for dysuria.   Musculoskeletal:  Negative for back pain.   Skin:  Negative for rash.   Neurological:  Negative for weakness.   Hematological:  Does not bruise/bleed easily.   All other systems reviewed and are  negative.      Physical Exam     Initial Vitals [09/24/23 0901]   BP Pulse Resp Temp SpO2   (!) 151/71 62 18 98.2 °F (36.8 °C) 97 %      MAP       --         Physical Exam    Nursing note and vitals reviewed.  Constitutional: Vital signs are normal. She appears well-developed and well-nourished. She is active. No distress.   HENT:   Head: Normocephalic.   Nose: Nose normal.   Mouth/Throat: Oropharynx is clear and moist and mucous membranes are normal.   Eyes: Conjunctivae, EOM and lids are normal.   Neck: Neck supple.   Normal range of motion.  Cardiovascular:  Normal rate, regular rhythm, S1 normal, S2 normal and normal heart sounds.           Pulmonary/Chest: Breath sounds normal. No respiratory distress. She has no wheezes. She has no rhonchi. She has no rales.   Abdominal: Abdomen is soft. Bowel sounds are normal. She exhibits no distension. There is no abdominal tenderness. There is no rebound and no guarding.   Musculoskeletal:      Right upper arm: Normal.      Left upper arm: Normal.      Cervical back: Normal range of motion and neck supple.      Right lower leg: Normal.      Left lower leg: Normal.     Neurological: She is alert and oriented to person, place, and time. She has normal strength. GCS score is 15. GCS eye subscore is 4. GCS verbal subscore is 5. GCS motor subscore is 6.   Skin: Skin is warm. Capillary refill takes less than 2 seconds.   Psychiatric: She has a normal mood and affect. Her speech is normal and behavior is normal. Thought content normal. Cognition and memory are normal.         ED Course   Procedures  Labs Reviewed   CBC W/ AUTO DIFFERENTIAL - Abnormal; Notable for the following components:       Result Value    Lymph % 52.8 (*)     All other components within normal limits   COMPREHENSIVE METABOLIC PANEL   TROPONIN I   D DIMER, QUANTITATIVE   TROPONIN I     EKG Readings: (Independently Interpreted)   Initial Reading: No STEMI. Rhythm: Normal Sinus Rhythm. Heart Rate: 65. Ectopy:  No Ectopy. Conduction: Normal. ST Segments: Normal ST Segments. T Waves: Normal. Clinical Impression: Normal Sinus Rhythm       Imaging Results              X-Ray Chest AP Portable (Final result)  Result time 09/24/23 09:41:40      Final result by Jamison Tamayo MD (09/24/23 09:41:40)                   Impression:      No acute abnormality.      Electronically signed by: Jamison Tamayo  Date:    09/24/2023  Time:    09:41               Narrative:    EXAMINATION:  XR CHEST AP PORTABLE    CLINICAL HISTORY:  chest wall pain;    TECHNIQUE:  Single frontal view of the chest was performed.    COMPARISON:  Chest radiograph 12/26/2018.    FINDINGS:  Low lung volumes without convincing evidence of active disease.  No effusion or pneumothorax evident.    The cardiac silhouette is normal in size. The hilar and mediastinal contours are unremarkable.    Bones are intact.                                       Medications   aluminum-magnesium hydroxide-simethicone 200-200-20 mg/5 mL suspension 30 mL (30 mLs Oral Not Given 9/24/23 1208)     And   LIDOcaine HCl 2% oral solution 15 mL (has no administration in time range)   methocarbamoL tablet 500 mg (has no administration in time range)   ketorolac injection 30 mg (30 mg Intravenous Given 9/24/23 1021)   famotidine (PF) injection 20 mg (20 mg Intravenous Given 9/24/23 1208)   ondansetron injection 4 mg (4 mg Intravenous Given 9/24/23 1230)     Medical Decision Making  Right anterior chest wall pain which is sharp and shooting in nature.  Improved currently.  Increases with inspiration.    Differential diagnosis include- angina, unstable angina, coronary syndrome, myocardial infraction, pleurisy, pneumonia, muscle strain, intercostal pain, colic pain,    EKG no acute changes.  Cardiac enzymes negative, x2.  electrolytes and CBC normal.  D-dimer negative.  Chest x-ray no acute findings  Pain has improved.  Possible musculoskeletal versus GERD will treat both.    Amount and/or  Complexity of Data Reviewed  Labs: ordered. Decision-making details documented in ED Course.     Details: Cardiac enzymes negative, CBC and CMP normal.  D-dimer negative.  Radiology: ordered and independent interpretation performed. Decision-making details documented in ED Course.     Details: Chest x-ray no acute findings.  ECG/medicine tests: ordered and independent interpretation performed. Decision-making details documented in ED Course.    Risk  OTC drugs.  Prescription drug management.      Additional MDM:   Heart Score:    History:          Slightly suspicious.  ECG:             Normal  Age:               45-65 years  Risk factors: no risk factors known  Troponin:       Less than or equal to normal limit  Heart Score = 1                                Clinical Impression:   Final diagnoses:  [R07.9] Chest pain  [R07.89] Chest wall pain  [K21.9] Gastroesophageal reflux disease, unspecified whether esophagitis present (Primary)        ED Disposition Condition    Discharge Stable          ED Prescriptions       Medication Sig Dispense Start Date End Date Auth. Provider    methocarbamoL (ROBAXIN) 500 MG Tab Take 1 tablet (500 mg total) by mouth 3 (three) times daily. for 10 days 30 tablet 9/24/2023 10/4/2023 Spencer Rosenthal MD    famotidine (PEPCID) 20 MG tablet Take 1 tablet (20 mg total) by mouth 2 (two) times daily. 60 tablet 9/24/2023 -- Spencer Rosenthal MD          Follow-up Information       Follow up With Specialties Details Why Contact Info    Bharat Reza MD Internal Medicine   [address] [facility] [id number]  [phone]               Spencer Rosenthal MD  09/24/23 3941

## 2023-09-24 NOTE — Clinical Note
"Luzmaria Lucero" Missy was seen and treated in our emergency department on 9/24/2023.  She may return to work on 09/26/2023.       If you have any questions or concerns, please don't hesitate to call.      FLORENCIA Rinaldi RN    "

## 2023-09-25 RX ORDER — PROMETHAZINE HYDROCHLORIDE 25 MG/1
25 TABLET ORAL EVERY 6 HOURS PRN
Qty: 30 TABLET | Refills: 1 | Status: SHIPPED | OUTPATIENT
Start: 2023-09-25 | End: 2023-11-03 | Stop reason: SDUPTHER

## 2023-09-25 RX ORDER — HYDROCODONE BITARTRATE AND ACETAMINOPHEN 10; 325 MG/1; MG/1
1 TABLET ORAL EVERY 6 HOURS PRN
Qty: 28 TABLET | Refills: 0 | Status: SHIPPED | OUTPATIENT
Start: 2023-09-25 | End: 2023-11-06 | Stop reason: SDUPTHER

## 2023-10-11 ENCOUNTER — LAB VISIT (OUTPATIENT)
Dept: LAB | Facility: HOSPITAL | Age: 48
End: 2023-10-11
Attending: INTERNAL MEDICINE
Payer: COMMERCIAL

## 2023-10-11 ENCOUNTER — CLINICAL SUPPORT (OUTPATIENT)
Dept: BARIATRICS | Facility: CLINIC | Age: 48
End: 2023-10-11
Payer: COMMERCIAL

## 2023-10-11 ENCOUNTER — OFFICE VISIT (OUTPATIENT)
Dept: INTERNAL MEDICINE | Facility: CLINIC | Age: 48
End: 2023-10-11
Payer: COMMERCIAL

## 2023-10-11 ENCOUNTER — OFFICE VISIT (OUTPATIENT)
Dept: BARIATRICS | Facility: CLINIC | Age: 48
End: 2023-10-11
Payer: COMMERCIAL

## 2023-10-11 VITALS
DIASTOLIC BLOOD PRESSURE: 67 MMHG | WEIGHT: 250.44 LBS | HEART RATE: 59 BPM | BODY MASS INDEX: 44.38 KG/M2 | SYSTOLIC BLOOD PRESSURE: 132 MMHG | OXYGEN SATURATION: 96 % | HEIGHT: 63 IN | TEMPERATURE: 98 F

## 2023-10-11 VITALS
TEMPERATURE: 97 F | SYSTOLIC BLOOD PRESSURE: 120 MMHG | WEIGHT: 252 LBS | BODY MASS INDEX: 46.38 KG/M2 | HEART RATE: 64 BPM | HEIGHT: 62 IN | DIASTOLIC BLOOD PRESSURE: 72 MMHG | OXYGEN SATURATION: 97 % | RESPIRATION RATE: 18 BRPM

## 2023-10-11 DIAGNOSIS — K21.9 GASTROESOPHAGEAL REFLUX DISEASE WITHOUT ESOPHAGITIS: Chronic | ICD-10-CM

## 2023-10-11 DIAGNOSIS — R23.2 HOT FLASHES: ICD-10-CM

## 2023-10-11 DIAGNOSIS — G43.109 MIGRAINE WITH AURA AND WITHOUT STATUS MIGRAINOSUS, NOT INTRACTABLE: Chronic | ICD-10-CM

## 2023-10-11 DIAGNOSIS — Z00.00 ANNUAL PHYSICAL EXAM: Primary | ICD-10-CM

## 2023-10-11 DIAGNOSIS — N64.4 BREAST PAIN IN FEMALE: ICD-10-CM

## 2023-10-11 DIAGNOSIS — E66.01 CLASS 3 SEVERE OBESITY DUE TO EXCESS CALORIES WITH SERIOUS COMORBIDITY AND BODY MASS INDEX (BMI) OF 45.0 TO 49.9 IN ADULT: Primary | ICD-10-CM

## 2023-10-11 DIAGNOSIS — Z12.31 VISIT FOR SCREENING MAMMOGRAM: ICD-10-CM

## 2023-10-11 DIAGNOSIS — F32.1 CURRENT MODERATE EPISODE OF MAJOR DEPRESSIVE DISORDER WITHOUT PRIOR EPISODE: Chronic | ICD-10-CM

## 2023-10-11 DIAGNOSIS — Z00.00 ANNUAL PHYSICAL EXAM: ICD-10-CM

## 2023-10-11 DIAGNOSIS — E66.01 MORBID OBESITY WITH BMI OF 45.0-49.9, ADULT: ICD-10-CM

## 2023-10-11 DIAGNOSIS — Z71.3 DIETARY COUNSELING AND SURVEILLANCE: Primary | ICD-10-CM

## 2023-10-11 PROBLEM — F32.9 CURRENT EPISODE OF MAJOR DEPRESSIVE DISORDER WITHOUT PRIOR EPISODE: Chronic | Status: ACTIVE | Noted: 2023-10-11

## 2023-10-11 LAB
ALBUMIN SERPL BCP-MCNC: 3.7 G/DL (ref 3.5–5.2)
ALP SERPL-CCNC: 51 U/L (ref 55–135)
ALT SERPL W/O P-5'-P-CCNC: 19 U/L (ref 10–44)
ANION GAP SERPL CALC-SCNC: 9 MMOL/L (ref 8–16)
AST SERPL-CCNC: 19 U/L (ref 10–40)
BASOPHILS # BLD AUTO: 0.02 K/UL (ref 0–0.2)
BASOPHILS NFR BLD: 0.3 % (ref 0–1.9)
BILIRUB SERPL-MCNC: 0.3 MG/DL (ref 0.1–1)
BUN SERPL-MCNC: 11 MG/DL (ref 6–20)
CALCIUM SERPL-MCNC: 9.2 MG/DL (ref 8.7–10.5)
CHLORIDE SERPL-SCNC: 103 MMOL/L (ref 95–110)
CHOLEST SERPL-MCNC: 272 MG/DL (ref 120–199)
CHOLEST/HDLC SERPL: 6.3 {RATIO} (ref 2–5)
CO2 SERPL-SCNC: 26 MMOL/L (ref 23–29)
CREAT SERPL-MCNC: 0.7 MG/DL (ref 0.5–1.4)
DIFFERENTIAL METHOD: ABNORMAL
EOSINOPHIL # BLD AUTO: 0.1 K/UL (ref 0–0.5)
EOSINOPHIL NFR BLD: 1.1 % (ref 0–8)
ERYTHROCYTE [DISTWIDTH] IN BLOOD BY AUTOMATED COUNT: 13.3 % (ref 11.5–14.5)
EST. GFR  (NO RACE VARIABLE): >60 ML/MIN/1.73 M^2
ESTIMATED AVG GLUCOSE: 105 MG/DL (ref 68–131)
FSH SERPL-ACNC: 19.04 MIU/ML
GLUCOSE SERPL-MCNC: 92 MG/DL (ref 70–110)
HBA1C MFR BLD: 5.3 % (ref 4–5.6)
HCT VFR BLD AUTO: 41.9 % (ref 37–48.5)
HDLC SERPL-MCNC: 43 MG/DL (ref 40–75)
HDLC SERPL: 15.8 % (ref 20–50)
HGB BLD-MCNC: 13.5 G/DL (ref 12–16)
IMM GRANULOCYTES # BLD AUTO: 0.01 K/UL (ref 0–0.04)
IMM GRANULOCYTES NFR BLD AUTO: 0.2 % (ref 0–0.5)
LDLC SERPL CALC-MCNC: 182.4 MG/DL (ref 63–159)
LH SERPL-ACNC: 3.8 MIU/ML
LYMPHOCYTES # BLD AUTO: 3.6 K/UL (ref 1–4.8)
LYMPHOCYTES NFR BLD: 54.5 % (ref 18–48)
MCH RBC QN AUTO: 29.9 PG (ref 27–31)
MCHC RBC AUTO-ENTMCNC: 32.2 G/DL (ref 32–36)
MCV RBC AUTO: 93 FL (ref 82–98)
MONOCYTES # BLD AUTO: 0.4 K/UL (ref 0.3–1)
MONOCYTES NFR BLD: 5.6 % (ref 4–15)
NEUTROPHILS # BLD AUTO: 2.5 K/UL (ref 1.8–7.7)
NEUTROPHILS NFR BLD: 38.3 % (ref 38–73)
NONHDLC SERPL-MCNC: 229 MG/DL
NRBC BLD-RTO: 0 /100 WBC
PLATELET # BLD AUTO: 290 K/UL (ref 150–450)
PMV BLD AUTO: 9.9 FL (ref 9.2–12.9)
POTASSIUM SERPL-SCNC: 4.2 MMOL/L (ref 3.5–5.1)
PROT SERPL-MCNC: 7.4 G/DL (ref 6–8.4)
RBC # BLD AUTO: 4.51 M/UL (ref 4–5.4)
SODIUM SERPL-SCNC: 138 MMOL/L (ref 136–145)
TRIGL SERPL-MCNC: 233 MG/DL (ref 30–150)
TSH SERPL DL<=0.005 MIU/L-ACNC: 2.35 UIU/ML (ref 0.4–4)
WBC # BLD AUTO: 6.6 K/UL (ref 3.9–12.7)

## 2023-10-11 PROCEDURE — 99999 PR PBB SHADOW E&M-EST. PATIENT-LVL V: ICD-10-PCS | Mod: PBBFAC,,, | Performed by: NURSE PRACTITIONER

## 2023-10-11 PROCEDURE — 85025 COMPLETE CBC W/AUTO DIFF WBC: CPT | Performed by: INTERNAL MEDICINE

## 2023-10-11 PROCEDURE — 3008F BODY MASS INDEX DOCD: CPT | Mod: CPTII,S$GLB,, | Performed by: NURSE PRACTITIONER

## 2023-10-11 PROCEDURE — 1159F PR MEDICATION LIST DOCUMENTED IN MEDICAL RECORD: ICD-10-PCS | Mod: CPTII,S$GLB,, | Performed by: INTERNAL MEDICINE

## 2023-10-11 PROCEDURE — 99999 PR PBB SHADOW E&M-EST. PATIENT-LVL IV: ICD-10-PCS | Mod: PBBFAC,,, | Performed by: INTERNAL MEDICINE

## 2023-10-11 PROCEDURE — 3008F PR BODY MASS INDEX (BMI) DOCUMENTED: ICD-10-PCS | Mod: CPTII,S$GLB,, | Performed by: NURSE PRACTITIONER

## 2023-10-11 PROCEDURE — 1160F PR REVIEW ALL MEDS BY PRESCRIBER/CLIN PHARMACIST DOCUMENTED: ICD-10-PCS | Mod: CPTII,S$GLB,, | Performed by: NURSE PRACTITIONER

## 2023-10-11 PROCEDURE — 36415 COLL VENOUS BLD VENIPUNCTURE: CPT | Mod: PO | Performed by: INTERNAL MEDICINE

## 2023-10-11 PROCEDURE — G0008 ADMIN INFLUENZA VIRUS VAC: HCPCS | Mod: S$GLB,,, | Performed by: INTERNAL MEDICINE

## 2023-10-11 PROCEDURE — 99396 PREV VISIT EST AGE 40-64: CPT | Mod: 25,S$GLB,, | Performed by: INTERNAL MEDICINE

## 2023-10-11 PROCEDURE — 83002 ASSAY OF GONADOTROPIN (LH): CPT | Performed by: INTERNAL MEDICINE

## 2023-10-11 PROCEDURE — 3075F SYST BP GE 130 - 139MM HG: CPT | Mod: CPTII,S$GLB,, | Performed by: NURSE PRACTITIONER

## 2023-10-11 PROCEDURE — 1160F PR REVIEW ALL MEDS BY PRESCRIBER/CLIN PHARMACIST DOCUMENTED: ICD-10-PCS | Mod: CPTII,S$GLB,, | Performed by: INTERNAL MEDICINE

## 2023-10-11 PROCEDURE — 90686 IIV4 VACC NO PRSV 0.5 ML IM: CPT | Mod: S$GLB,,, | Performed by: INTERNAL MEDICINE

## 2023-10-11 PROCEDURE — 83001 ASSAY OF GONADOTROPIN (FSH): CPT | Performed by: INTERNAL MEDICINE

## 2023-10-11 PROCEDURE — 99999 PR PBB SHADOW E&M-EST. PATIENT-LVL II: ICD-10-PCS | Mod: PBBFAC,,, | Performed by: DIETITIAN, REGISTERED

## 2023-10-11 PROCEDURE — 1160F RVW MEDS BY RX/DR IN RCRD: CPT | Mod: CPTII,S$GLB,, | Performed by: INTERNAL MEDICINE

## 2023-10-11 PROCEDURE — 99214 OFFICE O/P EST MOD 30 MIN: CPT | Mod: S$GLB,,, | Performed by: NURSE PRACTITIONER

## 2023-10-11 PROCEDURE — 99999 PR PBB SHADOW E&M-EST. PATIENT-LVL V: CPT | Mod: PBBFAC,,, | Performed by: NURSE PRACTITIONER

## 2023-10-11 PROCEDURE — 3078F PR MOST RECENT DIASTOLIC BLOOD PRESSURE < 80 MM HG: ICD-10-PCS | Mod: CPTII,S$GLB,, | Performed by: NURSE PRACTITIONER

## 2023-10-11 PROCEDURE — 99214 PR OFFICE/OUTPT VISIT, EST, LEVL IV, 30-39 MIN: ICD-10-PCS | Mod: S$GLB,,, | Performed by: NURSE PRACTITIONER

## 2023-10-11 PROCEDURE — 1159F PR MEDICATION LIST DOCUMENTED IN MEDICAL RECORD: ICD-10-PCS | Mod: CPTII,S$GLB,, | Performed by: NURSE PRACTITIONER

## 2023-10-11 PROCEDURE — 1160F RVW MEDS BY RX/DR IN RCRD: CPT | Mod: CPTII,S$GLB,, | Performed by: NURSE PRACTITIONER

## 2023-10-11 PROCEDURE — 99999 PR PBB SHADOW E&M-EST. PATIENT-LVL IV: CPT | Mod: PBBFAC,,, | Performed by: INTERNAL MEDICINE

## 2023-10-11 PROCEDURE — 3074F SYST BP LT 130 MM HG: CPT | Mod: CPTII,S$GLB,, | Performed by: INTERNAL MEDICINE

## 2023-10-11 PROCEDURE — 3078F DIAST BP <80 MM HG: CPT | Mod: CPTII,S$GLB,, | Performed by: NURSE PRACTITIONER

## 2023-10-11 PROCEDURE — 99396 PR PREVENTIVE VISIT,EST,40-64: ICD-10-PCS | Mod: 25,S$GLB,, | Performed by: INTERNAL MEDICINE

## 2023-10-11 PROCEDURE — 1159F MED LIST DOCD IN RCRD: CPT | Mod: CPTII,S$GLB,, | Performed by: INTERNAL MEDICINE

## 2023-10-11 PROCEDURE — 3078F DIAST BP <80 MM HG: CPT | Mod: CPTII,S$GLB,, | Performed by: INTERNAL MEDICINE

## 2023-10-11 PROCEDURE — 83036 HEMOGLOBIN GLYCOSYLATED A1C: CPT | Performed by: INTERNAL MEDICINE

## 2023-10-11 PROCEDURE — 80061 LIPID PANEL: CPT | Performed by: INTERNAL MEDICINE

## 2023-10-11 PROCEDURE — 84443 ASSAY THYROID STIM HORMONE: CPT | Performed by: INTERNAL MEDICINE

## 2023-10-11 PROCEDURE — 99499 UNLISTED E&M SERVICE: CPT | Mod: S$GLB,,, | Performed by: DIETITIAN, REGISTERED

## 2023-10-11 PROCEDURE — G0008 FLU VACCINE (QUAD) GREATER THAN OR EQUAL TO 3YO PRESERVATIVE FREE IM: ICD-10-PCS | Mod: S$GLB,,, | Performed by: INTERNAL MEDICINE

## 2023-10-11 PROCEDURE — 99499 NO LOS: ICD-10-PCS | Mod: S$GLB,,, | Performed by: DIETITIAN, REGISTERED

## 2023-10-11 PROCEDURE — 3008F BODY MASS INDEX DOCD: CPT | Mod: CPTII,S$GLB,, | Performed by: INTERNAL MEDICINE

## 2023-10-11 PROCEDURE — 3074F PR MOST RECENT SYSTOLIC BLOOD PRESSURE < 130 MM HG: ICD-10-PCS | Mod: CPTII,S$GLB,, | Performed by: INTERNAL MEDICINE

## 2023-10-11 PROCEDURE — 1159F MED LIST DOCD IN RCRD: CPT | Mod: CPTII,S$GLB,, | Performed by: NURSE PRACTITIONER

## 2023-10-11 PROCEDURE — 3075F PR MOST RECENT SYSTOLIC BLOOD PRESS GE 130-139MM HG: ICD-10-PCS | Mod: CPTII,S$GLB,, | Performed by: NURSE PRACTITIONER

## 2023-10-11 PROCEDURE — 3008F PR BODY MASS INDEX (BMI) DOCUMENTED: ICD-10-PCS | Mod: CPTII,S$GLB,, | Performed by: INTERNAL MEDICINE

## 2023-10-11 PROCEDURE — 99999 PR PBB SHADOW E&M-EST. PATIENT-LVL II: CPT | Mod: PBBFAC,,, | Performed by: DIETITIAN, REGISTERED

## 2023-10-11 PROCEDURE — 80053 COMPREHEN METABOLIC PANEL: CPT | Performed by: INTERNAL MEDICINE

## 2023-10-11 PROCEDURE — 90686 FLU VACCINE (QUAD) GREATER THAN OR EQUAL TO 3YO PRESERVATIVE FREE IM: ICD-10-PCS | Mod: S$GLB,,, | Performed by: INTERNAL MEDICINE

## 2023-10-11 PROCEDURE — 3078F PR MOST RECENT DIASTOLIC BLOOD PRESSURE < 80 MM HG: ICD-10-PCS | Mod: CPTII,S$GLB,, | Performed by: INTERNAL MEDICINE

## 2023-10-11 RX ORDER — ESCITALOPRAM OXALATE 10 MG/1
10 TABLET ORAL DAILY
Qty: 30 TABLET | Refills: 11 | Status: SHIPPED | OUTPATIENT
Start: 2023-10-11 | End: 2024-10-10

## 2023-10-11 NOTE — PATIENT INSTRUCTIONS
Prior to surgery you will need to complete:  - Dietitian consult and follow up appointments as needed  - Labs  - Chest X-ray  - EKG  - Psychological evaluation, Please call psychiatry [phone] to schedule  - UGI    In preparation for bariatric surgery, please complete the following:   Discuss your current medications with your primary care provider, remember your medications will need to be crushed, chewable, or in liquid form for the first 2-4 weeks after a gastric bypass or sleeve.  For a gastric band, your medications will need to be crushed indefinitely.    If you take a blood thinner such as: Coumadin (warfarin), Pradaxa (dabigatran), or Plavix (clopidogrel), you will need to speak with your prescribing provider on how or if this medication can be stopped before surgery.   If you take a medication for depression or anxiety, remember to discuss this with the psychologist or psychiatrist that you see.   If you take medication for arthritis on a daily basis that is considered a non-steroidal anti-inflammatory (NSAID), please discuss with your prescribing physician an alternative medication.  After having gastric bypass or gastric sleeve, this group of medications is not appropriate to take due to increased risk of bleeding stomach ulcers.      DEFINITIONS  Appointments: Pre-scheduled meetings or consultations with any physician, advanced practice provider, dietitian, or psychologist, and labs, imaging studies, sleep studies, etc.   Late cancellation: Cancelling an appointment 24-48 hours prior to scheduled time.  No-Show appointment:  is when   You do NOT arrive to your appointment at the time its scheduled.  You call to cancel or cancel via [facility] less than 24 hours in advance of your scheduled appointment  You show up 15 minutes AFTER your scheduled appointment time without any notification of being late.     POLICY  You are allowed up to 3 cancellations for appointments.   After 3 cancellations your  case will be placed on hold for 2 months and after that time you can resume the program.   You are allowed only 1 no-show for an appointment.   You will be re-scheduled one time and if there is a 2nd no-show at any point, your case will be placed on hold for 3 months.  After 3 months you can resume the program.     Upon resuming the program after being placed on hold for either above mentioned reasons, if you have a late cancel or no show for any appointment, the bariatric team will review if youre an appropriate candidate for surgery at the monthly meeting.

## 2023-10-11 NOTE — PATIENT INSTRUCTIONS
1200- 1500 calorie Sample meal plan  80-120g protein per day.   Protein drinks and bars: 0-4 grams sugar  Drink lots of water throughout the day and exercise!  MENU # 1  Breakfast: 2 eggs, 1 turkey sausage brady, 1 apple  Snack: Atkins bar  Lunch: 2 roll-ups (sliced turkey, low-fat sliced cheese, mustard), 12 baby carrots dipped in 1 Tbsp natural peanut butter  Mid-Day Snack: ¼ cup unsalted almonds, ½ cup fruit  Dinner: 1 chicken thigh simmered in tomato sauce + 2 Tbsp mozzarella cheese, ½ cup black beans, 1/2 cup steamed carrots  Evening Snack: 1/2 cup grapes with 4 cubes low-fat cheddar cheese   ___________________________________________________  MENU # 2  Breakfast: 200 calorie protein drink  Mid-morning snack : ¼ cup unsalted nuts, medium banana  Lunch: 3oz tuna or chicken salad made with 2 tbsp light harding, over salad with tomatoes and cucumbers.   Snack: low-fat cheese stick  Dinner: 3oz hamburger brady, slice of low-fat cheese, 1 cup boiled yellow squash and zucchini.   Snack: 6oz light yogurt  _______________________________________________________  Menu #3  Breakfast: 6oz plain Greek yogurt + fruit (½ banana, ½ cup fruit - pineapple, blueberries, strawberries, peach), may add Splenda to dionne.  Lunch: Grilled  chicken breast w/ slice low-fat pepper tamara cheese, 1/2 cup grilled/baked asparagus and small salad with Salad Spritzer.    Mid-Day snack: 200 calorie protein drink   Dinner: 4oz Grilled fish, ½ cup white beans, ½ cup cooked spinach  Evening Snack: fudgsicle no-sugar-added    Menu # 4  Breakfast: 1 scoop vanilla protein powder + 4oz skim milk + 4oz coffee   Snack: Pure protein bar  Lunch: 2 Lettuce tacos: 3oz seasoned ground turkey wrapped in a Wilian lettuce leaves with 1 Tbsp shredded cheese and dollop low-fat sour cream  Snack: ½ cup cottage cheese, ½ cup pineapple chunks  Dinner: Shrimp omelet: 2 eggs, ½ cup shrimp, green onions, and shredded  cheese  ______________________________________________________  Menu #5  Breakfast: 1 cup low-fat cottage cheese, ½ cup peaches (no sugar added)  Snack: 1 apple, 4 cubes cheese  Lunch: 3oz baked pork chop, 1 cup okra and tomato stew  Snack: 1/2 cup black beans + salsa + dollop light sour cream  Dinner: Caprese chicken salad: 3 oz chicken breast, 1oz fresh mozzarella, sliced tomato, 1 Tbsp olive oil, basil  Snack: sugar-free popsicle    Menu #6  Breakfast: ½ cup part-skim ricotta cheese, 2 Tbsp sugar-free strawberry preserves, sprinkle of slivered almonds  Snack: 1 orange  Lunch: 3 oz canned chicken, 1oz shredded cheddar cheese, ½  cup black beans, 2 Tbsp salsa  Snack: 200 calorie Protein drink  Dinner: 4 oz grilled salmon steak, over mixed green salad with 2 tbsp light dressing      Meal Ideas for Regular Bariatric Diet  *Recipes and products available at [email or web address]      Breakfast: (15-20g protein)    - Egg white omelet: 2 egg whites or ½ cup Egg Beaters. (Optional proteins: cheese, shrimp, black beans, chicken, sliced turkey) (Optional veggies: tomatoes, salsa, spinach, mushrooms, onions, green peppers, or small slice avocado)     - Egg and sausage: 1 egg or ¼ cup Egg Beaters (any variety), with 1 brady or 2 links of Turkey sausage or Veggie breakfast sausage (Animoca or CareLuLu)    - Crust-less breakfast quiche: To make a glass pie dish, mix 4oz part skim Ricotta, 1 cup skim milk, and 2 eggs as your base. Add protein: shredded cheese, sliced lean ham or turkey, turkey norman/sausage. Add veggies: tomato, onion, green onion, mushroom, green pepper, spinach, etc.    - Yogurt parfait: Mix 1 - 6oz container Dannon Light N Fit vanilla yogurt, with ¼ cup crushed unsalted nuts    - Cottage cheese and fruit: ½ cup part-skim cottage cheese or ricotta cheese topped with fresh fruit or sugar free preserves     - Kathy Harrison's Vanilla Egg custard* (add 2 Tbsp instant coffee granules to make Cappuccino  Custard*)    - Hi-Protein café latte (skim milk, decaf coffee, 1 scoop protein powder). Optional to add Sugar free syrup or extract flavoring.    - Breakfast Lox: spread fat free cream cheese on slices of smoked salmon. Serve over scrambled or egg over easy (sauteed with nonstick cookspray) OR on a cucumber slice    - Eggwhich: Scramble or cook 1 large egg over easy using nonstick cookspray. Place between 2 slices of Malawian norman and low fat cheese.     Lunch: (20-30g protein)    - ½ cup Black bean soup (Homemade or Progresso), with ¼ cup shredded low-fat cheese. Top with chopped tomato or fresh salsa.     - Lean deli turkey breast and low-fat sliced cheese, mustard or light harding to moisten, rolled up together, or wrapped in a Wilian lettuce leaf    - Chicken salad made from dinner leftovers, moisten with low-fat salad dressing or light harding. Also try leftover salmon, shrimp, tuna or boiled eggs. Serve ½ cup over dark green salad    - Fat-free canned refried beans, topped with ¼ cup shredded low-fat cheese. Top with chopped tomato or fresh salsa.     - Greek salad: Top mixed greens with 1-2oz grilled chicken, tomatoes, red onions, 2-3 kalamata olives, and sprinkle lightly with feta cheese. Spritz with Balsamic vinegar to taste.     - Crust-less lunch quiche: To make a glass pie dish, mix 4oz part skim Ricotta, 1 cup skim milk, and 2 eggs as your base. Add protein: shredded cheese, sliced lean ham or turkey, shrimp, chicken. Add veggies: tomato, onion, green onion, mushroom, green pepper, spinach, artichoke, broccoli, etc.    - Pizza bake: spread a  gracie ariadne mushroom with tomato sauce, low-fat shredded mozzarella and turkey pepperoni or Parker norman. Add any veggies. Roast for 10-15 minutes, until cheese melted.     - Cucumber crab bites: Spread ¼ cup crab dip (lump crabmeat + light cream cheese and green onions) over sliced cucumber.     - Chicken with light spinach and artichoke dip*: Puree in food  processor: 6oz cooked and drained spinach, 2 cloves garlic, 1 can cannelloni beans, ½ cup chopped green onions, 1 can drained artichoke hearts (not marinated in oil), lemon juice and basil. Mix in 2oz chopped up chicken.    Supper: (20-30g protein)    - Serve grilled fish over dark green salad tossed with low-fat dressing, served with grilled asparagus perez     - Rotisserie chicken salad: served with sliced strawberries, walnuts, fat-free feta cheese crumbles and 1 tbsp Brantleys Own Light Raspberry [place] Vinaigrette    - Shrimp cocktail: Dip cold boiled shrimp in homemade low-sugar cocktail sauce (1/2 cup Harjeet One Carb ketchup, 2 tbsp horseradish, 1/4 tsp hot sauce, 1 tsp Worcestershire sauce, 1 tbsp freshly-squeezed lemon juice). Serve with dark green salad, walnuts, and crumbled blue cheese drizzled with olive oil and Balsamic vinegar    - Tuna Melt: Spread tuna salad onto 2 thick slices of tomato. Top with low-fat cheese and broil until cheese is melted. May also be made with chicken salad of shrimp salad. [place] with different types of cheeses.    - Chicken or beef fajitas (no tortilla, rice, beans, chips). Top meat and veggies w/ fresh salsa, fat free sour cream.     - Homemade low-fat Chili using extra lean ground beef or ground turkey. Top with shredded cheese and salsa as desired. May add dollop fat-free sour cream if desired    - Chicken parmesan: Top chicken breast w/ low sugar marinara sauce, mozzarella cheese and bake until chicken reaches 165*.  Serve w/ spaghetti SQUASH or Ukrainian cut green beans    - Dinner Omelet with shrimp or chicken and onion, green peppers and chives.    - No noodle lasagna: Use sliced zucchini or eggplant in place of noodles.  Layer with part skim ricotta cheese and low sugar meat sauce (use very lean ground beef or ground turkey).    - Mexican chicken bake: Bake chunks of chicken breast or thigh with taco seasoning, Pace brand enchilada sauce, green onions and low-fat  cheese. Serve with ¼ cup black beans or fat free refried beans topped with chopped tomatoes or salsa.    - Carl frozen meatballs, simmered in Classico Marinara sauce. Different flavors of salsa or spaghetti sauce create different dishes! Sprinkle with parmesan cheese. Serve with grilled or steamed veggies, or a dark green salad.    - Simmer boneless skinless chicken thigh chunks in Classico Marinara sauce or roasted salsa until tender with chopped onion, bell pepper, garlic, mushrooms, spinach, etc.     - Hamburger or veggie burger, without the bun, dressed the way you like. Served with grilled or steamed veggies.    - Eggplant parmesan: Bake slices of eggplant at 350 degrees for 15 minutes. Layer tomato sauce, sliced eggplant and low-fat mozzarella cheese in a baking dish and cover with foil. Bake 30-40 more minutes or until bubbly. Uncover and bake at 400 degrees for about 15 more minutes, or until top is slightly crisp.    - Fish tacos: grilled/baked white fish, wrapped in Wilian lettuce leaf, topped with salsa, shredded low-fat cheese, and light coleslaw.    - Chicken ismael: Sprinkle chicken w/ 1 tsp of hidden valley ranch dip mix. Then grill chicken and top with black beans, salsa and 1 tsp fat free sour cream.     - Cauliflower pizza crust: Use cauliflower as crust (see recipe on oscar, no flour!). Top w/ low fat cheese, turkey pepperoni and veggies and bake again    - chicken or turkey crust pizza: use ground chicken or turkey instead of cauliflower, spread in [place] and bake at 350 for about 20-30 minutes(may want to add garlic, black pepper, oregano and other herbs to ground meat mixture).  Remove and top w/ low fat cheese, turkey pepperoni and veggies and bake again for another 10 minutes or until cheese is browned.     Snacks: (100-200 calories; >5g protein)    - 1 low-fat cheese stick with 8 cherry tomatoes or 1 serving fresh fruit  - 4 thin slices fat-free turkey breast and 1 slice low-fat  cheese  - 4 thin slices fat-free honey ham with wedge of melon  - 6-8 edamame pods (equivalent to about 1/4 cup edamame without pods).   - 1/4 cup unsalted nuts with ½ cup fruit  - 6-oz container Dannon Light n Fit vanilla yogurt, topped with 1oz unsalted nuts         - apple, celery or baby carrots spread with 2 Tbsp PB2  - apple slices with 1 oz slice low-fat cheese  - Apple slices dipped in 2 Tbsp of PB2  - celery, cucumber, bell pepper or baby carrots dipped in ¼ cup hummus bean spread or light spinach and artichoke dip (*recipe in lunch section)  - celery, cucumber, baby carrots dipped in high protein greek yogurt (Mix 16 oz plain greek yogurt + 1 packet of hidden valley ranch dip mix)  - Sanju Links Beef Steak - 14g protein! (similar to beef jerky)  - 2 wedges [place] - Light Herb & Garlic Cheese with sliced cucumber or green bell pepper  - 1/2 cup low-fat cottage cheese with ¼ cup fruit or ¼ cup salsa  - RTD Protein drinks: Atkins, Low Carb Slim Fast, EAS light, Muscle Milk Light, etc.  - Homemade Protein drinks: GNC Soy95, Isopure, Nectar, UNJURY, Whey Gourmet, etc. Mix 1 scoop powder with 8oz skim/1% milk or light soymilk.  - Protein bars: Atkins, EAS, Pure Protein, Think Thin, Detour, etc. Must have 0-4 grams sugar - Read the label.    Takeout Options: No more than twice/week  Deli - Salads (no pasta or rice), meats, cheeses. Roasted chicken. Lox (salmon)    Mexican - Platters which don't include tortillas, chips, or rice. Go easy on the beans. Example: Fajitas without the tortillas. Ask the  not to bring chips to the table if they are too tempting.    Greek - Meat or fish and vegetable, but no bread or rice. Including hummus, baba ganoush, etc, is OK. Most sit-down Greek restaurants can provide you with cucumber slices for dipping instead of wali bread.    Fast Food (Avoid as much as possible) - Salads (no croutons and limit salad dressing to 2 tbsp), grilled chicken sandwich without the bun  and ask for no harding. Reemas low fat chili or Taco Bell pintos and cheese.    BBQ - The meats are fine if you ask for sauces on the side, but most of the traditional side dishes are loaded with carbs. Chato slaw, baked beans and BBQ sauce are typically made with sugar.    Chinese - Nothing deep-fried, no rice or noodles. Many Chinese sauces have starch and sugar in them, so you'll have to use your judgement. If you find that these sauces trigger cravings, or cause Dumping, you can ask for the sauce to be made without sugar or just use soy sauce.

## 2023-10-11 NOTE — PROGRESS NOTES
NUTRITIONAL CONSULT    Referring Physician: Guerita Delgado M.D.   Reason for MNT Referral: Initial assessment for laparoscopic Ambreen-en-Y work-up    PAST MEDICAL HISTORY:   48 y.o. female  Weight history includes Her highest adult weight was 250 lbs at age 48, and her lowest adult weight was 200 lbs at age 20.  The patient has tried calorie counting, phentermine (Adipex-P), and exercise.  The patient was most successful with adipex with a weight loss of 20 lbs.      Past Medical History:   Diagnosis Date    Fibroid     GERD (gastroesophageal reflux disease)     Migraine headache        CLINICAL DATA:  48 y.o.-year-old Black or  female.  Height: 5 ft. 2 in.  Weight: 250 lbs  IBW: 132 lbs  BMI: 45  The patient's goal weight (50-70% EBW): 168-190 lbs  Personal goal weight: under 200 lbs    Goal for Bariatric Surgery: to improve health, to improve quality of life, to lose weight, and to prevent future medical conditions    DAILY NUTRITIONAL NEEDS: pre-op nutritional bariatric guidelines to promote weight loss  9447-4365 Calories    Grams Protein    NUTRITION & HEALTH HISTORY:  Greatest challenge: sweets, starchy CHO, and snacking at night    Current diet recall:     B 9am: large low carb tortilla with shredded cheese and boiled egg OR 2 slices raisin toast with butter and 1 boiled egg and 3-4 slices honey ham. Coffee with powdered creamer and sugar poured (at least 4 spoons)  L: leftovers from home (pork chop, red beans and rice) OR salad mix bag OR skips d/t still full from breakfast  Snacking at work: Juanjose crackers with pb/jelly, 4-5 sydni/sugar candy in her office  D in bed: bowl of spaghetti and meat sauce  - meds with 4oz Phi D  Sn 9-12am: in bed, snacks on candy    Current Diet:  Meal pattern: 3 meals + snacking  Protein supplements: Has had Premier shakes for breakfast in the past  Snacking: candy, junk foods  Vegetables: Likes a variety. Eats 2-3 times per week.  Fruits: Likes a  variety. Eats 2-3 times per week.  Beverages: water, soda, and coffee with sugar  Dining out: Several times Weekly. Mostly on weekends. Mostly fast food, restaurants, and take-out.  Cooking at home: Daily. Weekly. Mostly baked, grilled, and smothered meat, fish, starchy CHO, and vegetables.    Exercise:  Past exercise: Walking around airport, taking the stairs    Current exercise: None  Restrictions to exercise: Pain from ankle surgery 2 years ago - still on pain meds for it. Walking hurts. Fatigue.     Vitamins / Minerals / Herbs:   MV for women gummies  Vit B12, for energy    Labs:   None available at time of visit    Food Allergies:   None known    Lactose intolerance    Social:  [employer] system supervisor. Mostly seated. Stressful.  Lives with her , her daughter and her son's 2 yr old.  Grocery shopping and food prep done by the pt.  Patient believes her family will be supportive after surgery.  Alcohol: Socially.  Smoking: None.    ASSESSMENT:  Patient reports attempts at weight loss, only to regain lost weight.  Patient demonstrated knowledge of healthy eating behaviors and exercise patterns; admits to not eating healthy and not exercising at this point.  Patient states willingness to change lifestyle and make behavior modifications.        Barriers to Education: none    Stage of change: determination    NUTRITION DIAGNOSIS:    Morbid Obesity related to Excessive carbohydrate intake, Excessive calorie intake, and Physical inactivity as evidence by BMI.    BARIATRIC DIET DISCUSSION/PLAN:  Discussed diet after surgery and related to patient's food record.  Reviewed nutrition guidelines for before and after surgery.  Answered all questions.  Continue to review Bariatric Nutrition Guidebook at home and call with any questions.  Work on Bariatric Nutrition Checklist.  Work on expanding variety of vegetables.  Work on gradually cutting back on starchy CHO in the diet.  Begin trying various protein supplements  to determine preference.  1200-calorie diet.  1500-calorie diet.  5-6 meals per day.  Start including protein supplements in the diet plan daily.  Reduce frequency of dining out.  More grocery shopping and meal preparation at home.  Increase exercise.    Focus goal for next month:  Work on eliminating excess sweets and sugary drinks.  Discouraged eating/snacking in bed. Try to eat at the table and finish eating 3 hrs before laying down to help decrease reflux.    RECOMMENDATIONS:  Pt is a potential candidate for bariatric surgery - Gastric Bypass.    Follow up in one month.  Needs additional visits with RD to work on dietary and lifestyle changes in preparation for bariatric surgery.    Patient verbalized understanding.      Communicated nutrition plan with bariatric team.    SESSION TIME:  60 minutes

## 2023-10-11 NOTE — PROGRESS NOTES
Subjective:       Patient ID: Luzmaria Louis is a 48 y.o. female.    Chief Complaint: Annual Exam    HPI    48 y.o. female here for annual exam.     Cholesterol: needs  Vaccines: Influenza - needs; Tetanus - 2018; COVID - 3 done  Sexual Screening:   STD screening: no concern  Eye exam: due  Mammogram: due Nov 2nd.  Gyn exam: UTD  Colonoscopy: 2021, due 2026  A1c: needs    Exercise: no regular exercise.    Diet: mix of home cooked, eating out, fast food.    Her breasts started to hurt the last three weeks.  She has pain in both nipples equally.  This happens when she is supposed to have a cycle, but it lasted longer this time than usual.  She noted no discharge.  This usually lasts for a couple days.  It lasted for a couple of weeks this time.    She has a lot going on and cries a lot.  She is dealing with some issues she has been having with her son.  She has his daughter who is 3 yo.  Her whole life is completely changed.  Her brother and wife were murdered by her cousin.  The [place] law is causing this trial to be relived.    Past Medical History:   Diagnosis Date    Fibroid     GERD (gastroesophageal reflux disease)     Migraine headache      Past Surgical History:   Procedure Laterality Date    ARTHROSCOPIC DEBRIDEMENT OF ROTATOR CUFF Left 7/29/2020    Procedure: DEBRIDEMENT, ROTATOR CUFF, ARTHROSCOPIC;  Surgeon: Tejas Prince MD;  Location: [facility] OR;  Service: Orthopedics;  Laterality: Left;    ARTHROSCOPY OF SHOULDER WITH DECOMPRESSION OF SUBACROMIAL SPACE Left 7/29/2020    Procedure: ARTHROSCOPY, SHOULDER, WITH SUBACROMIAL SPACE DECOMPRESSION;  Surgeon: Tejas Prince MD;  Location: [facility] OR;  Service: Orthopedics;  Laterality: Left;    CALCANEAL OSTEOTOMY Left 2/25/2021    Procedure: OSTEOTOMY, CALCANEUS Medial displacement;  Surgeon: Jamison Mercado MD;  Location: [facility] OR;  Service: Orthopedics;  Laterality: Left;  Arthrex screws  FluoroScan imaging    CHOLECYSTECTOMY       COLONOSCOPY N/A 10/4/2021    Procedure: COLONOSCOPY;  Surgeon: Cristina Cannon MD;  Location: [facility] ENDO (4TH FLR);  Service: Endoscopy;  Laterality: N/A;  covid test madyson 10/1  pt requests Miralax prep-MS    HYSTERECTOMY      MOUTH SURGERY      THYROID LOBECTOMY Right 7/3/2019    Procedure: LOBECTOMY, THYROID, Possible Total;  Surgeon: Carlee Huff MD;  Location: [facility] OR 2ND FLR;  Service: General;  Laterality: Right;    TONSILLECTOMY      TRANSFER OF TENDON OF LOWER EXTREMITY Left 2/25/2021    Procedure: TRANSFER, TENDON, LOWER EXTREMITY FDL to posterior tibial tendon;  Surgeon: Jamison Mercado MD;  Location: [facility];  Service: Orthopedics;  Laterality: Left;  Arthrex bio tenodesis screws    TUBAL LIGATION       Social History     Socioeconomic History    Marital status: Legally    Tobacco Use    Smoking status: Never    Smokeless tobacco: Never   Substance and Sexual Activity    Alcohol use: Yes     Alcohol/week: 2.0 standard drinks of alcohol     Types: 2 Glasses of wine per week     Comment: weekends, but not every weekend - 1 or less    Drug use: Never    Sexual activity: Yes     Partners: Male     Comment: hysterectomy     Social Determinants of Health     Financial Resource Strain: Medium Risk (10/20/2020)    Overall Financial Resource Strain (CARDIA)     Difficulty of Paying Living Expenses: Somewhat hard   Food Insecurity: Food Insecurity Present (10/20/2020)    Hunger Vital Sign     Worried About Running Out of Food in the Last Year: Sometimes true   Transportation Needs: No Transportation Needs (4/28/2019)    PRAPARE - Transportation     Lack of Transportation (Medical): No     Lack of Transportation (Non-Medical): No   Physical Activity: Inactive (4/28/2019)    Exercise Vital Sign     Days of Exercise per Week: 0 days     Minutes of Exercise per Session: 0 min   Stress: No Stress Concern Present (4/28/2019)    [place] [employer] - Occupational Stress  Questionnaire     Feeling of Stress : Not at all   Social Connections: Unknown (10/20/2020)    Social Connection and Isolation Panel [NHANES]     Frequency of Social Gatherings with Friends and Family: More than three times a week     Review of patient's allergies indicates:   Allergen Reactions    Caffeine      She feels like it is hard to breath, dizzy, nauseous.     Luzmaria Louis had no medications administered during this visit.    Review of Systems      Objective:      Physical Exam  Vitals reviewed.   Constitutional:       Appearance: She is well-developed.   HENT:      Head: Normocephalic and atraumatic.      Mouth/Throat:      Pharynx: No oropharyngeal exudate.   Eyes:      General: No scleral icterus.        Right eye: No discharge.         Left eye: No discharge.      Pupils: Pupils are equal, round, and reactive to light.   Neck:      Thyroid: No thyromegaly.      Trachea: No tracheal deviation.   Cardiovascular:      Rate and Rhythm: Normal rate and regular rhythm.      Heart sounds: Normal heart sounds. No murmur heard.     No friction rub. No gallop.   Pulmonary:      Effort: Pulmonary effort is normal. No respiratory distress.      Breath sounds: Normal breath sounds. No wheezing or rales.   Chest:      Chest wall: No tenderness.   Abdominal:      General: Bowel sounds are normal. There is no distension.      Palpations: Abdomen is soft. There is no mass.      Tenderness: There is no abdominal tenderness. There is no guarding or rebound.   Musculoskeletal:         General: No tenderness. Normal range of motion.      Cervical back: Normal range of motion and neck supple.   Skin:     General: Skin is warm and dry.      Coloration: Skin is not pale.      Findings: No erythema or rash.   Neurological:      Mental Status: She is alert and oriented to person, place, and time.   Psychiatric:         Behavior: Behavior normal.         Assessment:       1. Annual physical exam  - CBC Auto  Differential; Future  - Comprehensive Metabolic Panel; Future  - TSH; Future  - Lipid Panel; Future  - Hemoglobin A1C; Future    2. Gastroesophageal reflux disease without esophagitis    3. Migraine with aura and without status migrainosus, not intractable    4. Visit for screening mammogram    5. Breast pain in female  - Mammo Digital Diagnostic Bilat with Murtaza; Future    6. Hot flashes  - Luteinizing Hormone; Future  - FOLLICLE STIMULATING HORMONE; Future    7. Current moderate episode of major depressive disorder without prior episode      Plan:       1. Check CBC, CMP, TSH, lipids, A1c.  Discussed diet and exercise.  Discussed vaccines.  2. Continue Pepcid as needed.    3.  Monitor   4. Deferred screening mammogram in favor of diagnostic mammogram.  5. Check mammogram.    6. Check LH, FSH  7. Lexapro 5 mg daily for week, then 10 mg daily.  Return to clinic in 2 months to reassess.

## 2023-10-11 NOTE — PROGRESS NOTES
BARIATRIC NEW PATIENT EVALUATION    CHIEF COMPLAINT:   Morbid obesity, body mass index is 45.08 kg/m². and inability to lose weight.    HPI:  Luzmaria Louis is a 48 y.o. morbidly obese female. Her current body mass index is 45.08 kg/m². She has multiple associated comorbidities including GERD on daily medications and depression.  She has struggled with excess weight since after children.  Her highest adult weight was 250 lbs at age 48, and her lowest adult weight was 200 lbs at age 20.  The patient has tried calorie counting, phentermine (Adipex-P), and exercise .  The patient was most successful with adipex with a weight loss of 20 lbs.  Her current exercise includes none 0 times a week. She denies any history of eating disorder such as anorexia, bulimia, or taking laxatives for weight loss, and denies any addiction including illicit substances, alcohol, or gambling.  Patient states she has a good  support system.  She lives with family.  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  She  endorses a 20 year history of GERD, on Pepcid daily and stays aware from trigger food its controlled. The patient's goal is under 200 lbs.    ESS: Score of 9, reviewed 10/11/2023.  Does  need Sleep Study.    Pre op weight-250  IBW-132    EKG 9/23 NSR     PAST MEDICAL HISTORY:  Past Medical History:   Diagnosis Date    Fibroid     GERD (gastroesophageal reflux disease)     Migraine headache        PAST SURGICAL HISTORY:  Past Surgical History:   Procedure Laterality Date    ARTHROSCOPIC DEBRIDEMENT OF ROTATOR CUFF Left 7/29/2020    Procedure: DEBRIDEMENT, ROTATOR CUFF, ARTHROSCOPIC;  Surgeon: Tejas Prince MD;  Location: [facility] OR;  Service: Orthopedics;  Laterality: Left;    ARTHROSCOPY OF SHOULDER WITH DECOMPRESSION OF SUBACROMIAL SPACE Left 7/29/2020    Procedure: ARTHROSCOPY, SHOULDER, WITH SUBACROMIAL SPACE DECOMPRESSION;  Surgeon: Tejas Prince MD;  Location: [facility] OR;  Service: Orthopedics;   Laterality: Left;    CALCANEAL OSTEOTOMY Left 2/25/2021    Procedure: OSTEOTOMY, CALCANEUS Medial displacement;  Surgeon: Jamison Mercado MD;  Location: [facility] OR;  Service: Orthopedics;  Laterality: Left;  Arthrex screws  FluoroScan imaging    CHOLECYSTECTOMY      COLONOSCOPY N/A 10/4/2021    Procedure: COLONOSCOPY;  Surgeon: Cristina Cannon MD;  Location: [facility] ENDO (4TH FLR);  Service: Endoscopy;  Laterality: N/A;  covid test madyson 10/1  pt requests Miralax prep-MS    HYSTERECTOMY      MOUTH SURGERY      THYROID LOBECTOMY Right 7/3/2019    Procedure: LOBECTOMY, THYROID, Possible Total;  Surgeon: Carlee Huff MD;  Location: [facility] OR 2ND FLR;  Service: General;  Laterality: Right;    TONSILLECTOMY      TRANSFER OF TENDON OF LOWER EXTREMITY Left 2/25/2021    Procedure: TRANSFER, TENDON, LOWER EXTREMITY FDL to posterior tibial tendon;  Surgeon: Jamison Mercado MD;  Location: [facility] OR;  Service: Orthopedics;  Laterality: Left;  Arthrex bio tenodesis screws    TUBAL LIGATION         FAMILY HISTORY:  Family History   Problem Relation Age of Onset    Prostate cancer Paternal Grandfather     Cancer Paternal Grandmother     Throat cancer Maternal Grandmother     Cancer Maternal Grandmother         throat cancer - smoker    Diabetes Maternal Grandmother     Stroke Maternal Grandmother     Hypertension Maternal Grandmother     Aneurysm Father         stomach aneurysm    Cancer Maternal Aunt         gastric cancer    Prostate cancer Maternal Uncle     Hyperlipidemia Mother     Prostate cancer Brother     Cancer Maternal Grandfather         colon cancer    Heart disease Neg Hx     Allergic rhinitis Neg Hx     Allergies Neg Hx     Angioedema Neg Hx     Asthma Neg Hx     Atopy Neg Hx     Eczema Neg Hx     Immunodeficiency Neg Hx     Rhinitis Neg Hx     Urticaria Neg Hx         SOCIAL HISTORY:  Social History     Socioeconomic History    Marital status: Legally    Tobacco Use    Smoking status: Never     Smokeless tobacco: Never   Substance and Sexual Activity    Alcohol use: Yes     Alcohol/week: 2.0 standard drinks of alcohol     Types: 2 Glasses of wine per week     Comment: weekends, but not every weekend - 1 or less    Drug use: Never    Sexual activity: Yes     Partners: Male     Comment: hysterectomy     Social Determinants of Health     Financial Resource Strain: Medium Risk (10/11/2023)    Overall Financial Resource Strain (CARDIA)     Difficulty of Paying Living Expenses: Somewhat hard   Food Insecurity: No Food Insecurity (10/11/2023)    Hunger Vital Sign     Worried About Running Out of Food in the Last Year: Never true     Ran Out of Food in the Last Year: Never true   Transportation Needs: No Transportation Needs (10/11/2023)    PRAPARE - Transportation     Lack of Transportation (Medical): No     Lack of Transportation (Non-Medical): No   Physical Activity: Inactive (10/11/2023)    Exercise Vital Sign     Days of Exercise per Week: 0 days     Minutes of Exercise per Session: 0 min   Stress: Stress Concern Present (10/11/2023)    [place] [employer] - Occupational Stress Questionnaire     Feeling of Stress : Very much   Social Connections: Unknown (10/11/2023)    Social Connection and Isolation Panel [NHANES]     Frequency of Communication with Friends and Family: More than three times a week     Frequency of Social Gatherings with Friends and Family: Twice a week     Active Member of Clubs or Organizations: No     Attends Club or Organization Meetings: Patient refused     Marital Status:    Housing Stability: High Risk (10/11/2023)    Housing Stability Vital Sign     Unable to Pay for Housing in the Last Year: Yes     Number of Places Lived in the Last Year: 1     Unstable Housing in the Last Year: No       MEDICATIONS:    Current Outpatient Medications:     cholestyramine (QUESTRAN) 4 gram packet, MIX 1 PACKET AND DRINK 3 TIMES DAILY WITH FOOD, Disp: 90 packet, Rfl: 7     diphenhydramine HCl (ALLERGY, DIPHENHYDRAMINE, ORAL), Take by mouth., Disp: , Rfl:     EScitalopram oxalate (LEXAPRO) 10 MG tablet, Take 1 tablet (10 mg total) by mouth once daily., Disp: 30 tablet, Rfl: 11    famotidine (PEPCID) 20 MG tablet, Take 1 tablet (20 mg total) by mouth 2 (two) times daily., Disp: 60 tablet, Rfl: 0    HYDROcodone-acetaminophen (NORCO)  mg per tablet, Take 1 tablet by mouth every 6 (six) hours as needed for Pain., Disp: 28 tablet, Rfl: 0    promethazine (PHENERGAN) 25 MG tablet, Take 1 tablet (25 mg total) by mouth every 6 (six) hours as needed for Nausea., Disp: 30 tablet, Rfl: 1    ibuprofen (ADVIL,MOTRIN) 800 MG tablet, TAKE 1 TABLET (800 MG TOTAL) BY MOUTH 3 (THREE) TIMES DAILY AS NEEDED FOR PAIN. (Patient not taking: Reported on 10/11/2023), Disp: 90 tablet, Rfl: 1    meloxicam (MOBIC) 15 MG tablet, Take 1 tablet (15 mg total) by mouth daily as needed for Pain. (Patient not taking: Reported on 10/11/2023), Disp: 30 tablet, Rfl: 11    phentermine (ADIPEX-P) 37.5 mg tablet, 1/2 -1 tab po q am (Patient not taking: Reported on 10/11/2023), Disp: 30 tablet, Rfl: 2    rizatriptan (MAXALT-MLT) 5 MG disintegrating tablet, Take at onset of migraine, can repeat in 2 hrs if needed.  No more than 2 tabs per day or 3 days/wk. (Patient not taking: Reported on 10/11/2023), Disp: 12 tablet, Rfl: 3    ALLERGIES:  Review of patient's allergies indicates:   Allergen Reactions    Caffeine      She feels like it is hard to breath, dizzy, nauseous.       Review of Systems   Constitutional:  Negative for chills and fever.   HENT:  Negative for ear pain, nosebleeds and sore throat.    Eyes:  Negative for blurred vision and double vision.   Respiratory:  Negative for cough and shortness of breath.    Cardiovascular:  Negative for chest pain, palpitations, orthopnea, claudication and leg swelling.   Gastrointestinal:  Positive for heartburn. Negative for abdominal pain, constipation, diarrhea, nausea and  "vomiting.   Genitourinary:  Negative for dysuria and urgency.   Musculoskeletal:  Negative for back pain and joint pain.   Skin:  Negative for rash.   Neurological:  Negative for dizziness, tingling, focal weakness and headaches.   Endo/Heme/Allergies:  Does not bruise/bleed easily.   Psychiatric/Behavioral:  Negative for depression and suicidal ideas.        Vitals:    10/11/23 1018   BP: 132/67   Pulse: (!) 59   Temp: 98 °F (36.7 °C)   SpO2: 96%   Weight: 113.6 kg (250 lb 7.1 oz)   Height: 5' 2.5" (1.588 m)   PainSc: 0-No pain       Physical Exam  Vitals and nursing note reviewed.   Constitutional:       Appearance: She is well-developed. She is morbidly obese.   HENT:      Head: Normocephalic.      Nose: Nose normal.      Mouth/Throat:      Mouth: Mucous membranes are moist.   Eyes:      Extraocular Movements: Extraocular movements intact.   Cardiovascular:      Rate and Rhythm: Normal rate and regular rhythm.      Heart sounds: Normal heart sounds.   Pulmonary:      Effort: Pulmonary effort is normal.      Breath sounds: Normal breath sounds.   Abdominal:      General: Bowel sounds are normal.      Palpations: Abdomen is soft.   Musculoskeletal:         General: Normal range of motion.      Cervical back: Normal range of motion.      Left lower leg: Edema present.   Skin:     General: Skin is warm and dry.      Capillary Refill: Capillary refill takes less than 2 seconds.   Neurological:      Mental Status: She is alert and oriented to person, place, and time.   Psychiatric:         Mood and Affect: Mood normal.          DIAGNOSIS:  1. Morbid obesity, body mass index is 45.08 kg/m². and inability to lose weight.  2. Co-morbidities:     PLAN:  The patient is a good candidate for Bariatric Surgery. The patient is interested in laparoscopic florence-en-y gastric bypass with Dr. Delgado . The surgery and post-op care was discussed in detail with the patient. All questions were answered.    The patient understands that " bariatric surgery is a tool to aid in weight loss and that they need to be committed to the diet and exercise post-operatively for successful weight loss. Discussed with patient that bariatric surgery is not the easy way out and that it will take plenty of dedication on the patient's part to be successful. Also discussed the possibility of weight regain if the patient strays from the diet guidelines or exercise requirements. Patient verbalized understanding and wishes to proceed with the work-up.    Estimated Goal weight is 190 lbs.    Discussed no NSAIDS post op    ORDERS:  1. Chest X-Ray and EKG UGI  2. Psychological Consult and Bariatric Dietician Consult  3. Bariatric Labs: Per orders.    PCP: Bharat Reza MD  RTC: As scheduled.    This includes face to face time and non-face to face time preparing to see the patient (eg, review of tests), obtaining and/or reviewing separately obtained history, documenting clinical information in the electronic or other health record, independently interpreting results and communicating results to the patient/family/caregiver, or care coordinator.

## 2023-10-13 ENCOUNTER — PATIENT MESSAGE (OUTPATIENT)
Dept: BARIATRICS | Facility: CLINIC | Age: 48
End: 2023-10-13
Payer: COMMERCIAL

## 2023-11-03 DIAGNOSIS — R11.2 POST-OPERATIVE NAUSEA AND VOMITING: ICD-10-CM

## 2023-11-03 DIAGNOSIS — Z98.890 POST-OPERATIVE NAUSEA AND VOMITING: ICD-10-CM

## 2023-11-03 RX ORDER — PROMETHAZINE HYDROCHLORIDE 25 MG/1
25 TABLET ORAL EVERY 6 HOURS PRN
Qty: 30 TABLET | Refills: 1 | Status: SHIPPED | OUTPATIENT
Start: 2023-11-03

## 2023-11-03 NOTE — TELEPHONE ENCOUNTER
Refill Routing Note   Medication(s) are not appropriate for processing by [facility] for the following reason(s):      Medication outside of protocol    [facility] action(s):  Route Care Due:  None identified            Appointments  past 12m or future 3m with PCP    Date Provider   Last Visit   10/11/2023 Bharat Reza MD   Next Visit   12/8/2023 Bharat Reza MD   ED visits in past 90 days: 1        Note composed:1:14 PM 11/03/2023

## 2023-11-06 DIAGNOSIS — G89.18 POST-OP PAIN: ICD-10-CM

## 2023-11-07 RX ORDER — HYDROCODONE BITARTRATE AND ACETAMINOPHEN 10; 325 MG/1; MG/1
1 TABLET ORAL EVERY 6 HOURS PRN
Qty: 28 TABLET | Refills: 0 | Status: SHIPPED | OUTPATIENT
Start: 2023-11-07 | End: 2023-12-05 | Stop reason: SDUPTHER

## 2023-11-14 ENCOUNTER — PATIENT MESSAGE (OUTPATIENT)
Dept: BARIATRICS | Facility: CLINIC | Age: 48
End: 2023-11-14
Payer: COMMERCIAL

## 2023-11-20 ENCOUNTER — CLINICAL SUPPORT (OUTPATIENT)
Dept: BARIATRICS | Facility: CLINIC | Age: 48
End: 2023-11-20
Payer: COMMERCIAL

## 2023-11-20 ENCOUNTER — PATIENT MESSAGE (OUTPATIENT)
Dept: BARIATRICS | Facility: CLINIC | Age: 48
End: 2023-11-20

## 2023-11-20 DIAGNOSIS — E66.01 MORBID OBESITY WITH BMI OF 45.0-49.9, ADULT: ICD-10-CM

## 2023-11-20 DIAGNOSIS — Z71.3 DIETARY COUNSELING: Primary | ICD-10-CM

## 2023-11-20 PROCEDURE — 99499 NO LOS: ICD-10-PCS | Mod: 95,,, | Performed by: DIETITIAN, REGISTERED

## 2023-11-20 PROCEDURE — 97803 PR MED NUTR THER, SUBSQ, INDIV, EA 15 MIN: ICD-10-PCS | Mod: 95,GZ,, | Performed by: DIETITIAN, REGISTERED

## 2023-11-20 PROCEDURE — 97803 MED NUTRITION INDIV SUBSEQ: CPT | Mod: 95,GZ,, | Performed by: DIETITIAN, REGISTERED

## 2023-11-20 PROCEDURE — 99499 UNLISTED E&M SERVICE: CPT | Mod: 95,,, | Performed by: DIETITIAN, REGISTERED

## 2023-11-20 NOTE — PROGRESS NOTES
The patient location is: home ([place])  The chief complaint leading to consultation is: morbid obesity    Visit type: audiovisual    Face to Face time with patient: 30 min  30 minutes of total time spent on the encounter, which includes face to face time and non-face to face time preparing to see the patient (eg, review of tests), Obtaining and/or reviewing separately obtained history, Documenting clinical information in the electronic or other health record, Independently interpreting results (not separately reported) and communicating results to the patient/family/caregiver, or Care coordination (not separately reported).         Each patient to whom he or she provides medical services by telemedicine is:  (1) informed of the relationship between the physician and patient and the respective role of any other health care provider with respect to management of the patient; and (2) notified that he or she may decline to receive medical services by telemedicine and may withdraw from such care at any time.      NUTRITIONAL Note     Referring Physician: Guerita Delgado M.D.   Reason for MNT Referral: Follow up assessment for laparoscopic Ambreen-en-Y work-up     48 y.o. female presents with self reported 2 lbs weight loss over the past month by making dietary and lifestyle changes in preparation for bariatric surgery.          Past Medical History:   Diagnosis Date    Fibroid      GERD (gastroesophageal reflux disease)      Migraine headache           CLINICAL DATA:  48 y.o.-year-old Black or  female.  Height: 5 ft. 2 in.  Weight: 248 lbs  IBW: 132 lbs  BMI: 45     DAILY NUTRITIONAL NEEDS: pre-op nutritional bariatric guidelines to promote weight loss  6990-0000 Calories    Grams Protein     NUTRITION & HEALTH HISTORY:  Greatest challenge: sweets, starchy CHO, and snacking at night     Current diet recall:      B 9am: boiled egg with sausage link or 3-4 slices honey ham OR Premier shake. Coffee  with powder creamer and sf syrup and 1/2 spoon of sugar  L: leftovers from home (pork chop, red beans and smothered greens and/or 1 tbsp rice)   D: [facility]   - meds with 4oz diet cran-grape juice     Current Diet:  Meal pattern: 3 meals + snacks  Protein supplements: Premier shakes   Snacking: reduced candy, junk foods.  Vegetables: Likes a variety. Eats almost daily.  Fruits: Likes a variety. Eats 2-3 times per week.  Beverages: water, diet cranberry juice and coffee with sugar  Dining out: Several times Weekly. Mostly on weekends. Mostly fast food, restaurants, and take-out.  Cooking at home: Daily. Weekly. Mostly baked, grilled, and smothered meat, fish, starchy CHO, and vegetables.     Exercise:  None  Trying to walk around at work more.  Restrictions to exercise: Pain from ankle surgery 2 years ago - still on pain meds for it. Walking hurts. Fatigue.      Vitamins / Minerals / Herbs:   MV for women gummies  Vit B12, for energy     Labs:   None available at time of visit     Food Allergies:   None known     Lactose intolerance     Social:  [employer] system supervisor. Mostly seated. Stressful.  Lives with her , her daughter and her son's 2 yr old.  Grocery shopping and food prep done by the pt.  Patient believes her family will be supportive after surgery.  Alcohol: Socially.  Smoking: None.     ASSESSMENT:  Patient demonstrated knowledge of healthy eating behaviors and exercise patterns.  Patient demonstrates willingness to change lifestyle and make behavior modifications AEB weight loss, dietary changes, protein supplements.           Barriers to Education: none     Stage of change: determination/action     NUTRITION DIAGNOSIS:    Morbid Obesity related to Excessive carbohydrate intake, Excessive calorie intake, and Physical inactivity as evidence by BMI.     BARIATRIC DIET DISCUSSION/PLAN:  Discussed diet after surgery and related to patient's food record.  Reviewed nutrition guidelines for  before and after surgery.  Answered all questions.  Continue to review Bariatric Nutrition Guidebook at home and call with any questions.  Work on Bariatric Nutrition Checklist.  Work on expanding variety of vegetables.  Continue cutting back on starchy CHO in the diet.  1200-calorie diet.  1500-calorie diet.  5-6 meals per day.  Start including protein supplements in the diet plan daily.    Focus goal for next month:  Work on eliminating excess sweets and sugary drinks.  Begin light exercise routine.     RECOMMENDATIONS:  Pt is a potential candidate for bariatric surgery - Gastric Bypass.     Follow up in one month.  Needs additional visits with RD to work on dietary and lifestyle changes in preparation for bariatric surgery.     Patient verbalized understanding.        Communicated nutrition plan with bariatric team.     SESSION TIME:  30 minutes

## 2023-12-05 DIAGNOSIS — G89.18 POST-OP PAIN: ICD-10-CM

## 2023-12-05 RX ORDER — HYDROCODONE BITARTRATE AND ACETAMINOPHEN 10; 325 MG/1; MG/1
1 TABLET ORAL EVERY 6 HOURS PRN
Qty: 28 TABLET | Refills: 0 | Status: SHIPPED | OUTPATIENT
Start: 2023-12-05 | End: 2023-12-07 | Stop reason: SDUPTHER

## 2023-12-07 DIAGNOSIS — G89.18 POST-OP PAIN: ICD-10-CM

## 2023-12-07 RX ORDER — HYDROCODONE BITARTRATE AND ACETAMINOPHEN 10; 325 MG/1; MG/1
1 TABLET ORAL EVERY 6 HOURS PRN
Qty: 28 TABLET | Refills: 0 | Status: SHIPPED | OUTPATIENT
Start: 2023-12-07 | End: 2024-02-13 | Stop reason: SDUPTHER

## 2023-12-12 ENCOUNTER — TELEPHONE (OUTPATIENT)
Dept: ORTHOPEDICS | Facility: CLINIC | Age: 48
End: 2023-12-12
Payer: COMMERCIAL

## 2023-12-12 ENCOUNTER — PATIENT MESSAGE (OUTPATIENT)
Dept: BARIATRICS | Facility: CLINIC | Age: 48
End: 2023-12-12
Payer: COMMERCIAL

## 2023-12-12 DIAGNOSIS — G89.18 POST-OP PAIN: ICD-10-CM

## 2023-12-12 RX ORDER — HYDROCODONE BITARTRATE AND ACETAMINOPHEN 10; 325 MG/1; MG/1
1 TABLET ORAL EVERY 6 HOURS PRN
Qty: 28 TABLET | Refills: 0 | Status: CANCELLED | OUTPATIENT
Start: 2023-12-12

## 2023-12-12 NOTE — TELEPHONE ENCOUNTER
I spoke with pt,notified the patient that  is gone for the day. Pt,verbalized understanding.          ----- Message from Jennifer Kan sent at 12/12/2023  3:58 PM CST -----  Regarding: refill request  Rx REFILL REQUEST    Refill or New Rx: refill    RX Name and Strength:HYDROcodone-acetaminophen (NORCO)     Preferred Pharmacy with phone number:  [facility]/pharmacy #[id number] - [address] AT [address] OF [address] [address]  Phone: [phone] Fax: [phone]    Additional Information: pt states that this pharmacy is out of the 10-325mg strength tablet, please send a new script for the 5-325mg w/ adjusted sig

## 2023-12-13 ENCOUNTER — PATIENT MESSAGE (OUTPATIENT)
Dept: BARIATRICS | Facility: CLINIC | Age: 48
End: 2023-12-13
Payer: COMMERCIAL

## 2023-12-13 DIAGNOSIS — G89.18 POST-OP PAIN: ICD-10-CM

## 2023-12-13 DIAGNOSIS — M79.672 CHRONIC FOOT PAIN, LEFT: Primary | ICD-10-CM

## 2023-12-13 DIAGNOSIS — G89.29 CHRONIC FOOT PAIN, LEFT: Primary | ICD-10-CM

## 2023-12-13 RX ORDER — HYDROCODONE BITARTRATE AND ACETAMINOPHEN 7.5; 325 MG/1; MG/1
1 TABLET ORAL EVERY 6 HOURS PRN
Qty: 28 TABLET | Refills: 0 | Status: SHIPPED | OUTPATIENT
Start: 2023-12-13

## 2023-12-18 ENCOUNTER — PATIENT MESSAGE (OUTPATIENT)
Dept: BARIATRICS | Facility: CLINIC | Age: 48
End: 2023-12-18
Payer: COMMERCIAL

## 2023-12-20 ENCOUNTER — HOSPITAL ENCOUNTER (OUTPATIENT)
Dept: RADIOLOGY | Facility: HOSPITAL | Age: 48
Discharge: HOME | End: 2023-12-20
Attending: INTERNAL MEDICINE
Payer: COMMERCIAL

## 2023-12-20 DIAGNOSIS — N64.4 BREAST PAIN IN FEMALE: ICD-10-CM

## 2023-12-20 PROCEDURE — 77062 BREAST TOMOSYNTHESIS BI: CPT | Mod: 26,,, | Performed by: RADIOLOGY

## 2023-12-20 PROCEDURE — 77062 MAMMO DIGITAL DIAGNOSTIC BILAT WITH TOMO: ICD-10-PCS | Mod: 26,,, | Performed by: RADIOLOGY

## 2023-12-20 PROCEDURE — 77066 MAMMO DIGITAL DIAGNOSTIC BILAT WITH TOMO: ICD-10-PCS | Mod: 26,,, | Performed by: RADIOLOGY

## 2023-12-20 PROCEDURE — 77066 DX MAMMO INCL CAD BI: CPT | Mod: 26,,, | Performed by: RADIOLOGY

## 2023-12-20 PROCEDURE — 77062 BREAST TOMOSYNTHESIS BI: CPT | Mod: TC

## 2024-01-09 ENCOUNTER — PATIENT MESSAGE (OUTPATIENT)
Dept: BARIATRICS | Facility: CLINIC | Age: 49
End: 2024-01-09
Payer: COMMERCIAL

## 2024-01-23 NOTE — ADDENDUM NOTE
Addendum  created 07/31/20 1401 by Kenney Aguero RN    Clinical Note Signed       dry, intact, no bleeding and no hematoma.

## 2024-02-05 ENCOUNTER — OFFICE VISIT (OUTPATIENT)
Dept: INTERNAL MEDICINE | Facility: CLINIC | Age: 49
End: 2024-02-05
Payer: COMMERCIAL

## 2024-02-05 VITALS
HEIGHT: 62 IN | OXYGEN SATURATION: 96 % | DIASTOLIC BLOOD PRESSURE: 80 MMHG | RESPIRATION RATE: 18 BRPM | HEART RATE: 74 BPM | BODY MASS INDEX: 45.19 KG/M2 | SYSTOLIC BLOOD PRESSURE: 126 MMHG | TEMPERATURE: 97 F | WEIGHT: 245.56 LBS

## 2024-02-05 DIAGNOSIS — F32.1 CURRENT MODERATE EPISODE OF MAJOR DEPRESSIVE DISORDER WITHOUT PRIOR EPISODE: ICD-10-CM

## 2024-02-05 DIAGNOSIS — R19.7 DIARRHEA, UNSPECIFIED TYPE: Primary | ICD-10-CM

## 2024-02-05 DIAGNOSIS — R51.9 NONINTRACTABLE HEADACHE, UNSPECIFIED CHRONICITY PATTERN, UNSPECIFIED HEADACHE TYPE: ICD-10-CM

## 2024-02-05 DIAGNOSIS — E66.01 CLASS 3 SEVERE OBESITY DUE TO EXCESS CALORIES WITH SERIOUS COMORBIDITY AND BODY MASS INDEX (BMI) OF 45.0 TO 49.9 IN ADULT: ICD-10-CM

## 2024-02-05 PROBLEM — E66.813 CLASS 3 SEVERE OBESITY DUE TO EXCESS CALORIES WITH SERIOUS COMORBIDITY AND BODY MASS INDEX (BMI) OF 45.0 TO 49.9 IN ADULT: Status: ACTIVE | Noted: 2024-02-05

## 2024-02-05 LAB
INFLUENZA A, MOLECULAR: NEGATIVE
INFLUENZA B, MOLECULAR: NEGATIVE
SARS-COV-2 RNA RESP QL NAA+PROBE: NOT DETECTED
SPECIMEN SOURCE: NORMAL

## 2024-02-05 PROCEDURE — 3008F BODY MASS INDEX DOCD: CPT | Mod: CPTII,S$GLB,, | Performed by: INTERNAL MEDICINE

## 2024-02-05 PROCEDURE — 99999 PR PBB SHADOW E&M-EST. PATIENT-LVL IV: CPT | Mod: PBBFAC,,, | Performed by: INTERNAL MEDICINE

## 2024-02-05 PROCEDURE — 99213 OFFICE O/P EST LOW 20 MIN: CPT | Mod: S$GLB,,, | Performed by: INTERNAL MEDICINE

## 2024-02-05 PROCEDURE — 3079F DIAST BP 80-89 MM HG: CPT | Mod: CPTII,S$GLB,, | Performed by: INTERNAL MEDICINE

## 2024-02-05 PROCEDURE — 1159F MED LIST DOCD IN RCRD: CPT | Mod: CPTII,S$GLB,, | Performed by: INTERNAL MEDICINE

## 2024-02-05 PROCEDURE — 87635 SARS-COV-2 COVID-19 AMP PRB: CPT | Performed by: INTERNAL MEDICINE

## 2024-02-05 PROCEDURE — 1160F RVW MEDS BY RX/DR IN RCRD: CPT | Mod: CPTII,S$GLB,, | Performed by: INTERNAL MEDICINE

## 2024-02-05 PROCEDURE — 3074F SYST BP LT 130 MM HG: CPT | Mod: CPTII,S$GLB,, | Performed by: INTERNAL MEDICINE

## 2024-02-05 PROCEDURE — 87502 INFLUENZA DNA AMP PROBE: CPT | Mod: PO | Performed by: INTERNAL MEDICINE

## 2024-02-05 RX ORDER — NAPROXEN 500 MG/1
500 TABLET ORAL 2 TIMES DAILY
Qty: 60 TABLET | Refills: 2 | Status: SHIPPED | OUTPATIENT
Start: 2024-02-05 | End: 2024-05-09

## 2024-02-05 NOTE — PROGRESS NOTES
Subjective:       Patient ID: Luzmaria Louis is a 48 y.o. female.    Chief Complaint: Diarrhea, Headache, and Dizziness    HPI    48-year-old female with severe obesity, major depression here for evaluation of headache and diarrhea.  The headache started Friday and diarrhea started yesterday.  She has had no fevers or chills.  The headache is the front of her head and is sometimes in the back of her head.  The headache is throbbing.  This is intermittent.  It is a constant, dull pain.  It is aching and comes and goes.  She has tried tylenol for the headache.  She has used oxycodone she has for her foot that helps for two hours.  Her stools are loose and watery.  She has had 3 BMs yesterday.  She still feels queasy. No blood.      Review of Systems      Objective:      Physical Exam  Vitals reviewed.   Constitutional:       Appearance: She is well-developed.   HENT:      Head: Normocephalic and atraumatic.      Mouth/Throat:      Pharynx: No oropharyngeal exudate.   Eyes:      General: No scleral icterus.        Right eye: No discharge.         Left eye: No discharge.      Pupils: Pupils are equal, round, and reactive to light.   Neck:      Thyroid: No thyromegaly.      Trachea: No tracheal deviation.   Cardiovascular:      Rate and Rhythm: Normal rate and regular rhythm.      Heart sounds: Normal heart sounds. No murmur heard.     No friction rub. No gallop.   Pulmonary:      Effort: Pulmonary effort is normal. No respiratory distress.      Breath sounds: Normal breath sounds. No wheezing or rales.   Chest:      Chest wall: No tenderness.   Abdominal:      General: Bowel sounds are normal. There is no distension.      Palpations: Abdomen is soft. There is no mass.      Tenderness: There is no abdominal tenderness. There is no guarding or rebound.   Musculoskeletal:         General: No tenderness. Normal range of motion.      Cervical back: Normal range of motion and neck supple.   Skin:     General: Skin is  warm and dry.      Coloration: Skin is not pale.      Findings: No erythema or rash.   Neurological:      Mental Status: She is alert and oriented to person, place, and time.   Psychiatric:         Behavior: Behavior normal.         Assessment:       1. Nonintractable headache, unspecified chronicity pattern, unspecified headache type  - COVID-19 Routine Screening; Future  - Influenza A & B by Molecular    2. Diarrhea, unspecified type  - COVID-19 Routine Screening; Future  - Influenza A & B by Molecular    3. Class 3 severe obesity due to excess calories with serious comorbidity and body mass index (BMI) of 45.0 to 49.9 in adult    4. Current moderate episode of major depressive disorder without prior episode      Plan:       1/2.  COVID and flu swabs done.  Work note done for today and tomorrow.  3.  Monitor   4.  Monitor

## 2024-02-13 DIAGNOSIS — G89.18 POST-OP PAIN: ICD-10-CM

## 2024-02-14 ENCOUNTER — PATIENT MESSAGE (OUTPATIENT)
Dept: BARIATRICS | Facility: CLINIC | Age: 49
End: 2024-02-14
Payer: COMMERCIAL

## 2024-02-16 RX ORDER — HYDROCODONE BITARTRATE AND ACETAMINOPHEN 10; 325 MG/1; MG/1
1 TABLET ORAL EVERY 6 HOURS PRN
Qty: 28 TABLET | Refills: 0 | Status: SHIPPED | OUTPATIENT
Start: 2024-02-16 | End: 2024-03-13 | Stop reason: SDUPTHER

## 2024-02-20 ENCOUNTER — PATIENT MESSAGE (OUTPATIENT)
Dept: BARIATRICS | Facility: CLINIC | Age: 49
End: 2024-02-20
Payer: COMMERCIAL

## 2024-02-29 ENCOUNTER — PATIENT MESSAGE (OUTPATIENT)
Dept: BARIATRICS | Facility: CLINIC | Age: 49
End: 2024-02-29
Payer: COMMERCIAL

## 2024-03-06 ENCOUNTER — PATIENT MESSAGE (OUTPATIENT)
Dept: BARIATRICS | Facility: CLINIC | Age: 49
End: 2024-03-06
Payer: COMMERCIAL

## 2024-03-13 DIAGNOSIS — G89.18 POST-OP PAIN: ICD-10-CM

## 2024-03-13 NOTE — TELEPHONE ENCOUNTER
No care due was identified.  [facility] Embedded Care Due Messages. Reference number: [id number].   3/13/2024 6:05:18 PM CDT

## 2024-03-14 RX ORDER — CHOLESTYRAMINE 4 G/9G
POWDER, FOR SUSPENSION ORAL
Qty: 270 PACKET | Refills: 1 | Status: SHIPPED | OUTPATIENT
Start: 2024-03-14

## 2024-03-14 RX ORDER — HYDROCODONE BITARTRATE AND ACETAMINOPHEN 10; 325 MG/1; MG/1
1 TABLET ORAL EVERY 6 HOURS PRN
Qty: 28 TABLET | Refills: 0 | Status: SHIPPED | OUTPATIENT
Start: 2024-03-14 | End: 2024-04-09 | Stop reason: SDUPTHER

## 2024-03-14 NOTE — TELEPHONE ENCOUNTER
Refill Decision Note   Luzmaria Louis  is requesting a refill authorization.  Brief Assessment and Rationale for Refill:  Approve     Medication Therapy Plan:         Comments:     Note composed:9:11 AM 03/14/2024

## 2024-04-03 ENCOUNTER — PATIENT MESSAGE (OUTPATIENT)
Dept: BARIATRICS | Facility: CLINIC | Age: 49
End: 2024-04-03
Payer: COMMERCIAL

## 2024-04-09 ENCOUNTER — PATIENT MESSAGE (OUTPATIENT)
Dept: BARIATRICS | Facility: CLINIC | Age: 49
End: 2024-04-09
Payer: COMMERCIAL

## 2024-04-09 DIAGNOSIS — G89.18 POST-OP PAIN: ICD-10-CM

## 2024-04-09 DIAGNOSIS — Z98.890 POST-OPERATIVE NAUSEA AND VOMITING: ICD-10-CM

## 2024-04-09 DIAGNOSIS — R11.2 POST-OPERATIVE NAUSEA AND VOMITING: ICD-10-CM

## 2024-04-09 RX ORDER — HYDROCODONE BITARTRATE AND ACETAMINOPHEN 10; 325 MG/1; MG/1
1 TABLET ORAL EVERY 6 HOURS PRN
Qty: 28 TABLET | Refills: 0 | Status: SHIPPED | OUTPATIENT
Start: 2024-04-09 | End: 2024-05-01 | Stop reason: SDUPTHER

## 2024-04-09 RX ORDER — PROMETHAZINE HYDROCHLORIDE 25 MG/1
25 TABLET ORAL EVERY 6 HOURS PRN
Qty: 30 TABLET | Refills: 1 | Status: SHIPPED | OUTPATIENT
Start: 2024-04-09

## 2024-04-09 NOTE — TELEPHONE ENCOUNTER
Refill Routing Note   Medication(s) are not appropriate for processing by [facility] for the following reason(s):        Outside of protocol    [facility] action(s):  Route               Appointments  past 12m or future 3m with PCP    Date Provider   Last Visit   2/5/2024 Bharat Reza MD   Next Visit   Visit date not found Bharat Reza MD   ED visits in past 90 days: 0        Note composed:11:07 AM 04/09/2024

## 2024-04-16 ENCOUNTER — PATIENT MESSAGE (OUTPATIENT)
Dept: INTERNAL MEDICINE | Facility: CLINIC | Age: 49
End: 2024-04-16
Payer: COMMERCIAL

## 2024-04-16 NOTE — TELEPHONE ENCOUNTER
Patient is requesting medication to prevent a yeast infection from her antibiotics. She will start them now.

## 2024-04-16 NOTE — TELEPHONE ENCOUNTER
Patient is experiencing acute bronchitis and acute sinusitis as diagnosed at urgent care on Friday. Patient has not been taking the antibiotic prescribed to here, encouraged her to take them.    Patient is requesting further advice on care.

## 2024-04-24 ENCOUNTER — TELEPHONE (OUTPATIENT)
Dept: BARIATRICS | Facility: CLINIC | Age: 49
End: 2024-04-24
Payer: COMMERCIAL

## 2024-05-01 ENCOUNTER — PATIENT MESSAGE (OUTPATIENT)
Dept: INTERNAL MEDICINE | Facility: CLINIC | Age: 49
End: 2024-05-01
Payer: COMMERCIAL

## 2024-05-01 DIAGNOSIS — G89.18 POST-OP PAIN: ICD-10-CM

## 2024-05-02 ENCOUNTER — E-VISIT (OUTPATIENT)
Dept: INTERNAL MEDICINE | Facility: CLINIC | Age: 49
End: 2024-05-02
Payer: COMMERCIAL

## 2024-05-02 DIAGNOSIS — F32.1 CURRENT MODERATE EPISODE OF MAJOR DEPRESSIVE DISORDER WITHOUT PRIOR EPISODE: Primary | ICD-10-CM

## 2024-05-02 PROCEDURE — 99421 OL DIG E/M SVC 5-10 MIN: CPT | Mod: ,,, | Performed by: INTERNAL MEDICINE

## 2024-05-02 RX ORDER — HYDROCODONE BITARTRATE AND ACETAMINOPHEN 10; 325 MG/1; MG/1
1 TABLET ORAL EVERY 6 HOURS PRN
Qty: 28 TABLET | Refills: 0 | Status: SHIPPED | OUTPATIENT
Start: 2024-05-02 | End: 2024-05-26 | Stop reason: SDUPTHER

## 2024-05-02 RX ORDER — ESCITALOPRAM OXALATE 20 MG/1
20 TABLET ORAL DAILY
Qty: 30 TABLET | Refills: 11 | Status: SHIPPED | OUTPATIENT
Start: 2024-05-02 | End: 2025-05-02

## 2024-05-02 NOTE — PROGRESS NOTES
Patient ID: Luzmaria Louis is a 49 y.o. female.    Chief Complaint: Medication Management (Entered automatically based on patient selection in Patient Portal.)    The patient initiated a request through [facility] on 5/2/2024 for evaluation and management with a chief complaint of Medication Management (Entered automatically based on patient selection in Patient Portal.)     I evaluated the questionnaire submission on 5/2/24.    [facility] Peq Evisit Medication    5/2/2024  8:23 AM CDT - Filed by Patient   Do you agree to participate in an E-Visit? Yes   If you have any of the following symptoms, please present to your local emergency room or call 911:  I acknowledge   Medication requests for narcotics will not be addressed via an E-Visit.  Please schedule an appointment. I acknowledge   Are you pregnant, could you be pregnant, or are you breast feeding? None of the above   Do you want to address a new or existing medication? I would like to address a medication I currently take   What is the main issue you would like addressed today? Depression   Would you like to change or continue your medication? Continue medication   What medication would you like to continue?  Depression   Are you taking it as prescribed? Yes    What medical condition is the  medication intended to treat? Depression   Provide any additional information you feel is important.    Please attach any relevant images or files    Are you able to take your vital signs? No         Encounter Diagnosis   Name Primary?    Current moderate episode of major depressive disorder without prior episode Yes        No orders of the defined types were placed in this encounter.     Medications Ordered This Encounter   Medications    EScitalopram oxalate (LEXAPRO) 20 MG tablet     Sig: Take 1 tablet (20 mg total) by mouth once daily.     Dispense:  30 tablet     Refill:  11        No follow-ups on file.      E-Visit Time Tracking:    Day 1 Time (in minutes):  5    Total Time (in minutes): 5

## 2024-05-09 RX ORDER — NAPROXEN 500 MG/1
500 TABLET ORAL 2 TIMES DAILY
Qty: 60 TABLET | Refills: 2 | Status: SHIPPED | OUTPATIENT
Start: 2024-05-09

## 2024-05-09 NOTE — TELEPHONE ENCOUNTER
No care due was identified.  [facility] Embedded Care Due Messages. Reference number: [id number].   5/09/2024 12:18:29 AM CDT

## 2024-05-09 NOTE — TELEPHONE ENCOUNTER
Refill Routing Note   Medication(s) are not appropriate for processing by [facility] for the following reason(s):        Outside of protocol    [facility] action(s):  Route               Appointments  past 12m or future 3m with PCP    Date Provider   Last Visit   5/2/2024 Bharat Reza MD   Next Visit   Visit date not found Bharat Reza MD   ED visits in past 90 days: 0        Note composed:9:17 AM 05/09/2024

## 2024-05-14 ENCOUNTER — PATIENT MESSAGE (OUTPATIENT)
Dept: BARIATRICS | Facility: CLINIC | Age: 49
End: 2024-05-14
Payer: COMMERCIAL

## 2024-05-26 DIAGNOSIS — G89.18 POST-OP PAIN: ICD-10-CM

## 2024-05-28 RX ORDER — HYDROCODONE BITARTRATE AND ACETAMINOPHEN 10; 325 MG/1; MG/1
1 TABLET ORAL EVERY 6 HOURS PRN
Qty: 28 TABLET | Refills: 0 | Status: SHIPPED | OUTPATIENT
Start: 2024-05-28 | End: 2024-06-13 | Stop reason: SDUPTHER

## 2024-06-11 ENCOUNTER — PATIENT MESSAGE (OUTPATIENT)
Dept: BARIATRICS | Facility: CLINIC | Age: 49
End: 2024-06-11
Payer: COMMERCIAL

## 2024-06-13 DIAGNOSIS — G89.18 POST-OP PAIN: ICD-10-CM

## 2024-06-14 RX ORDER — HYDROCODONE BITARTRATE AND ACETAMINOPHEN 10; 325 MG/1; MG/1
1 TABLET ORAL EVERY 6 HOURS PRN
Qty: 28 TABLET | Refills: 0 | Status: SHIPPED | OUTPATIENT
Start: 2024-06-14

## 2024-07-03 ENCOUNTER — PATIENT MESSAGE (OUTPATIENT)
Dept: BARIATRICS | Facility: CLINIC | Age: 49
End: 2024-07-03
Payer: COMMERCIAL

## 2024-07-04 DIAGNOSIS — G89.18 POST-OP PAIN: ICD-10-CM

## 2024-07-05 RX ORDER — HYDROCODONE BITARTRATE AND ACETAMINOPHEN 10; 325 MG/1; MG/1
1 TABLET ORAL EVERY 6 HOURS PRN
Qty: 28 TABLET | Refills: 0 | Status: SHIPPED | OUTPATIENT
Start: 2024-07-05

## 2024-07-09 ENCOUNTER — PATIENT MESSAGE (OUTPATIENT)
Dept: BARIATRICS | Facility: CLINIC | Age: 49
End: 2024-07-09
Payer: COMMERCIAL

## 2024-07-25 DIAGNOSIS — G89.18 POST-OP PAIN: ICD-10-CM

## 2024-07-26 RX ORDER — HYDROCODONE BITARTRATE AND ACETAMINOPHEN 10; 325 MG/1; MG/1
1 TABLET ORAL EVERY 6 HOURS PRN
Qty: 28 TABLET | Refills: 0 | Status: SHIPPED | OUTPATIENT
Start: 2024-07-26

## 2024-07-27 NOTE — TELEPHONE ENCOUNTER
Care Due:                  Date            Visit Type   Department     Provider  --------------------------------------------------------------------------------                                MYCHART                              FOLLOWUP/OF  [facility] INTERNAL  Last Visit: 02-      FICE VISIT   MEDICINE       Bharat Reza  Next Visit: None Scheduled  None         None Found                                                            Last  Test          Frequency    Reason                     Performed    Due Date  --------------------------------------------------------------------------------    CBC.........  12 months..  naproxen.................  10-   10-    CMP.........  12 months..  cholestyramine, naproxen.  10-   10-    Lipid Panel.  12 months..  cholestyramine...........  10-   10-    [facility] Embedded Care Due Messages. Reference number: [id number].   7/27/2024 3:39:56 PM CDT

## 2024-07-28 RX ORDER — NAPROXEN 500 MG/1
500 TABLET ORAL 2 TIMES DAILY
Qty: 60 TABLET | Refills: 2 | Status: SHIPPED | OUTPATIENT
Start: 2024-07-28

## 2024-08-12 ENCOUNTER — PATIENT MESSAGE (OUTPATIENT)
Dept: BARIATRICS | Facility: CLINIC | Age: 49
End: 2024-08-12
Payer: COMMERCIAL

## 2024-08-12 DIAGNOSIS — G89.18 POST-OP PAIN: ICD-10-CM

## 2024-08-12 RX ORDER — HYDROCODONE BITARTRATE AND ACETAMINOPHEN 10; 325 MG/1; MG/1
1 TABLET ORAL EVERY 6 HOURS PRN
Qty: 28 TABLET | Refills: 0 | Status: SHIPPED | OUTPATIENT
Start: 2024-08-12

## 2024-08-19 ENCOUNTER — TELEPHONE (OUTPATIENT)
Dept: INTERNAL MEDICINE | Facility: CLINIC | Age: 49
End: 2024-08-19
Payer: COMMERCIAL

## 2024-08-19 DIAGNOSIS — Z00.00 ANNUAL PHYSICAL EXAM: Primary | ICD-10-CM

## 2024-08-19 NOTE — TELEPHONE ENCOUNTER
----- Message from Soni Jacobson sent at 8/19/2024  9:37 AM CDT -----  Contact: Self/ [phone]  type: Lab    Caller is requesting to schedule their Lab appointment prior to an appointment.    Order is not listed in EPIC.  Please enter order and contact patient to schedule.    Preferred Date and Time of Labs:10/7,     Date of Appointment:10/14/24    Where would they like the lab performed?[facility] ([facility] )    Would the patient rather a call back or a response via [facility]? Call back     Best Call Back Number:[phone]

## 2024-09-03 ENCOUNTER — PATIENT MESSAGE (OUTPATIENT)
Dept: BARIATRICS | Facility: CLINIC | Age: 49
End: 2024-09-03
Payer: COMMERCIAL

## 2024-09-03 DIAGNOSIS — G89.18 POST-OP PAIN: ICD-10-CM

## 2024-09-04 RX ORDER — HYDROCODONE BITARTRATE AND ACETAMINOPHEN 10; 325 MG/1; MG/1
1 TABLET ORAL EVERY 6 HOURS PRN
Qty: 28 TABLET | Refills: 0 | Status: SHIPPED | OUTPATIENT
Start: 2024-09-04

## 2024-09-10 ENCOUNTER — PATIENT MESSAGE (OUTPATIENT)
Dept: BARIATRICS | Facility: CLINIC | Age: 49
End: 2024-09-10
Payer: COMMERCIAL

## 2024-09-12 RX ORDER — CHOLESTYRAMINE 4 G/9G
POWDER, FOR SUSPENSION ORAL
Qty: 270 PACKET | Refills: 0 | Status: SHIPPED | OUTPATIENT
Start: 2024-09-12

## 2024-09-12 NOTE — TELEPHONE ENCOUNTER
No care due was identified.  [facility] Embedded Care Due Messages. Reference number: [id number].   9/12/2024 12:07:35 AM CDT

## 2024-09-12 NOTE — TELEPHONE ENCOUNTER
Refill Decision Note   Luzmaria Louis  is requesting a refill authorization.  Brief Assessment and Rationale for Refill:  Approve     Medication Therapy Plan:         Comments:     Note composed:7:40 AM 09/12/2024

## 2024-09-25 DIAGNOSIS — G89.18 POST-OP PAIN: ICD-10-CM

## 2024-09-26 RX ORDER — HYDROCODONE BITARTRATE AND ACETAMINOPHEN 10; 325 MG/1; MG/1
1 TABLET ORAL EVERY 6 HOURS PRN
Qty: 28 TABLET | Refills: 0 | Status: SHIPPED | OUTPATIENT
Start: 2024-09-26

## 2024-10-01 ENCOUNTER — PATIENT MESSAGE (OUTPATIENT)
Dept: BARIATRICS | Facility: CLINIC | Age: 49
End: 2024-10-01
Payer: COMMERCIAL

## 2024-10-04 ENCOUNTER — CLINICAL SUPPORT (OUTPATIENT)
Dept: OTOLARYNGOLOGY | Facility: CLINIC | Age: 49
End: 2024-10-04
Payer: COMMERCIAL

## 2024-10-04 ENCOUNTER — HOSPITAL ENCOUNTER (OUTPATIENT)
Dept: RADIOLOGY | Facility: HOSPITAL | Age: 49
Discharge: HOME | End: 2024-10-04
Attending: NURSE PRACTITIONER
Payer: COMMERCIAL

## 2024-10-04 ENCOUNTER — OFFICE VISIT (OUTPATIENT)
Dept: OTOLARYNGOLOGY | Facility: CLINIC | Age: 49
End: 2024-10-04
Payer: COMMERCIAL

## 2024-10-04 VITALS
BODY MASS INDEX: 44.46 KG/M2 | SYSTOLIC BLOOD PRESSURE: 106 MMHG | WEIGHT: 243.06 LBS | DIASTOLIC BLOOD PRESSURE: 70 MMHG | HEART RATE: 68 BPM

## 2024-10-04 DIAGNOSIS — H92.12 DRAINAGE FROM EAR, LEFT: ICD-10-CM

## 2024-10-04 DIAGNOSIS — M26.69 OTHER SPECIFIED DISORDERS OF TEMPOROMANDIBULAR JOINT: ICD-10-CM

## 2024-10-04 DIAGNOSIS — H92.02 LEFT EAR PAIN: Primary | ICD-10-CM

## 2024-10-04 DIAGNOSIS — Z01.10 ENCOUNTER FOR EXAMINATION OF HEARING WITHOUT ABNORMAL FINDINGS: ICD-10-CM

## 2024-10-04 DIAGNOSIS — J30.89 NON-SEASONAL ALLERGIC RHINITIS, UNSPECIFIED TRIGGER: ICD-10-CM

## 2024-10-04 DIAGNOSIS — H92.02 EAR PAIN, LEFT: Primary | ICD-10-CM

## 2024-10-04 PROCEDURE — 70486 CT MAXILLOFACIAL W/O DYE: CPT | Mod: TC,PN

## 2024-10-04 PROCEDURE — 99999 PR PBB SHADOW E&M-EST. PATIENT-LVL I: CPT | Mod: PBBFAC,,,

## 2024-10-04 PROCEDURE — 70486 CT MAXILLOFACIAL W/O DYE: CPT | Mod: 26,,, | Performed by: RADIOLOGY

## 2024-10-04 PROCEDURE — 99999 PR PBB SHADOW E&M-EST. PATIENT-LVL III: CPT | Mod: PBBFAC,,, | Performed by: NURSE PRACTITIONER

## 2024-10-04 RX ORDER — CETIRIZINE HYDROCHLORIDE 10 MG/1
10 TABLET ORAL DAILY
Qty: 30 TABLET | Refills: 11 | Status: SHIPPED | OUTPATIENT
Start: 2024-10-04 | End: 2025-10-04

## 2024-10-04 RX ORDER — FLUTICASONE PROPIONATE 50 MCG
2 SPRAY, SUSPENSION (ML) NASAL DAILY
Qty: 9.9 ML | Refills: 11 | Status: SHIPPED | OUTPATIENT
Start: 2024-10-04

## 2024-10-04 NOTE — PROGRESS NOTES
Alhseri BritoZacariasFarhan, a 49 y.o. female was seen today in the clinic for an audiologic evaluation. The patient's primary complaint was left ear pain and drainage.  She denies dizziness and history of noise exposure.     Pure tone testing revealed normal hearing, bilaterally. Speech reception thresholds were obtained at 10 dBHL in the right ear and 15 dBHL in the left ear. Speech discrimination scores were 100% in the right ear and 100% in the left ear. Tympanometry revealed Type A tympanograms in both ears.     Recommendations:  Otologic evaluation  Audiologic evaluation as needed  Hearing protection in noise

## 2024-10-04 NOTE — PROGRESS NOTES
Chief Complaint   Patient presents with    Otalgia     Left ear    Ear Fullness     Left ear    Sinus Problem     For months. Come and go   .     HPI: Luzmaria Louis is 49 y.o. female who is self-referred for evaluation of left ear pain.  Symptoms include runny nose, congestion and fullness sensation in left ear . Symptoms began several months ago and are unchanged since that time. She denies grinding teeth. She admits to chewing gum.       Past Medical History:   Diagnosis Date    Fibroid     GERD (gastroesophageal reflux disease)     Migraine headache      Social History     Socioeconomic History    Marital status: Legally    Tobacco Use    Smoking status: Never    Smokeless tobacco: Never   Substance and Sexual Activity    Alcohol use: Yes     Alcohol/week: 2.0 standard drinks of alcohol     Types: 2 Glasses of wine per week     Comment: weekends, but not every weekend - 1 or less    Drug use: Never    Sexual activity: Yes     Partners: Male     Comment: hysterectomy     Social Drivers of Health     Financial Resource Strain: Medium Risk (11/20/2023)    Overall Financial Resource Strain (CARDIA)     Difficulty of Paying Living Expenses: Somewhat hard   Food Insecurity: No Food Insecurity (11/20/2023)    Hunger Vital Sign     Worried About Running Out of Food in the Last Year: Never true     Ran Out of Food in the Last Year: Never true   Transportation Needs: No Transportation Needs (11/20/2023)    PRAPARE - Transportation     Lack of Transportation (Medical): No     Lack of Transportation (Non-Medical): No   Physical Activity: Insufficiently Active (11/20/2023)    Exercise Vital Sign     Days of Exercise per Week: 3 days     Minutes of Exercise per Session: 10 min   Stress: No Stress Concern Present (11/20/2023)    [employer] - Occupational Stress Questionnaire     Feeling of Stress : Not at all   Recent Concern: Stress - Stress Concern Present (10/11/2023)     [facility] [employer] - Occupational Stress Questionnaire     Feeling of Stress : Very much   Housing Stability: High Risk (11/20/2023)    Housing Stability Vital Sign     Unable to Pay for Housing in the Last Year: Yes     Number of Places Lived in the Last Year: 1     Unstable Housing in the Last Year: No     Past Surgical History:   Procedure Laterality Date    ARTHROSCOPIC DEBRIDEMENT OF ROTATOR CUFF Left 7/29/2020    Procedure: DEBRIDEMENT, ROTATOR CUFF, ARTHROSCOPIC;  Surgeon: Tejas Prince MD;  Location: [facility] OR;  Service: Orthopedics;  Laterality: Left;    ARTHROSCOPY OF SHOULDER WITH DECOMPRESSION OF SUBACROMIAL SPACE Left 7/29/2020    Procedure: ARTHROSCOPY, SHOULDER, WITH SUBACROMIAL SPACE DECOMPRESSION;  Surgeon: Tejas Prince MD;  Location: [facility] OR;  Service: Orthopedics;  Laterality: Left;    CALCANEAL OSTEOTOMY Left 2/25/2021    Procedure: OSTEOTOMY, CALCANEUS Medial displacement;  Surgeon: Jamison Mercado MD;  Location: [facility] OR;  Service: Orthopedics;  Laterality: Left;  Arthrex screws  FluoroScan imaging    CHOLECYSTECTOMY      COLONOSCOPY N/A 10/4/2021    Procedure: COLONOSCOPY;  Surgeon: Cristina Cannon MD;  Location: [facility] ENDO (4TH FLR);  Service: Endoscopy;  Laterality: N/A;  covid test madyson 10/1  pt requests Miralax prep-MS    HYSTERECTOMY      MOUTH SURGERY      THYROID LOBECTOMY Right 7/3/2019    Procedure: LOBECTOMY, THYROID, Possible Total;  Surgeon: Carlee Huff MD;  Location: [facility] OR 2ND FLR;  Service: General;  Laterality: Right;    TONSILLECTOMY      TRANSFER OF TENDON OF LOWER EXTREMITY Left 2/25/2021    Procedure: TRANSFER, TENDON, LOWER EXTREMITY FDL to posterior tibial tendon;  Surgeon: Jamison Mercado MD;  Location: [facility] OR;  Service: Orthopedics;  Laterality: Left;  Arthrex bio tenodesis screws    TUBAL LIGATION       Family History   Problem Relation Name Age of Onset    Prostate cancer Paternal Grandfather      Cancer  Paternal Grandmother Ruddy     Throat cancer Maternal Grandmother Heydiha     Cancer Maternal Grandmother Lubertha         throat cancer - smoker    Diabetes Maternal Grandmother Miltonertha     Stroke Maternal Grandmother Miltonertha     Hypertension Maternal Grandmother Heydiha     Aneurysm Father          stomach aneurysm    Cancer Maternal Aunt Izola         gastric cancer    Prostate cancer Maternal Uncle      Hyperlipidemia Mother Samanta     Prostate cancer Brother      Cancer Maternal Grandfather          colon cancer    Heart disease Neg Hx      Allergic rhinitis Neg Hx      Allergies Neg Hx      Angioedema Neg Hx      Asthma Neg Hx      Atopy Neg Hx      Eczema Neg Hx      Immunodeficiency Neg Hx      Rhinitis Neg Hx      Urticaria Neg Hx             Review of Systems  General: negative for chills, fever or weight loss  Psychological: negative for mood changes or depression  Ophthalmic: negative for blurry vision, photophobia or eye pain  ENT: see HPI  Respiratory: no cough, shortness of breath, or wheezing  Cardiovascular: no chest pain or dyspnea on exertion  Gastrointestinal: no abdominal pain, change in bowel habits, or black/ bloody stools  Musculoskeletal: negative for gait disturbance or muscular weakness  Neurological: no syncope or seizures; no ataxia  Dermatological: negative for puritis,  rash and jaundice  Hematologic/lymphatic: no easy bruising, no new lumps or bumps      Physical Exam:    Vitals:    10/04/24 1052   BP: 106/70   Pulse: 68       Constitutional: Well appearing / communicating without difficutly.  NAD.  Eyes: EOM I Bilaterally  Head/Face: Normocephalic.  Negative paranasal sinus pressure/tenderness.  Salivary glands WNL.  House Brackmann I Bilaterally.    Right Ear: Auricle normal appearance. External Auditory Canal within normal limits no lesions or masses,TM w/o masses/lesions/perforations. TM mobility noted.   Left Ear: Auricle normal appearance. External Auditory Canal within  normal limits no lesions or masses,TM w/o masses/lesions/perforations. TM mobility noted.  Nose: No gross nasal septal deviation. Inferior Turbinates 3+ bilaterally. No septal perforation. No masses/lesions. External nasal skin appears normal without masses/lesions.  Oral Cavity: Gingiva/lips within normal limits.  Dentition/gingiva healthy appearing. Mucus membranes moist. Floor of mouth soft, no masses palpated. Oral Tongue mobile. Hard Palate appears normal.    Oropharynx: Base of tongue appears normal. No masses/lesions noted. Tonsillar fossa/pharyngeal wall without lesions. Posterior oropharynx WNL.  Soft palate without masses. Midline uvula.   Neck/Lymphatic: No LAD I-VI bilaterally.  No thyromegaly.  No masses noted on exam.        Diagnostic testing reviewed:    Audiogram interpreted personally by me and discussed in detail with the patient today.   Pure tone testing revealed normal hearing, bilaterally. Speech reception thresholds were obtained at 10 dBHL in the right ear and 15 dBHL in the left ear. Speech discrimination scores were 100% in the right ear and 100% in the left ear. Tympanometry revealed Type A tympanograms in both ears.         Assessment:    ICD-10-CM ICD-9-CM    1. Left ear pain  H92.02 388.70       2. Other specified disorders of temporomandibular joint  M26.69 524.69 CT Maxillofacial Without Contrast      3. Non-seasonal allergic rhinitis, unspecified trigger  J30.89 477.8         The primary encounter diagnosis was Left ear pain. Diagnoses of Other specified disorders of temporomandibular joint and Non-seasonal allergic rhinitis, unspecified trigger were also pertinent to this visit.      Plan:  Orders Placed This Encounter   Procedures    CT Maxillofacial Without Contrast     -otoscopic within normal limit  -We had a long discussion regarding the underlying pathology of temporomandibular joint dysfunction (TMD) as the cause of ear pain.  We further discussed conservative measures to  treat TMD including avoiding gum and other foods that require lots of chewing, warm compresses, and scheduled antinflammatories (such as Motrin, Ibuprofen, or Aleve).  The patient should also wear a  (purchased OTC or see dentist for custom), which prevents additional pressure on the TM joint.  Stress can also exacerbate TMJ symptoms.    If the pain persists, the patient will then schedule an appointment with a dentist for further evaluation.  -order CT MF.     -start on Flonase 2 sprays in each nostril daily  -start on Zyrtec 10 mg daily        Kathy Hurtado NP        Answers submitted by the patient for this visit:  Review of Symptoms Questionnaire  (Submitted on 10/3/2024)  Fatigue (Tiredness)?: Yes  ear discharge: Yes  ear pain: Yes  sore throat: Yes  trouble swallowing: Yes  Voice Change?: Yes  None of these : Yes  Snoring?: Yes  shortness of breath: Yes  wheezing: Yes  cough: Yes  Irregular heartbeat?: Yes  heartburn: Yes  Acid Reflux?: Yes  None of these: Yes  Muscle aches / pain?: Yes  back pain: Yes  None of these : Yes  None of these: Yes  None of these : Yes  dizziness: Yes  headaches: Yes  None of these: Yes  None of these: Yes

## 2024-10-07 ENCOUNTER — PATIENT MESSAGE (OUTPATIENT)
Dept: OTOLARYNGOLOGY | Facility: CLINIC | Age: 49
End: 2024-10-07
Payer: COMMERCIAL

## 2024-10-07 ENCOUNTER — LAB VISIT (OUTPATIENT)
Dept: LAB | Facility: HOSPITAL | Age: 49
End: 2024-10-07
Attending: INTERNAL MEDICINE
Payer: COMMERCIAL

## 2024-10-07 DIAGNOSIS — Z00.00 ANNUAL PHYSICAL EXAM: ICD-10-CM

## 2024-10-07 LAB
ALBUMIN SERPL BCP-MCNC: 4.3 G/DL (ref 3.5–5.2)
ALP SERPL-CCNC: 53 U/L (ref 38–126)
ALT SERPL W/O P-5'-P-CCNC: 20 U/L (ref 10–44)
ANION GAP SERPL CALC-SCNC: 5 MMOL/L (ref 8–16)
AST SERPL-CCNC: 25 U/L (ref 15–46)
BASOPHILS # BLD AUTO: 0.02 K/UL (ref 0–0.2)
BASOPHILS NFR BLD: 0.3 % (ref 0–1.9)
BILIRUB SERPL-MCNC: 0.6 MG/DL (ref 0.1–1)
CALCIUM SERPL-MCNC: 9.1 MG/DL (ref 8.7–10.5)
CHLORIDE SERPL-SCNC: 104 MMOL/L (ref 95–110)
CHOLEST SERPL-MCNC: 228 MG/DL (ref 120–199)
CHOLEST/HDLC SERPL: 5.8 {RATIO} (ref 2–5)
CO2 SERPL-SCNC: 27 MMOL/L (ref 23–29)
CREAT SERPL-MCNC: 0.72 MG/DL (ref 0.5–1.4)
DIFFERENTIAL METHOD BLD: NORMAL
EOSINOPHIL # BLD AUTO: 0.1 K/UL (ref 0–0.5)
EOSINOPHIL NFR BLD: 1.5 % (ref 0–8)
ERYTHROCYTE [DISTWIDTH] IN BLOOD BY AUTOMATED COUNT: 13.4 % (ref 11.5–14.5)
EST. GFR  (NO RACE VARIABLE): >60 ML/MIN/1.73 M^2
ESTIMATED AVG GLUCOSE: 103 MG/DL (ref 68–131)
GLUCOSE SERPL-MCNC: 89 MG/DL (ref 70–110)
HBA1C MFR BLD: 5.2 % (ref 4–5.6)
HCT VFR BLD AUTO: 40.2 % (ref 37–48.5)
HDLC SERPL-MCNC: 39 MG/DL (ref 40–75)
HDLC SERPL: 17.1 % (ref 20–50)
HGB BLD-MCNC: 13.1 G/DL (ref 12–16)
IMM GRANULOCYTES # BLD AUTO: 0.01 K/UL (ref 0–0.04)
IMM GRANULOCYTES NFR BLD AUTO: 0.1 % (ref 0–0.5)
LDLC SERPL CALC-MCNC: 134.4 MG/DL (ref 63–159)
LYMPHOCYTES # BLD AUTO: 3.3 K/UL (ref 1–4.8)
LYMPHOCYTES NFR BLD: 44 % (ref 18–48)
MCH RBC QN AUTO: 30 PG (ref 27–31)
MCHC RBC AUTO-ENTMCNC: 32.6 G/DL (ref 32–36)
MCV RBC AUTO: 92 FL (ref 82–98)
MONOCYTES # BLD AUTO: 0.5 K/UL (ref 0.3–1)
MONOCYTES NFR BLD: 6.5 % (ref 4–15)
NEUTROPHILS # BLD AUTO: 3.6 K/UL (ref 1.8–7.7)
NEUTROPHILS NFR BLD: 47.6 % (ref 38–73)
NONHDLC SERPL-MCNC: 189 MG/DL
NRBC BLD-RTO: 0 /100 WBC
PLATELET # BLD AUTO: 279 K/UL (ref 150–450)
PMV BLD AUTO: 9.8 FL (ref 9.2–12.9)
POTASSIUM SERPL-SCNC: 4.1 MMOL/L (ref 3.5–5.1)
PROT SERPL-MCNC: 7.4 G/DL (ref 6–8.4)
RBC # BLD AUTO: 4.37 M/UL (ref 4–5.4)
SODIUM SERPL-SCNC: 136 MMOL/L (ref 136–145)
T4 FREE SERPL-MCNC: 0.86 NG/DL (ref 0.71–1.51)
TRIGL SERPL-MCNC: 273 MG/DL (ref 30–150)
TSH SERPL DL<=0.005 MIU/L-ACNC: 4.49 UIU/ML (ref 0.4–4)
UUN UR-MCNC: 11 MG/DL (ref 7–17)
WBC # BLD AUTO: 7.52 K/UL (ref 3.9–12.7)

## 2024-10-07 PROCEDURE — 83036 HEMOGLOBIN GLYCOSYLATED A1C: CPT | Performed by: INTERNAL MEDICINE

## 2024-10-07 PROCEDURE — 84439 ASSAY OF FREE THYROXINE: CPT | Performed by: INTERNAL MEDICINE

## 2024-10-07 PROCEDURE — 84443 ASSAY THYROID STIM HORMONE: CPT | Mod: PN | Performed by: INTERNAL MEDICINE

## 2024-10-07 PROCEDURE — 80061 LIPID PANEL: CPT | Performed by: INTERNAL MEDICINE

## 2024-10-07 PROCEDURE — 85025 COMPLETE CBC W/AUTO DIFF WBC: CPT | Mod: PN | Performed by: INTERNAL MEDICINE

## 2024-10-07 PROCEDURE — 80053 COMPREHEN METABOLIC PANEL: CPT | Mod: PN | Performed by: INTERNAL MEDICINE

## 2024-10-08 ENCOUNTER — PATIENT MESSAGE (OUTPATIENT)
Dept: BARIATRICS | Facility: CLINIC | Age: 49
End: 2024-10-08
Payer: COMMERCIAL

## 2024-10-14 ENCOUNTER — OFFICE VISIT (OUTPATIENT)
Dept: INTERNAL MEDICINE | Facility: CLINIC | Age: 49
End: 2024-10-14
Payer: COMMERCIAL

## 2024-10-14 VITALS
HEART RATE: 67 BPM | HEIGHT: 62 IN | WEIGHT: 246.38 LBS | OXYGEN SATURATION: 99 % | SYSTOLIC BLOOD PRESSURE: 116 MMHG | DIASTOLIC BLOOD PRESSURE: 82 MMHG | TEMPERATURE: 98 F | RESPIRATION RATE: 18 BRPM | BODY MASS INDEX: 45.34 KG/M2

## 2024-10-14 DIAGNOSIS — E03.9 HYPOTHYROIDISM, UNSPECIFIED TYPE: ICD-10-CM

## 2024-10-14 DIAGNOSIS — Z12.31 VISIT FOR SCREENING MAMMOGRAM: ICD-10-CM

## 2024-10-14 DIAGNOSIS — Z00.00 ANNUAL PHYSICAL EXAM: Primary | ICD-10-CM

## 2024-10-14 DIAGNOSIS — K21.9 GASTROESOPHAGEAL REFLUX DISEASE WITHOUT ESOPHAGITIS: Chronic | ICD-10-CM

## 2024-10-14 DIAGNOSIS — F32.1 CURRENT MODERATE EPISODE OF MAJOR DEPRESSIVE DISORDER WITHOUT PRIOR EPISODE: Chronic | ICD-10-CM

## 2024-10-14 DIAGNOSIS — Z12.12 ENCOUNTER FOR COLORECTAL CANCER SCREENING: ICD-10-CM

## 2024-10-14 DIAGNOSIS — Z23 NEED FOR VACCINATION: ICD-10-CM

## 2024-10-14 DIAGNOSIS — Z12.11 ENCOUNTER FOR COLORECTAL CANCER SCREENING: ICD-10-CM

## 2024-10-14 PROCEDURE — 99999 PR PBB SHADOW E&M-EST. PATIENT-LVL V: CPT | Mod: PBBFAC,,, | Performed by: INTERNAL MEDICINE

## 2024-10-14 RX ORDER — LEVOTHYROXINE SODIUM 50 UG/1
50 TABLET ORAL
Qty: 30 TABLET | Refills: 11 | Status: SHIPPED | OUTPATIENT
Start: 2024-10-14 | End: 2025-10-14

## 2024-10-14 NOTE — PROGRESS NOTES
Assessment:       1. Annual physical exam  - Ambulatory referral/consult to Obstetrics / Gynecology; Future    2. Current moderate episode of major depressive disorder without prior episode    3. BMI 45.0-49.9, adult    4. Gastroesophageal reflux disease without esophagitis    5. Visit for screening mammogram  - Mammo Digital Screening Bilat; Future    6. Encounter for colorectal cancer screening    7. Hypothyroidism, unspecified type  - TSH; Future    8. Need for vaccination  - Influenza - Trivalent - PF (ADULT)        Plan:       1. Reviewed lab work with patient.  Discussed diet and exercise.    2. Continue Lexapro 20 mg.    3. Monitor.  4. Continue Pepcid 20 mg twice daily as needed.  5. Check mammogram.    6. Plan for colonoscopy in 2026.    7.  Check TSH in 3 months.  Synthroid 50 mcg prescribed.    Assessment & Plan    IMPRESSION:  1. Reviewed lab results:  2. - Cholesterol elevated (total 228, triglycerides 273, HDL 39, ), improved from last year but still suboptimal  3. - A1c 5.2, not diabetic or at risk  4. - Electrolytes, kidney, and liver function normal  5. - Blood counts normal  6. - T4 normal, TSH slightly suppressed  7. Assessed 10-year stroke/heart attack risk at 1.5% (low)  8. Evaluated thyroid function in context of partial thyroidectomy history  9. Considered fatigue potentially related to thyroid function or recent initiation of semaglutide (Ozempic)  10. Will consider starting low-dose Synthroid if TSH remains suppressed in 3 months    COLONOSCOPY:   Explained colonoscopy process, sensitivity (99.9%), and alternatives (Cologuard 92-94%, FIT test 88-90%).   Follow up in 2026 for colonoscopy.    WEIGHT MANAGEMENT:   Discussed importance of protein intake while on weight loss medication to prevent muscle loss.   Ms. Louis to ensure adequate protein intake, especially if continuing semaglutide.   Recommend focusing on diet improvements to aid in cholesterol management and weight  loss.    THYROID DISORDER:   Educated on thyroid medication absorption and administration.   Started Synthroid 50 mcg daily, to be taken in the morning 30 minutes before eating, with a full glass of water on an empty stomach.   Follow up for thyroid function test in 3 months.    NECK ARTHRITIS:   Informed about potential arthritis in neck joint causing clicking sensation when swallowing.    HYPERLIPIDEMIA:   Ms. Louis to exercise 5 days a week, 30 minutes a day minimum to improve cholesterol and overall health.    MEDICATIONS/SUPPLEMENTS:   Continued Lexapro 20 mg.   Continued Pepcid 20 mg twice daily as needed for acid reflux.    FLU VACCINATION:   Flu shot administered in office.    BREAST CANCER SCREENING:   Mammogram ordered.    GYN REFERRAL:   Referred to gynecologist for GYN exam.                   This note was generated with the assistance of ambient listening technology. Verbal consent was obtained by the patient and accompanying visitor(s) for the recording of patient appointment to facilitate this note. I attest to having reviewed and edited the generated note for accuracy, though some syntax or spelling errors may persist. Please contact the author of this note for any clarification.       Subjective:       Patient ID: Luzmaria Louis is a 49 y.o. female.    Chief Complaint: Annual Exam    HPI    49 y.o. female here for annual exam.     Cholesterol: total 228, trig 273  Vaccines: Influenza - 2023; Tetanus - 2018; COVID - 3 done  Sexual Screening: active  STD screening: no concern  Eye exam: done last year  MMG: due 12/21/24  Pap: needs  Colonoscopy:  2021, due 2026  A1c: 5.2    Exercise: no regular exercise.  Diet: mix of fast food, home cooked, eating out.    History of Present Illness    CHIEF COMPLAINT:  Ms. Louis presents today for annual exam.    MEDICAL HISTORY:  She has a history of depression, morbid obesity, and acid reflux. She underwent a partial thyroidectomy. Recent  "thyroid function tests show normal T4 levels, but TSH is slightly suppressed. She denies any symptoms of hypothyroidism at present. Grandmother had throat cancer. She denies any known family history of thyroid cancer or other endocrine cancers.    MEDICATIONS:  She takes Lexapro 20 mg for depression, Pepcid 20 mg twice daily as needed for acid reflux, and thyroid medication with a starting dose of 50 mcg. She recently discontinued Ozempic (semaglutide) for weight management due to severe diarrhea.    PREVENTIVE CARE:  She has not received her flu vaccine this year and is due for her COVID-19 vaccination. Her tetanus vaccine is current until 2028. She had an eye exam last year. She is due for a mammogram on December 21st, is overdue for a GYN exam, and is due for a colonoscopy.    SOCIAL HISTORY:  She reports social alcohol consumption, approximately 3-4 times per month, typically consuming 1-2 drinks per occasion. She denies smoking and illicit drug use. She is occasionally sexually active and denies any concerns about sexually transmitted diseases.    CURRENT SYMPTOMS:  She reports experiencing fatigue and sleepiness at work for the past couple of months, describing falling asleep at her desk and feeling consistently tired. She denies any changes in her sleep schedule.    DIET AND EXERCISE:  She reports a mixed diet consisting of fast food, home-cooked meals, and eating out. She currently has no exercise routine.    LAB RESULTS:  Total cholesterol is elevated at 228. Triglycerides are high at 273. HDL is 39, and LDL is 134. These values show improvement from the previous year but still require attention. A1c is 5.2, indicating no diabetes or pre-diabetes. Electrolytes, kidney function, liver function, and blood counts are all within normal limits.    GASTROINTESTINAL ISSUES:  She experienced severe diarrhea as a side effect of semaglutide medication, describing the consistency of her stools as "like water." This " prompted her to discontinue the medication.      ROS:  Constitutional: -weight loss, +fatigue  Gastrointestinal: +diarrhea         Review of Systems          Objective:      Physical Exam  Vitals reviewed.   Constitutional:       Appearance: She is well-developed.   HENT:      Head: Normocephalic and atraumatic.      Mouth/Throat:      Pharynx: No oropharyngeal exudate.   Eyes:      General: No scleral icterus.        Right eye: No discharge.         Left eye: No discharge.      Pupils: Pupils are equal, round, and reactive to light.   Neck:      Thyroid: No thyromegaly.      Trachea: No tracheal deviation.   Cardiovascular:      Rate and Rhythm: Normal rate and regular rhythm.      Heart sounds: Normal heart sounds. No murmur heard.     No friction rub. No gallop.   Pulmonary:      Effort: Pulmonary effort is normal. No respiratory distress.      Breath sounds: Normal breath sounds. No wheezing or rales.   Chest:      Chest wall: No tenderness.   Abdominal:      General: Bowel sounds are normal. There is no distension.      Palpations: Abdomen is soft. There is no mass.      Tenderness: There is no abdominal tenderness. There is no guarding or rebound.   Musculoskeletal:         General: No tenderness. Normal range of motion.      Cervical back: Normal range of motion and neck supple.   Skin:     General: Skin is warm and dry.      Coloration: Skin is not pale.      Findings: No erythema or rash.   Neurological:      Mental Status: She is alert and oriented to person, place, and time.   Psychiatric:         Behavior: Behavior normal.

## 2024-10-15 ENCOUNTER — TELEPHONE (OUTPATIENT)
Dept: BARIATRICS | Facility: CLINIC | Age: 49
End: 2024-10-15
Payer: COMMERCIAL

## 2024-10-20 DIAGNOSIS — G89.18 POST-OP PAIN: ICD-10-CM

## 2024-10-21 RX ORDER — HYDROCODONE BITARTRATE AND ACETAMINOPHEN 10; 325 MG/1; MG/1
1 TABLET ORAL EVERY 6 HOURS PRN
Qty: 28 TABLET | Refills: 0 | Status: SHIPPED | OUTPATIENT
Start: 2024-10-21

## 2024-10-25 ENCOUNTER — OFFICE VISIT (OUTPATIENT)
Dept: SPORTS MEDICINE | Facility: CLINIC | Age: 49
End: 2024-10-25
Payer: COMMERCIAL

## 2024-10-25 ENCOUNTER — HOSPITAL ENCOUNTER (OUTPATIENT)
Dept: RADIOLOGY | Facility: HOSPITAL | Age: 49
Discharge: HOME | End: 2024-10-25
Attending: ORTHOPAEDIC SURGERY
Payer: COMMERCIAL

## 2024-10-25 VITALS
WEIGHT: 243.19 LBS | DIASTOLIC BLOOD PRESSURE: 81 MMHG | HEART RATE: 66 BPM | BODY MASS INDEX: 44.75 KG/M2 | SYSTOLIC BLOOD PRESSURE: 134 MMHG | HEIGHT: 62 IN

## 2024-10-25 DIAGNOSIS — M25.512 CHRONIC LEFT SHOULDER PAIN: Primary | ICD-10-CM

## 2024-10-25 DIAGNOSIS — G89.29 CHRONIC LEFT SHOULDER PAIN: Primary | ICD-10-CM

## 2024-10-25 DIAGNOSIS — M75.102 TEAR OF LEFT ROTATOR CUFF, UNSPECIFIED TEAR EXTENT, UNSPECIFIED WHETHER TRAUMATIC: ICD-10-CM

## 2024-10-25 PROCEDURE — 73030 X-RAY EXAM OF SHOULDER: CPT | Mod: TC,LT

## 2024-10-25 PROCEDURE — 99999 PR PBB SHADOW E&M-EST. PATIENT-LVL III: CPT | Mod: PBBFAC,,, | Performed by: ORTHOPAEDIC SURGERY

## 2024-10-25 PROCEDURE — 73030 X-RAY EXAM OF SHOULDER: CPT | Mod: 26,LT,, | Performed by: STUDENT IN AN ORGANIZED HEALTH CARE EDUCATION/TRAINING PROGRAM

## 2024-10-25 NOTE — PROGRESS NOTES
CC: LEFT shoulder pain     49 y.o. RHD Female with a 1 wk history of left shoulder atraumatic pain.  Hx RTC debridement in 2020  Patient works a desk job. She has attempted lidocaine patches with moderate relief, and takes hydrocodone for her ankle pain at night, which also helps the shoulder.    She states that the pain is severe and not responding to any conservative care.      She reports that the pain and weakness is worse with overhead activity. It also bothers her at night.    Is affecting ADLs.  Pain is 7/10 at it's worst.      Past Medical History:   Diagnosis Date    Fibroid     GERD (gastroesophageal reflux disease)     Migraine headache        Past Surgical History:   Procedure Laterality Date    ARTHROSCOPIC DEBRIDEMENT OF ROTATOR CUFF Left 7/29/2020    Procedure: DEBRIDEMENT, ROTATOR CUFF, ARTHROSCOPIC;  Surgeon: Tejas Prince MD;  Location: [facility] OR;  Service: Orthopedics;  Laterality: Left;    ARTHROSCOPY OF SHOULDER WITH DECOMPRESSION OF SUBACROMIAL SPACE Left 7/29/2020    Procedure: ARTHROSCOPY, SHOULDER, WITH SUBACROMIAL SPACE DECOMPRESSION;  Surgeon: Tejas Prince MD;  Location: [facility] OR;  Service: Orthopedics;  Laterality: Left;    CALCANEAL OSTEOTOMY Left 2/25/2021    Procedure: OSTEOTOMY, CALCANEUS Medial displacement;  Surgeon: Jamison Mercado MD;  Location: [facility] OR;  Service: Orthopedics;  Laterality: Left;  Arthrex screws  FluoroScan imaging    CHOLECYSTECTOMY      COLONOSCOPY N/A 10/4/2021    Procedure: COLONOSCOPY;  Surgeon: Cristina Cannon MD;  Location: [facility] (4TH FLR);  Service: Endoscopy;  Laterality: N/A;  covid test madyson 10/1  pt requests Miralax prep-MS    HYSTERECTOMY      MOUTH SURGERY      THYROID LOBECTOMY Right 7/3/2019    Procedure: LOBECTOMY, THYROID, Possible Total;  Surgeon: Carlee Huff MD;  Location: [facility] OR 2ND FLR;  Service: General;  Laterality: Right;    TONSILLECTOMY      TRANSFER OF TENDON OF LOWER EXTREMITY Left 2/25/2021     Procedure: TRANSFER, TENDON, LOWER EXTREMITY FDL to posterior tibial tendon;  Surgeon: Jamison Mercado MD;  Location: [facility] OR;  Service: Orthopedics;  Laterality: Left;  Arthrex bio tenodesis screws    TUBAL LIGATION         Family History   Problem Relation Name Age of Onset    Prostate cancer Paternal Grandfather      Cancer Paternal Grandmother Ruddy     Throat cancer Maternal Grandmother Lubertha     Cancer Maternal Grandmother Lubertha         throat cancer - smoker    Diabetes Maternal Grandmother Lubertha     Stroke Maternal Grandmother Lubertha     Hypertension Maternal Grandmother Lubertha     Aneurysm Father          stomach aneurysm    Cancer Maternal Aunt Izola         gastric cancer    Prostate cancer Maternal Uncle      Hyperlipidemia Mother Samanta     Prostate cancer Brother      Cancer Maternal Grandfather          colon cancer    Heart disease Neg Hx      Allergic rhinitis Neg Hx      Allergies Neg Hx      Angioedema Neg Hx      Asthma Neg Hx      Atopy Neg Hx      Eczema Neg Hx      Immunodeficiency Neg Hx      Rhinitis Neg Hx      Urticaria Neg Hx           Current Outpatient Medications:     cetirizine (ZYRTEC) 10 MG tablet, Take 1 tablet (10 mg total) by mouth once daily., Disp: 30 tablet, Rfl: 11    cholestyramine (QUESTRAN) 4 gram packet, MIX 1 PACKET AND DRINK 3 TIMES DAILY WITH FOOD, Disp: 270 packet, Rfl: 0    EScitalopram oxalate (LEXAPRO) 20 MG tablet, Take 1 tablet (20 mg total) by mouth once daily., Disp: 30 tablet, Rfl: 11    famotidine (PEPCID) 20 MG tablet, Take 1 tablet (20 mg total) by mouth 2 (two) times daily., Disp: 60 tablet, Rfl: 0    fluticasone propionate (FLONASE) 50 mcg/actuation nasal spray, 2 sprays (100 mcg total) by Each Nostril route once daily., Disp: 9.9 mL, Rfl: 11    HYDROcodone-acetaminophen (NORCO)  mg per tablet, Take 1 tablet by mouth every 6 (six) hours as needed for Pain., Disp: 28 tablet, Rfl: 0    levothyroxine (SYNTHROID) 50 MCG tablet, Take  "1 tablet (50 mcg total) by mouth before breakfast., Disp: 30 tablet, Rfl: 11    naproxen (NAPROSYN) 500 MG tablet, TAKE 1 TABLET BY MOUTH TWICE A DAY, Disp: 60 tablet, Rfl: 2    promethazine (PHENERGAN) 25 MG tablet, Take 1 tablet (25 mg total) by mouth every 6 (six) hours as needed for Nausea., Disp: 30 tablet, Rfl: 1    Review of patient's allergies indicates:   Allergen Reactions    Caffeine      She feels like it is hard to breath, dizzy, nauseous.          REVIEW OF SYSTEMS:  Constitution: Negative. Negative for chills, fever and night sweats.   HENT: Negative for congestion and headaches.    Eyes: Negative for blurred vision, left vision loss and right vision loss.   Cardiovascular: Negative for chest pain and syncope.   Respiratory: Negative for cough and shortness of breath.    Endocrine: Negative for polydipsia, polyphagia and polyuria.   Hematologic/Lymphatic: Negative for bleeding problem. Does not bruise/bleed easily.   Skin: Negative for dry skin, itching and rash.   Musculoskeletal: Negative for falls.  Positive for left shoulder pain and muscle weakness.   Gastrointestinal: Negative for abdominal pain and bowel incontinence.   Genitourinary: Negative for bladder incontinence and nocturia.   Neurological: Negative for disturbances in coordination, loss of balance and seizures.   Psychiatric/Behavioral: Negative for depression. The patient does not have insomnia.    Allergic/Immunologic: Negative for hives and persistent infections.      PHYSICAL EXAMINATION:  Vitals:  /81 (BP Location: Right arm, Patient Position: Sitting)   Pulse 66   Ht 5' 2" (1.575 m)   Wt 110.3 kg (243 lb 2.7 oz)   BMI 44.48 kg/m²    General: The patient is alert and oriented x 3.  Mood is pleasant.  Observation of ears, eyes and nose reveal no gross abnormalities.  No labored breathing observed.  Gait is coordinated. Patient can toe walk and heel walk without difficulty.      LEFT Shoulder / Upper Extremity " Exam    OBSERVATION:     Swelling  none  Deformity  none   Discoloration  none   Scapular winging none   Scars   none  Atrophy  none    TENDERNESS / CREPITUS (T/C):          T/C      T/C   Clavicle   -/-  SUPRAspinatus    -/-     AC Jt.    +/-  INFRAspinatus  -/-    SC Jt.    -/-  Deltoid    +/-      G. Tuberosity  -/-  LH BICEP groove  -/-   Acromion:  -/-  Midline Neck   -/-     Scapular Spine -/-  Trapezium   +/-   SMA Scapula  -/-  GH jt. line - post  -/-     Scapulothoracic  -/-         ROM: (* = with pain)  Right shoulder   Left shoulder        AROM (PROM)   AROM (PROM)   FE    170° (175°)     170° (175°)     ER at 0°    60°  (65°)    60°  (65°)   ER at 90° ABD  90°  (90°)    90°  (90°)   IR at 90°  ABD   NA  (40°)     NA  (40°)      IR (spine level)   T7     T10    STRENGTH: (* = with pain) Right shoulder   Left shoulder    SCAPTION   5/5    5/5    IR    5/5    5/5   ER    5/5    5/5   BICEPS   5/5    5/5   Deltoid    5/5    5/5     SIGNS:  Painful side       NEER   -    OASHLEES  +    SPENCER   -    SPEEDS  neg     DROP ARM   -   BELLY PRESS neg   Superior escape none    LIFT-OFF  neg   X-Body ADD    neg    MOVING VALGUS neg        STABILITY TESTING    Right shoulder   Left shoulder    Translation     Anterior  up face     up face    Posterior  up face    up face    Sulcus   < 10mm    < 10 mm     Signs   Apprehension   neg      neg       Relocation   no change     no change      Jerk test  neg     neg    EXTREMITY NEURO-VASCULAR EXAM:    Sensation grossly intact to light touch all dermatomal regions.    DTR 2+ Biceps, Triceps, BR and Negative Rubens sign   Grossly intact motor function at Elbow, Wrist and Hand   Distal pulses radial and ulnar 2+, brisk cap refill, symmetric.      NECK:  Painless FROM and spinous processes non-tender. Negative Spurlings sign.        XRAYS:  Xrays including AP, Outlet and Axillary Lateral of Left shoulder are ordered / images reviewed by me:   No fracture  dislocation or other pathology   Acromion type 1   Proximal migration of humeral head: None   GH arthritis: None          ASSESSMENT:   Left shoulder pain, possible:  1. Chronic left shoulder pain        PLAN:      1. MRI left shoulder  2. Follow up in clinic to discuss MRI results    All questions were answered, patient will contact us for questions or concerns in the interim.

## 2024-11-05 DIAGNOSIS — G89.18 POST-OP PAIN: ICD-10-CM

## 2024-11-05 RX ORDER — HYDROCODONE BITARTRATE AND ACETAMINOPHEN 10; 325 MG/1; MG/1
1 TABLET ORAL EVERY 6 HOURS PRN
Qty: 28 TABLET | Refills: 0 | Status: SHIPPED | OUTPATIENT
Start: 2024-11-05

## 2024-11-08 ENCOUNTER — PATIENT MESSAGE (OUTPATIENT)
Dept: SPORTS MEDICINE | Facility: CLINIC | Age: 49
End: 2024-11-08
Payer: COMMERCIAL

## 2024-11-17 RX ORDER — NAPROXEN 500 MG/1
500 TABLET ORAL 2 TIMES DAILY
Qty: 60 TABLET | Refills: 2 | Status: SHIPPED | OUTPATIENT
Start: 2024-11-17

## 2024-11-17 NOTE — TELEPHONE ENCOUNTER
No care due was identified.  [facility] Embedded Care Due Messages. Reference number: [id number].   11/17/2024 1:06:58 AM CST

## 2024-11-26 DIAGNOSIS — G89.18 POST-OP PAIN: ICD-10-CM

## 2024-11-26 RX ORDER — HYDROCODONE BITARTRATE AND ACETAMINOPHEN 10; 325 MG/1; MG/1
1 TABLET ORAL EVERY 6 HOURS PRN
Qty: 28 TABLET | Refills: 0 | Status: SHIPPED | OUTPATIENT
Start: 2024-11-26

## 2024-12-12 ENCOUNTER — TELEPHONE (OUTPATIENT)
Dept: OBSTETRICS AND GYNECOLOGY | Facility: CLINIC | Age: 49
End: 2024-12-12
Payer: COMMERCIAL

## 2024-12-12 ENCOUNTER — PATIENT MESSAGE (OUTPATIENT)
Dept: OBSTETRICS AND GYNECOLOGY | Facility: CLINIC | Age: 49
End: 2024-12-12
Payer: COMMERCIAL

## 2024-12-16 DIAGNOSIS — Z98.890 POST-OPERATIVE NAUSEA AND VOMITING: ICD-10-CM

## 2024-12-16 DIAGNOSIS — R11.2 POST-OPERATIVE NAUSEA AND VOMITING: ICD-10-CM

## 2024-12-16 DIAGNOSIS — G89.18 POST-OP PAIN: ICD-10-CM

## 2024-12-16 RX ORDER — HYDROCODONE BITARTRATE AND ACETAMINOPHEN 10; 325 MG/1; MG/1
1 TABLET ORAL EVERY 6 HOURS PRN
Qty: 28 TABLET | Refills: 0 | Status: SHIPPED | OUTPATIENT
Start: 2024-12-16

## 2024-12-17 RX ORDER — PROMETHAZINE HYDROCHLORIDE 25 MG/1
25 TABLET ORAL EVERY 6 HOURS PRN
Qty: 30 TABLET | Refills: 1 | Status: SHIPPED | OUTPATIENT
Start: 2024-12-17

## 2024-12-17 NOTE — TELEPHONE ENCOUNTER
Refill Routing Note   Medication(s) are not appropriate for processing by [facility] for the following reason(s):        Outside of protocol    [facility] action(s):  Route               Appointments  past 12m or future 3m with PCP    Date Provider   Last Visit   10/14/2024 Bharat Reza MD   Next Visit   Visit date not found Bharat Reza MD   ED visits in past 90 days: 0        Note composed:7:20 PM 12/16/2024

## 2025-01-01 DIAGNOSIS — G89.18 POST-OP PAIN: ICD-10-CM

## 2025-01-03 RX ORDER — HYDROCODONE BITARTRATE AND ACETAMINOPHEN 10; 325 MG/1; MG/1
1 TABLET ORAL EVERY 6 HOURS PRN
Qty: 28 TABLET | Refills: 0 | Status: SHIPPED | OUTPATIENT
Start: 2025-01-03

## 2025-01-10 ENCOUNTER — OFFICE VISIT (OUTPATIENT)
Dept: INTERNAL MEDICINE | Facility: CLINIC | Age: 50
End: 2025-01-10
Payer: COMMERCIAL

## 2025-01-10 VITALS
WEIGHT: 238.31 LBS | BODY MASS INDEX: 43.86 KG/M2 | SYSTOLIC BLOOD PRESSURE: 120 MMHG | RESPIRATION RATE: 16 BRPM | HEART RATE: 65 BPM | TEMPERATURE: 96 F | OXYGEN SATURATION: 98 % | DIASTOLIC BLOOD PRESSURE: 68 MMHG | HEIGHT: 62 IN

## 2025-01-10 DIAGNOSIS — E66.01 CLASS 3 SEVERE OBESITY DUE TO EXCESS CALORIES WITH SERIOUS COMORBIDITY AND BODY MASS INDEX (BMI) OF 45.0 TO 49.9 IN ADULT: Chronic | ICD-10-CM

## 2025-01-10 DIAGNOSIS — M62.838 MUSCLE SPASM: ICD-10-CM

## 2025-01-10 DIAGNOSIS — K21.9 GASTROESOPHAGEAL REFLUX DISEASE WITHOUT ESOPHAGITIS: Primary | Chronic | ICD-10-CM

## 2025-01-10 DIAGNOSIS — M54.6 THORACIC BACK PAIN, UNSPECIFIED BACK PAIN LATERALITY, UNSPECIFIED CHRONICITY: ICD-10-CM

## 2025-01-10 DIAGNOSIS — E66.813 CLASS 3 SEVERE OBESITY DUE TO EXCESS CALORIES WITH SERIOUS COMORBIDITY AND BODY MASS INDEX (BMI) OF 45.0 TO 49.9 IN ADULT: Chronic | ICD-10-CM

## 2025-01-10 DIAGNOSIS — G43.109 MIGRAINE WITH AURA AND WITHOUT STATUS MIGRAINOSUS, NOT INTRACTABLE: Chronic | ICD-10-CM

## 2025-01-10 DIAGNOSIS — J30.89 NON-SEASONAL ALLERGIC RHINITIS, UNSPECIFIED TRIGGER: ICD-10-CM

## 2025-01-10 DIAGNOSIS — F32.1 CURRENT MODERATE EPISODE OF MAJOR DEPRESSIVE DISORDER WITHOUT PRIOR EPISODE: Chronic | ICD-10-CM

## 2025-01-10 PROCEDURE — 99999 PR PBB SHADOW E&M-EST. PATIENT-LVL V: CPT | Mod: PBBFAC,,,

## 2025-01-10 RX ORDER — TIZANIDINE 4 MG/1
4 TABLET ORAL EVERY 8 HOURS PRN
Qty: 30 TABLET | Refills: 0 | Status: SHIPPED | OUTPATIENT
Start: 2025-01-10 | End: 2025-01-20

## 2025-01-10 RX ORDER — MELOXICAM 15 MG/1
15 TABLET ORAL DAILY PRN
Qty: 30 TABLET | Refills: 11 | Status: SHIPPED | OUTPATIENT
Start: 2025-01-10

## 2025-01-10 NOTE — PROGRESS NOTES
Subjective:       Patient ID: Luzmaria Louis is a 49 y.o. female.    Chief Complaint: Back Spasm  Back Pain  This is a recurrent problem. The current episode started 1 to 4 weeks ago. The problem occurs 2 to 4 times per day. The problem is unchanged. The pain is present in the thoracic spine. The quality of the pain is described as burning and shooting. The pain does not radiate. The pain is at a severity of 10/10. The pain is severe. The pain is The same all the time. The symptoms are aggravated by coughing, position, sitting, standing and twisting. Stiffness is present All day. Associated symptoms include weight loss. Pertinent negatives include no bowel incontinence, paresis, paresthesias, perianal numbness or tingling. The treatment provided mild relief.       History of Present Illness    CHIEF COMPLAINT:  Ms. Louis presents today for back spasms.    BACK PAIN:  She reports middle back spasms that started three weeks ago without known trauma. Pain occasionally radiates around the trunk and is exacerbated by movement, including walking and transitioning from sitting to standing. Heat application provides temporary relief.  She denies bowel or bladder incontinence, saddle anesthesia, or fevers.  She endorses frequently picking up her granddaughter which may have caused her to unknowingly strain her back.    CURRENT MEDICATIONS:  She takes Synthroid 50 mcg for hypothyroidism, Lexapro for anxiety and depression, Famotidine for GERD, Zyrtec for non-seasonal allergic rhinitis, and Questran for gallbladder issues. She has been taking nightly hydrocodone for ankle pain for almost two years. She recently started Semaglutide for weight loss.      ROS:  Musculoskeletal: +muscle spasms         Review of Systems   Constitutional:  Positive for weight loss.   Gastrointestinal:  Negative for bowel incontinence.   Musculoskeletal:  Positive for back pain.   Neurological:  Negative for tingling and  paresthesias.             Objective:      Physical Exam  Constitutional:       General: She is awake. She is not in acute distress.     Appearance: Normal appearance. She is well-developed and well-groomed. She is obese. She is not ill-appearing, toxic-appearing or diaphoretic.   HENT:      Head: Normocephalic and atraumatic.   Eyes:      General: No scleral icterus.     Conjunctiva/sclera: Conjunctivae normal.      Pupils: Pupils are equal, round, and reactive to light.   Cardiovascular:      Rate and Rhythm: Normal rate and regular rhythm.      Pulses: Normal pulses.      Heart sounds: Normal heart sounds. No murmur heard.     No friction rub. No gallop.   Pulmonary:      Effort: Pulmonary effort is normal. No respiratory distress.      Breath sounds: Normal breath sounds. No wheezing, rhonchi or rales.   Musculoskeletal:         General: Tenderness present. Normal range of motion.      Thoracic back: Spasms and tenderness present. No swelling, edema, deformity, signs of trauma or bony tenderness.      Comments: Muscle tenderness and spasm to left thoracic back. No midline spinal tenderness.    Skin:     General: Skin is warm and dry.      Capillary Refill: Capillary refill takes less than 2 seconds.   Neurological:      Mental Status: She is alert and oriented to person, place, and time. Mental status is at baseline.      GCS: GCS eye subscore is 4. GCS verbal subscore is 5. GCS motor subscore is 6.   Psychiatric:         Attention and Perception: Attention normal.         Mood and Affect: Mood normal.         Behavior: Behavior normal. Behavior is cooperative.         Thought Content: Thought content normal.         Judgment: Judgment normal.           Physical Exam            Assessment:       1. Thoracic back pain, unspecified back pain laterality, unspecified chronicity  - meloxicam (MOBIC) 15 MG tablet; Take 1 tablet (15 mg total) by mouth daily as needed for Pain.  Dispense: 30 tablet; Refill: 11  -  tiZANidine (ZANAFLEX) 4 MG tablet; Take 1 tablet (4 mg total) by mouth every 8 (eight) hours as needed (no driving or operating heavy machinery.  May cause drowsiness.).  Dispense: 30 tablet; Refill: 0    2. Muscle spasm  - meloxicam (MOBIC) 15 MG tablet; Take 1 tablet (15 mg total) by mouth daily as needed for Pain.  Dispense: 30 tablet; Refill: 11  - tiZANidine (ZANAFLEX) 4 MG tablet; Take 1 tablet (4 mg total) by mouth every 8 (eight) hours as needed (no driving or operating heavy machinery.  May cause drowsiness.).  Dispense: 30 tablet; Refill: 0    3. Class 3 severe obesity due to excess calories with serious comorbidity and body mass index (BMI) of 45.0 to 49.9 in adult    4. Current moderate episode of major depressive disorder without prior episode    5. Gastroesophageal reflux disease without esophagitis    6. Migraine with aura and without status migrainosus, not intractable    7. Non-seasonal allergic rhinitis, unspecified trigger      Plan:         Assessment & Plan    OBESITY, CLASS 3:  - Monitored patient's use of Semaglutide (Ozempic) for weight loss from [facility].  - Reviewed potential side effects, primarily GI and endocrine-related, and discussed the importance of monitoring for these effects.    CURRENT MODERATE EPISODE OF MAJOR DEPRESSIVE DISORDER WITHOUT PRIOR EPISODE:  - Continued Lexapro for anxiety and depression management.    THORACIC BACK PAIN WITH MUSCLE SPASMS:  - Evaluated patient's thoracic back pain and muscle spasms that started three weeks ago without specific trauma or trigger.  - Physical exam performed, including muscle strength and sensation testing.  - Assessed as thoracic back pain with muscle spasms, possibly due to unknown injury or poor body mechanics.  - Prescribed meloxicam (to be taken every morning with food) and tizanidine (up to 3 times per day if staying home, otherwise only at bedtime) for 10 consecutive days.  - Advised against other NSAIDs while using meloxicam and  cautioned about driving or operating heavy machinery when taking tizanidine.  - Educated on heat therapy, appropriate exercises, and stretches for upper back pain management. Education provided.  -Advised not to co-administer Tizanidine and Norco due to sedative effects.  - Ms. Louis instructed to contact the office if pain worsens or does not improve in 2 weeks for potential physical therapy referral.    THYROID DISORDER:  - Continued Synthroid 50 mcg for hypothyroidism.    GERD:  - Continued famotidine for management.    NON-SEASONAL ALLERGIC RHINITIS:  - Continued Zyrtec for non-seasonal allergic rhinitis management.  -May continue to use Flonase as needed.     MIGRAINES:  -Symptoms controlled.  Not requiring prophylactic treatment.    MEDICATIONS/SUPPLEMENTS:  - Noted patient's report of taking hydrocodone almost nightly for almost 2 years due to ankle surgery.  - Advised against taking hydrocodone with tizanidine due to increased sedation risk.  - Explained risks of combining muscle relaxants with opioids.           This note was generated with the assistance of ambient listening technology. Verbal consent was obtained by the patient and accompanying visitor(s) for the recording of patient appointment to facilitate this note. I attest to having reviewed and edited the generated note for accuracy, though some syntax or spelling errors may persist. Please contact the author of this note for any clarification.

## 2025-01-13 ENCOUNTER — LAB VISIT (OUTPATIENT)
Dept: LAB | Facility: HOSPITAL | Age: 50
End: 2025-01-13
Attending: INTERNAL MEDICINE
Payer: COMMERCIAL

## 2025-01-13 DIAGNOSIS — E03.9 HYPOTHYROIDISM, UNSPECIFIED TYPE: ICD-10-CM

## 2025-01-13 LAB — TSH SERPL DL<=0.005 MIU/L-ACNC: 2.79 UIU/ML (ref 0.4–4)

## 2025-01-13 PROCEDURE — 84443 ASSAY THYROID STIM HORMONE: CPT | Mod: PN | Performed by: INTERNAL MEDICINE

## 2025-01-13 PROCEDURE — 36415 COLL VENOUS BLD VENIPUNCTURE: CPT | Mod: PN | Performed by: INTERNAL MEDICINE

## 2025-01-23 DIAGNOSIS — G89.18 POST-OP PAIN: ICD-10-CM

## 2025-01-24 RX ORDER — HYDROCODONE BITARTRATE AND ACETAMINOPHEN 10; 325 MG/1; MG/1
1 TABLET ORAL EVERY 6 HOURS PRN
Qty: 28 TABLET | Refills: 0 | Status: SHIPPED | OUTPATIENT
Start: 2025-01-24

## 2025-02-13 DIAGNOSIS — G89.18 POST-OP PAIN: ICD-10-CM

## 2025-02-14 RX ORDER — HYDROCODONE BITARTRATE AND ACETAMINOPHEN 10; 325 MG/1; MG/1
1 TABLET ORAL EVERY 6 HOURS PRN
Qty: 28 TABLET | Refills: 0 | Status: SHIPPED | OUTPATIENT
Start: 2025-02-14

## 2025-03-03 ENCOUNTER — TELEPHONE (OUTPATIENT)
Dept: ORTHOPEDICS | Facility: CLINIC | Age: 50
End: 2025-03-03
Payer: COMMERCIAL

## 2025-03-03 DIAGNOSIS — G89.18 POST-OP PAIN: ICD-10-CM

## 2025-03-05 DIAGNOSIS — G89.18 POST-OP PAIN: ICD-10-CM

## 2025-03-05 RX ORDER — HYDROCODONE BITARTRATE AND ACETAMINOPHEN 10; 325 MG/1; MG/1
1 TABLET ORAL EVERY 6 HOURS PRN
Qty: 28 TABLET | Refills: 0 | Status: SHIPPED | OUTPATIENT
Start: 2025-03-05

## 2025-03-05 RX ORDER — HYDROCODONE BITARTRATE AND ACETAMINOPHEN 10; 325 MG/1; MG/1
1 TABLET ORAL EVERY 6 HOURS PRN
Qty: 28 TABLET | Refills: 0 | Status: CANCELLED | OUTPATIENT
Start: 2025-03-05

## 2025-03-24 ENCOUNTER — PATIENT MESSAGE (OUTPATIENT)
Dept: INTERNAL MEDICINE | Facility: CLINIC | Age: 50
End: 2025-03-24
Payer: COMMERCIAL

## 2025-03-25 DIAGNOSIS — G89.18 POST-OP PAIN: ICD-10-CM

## 2025-03-26 RX ORDER — HYDROCODONE BITARTRATE AND ACETAMINOPHEN 10; 325 MG/1; MG/1
1 TABLET ORAL EVERY 6 HOURS PRN
Qty: 28 TABLET | Refills: 0 | Status: SHIPPED | OUTPATIENT
Start: 2025-03-26

## 2025-03-27 ENCOUNTER — HOSPITAL ENCOUNTER (OUTPATIENT)
Dept: RADIOLOGY | Facility: HOSPITAL | Age: 50
Discharge: HOME | End: 2025-03-27
Attending: INTERNAL MEDICINE
Payer: COMMERCIAL

## 2025-03-27 DIAGNOSIS — Z12.31 VISIT FOR SCREENING MAMMOGRAM: ICD-10-CM

## 2025-03-27 PROCEDURE — 77063 BREAST TOMOSYNTHESIS BI: CPT | Mod: 26,,, | Performed by: STUDENT IN AN ORGANIZED HEALTH CARE EDUCATION/TRAINING PROGRAM

## 2025-03-27 PROCEDURE — 77067 SCR MAMMO BI INCL CAD: CPT | Mod: TC,PN

## 2025-03-27 PROCEDURE — 77067 SCR MAMMO BI INCL CAD: CPT | Mod: 26,,, | Performed by: STUDENT IN AN ORGANIZED HEALTH CARE EDUCATION/TRAINING PROGRAM

## 2025-03-28 ENCOUNTER — RESULTS FOLLOW-UP (OUTPATIENT)
Dept: INTERNAL MEDICINE | Facility: CLINIC | Age: 50
End: 2025-03-28

## 2025-04-21 DIAGNOSIS — G89.18 POST-OP PAIN: ICD-10-CM

## 2025-04-21 DIAGNOSIS — R11.2 POST-OPERATIVE NAUSEA AND VOMITING: ICD-10-CM

## 2025-04-21 DIAGNOSIS — Z98.890 POST-OPERATIVE NAUSEA AND VOMITING: ICD-10-CM

## 2025-04-21 RX ORDER — PROMETHAZINE HYDROCHLORIDE 25 MG/1
25 TABLET ORAL EVERY 6 HOURS PRN
Qty: 30 TABLET | Refills: 1 | Status: SHIPPED | OUTPATIENT
Start: 2025-04-21

## 2025-04-21 RX ORDER — ESCITALOPRAM OXALATE 20 MG/1
20 TABLET ORAL DAILY
Qty: 90 TABLET | Refills: 1 | Status: SHIPPED | OUTPATIENT
Start: 2025-04-21 | End: 2026-04-21

## 2025-04-21 NOTE — TELEPHONE ENCOUNTER
No care due was identified.  [facility] Embedded Care Due Messages. Reference number: [id number].   4/21/2025 4:21:29 PM CDT

## 2025-04-22 RX ORDER — HYDROCODONE BITARTRATE AND ACETAMINOPHEN 10; 325 MG/1; MG/1
1 TABLET ORAL EVERY 6 HOURS PRN
Qty: 28 TABLET | Refills: 0 | Status: SHIPPED | OUTPATIENT
Start: 2025-04-22

## 2025-05-15 DIAGNOSIS — G89.18 POST-OP PAIN: ICD-10-CM

## 2025-05-16 RX ORDER — HYDROCODONE BITARTRATE AND ACETAMINOPHEN 10; 325 MG/1; MG/1
1 TABLET ORAL EVERY 6 HOURS PRN
Qty: 28 TABLET | Refills: 0 | Status: SHIPPED | OUTPATIENT
Start: 2025-05-16

## 2025-05-20 ENCOUNTER — TELEPHONE (OUTPATIENT)
Dept: INTERNAL MEDICINE | Facility: CLINIC | Age: 50
End: 2025-05-20

## 2025-05-20 NOTE — TELEPHONE ENCOUNTER
LVM informing appt with Bharat Yousif today will be booked out and needs to be rescheduled. Advised to call back to reschedule

## 2025-06-04 DIAGNOSIS — G89.18 POST-OP PAIN: ICD-10-CM

## 2025-06-04 RX ORDER — HYDROCODONE BITARTRATE AND ACETAMINOPHEN 10; 325 MG/1; MG/1
1 TABLET ORAL EVERY 6 HOURS PRN
Qty: 28 TABLET | Refills: 0 | Status: SHIPPED | OUTPATIENT
Start: 2025-06-04

## 2025-06-12 ENCOUNTER — TELEPHONE (OUTPATIENT)
Dept: INTERNAL MEDICINE | Facility: CLINIC | Age: 50
End: 2025-06-12

## 2025-06-12 ENCOUNTER — OFFICE VISIT (OUTPATIENT)
Dept: INTERNAL MEDICINE | Facility: CLINIC | Age: 50
End: 2025-06-12
Payer: COMMERCIAL

## 2025-06-12 VITALS
SYSTOLIC BLOOD PRESSURE: 106 MMHG | TEMPERATURE: 97 F | BODY MASS INDEX: 40.88 KG/M2 | DIASTOLIC BLOOD PRESSURE: 68 MMHG | RESPIRATION RATE: 14 BRPM | HEART RATE: 65 BPM | WEIGHT: 222.13 LBS | OXYGEN SATURATION: 98 % | HEIGHT: 62 IN

## 2025-06-12 DIAGNOSIS — K21.9 GASTROESOPHAGEAL REFLUX DISEASE WITHOUT ESOPHAGITIS: Chronic | ICD-10-CM

## 2025-06-12 DIAGNOSIS — F32.1 CURRENT MODERATE EPISODE OF MAJOR DEPRESSIVE DISORDER WITHOUT PRIOR EPISODE: Chronic | ICD-10-CM

## 2025-06-12 DIAGNOSIS — M54.50 LOW BACK PAIN WITHOUT SCIATICA, UNSPECIFIED BACK PAIN LATERALITY, UNSPECIFIED CHRONICITY: ICD-10-CM

## 2025-06-12 DIAGNOSIS — E03.9 HYPOTHYROIDISM, UNSPECIFIED TYPE: ICD-10-CM

## 2025-06-12 DIAGNOSIS — G56.03 BILATERAL CARPAL TUNNEL SYNDROME: Primary | ICD-10-CM

## 2025-06-12 DIAGNOSIS — E66.01 CLASS 3 SEVERE OBESITY DUE TO EXCESS CALORIES WITH SERIOUS COMORBIDITY AND BODY MASS INDEX (BMI) OF 40.0 TO 44.9 IN ADULT: ICD-10-CM

## 2025-06-12 DIAGNOSIS — E66.813 CLASS 3 SEVERE OBESITY DUE TO EXCESS CALORIES WITH SERIOUS COMORBIDITY AND BODY MASS INDEX (BMI) OF 40.0 TO 44.9 IN ADULT: ICD-10-CM

## 2025-06-12 PROCEDURE — 3008F BODY MASS INDEX DOCD: CPT | Mod: CPTII,S$GLB,,

## 2025-06-12 PROCEDURE — 3074F SYST BP LT 130 MM HG: CPT | Mod: CPTII,S$GLB,,

## 2025-06-12 PROCEDURE — 99999 PR PBB SHADOW E&M-EST. PATIENT-LVL V: CPT | Mod: PBBFAC,,,

## 2025-06-12 PROCEDURE — 1159F MED LIST DOCD IN RCRD: CPT | Mod: CPTII,S$GLB,,

## 2025-06-12 PROCEDURE — 3078F DIAST BP <80 MM HG: CPT | Mod: CPTII,S$GLB,,

## 2025-06-12 PROCEDURE — 1160F RVW MEDS BY RX/DR IN RCRD: CPT | Mod: CPTII,S$GLB,,

## 2025-06-12 PROCEDURE — 99214 OFFICE O/P EST MOD 30 MIN: CPT | Mod: S$GLB,,,

## 2025-06-12 NOTE — TELEPHONE ENCOUNTER
Copied from CRM #[id number]. Topic: General Inquiry - Patient Advice  >> Jun 12, 2025  8:55 AM More wrote:  .1MEDICALADVICE     Patient is calling for Medical Advice regarding: Pt running late to appt but on the way    How long has patient had these symptoms:    Pharmacy name and phone#:    Patient wants a call back or thru [facility], provide patient's call back phone number: [phone] (M)    Comments:    Please advise patient replies from provider may take up to 48 hours.

## 2025-06-12 NOTE — PROGRESS NOTES
Subjective:       Patient ID: Luzmaria Louis is a 50 y.o. female.    Chief Complaint: Wrist Pain     Wrist Pain         History of Present Illness    CHIEF COMPLAINT:  Ms. Louis presents today with concerns for wrist pain    MUSCULOSKELETAL:  She reports bilateral wrist pain present for a couple months, initially intermittent but now constant. L>R. Pain extends from forearm to hands, particularly affecting the thumb area. She experiences the sensation of numbness.  She denies any history of injury or trauma to the affected area. Her job does require frequent repetitive movements of her hands especially when using the computer.    She also reports lower back pain that is not severe in intensity. She has noticed a slight skin sensitivity when touching her lateral left thigh. There is also no injury or trauma associated with this pain. She denies fever or fatigue. There is no saddle anesthesia, bowel or bladder incontinence, or weakness. Her gait is intact.    INTEGUMENTARY:  She reports burning sensation of the skin present for 2-3 months.    MEDICAL HISTORY:  History includes depression, GERD, hypothyroidism, and status post left ankle surgery.    MEDICATIONS:  She takes Lexapro 20 mg daily for depression, famotidine 20 mg twice daily for GERD, and Synthroid for hypothyroidism.    LABS:  Thyroid levels were normal on Synthroid in January.      ROS:  Musculoskeletal: +joint pain, +back pain, +limb pain, +burning sensation      Objective:      Physical Exam  Vitals reviewed.   Constitutional:       General: She is awake. She is not in acute distress.     Appearance: Normal appearance. She is well-developed. She is not ill-appearing, toxic-appearing or diaphoretic.   HENT:      Head: Normocephalic and atraumatic.      Right Ear: External ear normal.      Left Ear: External ear normal.      Nose: Nose normal.      Mouth/Throat:      Mouth: Mucous membranes are moist.      Pharynx: Oropharynx is clear.    Eyes:      General: No scleral icterus.     Conjunctiva/sclera: Conjunctivae normal.      Pupils: Pupils are equal, round, and reactive to light.   Cardiovascular:      Rate and Rhythm: Normal rate.      Pulses: Normal pulses.           Radial pulses are 2+ on the right side and 2+ on the left side.   Pulmonary:      Effort: Pulmonary effort is normal. No respiratory distress.   Musculoskeletal:      Right lower leg: No edema.      Left lower leg: No edema.   Skin:     General: Skin is warm and dry.      Capillary Refill: Capillary refill takes less than 2 seconds.   Neurological:      Mental Status: She is alert and oriented to person, place, and time. Mental status is at baseline.      GCS: GCS eye subscore is 4. GCS verbal subscore is 5. GCS motor subscore is 6.      Motor: No weakness.      Comments: + phalen test to bilateral wrists   Psychiatric:         Behavior: Behavior is cooperative.           Physical Exam            Assessment:       1. Bilateral carpal tunnel syndrome    2. Low back pain without sciatica, unspecified back pain laterality, unspecified chronicity    3. Current moderate episode of major depressive disorder without prior episode    4. Gastroesophageal reflux disease without esophagitis    5. Hypothyroidism, unspecified type    6. Class 3 severe obesity due to excess calories with serious comorbidity and body mass index (BMI) of 40.0 to 44.9 in adult      Plan:         Assessment & Plan    PLAN SUMMARY:  - Continue Lexapro 20 mg daily for depression  - Continue current Synthroid treatment for hypothyroidism  - Continue famotidine 20 mg twice daily for GERD, with option to reduce to daily if symptoms controlled  - Prescribe Tylenol and ibuprofen as needed for pain relief  - Recommend conservative management for low back pain: gentle lumbar spine stretches, avoid prolonged sitting, heavy lifting, and bending  - Advise proper use of wrist splints at night, frequent breaks during repetitive  hand activities, and stretching exercises for potential carpal tunnel syndrome  - Apply heat/cold for pain relief  - Refer to physical therapy if low back pain symptoms persist or do not improve  - Refer to orthopedics if nerve impingement symptoms persist  - Follow up if back pain worsens, weakness develops, or if conservative management is ineffective for wrist pain    ## CARPAL TUNNEL SYNDROME:  - Monitored patient's wrist pain that has persisted for a few months with progressive worsening.  - Symptoms include burning sensation when rubbing the skin and pain extending from forearm into hands, affecting the thumb.  - Physical exam revealed discomfort when hands are held together.  - Assessed carpal tunnel syndrome as potential diagnosis and explained the condition, its causes, and treatment options to the patient.  - Recommend conservative management including proper use of wrist splints at night to maintain neutral wrist position, taking frequent breaks during repetitive hand activities, and performing stretching exercises.  - Prescribed Tylenol and ibuprofen as needed for pain relief.  - Advised avoiding activities that exacerbate symptoms and to follow up if pain worsens or if conservative management is ineffective.  - Will refer to orthopedics if symptoms persist.    ## DEPRESSION:  - Monitored patient's history of depression.  - Continued treatment with Lexapro 20 mg daily.    ## GASTROESOPHAGEAL REFLUX DISEASE (GERD):  - Monitored GERD symptoms.  - Continued treatment with famotidine 20 mg twice per day with option to reduce to daily if symptoms are controlled.    ## HYPOTHYROIDISM:  - Monitored history of hypothyroidism.  - Noted blood test was done in January after 3 months of treatment, with thyroid levels normalizing after 5 months on Synthroid.  - Continued current Synthroid treatment.    ## LOW BACK PAIN:  - Monitored patient's low back pain with associated burning sensation in skin for approximately  2-3 months.  - Evaluated possible nerve impingement or inflammation at the disc causing paresthesia symptoms in extremity.  - Recommend conservative management including gentle lumbar spine stretches, avoiding prolonged seated positions, heavy lifting, and bending.  - Advised frequent breaks if sitting for prolonged perionds  - Prescribed Tylenol and ibuprofen as needed for discomfort and suggested heat and cold application.  - Will refer to physical therapy if symptoms do not improve.  - Instructed to contact the office if back pain worsens or does not improve    CLASS 3 SEVERE OBESITY DUE TO EXCESS CALORIES WITH SERIOUS COMORBIDITY AND BODY MASS INDEX OF 40.0-44.9 IN ADULT:  -Acknowledged that obesity can contribute to the formation of low back pain as well as carpal tunnel.  - Weight loss is recommended not only to improve current symptoms but also improve overall health.         Return to clinic if symptoms worsen or fail to improve with conservative therapy, or if other concerns arise.           This note was generated with the assistance of ambient listening technology. Verbal consent was obtained by the patient and accompanying visitor(s) for the recording of patient appointment to facilitate this note. I attest to having reviewed and edited the generated note for accuracy, though some syntax or spelling errors may persist. Please contact the author of this note for any clarification.

## 2025-06-24 DIAGNOSIS — G89.18 POST-OP PAIN: ICD-10-CM

## 2025-06-25 RX ORDER — HYDROCODONE BITARTRATE AND ACETAMINOPHEN 10; 325 MG/1; MG/1
1 TABLET ORAL EVERY 6 HOURS PRN
Qty: 28 TABLET | Refills: 0 | Status: SHIPPED | OUTPATIENT
Start: 2025-06-25

## 2025-07-15 DIAGNOSIS — G89.18 POST-OP PAIN: ICD-10-CM

## 2025-07-17 RX ORDER — HYDROCODONE BITARTRATE AND ACETAMINOPHEN 10; 325 MG/1; MG/1
1 TABLET ORAL EVERY 6 HOURS PRN
Qty: 28 TABLET | Refills: 0 | Status: SHIPPED | OUTPATIENT
Start: 2025-07-17

## 2025-08-12 DIAGNOSIS — G89.18 POST-OP PAIN: ICD-10-CM

## 2025-08-12 RX ORDER — HYDROCODONE BITARTRATE AND ACETAMINOPHEN 10; 325 MG/1; MG/1
1 TABLET ORAL EVERY 6 HOURS PRN
Qty: 28 TABLET | Refills: 0 | Status: SHIPPED | OUTPATIENT
Start: 2025-08-12

## 2025-09-02 DIAGNOSIS — G89.18 POST-OP PAIN: ICD-10-CM

## 2025-09-02 RX ORDER — HYDROCODONE BITARTRATE AND ACETAMINOPHEN 10; 325 MG/1; MG/1
1 TABLET ORAL EVERY 6 HOURS PRN
Qty: 28 TABLET | Refills: 0 | Status: SHIPPED | OUTPATIENT
Start: 2025-09-02

## 2025-09-04 RX ORDER — CHOLESTYRAMINE 4 G/9G
POWDER, FOR SUSPENSION ORAL
Qty: 270 PACKET | Refills: 0 | Status: SHIPPED | OUTPATIENT
Start: 2025-09-04

## (undated) DEVICE — SEE MEDLINE ITEM 146313

## (undated) DEVICE — TUBE EMG NIM 6.0MM TRIVANTAGE

## (undated) DEVICE — STAPLER SKIN PROXIMATE WIDE

## (undated) DEVICE — CORD BIPOLAR 12 FOOT

## (undated) DEVICE — PAD SHOULDER CARE POLAR

## (undated) DEVICE — DRAPE C ARM 42 X 120 10/BX

## (undated) DEVICE — GAUZE SPONGE 4X4 12PLY

## (undated) DEVICE — APPLICATOR CHLORAPREP ORN 26ML

## (undated) DEVICE — STOCKINET 4INX48

## (undated) DEVICE — SUT PROLENE 4-0 RB-1 BL MO

## (undated) DEVICE — DRESSING TRANS 4X4 TEGADERM

## (undated) DEVICE — STOCKINETTE DBL PLY ST 4X

## (undated) DEVICE — NDL SURGEON MAYO #7 2/PK 72PKS

## (undated) DEVICE — SUT 0 VICRYL / CT-1

## (undated) DEVICE — ELECTRODE 90 DEGREE ANGLE

## (undated) DEVICE — PENCIL ELECTROSURG HOLST W/BLD

## (undated) DEVICE — DRAPE INCISE IOBAN 2 23X17IN

## (undated) DEVICE — ADHESIVE DERMABOND ADVANCED

## (undated) DEVICE — POSITIONER HEAD DONUT 9IN FOAM

## (undated) DEVICE — SUT LIGACLIP SMALL XTRA

## (undated) DEVICE — BLADE SHAVER LANZA 4.2X13CM

## (undated) DEVICE — SLING ARM X-LARGE FOAM STRAP

## (undated) DEVICE — SET BASIN 48X48IN 6000ML RING

## (undated) DEVICE — DRAPE SURG W/TWL 17 5/8X23

## (undated) DEVICE — ELECTRODE REM PLYHSV RETURN 9

## (undated) DEVICE — SEE MEDLINE ITEM 157150

## (undated) DEVICE — BLADE SURG #15 CARBON STEEL

## (undated) DEVICE — SKIN MARKER DEVON 160

## (undated) DEVICE — DRESSING XEROFORM FOIL PK 1X8

## (undated) DEVICE — SEE MEDLINE ITEM 146298

## (undated) DEVICE — KIT SHOULDER POSITIONER SPIDER

## (undated) DEVICE — DRAPE STERI INSTRUMENT 1018

## (undated) DEVICE — SEE MEDLINE ITEM 154981

## (undated) DEVICE — WARMER DRAPE STERILE LF

## (undated) DEVICE — DRAPE STERI U-SHAPED 47X51IN

## (undated) DEVICE — REPAIR KIT SMALL JOINT BIO TEN

## (undated) DEVICE — BLADE SAGITTA 5/BX

## (undated) DEVICE — GLOVE BIOGEL ORTHOPEDIC 7

## (undated) DEVICE — TRAY MINOR ORTHO

## (undated) DEVICE — Device

## (undated) DEVICE — SOL 9P NACL IRR PIC IL

## (undated) DEVICE — GLOVE BIOGEL PI MICRO SZ 7.5

## (undated) DEVICE — SUT PROLENE 0 MO6 30IN BLUE

## (undated) DEVICE — TRAY MINOR GEN SURG

## (undated) DEVICE — ELECTRODE BLADE INSULATED 1 IN

## (undated) DEVICE — BANDAGE ESMARK 6X12

## (undated) DEVICE — HEMOSTAT SURGICEL FIBRLR 1X2IN

## (undated) DEVICE — UNDERGLOVES BIOGEL PI SIZE 8

## (undated) DEVICE — GOWN SMARTGOWN LVL4 X-LONG XL

## (undated) DEVICE — MARKER SKIN STND TIP BLUE BARR

## (undated) DEVICE — SEE MEDLINE ITEM 152530

## (undated) DEVICE — NDL 22GA X1 1/2 REG BEVEL

## (undated) DEVICE — SEE MEDLINE ITEM 152622

## (undated) DEVICE — SCRUB HIBICLENS 4% CHG 4OZ

## (undated) DEVICE — STOCKINET TUBULAR 1 PLY 6X60IN

## (undated) DEVICE — STOCKINETTE TUBULAR 2PL 6 X 4

## (undated) DEVICE — SUT ETHILON 3-0 PS2 18 BLK

## (undated) DEVICE — SUT MONOCRYL 5-0 P-3 UND 18

## (undated) DEVICE — GAUZE SPONGE PEANUT STRL

## (undated) DEVICE — SEE MEDLINE ITEM 157160

## (undated) DEVICE — SUT CTD VICRYL 0 UND BR

## (undated) DEVICE — SPONGE GAUZE 16PLY 4X4

## (undated) DEVICE — PAD ELECTRODE STER 1.5X3

## (undated) DEVICE — SUT ETHILON 3/0 18IN PS-1

## (undated) DEVICE — SOL IRR NACL .9% 3000ML

## (undated) DEVICE — SUT MONOCRYL 4-0 PS-2

## (undated) DEVICE — ADHESIVE MASTISOL VIAL 48/BX

## (undated) DEVICE — SUT 4-0 12-18IN SILK BLACK

## (undated) DEVICE — SEE MEDLINE ITEM 157194

## (undated) DEVICE — DRESSING AQUACEL AG FOAM 4X4

## (undated) DEVICE — SUT 3-0 12-18IN SILK

## (undated) DEVICE — PAD CAST SPECIALIST STRL 4

## (undated) DEVICE — BLADE MICRO REC SMALL CROSS

## (undated) DEVICE — SUT ETHIBOND 0 CR/MO-7 8-18

## (undated) DEVICE — TOURNIQUET SB QC DP 18X4IN

## (undated) DEVICE — SPONGE LAP 18X18 PREWASHED

## (undated) DEVICE — SEE MEDLINE ITEM 152522

## (undated) DEVICE — CONTAINER SPECIMEN STRL 4OZ

## (undated) DEVICE — SEE MEDLINE ITEM 157166

## (undated) DEVICE — SUT 2/0 36IN COATED VICRYL

## (undated) DEVICE — SEE MEDLINE ITEM 157131

## (undated) DEVICE — TUBE SET INFLOW/OUTFLOW

## (undated) DEVICE — PAD ABD 8X10 STERILE

## (undated) DEVICE — NDL HYPO REG 25G X 1 1/2

## (undated) DEVICE — POSITIONER IV ARMBOARD FOAM

## (undated) DEVICE — SEE MEDLINE ITEM 152532

## (undated) DEVICE — GLOVE BIOGEL ORTHOPEDIC 7.5

## (undated) DEVICE — GLOVE BIOGEL SKINSENSE PI 8.0

## (undated) DEVICE — DRAPE PLASTIC U 60X72

## (undated) DEVICE — CLIP LIGACLIP XTRA TITANIUM

## (undated) DEVICE — PROBE SIMULATOR KRAFF

## (undated) DEVICE — CLOSURE SKIN STERI STRIP 1/2X4

## (undated) DEVICE — SEE MEDLINE ITEM 157116

## (undated) DEVICE — SUT 2-0 VICRYL / SH (J417)